# Patient Record
Sex: FEMALE | Race: WHITE | NOT HISPANIC OR LATINO | Employment: OTHER | URBAN - METROPOLITAN AREA
[De-identification: names, ages, dates, MRNs, and addresses within clinical notes are randomized per-mention and may not be internally consistent; named-entity substitution may affect disease eponyms.]

---

## 2017-01-06 ENCOUNTER — TELEPHONE (OUTPATIENT)
Dept: INTERNAL MEDICINE | Facility: CLINIC | Age: 82
End: 2017-01-06

## 2017-01-10 RX ORDER — HYDROCHLOROTHIAZIDE 12.5 MG/1
12.5 TABLET ORAL DAILY
Qty: 90 TABLET | Refills: 0 | Status: SHIPPED | OUTPATIENT
Start: 2017-01-10 | End: 2017-12-27 | Stop reason: HOSPADM

## 2017-01-10 RX ORDER — PRAVASTATIN SODIUM 40 MG
40 TABLET ORAL DAILY
Qty: 90 TABLET | Refills: 2 | Status: SHIPPED | OUTPATIENT
Start: 2017-01-10 | End: 2018-10-22 | Stop reason: SDUPTHER

## 2017-01-12 RX ORDER — PRAVASTATIN SODIUM 40 MG
TABLET ORAL
Qty: 90 TABLET | Refills: 0 | Status: SHIPPED | OUTPATIENT
Start: 2017-01-12 | End: 2017-03-17

## 2017-01-26 ENCOUNTER — OFFICE VISIT (OUTPATIENT)
Dept: INTERNAL MEDICINE | Facility: CLINIC | Age: 82
End: 2017-01-26

## 2017-01-26 VITALS
HEIGHT: 59 IN | BODY MASS INDEX: 27.82 KG/M2 | WEIGHT: 138 LBS | SYSTOLIC BLOOD PRESSURE: 183 MMHG | OXYGEN SATURATION: 94 % | HEART RATE: 77 BPM | DIASTOLIC BLOOD PRESSURE: 84 MMHG | TEMPERATURE: 97.7 F

## 2017-01-26 DIAGNOSIS — M81.0 OSTEOPOROSIS: ICD-10-CM

## 2017-01-26 DIAGNOSIS — M15.9 GENERALIZED OSTEOARTHRITIS: ICD-10-CM

## 2017-01-26 DIAGNOSIS — F32.89 OTHER DEPRESSION: ICD-10-CM

## 2017-01-26 DIAGNOSIS — E78.49 OTHER HYPERLIPIDEMIA: Primary | ICD-10-CM

## 2017-01-26 DIAGNOSIS — J06.9 UPPER RESPIRATORY TRACT INFECTION, UNSPECIFIED TYPE: ICD-10-CM

## 2017-01-26 DIAGNOSIS — I10 ESSENTIAL HYPERTENSION: ICD-10-CM

## 2017-01-26 PROCEDURE — 99214 OFFICE O/P EST MOD 30 MIN: CPT | Performed by: INTERNAL MEDICINE

## 2017-01-26 RX ORDER — AZITHROMYCIN 250 MG/1
TABLET, FILM COATED ORAL
Qty: 6 TABLET | Refills: 0 | Status: SHIPPED | OUTPATIENT
Start: 2017-01-26 | End: 2017-03-17

## 2017-01-26 RX ORDER — METHYLPREDNISOLONE 4 MG/1
TABLET ORAL
Qty: 21 TABLET | Refills: 0 | Status: SHIPPED | OUTPATIENT
Start: 2017-01-26 | End: 2017-03-17

## 2017-01-26 RX ORDER — TRIAMCINOLONE ACETONIDE 1 MG/G
CREAM TOPICAL
COMMUNITY
Start: 2017-01-06 | End: 2017-09-18

## 2017-01-26 NOTE — MR AVS SNAPSHOT
Neha Guillory   1/26/2017 3:30 PM   Office Visit    Dept Phone:  [phone]   Encounter #:  [id number]    Provider:  Wm Mchugh MD   Department:  [facility] INTERNAL MEDICINE                Your Full Care Plan              Today's Medication Changes          These changes are accurate as of: 1/26/17  4:55 PM.  If you have any questions, ask your nurse or doctor.               New Medication(s)Ordered:     azithromycin 250 MG tablet   Commonly known as:  ZITHROMAX   Take 2 tablets the first day, then 1 tablet daily for 4 days.   Started by:  Wm Mchugh MD       MethylPREDNISolone 4 MG tablet   Commonly known as:  MEDROL (ATILIO)   Take as directed on package instructions.   Started by:  Wm Mchugh MD            Where to Get Your Medications      These medications were sent to [address] AT [facility]  - [phone]  - [phone] [place] [address]     Phone:  [phone]     azithromycin 250 MG tablet    MethylPREDNISolone 4 MG tablet                  Your Updated Medication List          This list is accurate as of: 1/26/17  4:55 PM.  Always use your most recent med list.                azithromycin 250 MG tablet   Commonly known as:  ZITHROMAX   Take 2 tablets the first day, then 1 tablet daily for 4 days.       carvedilol 25 MG tablet   Commonly known as:  COREG       CloNIDine 0.2 MG tablet   Commonly known as:  CATAPRES   TAKE TWO TABLETS BY MOUTH EVERY NIGHT AT BEDTIME       hydrochlorothiazide 12.5 MG tablet   Commonly known as:  HYDRODIURIL   Take 1 tablet by mouth Daily.       losartan 100 MG tablet   Commonly known as:  COZAAR       MethylPREDNISolone 4 MG tablet   Commonly known as:  MEDROL (ATILIO)   Take as directed on package instructions.       oxyCODONE-acetaminophen 5-325 MG per tablet   Commonly known as:  ROXICET   1-2 tabs q 4 hour  prn pain       * pravastatin 40 MG tablet   Commonly known as:  PRAVACHOL   Take 1 tablet by mouth Daily.       * pravastatin 40 MG tablet   Commonly known as:  PRAVACHOL   TAKE ONE TABLET BY MOUTH DAILY       triamcinolone 0.1 % cream   Commonly known as:  KENALOG       * Notice:  This list has 2 medication(s) that are the same as other medications prescribed for you. Read the directions carefully, and ask your doctor or other care provider to review them with you.            We Performed the Following     CBC & Differential     Comprehensive Metabolic Panel     Lipid Panel With LDL / HDL Ratio     T4, Free     TSH     Urinalysis With Microscopic       You Were Diagnosed With        Codes Comments    Other hyperlipidemia    -  Primary ICD-10-CM: E78.4  ICD-9-CM: 272.4     Essential hypertension     ICD-10-CM: I10  ICD-9-CM: 401.9     Generalized osteoarthritis     ICD-10-CM: M15.9  ICD-9-CM: 715.00     Osteoporosis     ICD-10-CM: M81.0  ICD-9-CM: 733.00     Other depression     ICD-10-CM: F32.89     Upper respiratory tract infection, unspecified type     ICD-10-CM: J06.9  ICD-9-CM: 465.9       Medications to be Given to You by a Medical Professional     Due       Frequency    2/8/2016 denosumab (PROLIA) syringe 60 mg  Once      Instructions     None    Patient Instructions History      Upcoming Appointments     Visit Type Date Time Department    OFFICE VISIT 1/26/2017  3:30 PM [facility] 1 4003    INJECTION 2/8/2017  1:00 PM  [facility] OP INFU NON ONC    [facility] 3/3/2017  9:00 AM [facility]    OFFICE VISIT 3/17/2017  9:00 AM [facility]    OFFICE VISIT 6/29/2017  9:45 AM [facility] 1 4003    OFFICE VISIT 7/26/2017 10:45 AM [facility] 1 4003      MyChart Signup     Our records indicate that you have declined [employer] signup. If you would like to sign up for [employer], please email [email or web address] or call [phone] to obtain an activation code.             Other Info  "from Your Visit           Your Appointments     Feb 08, 2017  1:00 PM EST   Injection with ROOM 5 [facility]    [address]  [facility] [address] [phone]            Mar 03, 2017  9:00 AM EST   [facility] with [facility], [facility]   [facility] ENDOCRINOLOGY (--)    [address]  [facility] [address] [phone]            Mar 17, 2017  9:00 AM EDT   Office Visit with ENZO Ricci   [facility] ENDOCRINOLOGY (--)    [address]  [facility] [address] [phone]           Arrive 15 minutes prior to appointment.            Jun 29, 2017  9:45 AM EDT   Office Visit with Wm Mchugh MD   [facility] INTERNAL MEDICINE (--)    [address]  [facility] [address] [phone]           Arrive 15 minutes prior to appointment.            Jul 26, 2017 10:45 AM EDT   Office Visit with Wm Mchugh MD   [facility] INTERNAL MEDICINE (--)    [address]  [facility] [address] [phone]           Arrive 15 minutes prior to appointment.              Allergies     Atorvastatin      myalgias  myalgias  myalgias    Amoxicillin  Hives      Vital Signs     Blood Pressure Pulse Temperature Height Weight Oxygen Saturation    183/84 (BP Location: Left arm, Patient Position: Sitting, Cuff Size: Adult) 77 97.7 °F (36.5 °C) (Tympanic) 59\" (149.9 cm) 138 lb (62.6 kg) 94%    Body Mass Index Smoking Status                27.87 kg/m2 Never Smoker          Problems and Diagnoses Noted     Depression    Generalized osteoarthritis    High cholesterol or triglycerides    High blood pressure    Osteoporosis    Upper respiratory infection      No Longer an Issue     Rash and nonspecific skin eruption        "

## 2017-01-26 NOTE — PROGRESS NOTES
Subjective   Neha Guillory is a 83 y.o. female.   She is here today for hyperlipidemia hypertension generalized osteoarthritis osteoporosis depression and has never respiratory tract infection today  History of Present Illness   She is here today for an upper respiratory tract infection today along with hyper lipidemia hypertension generalized osteoarthritis osteoporosis depression and she forgot HER-2 p.m. blood pressure medicine today  The following portions of the patient's history were reviewed and updated as appropriate: allergies, current medications, past family history, past medical history, past social history, past surgical history and problem list.    Review of Systems   HENT: Positive for congestion and postnasal drip.    All other systems reviewed and are negative.      Objective   Physical Exam   Constitutional: She is oriented to person, place, and time. She appears well-developed and well-nourished. She is cooperative.   HENT:   Head: Normocephalic and atraumatic.   Right Ear: Hearing, tympanic membrane, external ear and ear canal normal.   Left Ear: Hearing, tympanic membrane, external ear and ear canal normal.   Nose: Nose normal.   Mouth/Throat: Oropharyngeal exudate (upper respiratory congestion) present.   Eyes: Conjunctivae, EOM and lids are normal. Pupils are equal, round, and reactive to light.   Neck: Phonation normal. Neck supple. Carotid bruit is not present.   Cardiovascular: Normal rate, regular rhythm and normal heart sounds.  Exam reveals no gallop and no friction rub.    No murmur heard.  Pulmonary/Chest: Effort normal and breath sounds normal. No respiratory distress.   Abdominal: Soft. Bowel sounds are normal. She exhibits no distension and no mass. There is no hepatosplenomegaly. There is no tenderness. There is no rebound and no guarding. No hernia.   Musculoskeletal: She exhibits no edema.   Neurological: She is alert and oriented to person, place, and time. Coordination and gait  normal.   Skin: Skin is warm and dry.   Psychiatric: She has a normal mood and affect. Her speech is normal and behavior is normal. Judgment and thought content normal.   Nursing note and vitals reviewed.      Assessment/Plan   Diagnoses and all orders for this visit:    Other hyperlipidemia  -     Lipid Panel With LDL / HDL Ratio  -     CBC & Differential  -     Comprehensive Metabolic Panel  -     T4, Free  -     TSH  -     Urinalysis With Microscopic    Essential hypertension  -     Lipid Panel With LDL / HDL Ratio  -     CBC & Differential  -     Comprehensive Metabolic Panel  -     T4, Free  -     TSH  -     Urinalysis With Microscopic    Generalized osteoarthritis  -     Lipid Panel With LDL / HDL Ratio  -     CBC & Differential  -     Comprehensive Metabolic Panel  -     T4, Free  -     TSH  -     Urinalysis With Microscopic    Osteoporosis  -     Lipid Panel With LDL / HDL Ratio  -     CBC & Differential  -     Comprehensive Metabolic Panel  -     T4, Free  -     TSH  -     Urinalysis With Microscopic    Other depression  -     Lipid Panel With LDL / HDL Ratio  -     CBC & Differential  -     Comprehensive Metabolic Panel  -     T4, Free  -     TSH  -     Urinalysis With Microscopic    Upper respiratory tract infection, unspecified type  -     Lipid Panel With LDL / HDL Ratio  -     CBC & Differential  -     Comprehensive Metabolic Panel  -     T4, Free  -     TSH  -     Urinalysis With Microscopic    Other orders  -     azithromycin (ZITHROMAX) 250 MG tablet; Take 2 tablets the first day, then 1 tablet daily for 4 days.  -     MethylPREDNISolone (MEDROL, ATILIO,) 4 MG tablet; Take as directed on package instructions.  -     Microscopic Examination      Hyper lipidemia keep LDL less than 70 with proper diet exercise medication  Osteoporosis regular DEXA scans bisphosphonates calcium vitamin D  Depression stable on current medication  Upper respiratory tract infection medication as noted  Generalized  osteoporosis supportive meds physical therapy  Hypertension normally well controlled but she forgot HER-2 p.m. blood pressure medication today    She forgot her 2 pm b/p med today

## 2017-01-27 LAB
ALBUMIN SERPL-MCNC: 4.5 G/DL (ref 3.5–5.2)
ALBUMIN/GLOB SERPL: 1.9 G/DL
ALP SERPL-CCNC: 56 U/L (ref 39–117)
ALT SERPL-CCNC: 12 U/L (ref 1–33)
APPEARANCE UR: CLEAR
AST SERPL-CCNC: 16 U/L (ref 1–32)
BACTERIA #/AREA URNS HPF: ABNORMAL /HPF
BASOPHILS # BLD AUTO: 0.03 10*3/MM3 (ref 0–0.2)
BASOPHILS NFR BLD AUTO: 0.3 % (ref 0–1.5)
BILIRUB SERPL-MCNC: 0.7 MG/DL (ref 0.1–1.2)
BILIRUB UR QL STRIP: NEGATIVE
BUN SERPL-MCNC: 20 MG/DL (ref 8–23)
BUN/CREAT SERPL: 20.2 (ref 7–25)
CALCIUM SERPL-MCNC: 9.7 MG/DL (ref 8.6–10.5)
CASTS URNS MICRO: ABNORMAL
CHLORIDE SERPL-SCNC: 99 MMOL/L (ref 98–107)
CHOLEST SERPL-MCNC: 137 MG/DL (ref 0–200)
CO2 SERPL-SCNC: 30.2 MMOL/L (ref 22–29)
COLOR UR: YELLOW
CREAT SERPL-MCNC: 0.99 MG/DL (ref 0.57–1)
EOSINOPHIL # BLD AUTO: 0.38 10*3/MM3 (ref 0–0.7)
EOSINOPHIL NFR BLD AUTO: 4 % (ref 0.3–6.2)
EPI CELLS #/AREA URNS HPF: ABNORMAL /HPF
ERYTHROCYTE [DISTWIDTH] IN BLOOD BY AUTOMATED COUNT: 12.9 % (ref 11.7–13)
GLOBULIN SER CALC-MCNC: 2.4 GM/DL
GLUCOSE SERPL-MCNC: 93 MG/DL (ref 65–99)
GLUCOSE UR QL: NEGATIVE
HCT VFR BLD AUTO: 42.4 % (ref 35.6–45.5)
HDLC SERPL-MCNC: 49 MG/DL (ref 40–60)
HGB BLD-MCNC: 13.9 G/DL (ref 11.9–15.5)
HGB UR QL STRIP: NEGATIVE
IMM GRANULOCYTES # BLD: 0.05 10*3/MM3 (ref 0–0.03)
IMM GRANULOCYTES NFR BLD: 0.5 % (ref 0–0.5)
KETONES UR QL STRIP: NEGATIVE
LDLC SERPL CALC-MCNC: 72 MG/DL (ref 0–100)
LDLC/HDLC SERPL: 1.47 {RATIO}
LEUKOCYTE ESTERASE UR QL STRIP: (no result)
LYMPHOCYTES # BLD AUTO: 1.28 10*3/MM3 (ref 0.9–4.8)
LYMPHOCYTES NFR BLD AUTO: 13.4 % (ref 19.6–45.3)
MCH RBC QN AUTO: 30 PG (ref 26.9–32)
MCHC RBC AUTO-ENTMCNC: 32.8 G/DL (ref 32.4–36.3)
MCV RBC AUTO: 91.6 FL (ref 80.5–98.2)
MONOCYTES # BLD AUTO: 1.14 10*3/MM3 (ref 0.2–1.2)
MONOCYTES NFR BLD AUTO: 11.9 % (ref 5–12)
NEUTROPHILS # BLD AUTO: 6.7 10*3/MM3 (ref 1.9–8.1)
NEUTROPHILS NFR BLD AUTO: 69.9 % (ref 42.7–76)
NITRITE UR QL STRIP: NEGATIVE
PH UR STRIP: 6 [PH] (ref 5–8)
PLATELET # BLD AUTO: 256 10*3/MM3 (ref 140–500)
POTASSIUM SERPL-SCNC: 4.3 MMOL/L (ref 3.5–5.2)
PROT SERPL-MCNC: 6.9 G/DL (ref 6–8.5)
PROT UR QL STRIP: NEGATIVE
RBC # BLD AUTO: 4.63 10*6/MM3 (ref 3.9–5.2)
RBC #/AREA URNS HPF: ABNORMAL /HPF
SODIUM SERPL-SCNC: 140 MMOL/L (ref 136–145)
SP GR UR: 1.01 (ref 1–1.03)
T4 FREE SERPL-MCNC: 1.72 NG/DL (ref 0.93–1.7)
TRIGL SERPL-MCNC: 79 MG/DL (ref 0–150)
TSH SERPL DL<=0.005 MIU/L-ACNC: 1.13 MIU/ML (ref 0.27–4.2)
UROBILINOGEN UR STRIP-MCNC: (no result) MG/DL
VLDLC SERPL CALC-MCNC: 15.8 MG/DL (ref 5–40)
WBC # BLD AUTO: 9.58 10*3/MM3 (ref 4.5–10.7)
WBC #/AREA URNS HPF: ABNORMAL /HPF

## 2017-02-06 DIAGNOSIS — M81.0 OSTEOPOROSIS: Primary | ICD-10-CM

## 2017-02-06 DIAGNOSIS — E55.9 VITAMIN D DEFICIENCY: Primary | ICD-10-CM

## 2017-02-06 DIAGNOSIS — M81.0 OSTEOPOROSIS: ICD-10-CM

## 2017-02-06 DIAGNOSIS — M85.80 OSTEOPENIA: ICD-10-CM

## 2017-02-07 PROBLEM — M81.0 SENILE OSTEOPOROSIS: Status: ACTIVE | Noted: 2017-02-07

## 2017-02-08 ENCOUNTER — HOSPITAL ENCOUNTER (OUTPATIENT)
Dept: INFUSION THERAPY | Facility: HOSPITAL | Age: 82
Discharge: HOME | End: 2017-02-08
Admitting: INTERNAL MEDICINE

## 2017-02-08 VITALS
BODY MASS INDEX: 27.82 KG/M2 | SYSTOLIC BLOOD PRESSURE: 124 MMHG | HEIGHT: 59 IN | WEIGHT: 138 LBS | OXYGEN SATURATION: 92 % | TEMPERATURE: 97.9 F | RESPIRATION RATE: 20 BRPM | DIASTOLIC BLOOD PRESSURE: 68 MMHG | HEART RATE: 70 BPM

## 2017-02-08 DIAGNOSIS — M81.0 SENILE OSTEOPOROSIS: ICD-10-CM

## 2017-02-08 PROCEDURE — 96401 CHEMO ANTI-NEOPL SQ/IM: CPT

## 2017-02-08 PROCEDURE — 25010000002 DENOSUMAB 60 MG/ML SOLUTION: Performed by: INTERNAL MEDICINE

## 2017-02-08 RX ADMIN — DENOSUMAB 60 MG: 60 INJECTION SUBCUTANEOUS at 13:12

## 2017-02-20 DIAGNOSIS — E21.3 HYPERPARATHYROIDISM (HCC): ICD-10-CM

## 2017-02-20 DIAGNOSIS — E83.52 HYPERCALCEMIA: ICD-10-CM

## 2017-02-20 DIAGNOSIS — E79.0 HYPERURICEMIA: ICD-10-CM

## 2017-02-20 DIAGNOSIS — E89.2 H/O PARATHYROIDECTOMY (HCC): ICD-10-CM

## 2017-02-20 DIAGNOSIS — E55.9 VITAMIN D DEFICIENCY: Primary | ICD-10-CM

## 2017-02-24 RX ORDER — OSELTAMIVIR PHOSPHATE 75 MG/1
75 CAPSULE ORAL 2 TIMES DAILY
Qty: 10 CAPSULE | Refills: 0 | Status: SHIPPED | OUTPATIENT
Start: 2017-02-24 | End: 2017-03-17

## 2017-03-07 ENCOUNTER — LAB (OUTPATIENT)
Dept: ENDOCRINOLOGY | Age: 82
End: 2017-03-07

## 2017-03-07 DIAGNOSIS — E79.0 HYPERURICEMIA: ICD-10-CM

## 2017-03-07 DIAGNOSIS — E83.52 HYPERCALCEMIA: ICD-10-CM

## 2017-03-07 DIAGNOSIS — E89.2 H/O PARATHYROIDECTOMY (HCC): ICD-10-CM

## 2017-03-07 DIAGNOSIS — E55.9 VITAMIN D DEFICIENCY: ICD-10-CM

## 2017-03-07 DIAGNOSIS — E21.3 HYPERPARATHYROIDISM (HCC): ICD-10-CM

## 2017-03-08 LAB
25(OH)D3+25(OH)D2 SERPL-MCNC: 31.7 NG/ML (ref 30–100)
ALBUMIN SERPL-MCNC: 4.3 G/DL (ref 3.5–5.2)
ALBUMIN/GLOB SERPL: 1.9 G/DL
ALP SERPL-CCNC: 42 U/L (ref 39–117)
ALT SERPL-CCNC: 16 U/L (ref 1–33)
AST SERPL-CCNC: 21 U/L (ref 1–32)
BILIRUB SERPL-MCNC: 0.4 MG/DL (ref 0.1–1.2)
BUN SERPL-MCNC: 31 MG/DL (ref 8–23)
BUN/CREAT SERPL: 26.5 (ref 7–25)
CA-I SERPL ISE-MCNC: 5.5 MG/DL (ref 4.5–5.6)
CALCIUM SERPL-MCNC: 9.5 MG/DL (ref 8.6–10.5)
CHLORIDE SERPL-SCNC: 101 MMOL/L (ref 98–107)
CO2 SERPL-SCNC: 27.9 MMOL/L (ref 22–29)
CREAT SERPL-MCNC: 1.17 MG/DL (ref 0.57–1)
GLOBULIN SER CALC-MCNC: 2.3 GM/DL
GLUCOSE SERPL-MCNC: 109 MG/DL (ref 65–99)
MAGNESIUM SERPL-MCNC: 2.1 MG/DL (ref 1.6–2.4)
PHOSPHATE SERPL-MCNC: 4.4 MG/DL (ref 2.5–4.5)
POTASSIUM SERPL-SCNC: 4.1 MMOL/L (ref 3.5–5.2)
PROT SERPL-MCNC: 6.6 G/DL (ref 6–8.5)
PTH-INTACT SERPL-MCNC: 47 PG/ML (ref 15–65)
SODIUM SERPL-SCNC: 141 MMOL/L (ref 136–145)
URATE SERPL-MCNC: 7.4 MG/DL (ref 2.4–5.7)

## 2017-03-17 ENCOUNTER — OFFICE VISIT (OUTPATIENT)
Dept: ENDOCRINOLOGY | Age: 82
End: 2017-03-17

## 2017-03-17 VITALS
HEIGHT: 59 IN | SYSTOLIC BLOOD PRESSURE: 138 MMHG | DIASTOLIC BLOOD PRESSURE: 80 MMHG | WEIGHT: 137 LBS | BODY MASS INDEX: 27.62 KG/M2

## 2017-03-17 DIAGNOSIS — E89.2 H/O PARATHYROIDECTOMY (HCC): ICD-10-CM

## 2017-03-17 DIAGNOSIS — E79.0 HYPERURICEMIA: ICD-10-CM

## 2017-03-17 DIAGNOSIS — I10 ESSENTIAL HYPERTENSION: ICD-10-CM

## 2017-03-17 DIAGNOSIS — E78.49 OTHER HYPERLIPIDEMIA: ICD-10-CM

## 2017-03-17 DIAGNOSIS — E55.9 VITAMIN D DEFICIENCY: ICD-10-CM

## 2017-03-17 DIAGNOSIS — E83.52 HYPERCALCEMIA: Primary | ICD-10-CM

## 2017-03-17 PROBLEM — I51.89 DIASTOLIC DYSFUNCTION: Status: ACTIVE | Noted: 2017-03-17

## 2017-03-17 PROCEDURE — 99214 OFFICE O/P EST MOD 30 MIN: CPT | Performed by: NURSE PRACTITIONER

## 2017-03-17 RX ORDER — CLONIDINE HYDROCHLORIDE 0.2 MG/1
0.2 TABLET ORAL 2 TIMES DAILY
COMMUNITY
Start: 2017-02-13 | End: 2018-10-04 | Stop reason: SDUPTHER

## 2017-03-17 RX ORDER — CARVEDILOL 25 MG/1
25 TABLET ORAL
COMMUNITY
Start: 2017-02-13 | End: 2017-03-17 | Stop reason: SDUPTHER

## 2017-03-17 RX ORDER — LOSARTAN POTASSIUM 100 MG/1
100 TABLET ORAL DAILY
Status: ON HOLD | COMMUNITY
Start: 2017-02-13 | End: 2017-12-27

## 2017-03-17 RX ORDER — HYDROCHLOROTHIAZIDE 12.5 MG/1
12.5 TABLET ORAL
COMMUNITY
Start: 2017-02-13 | End: 2017-03-17 | Stop reason: SDUPTHER

## 2017-03-17 NOTE — PROGRESS NOTES
"Carol Guillory is a 84 y.o. female is here today for follow-up.  Chief Complaint   Patient presents with   • Hyperlipidemia     has recent labs   • Hypertension     tingling in both hands   • Vitamin D Deficiency   • hyperparathyroidism   • Abnormal Calcium   • hyperuricemia     Visit Vitals   • /62   • Ht 59\" (149.9 cm)   • Wt 137 lb (62.1 kg)   • BMI 27.67 kg/m2     Allergies   Allergen Reactions   • Atorvastatin      myalgias  myalgias  myalgias   • Amoxicillin Hives       Current Outpatient Prescriptions:   •  B COMPLEX VITAMINS PO, Take 1 tablet by mouth., Disp: , Rfl:   •  carvedilol (COREG) 25 MG tablet, Take 1 tablet by mouth daily., Disp: , Rfl:   •  CloNIDine (CATAPRES) 0.2 MG tablet, Take 0.1 mg by mouth 2 (Two) Times a Day. One in the am one at night, Disp: , Rfl:   •  hydrochlorothiazide (HYDRODIURIL) 12.5 MG tablet, Take 1 tablet by mouth Daily., Disp: 90 tablet, Rfl: 0  •  losartan (COZAAR) 100 MG tablet, Take 100 mg by mouth., Disp: , Rfl:   •  MULTIPLE VITAMINS-MINERALS PO, Take 1 tablet by mouth., Disp: , Rfl:   •  pravastatin (PRAVACHOL) 40 MG tablet, Take 1 tablet by mouth Daily., Disp: 90 tablet, Rfl: 2  •  triamcinolone (KENALOG) 0.1 % cream, , Disp: , Rfl:     Current Facility-Administered Medications:   •  denosumab (PROLIA) syringe 60 mg, 60 mg, Subcutaneous, Once, Wm Mchugh MD  Family History   Problem Relation Age of Onset   • Colon cancer Sister    • Heart disease Brother      CABG   • Other Brother      BRAIN TUMOR   • Cancer Other      Social History     Social History   • Marital status: Single     Spouse name: N/A   • Number of children: N/A   • Years of education: N/A     Social History Main Topics   • Smoking status: Never Smoker   • Smokeless tobacco: Never Used   • Alcohol use No   • Drug use: No   • Sexual activity: Defer     Other Topics Concern   • None     Social History Narrative         History of Present Illness   Encounter Diagnoses   Name Primary? "   • Other hyperlipidemia    • Essential hypertension    • Vitamin D deficiency    • Hypercalcemia Yes   • H/O parathyroidectomy    • Hyperuricemia      This is an 84-year-old female patient being seen today for routine follow-up visit for the above-mentioned problems.  She had a parathyroidectomy done in April of 2016 by Dr Marina and she has had no issues since. Her recent labs were reviewed and she was provided a copy. Her calcium level is normal. Her Cr has increased and gfr has decreased since her last labs. She admits she does not drink enough water and her labs were drawn in the morning and she had not drank anything since the night before. She also complains of leg cramps at night but this is not a new problem. She believes it is due to not drinking enough water. Her vitamin D level was at the low end of normal. Her uric acid level was elevated and she denies any history of gout. Her BP was elevated on the first attempt however it was rechecked and was stable. She does have tingling in her hands at night but she has had this for awhile. Her PCP believes it is carpel tunnel. She denies any other complaints.       The following portions of the patient's history were reviewed and updated as appropriate: allergies, current medications, past family history, past medical history, past social history, past surgical history and problem list.    Review of Systems   Constitutional: Negative for fatigue.   HENT: Negative for trouble swallowing.    Eyes: Negative for visual disturbance.   Respiratory: Negative for shortness of breath.    Cardiovascular: Negative for leg swelling.   Gastrointestinal: Negative for nausea.   Endocrine: Negative for polyphagia.   Genitourinary: Negative for urgency.   Musculoskeletal: Negative for joint swelling.   Skin: Negative for wound.   Allergic/Immunologic: Negative for immunocompromised state.   Neurological: Negative for numbness.   Hematological: Negative for adenopathy.    Psychiatric/Behavioral: The patient is not nervous/anxious.        Objective   Physical Exam   Constitutional: She is oriented to person, place, and time. She appears well-developed and well-nourished. No distress.   HENT:   Head: Normocephalic.   Right Ear: External ear normal.   Left Ear: External ear normal.   Nose: Nose normal.   Mouth/Throat: Oropharynx is clear and moist.   Eyes: Conjunctivae and EOM are normal. Pupils are equal, round, and reactive to light.   Neck: Normal range of motion. Neck supple. Carotid bruit is not present. Edema present. No tracheal deviation and no erythema present. No thyromegaly present.   Cardiovascular: Normal rate, regular rhythm and intact distal pulses.  Exam reveals no gallop and no friction rub.    Murmur heard.  Pulmonary/Chest: Effort normal and breath sounds normal. No respiratory distress.   Abdominal: Soft.   Musculoskeletal: Normal range of motion. She exhibits no edema.   Trace edema. Varicose veins   Lymphadenopathy:     She has no cervical adenopathy.   Neurological: She is alert and oriented to person, place, and time.   Leg cramps at night sometimes   Skin: Skin is warm and dry. She is not diaphoretic. No erythema. No pallor.   Psychiatric: She has a normal mood and affect. Her behavior is normal. Judgment and thought content normal.   Nursing note and vitals reviewed.    Results for orders placed or performed in visit on 03/07/17   Comprehensive Metabolic Panel   Result Value Ref Range    Glucose 109 (H) 65 - 99 mg/dL    BUN 31 (H) 8 - 23 mg/dL    Creatinine 1.17 (H) 0.57 - 1.00 mg/dL    eGFR Non African Am 44 (L) >60 mL/min/1.73    eGFR African Am 53 (L) >60 mL/min/1.73    BUN/Creatinine Ratio 26.5 (H) 7.0 - 25.0    Sodium 141 136 - 145 mmol/L    Potassium 4.1 3.5 - 5.2 mmol/L    Chloride 101 98 - 107 mmol/L    Total CO2 27.9 22.0 - 29.0 mmol/L    Calcium 9.5 8.6 - 10.5 mg/dL    Total Protein 6.6 6.0 - 8.5 g/dL    Albumin 4.30 3.50 - 5.20 g/dL    Globulin 2.3  gm/dL    A/G Ratio 1.9 g/dL    Total Bilirubin 0.4 0.1 - 1.2 mg/dL    Alkaline Phosphatase 42 39 - 117 U/L    AST (SGOT) 21 1 - 32 U/L    ALT (SGPT) 16 1 - 33 U/L   Vitamin D 25 Hydroxy   Result Value Ref Range    25 Hydroxy, Vitamin D 31.7 30.0 - 100.0 ng/mL   Calcium, Ionized   Result Value Ref Range    Ionized Calcium 5.5 4.5 - 5.6 mg/dL   Phosphorus   Result Value Ref Range    Phosphorus 4.4 2.5 - 4.5 mg/dL   Magnesium   Result Value Ref Range    Magnesium 2.1 1.6 - 2.4 mg/dL   Uric Acid   Result Value Ref Range    Uric Acid 7.4 (H) 2.4 - 5.7 mg/dL   PTH, Intact   Result Value Ref Range    PTH, Intact 47 15 - 65 pg/mL         Assessment/Plan   Neha was seen today for hyperlipidemia, hypertension, vitamin d deficiency, hyperparathyroidism, abnormal calcium and hyperuricemia.    Diagnoses and all orders for this visit:    Hypercalcemia    Other hyperlipidemia    Essential hypertension    Vitamin D deficiency    H/O parathyroidectomy    Hyperuricemia    In summary, patient was seen and examined. She had recent labs done which were reviewed and she was provided a copy. Her calcium level was normal on her most recent labs. Her uric acid level is elevated and she denies any history of gout. We discussed possible medications used to treat hyperuricemia and she has chosen not to use medication at this time and instead watch her diet and limit the amount of purines. She was provided written materials on gout and hyperuricemia and foods to avoid that are high in purine. She verbalizes understanding. We also discussed her kidney function per her recent labs and she was advised to increase her water intake and we will continue to monitor this. Since her vitamin D is at the low end of normal, it was recommended for her to take OTC vitamin D 2,000u a day. Her cholesterol is stable. She is to follow up in 6 months with labs prior. I have asked that she contact the office with any questions or concerns prior to her next visit.

## 2017-04-10 RX ORDER — PRAVASTATIN SODIUM 40 MG
TABLET ORAL
Qty: 90 TABLET | Refills: 0 | Status: SHIPPED | OUTPATIENT
Start: 2017-04-10 | End: 2017-08-09 | Stop reason: SDUPTHER

## 2017-07-21 DIAGNOSIS — E55.9 VITAMIN D DEFICIENCY: ICD-10-CM

## 2017-07-21 DIAGNOSIS — E78.49 OTHER HYPERLIPIDEMIA: Primary | ICD-10-CM

## 2017-07-21 DIAGNOSIS — E83.52 HYPERCALCEMIA: ICD-10-CM

## 2017-07-21 DIAGNOSIS — E79.0 HYPERURICEMIA: ICD-10-CM

## 2017-07-26 ENCOUNTER — HOSPITAL ENCOUNTER (OUTPATIENT)
Dept: GENERAL RADIOLOGY | Facility: HOSPITAL | Age: 82
Discharge: HOME | End: 2017-07-26
Attending: INTERNAL MEDICINE | Admitting: INTERNAL MEDICINE

## 2017-07-26 ENCOUNTER — OFFICE VISIT (OUTPATIENT)
Dept: INTERNAL MEDICINE | Facility: CLINIC | Age: 82
End: 2017-07-26

## 2017-07-26 VITALS
DIASTOLIC BLOOD PRESSURE: 73 MMHG | OXYGEN SATURATION: 92 % | BODY MASS INDEX: 28.22 KG/M2 | TEMPERATURE: 97.8 F | WEIGHT: 140 LBS | HEART RATE: 59 BPM | HEIGHT: 59 IN | SYSTOLIC BLOOD PRESSURE: 147 MMHG

## 2017-07-26 DIAGNOSIS — Z00.00 MEDICARE ANNUAL WELLNESS VISIT, INITIAL: Primary | ICD-10-CM

## 2017-07-26 DIAGNOSIS — M25.552 HIP PAIN, LEFT: ICD-10-CM

## 2017-07-26 DIAGNOSIS — M81.0 OSTEOPOROSIS: ICD-10-CM

## 2017-07-26 DIAGNOSIS — I27.20 PULMONARY HYPERTENSION (HCC): ICD-10-CM

## 2017-07-26 DIAGNOSIS — G47.33 OSA ON CPAP: ICD-10-CM

## 2017-07-26 DIAGNOSIS — I51.89 DIASTOLIC DYSFUNCTION: ICD-10-CM

## 2017-07-26 DIAGNOSIS — E78.49 OTHER HYPERLIPIDEMIA: ICD-10-CM

## 2017-07-26 DIAGNOSIS — I10 ESSENTIAL HYPERTENSION: ICD-10-CM

## 2017-07-26 DIAGNOSIS — Z99.89 OSA ON CPAP: ICD-10-CM

## 2017-07-26 DIAGNOSIS — M20.41 HAMMER TOE OF SECOND TOE OF RIGHT FOOT: ICD-10-CM

## 2017-07-26 DIAGNOSIS — I42.2 PRIMARY HYPERTROPHIC CARDIOMYOPATHY (HCC): ICD-10-CM

## 2017-07-26 PROCEDURE — 99214 OFFICE O/P EST MOD 30 MIN: CPT | Performed by: INTERNAL MEDICINE

## 2017-07-26 PROCEDURE — G0438 PPPS, INITIAL VISIT: HCPCS | Performed by: INTERNAL MEDICINE

## 2017-07-26 PROCEDURE — 73502 X-RAY EXAM HIP UNI 2-3 VIEWS: CPT

## 2017-07-26 NOTE — PROGRESS NOTES
QUICK REFERENCE INFORMATION:  The ABCs of the Annual Wellness Visit    Initial Medicare Wellness Visit    HEALTH RISK ASSESSMENT    2/4/1933    Recent Hospitalizations:  No hospitalization(s) within the last year..        Current Medical Providers:  Patient Care Team:  Wm Mchugh MD as PCP - General (Internal Medicine)  Wm Mchugh MD as PCP - Claims Attributed        Smoking Status:  History   Smoking Status   • Never Smoker   Smokeless Tobacco   • Never Used       Alcohol Consumption:  History   Alcohol Use No       Depression Screen:   PHQ-9 Depression Screening 7/26/2017   Little interest or pleasure in doing things 0   Feeling down, depressed, or hopeless 0   PHQ-9 Total Score 0       Health Habits and Functional and Cognitive Screening:  Functional & Cognitive Status 7/26/2017   Do you have difficulty preparing food and eating? No   Do you have difficulty bathing yourself? No   Do you have difficulty getting dressed? No   Do you have difficulty using the toilet? No   Do you have difficulty moving around from place to place? No   In the past year have you fallen or experienced a near fall? Yes   Do you need help using the phone?  No   Are you deaf or do you have serious difficulty hearing?  No   Do you need help with transportation? No   Do you need help shopping? No   Do you need help preparing meals?  No   Do you need help with housework?  No   Do you need help with laundry? No   Do you need help taking your medications? No   Do you need help managing money? No   Do you have difficulty concentrating, remembering or making decisions? No       Health Habits  Current Diet: Well Balanced Diet  Dental Exam: Up to date  Eye Exam: Up to date  Exercise (times per week): 0 times per week  Current Exercise Activities Include: None          Does the patient have evidence of cognitive impairment? No    Asiprin use counseling: Start ASA 81 mg daily       Recent Lab Results:    Visual Acuity:  No exam data  present    Age-appropriate Screening Schedule:  Refer to the list below for future screening recommendations based on patient's age, sex and/or medical conditions. Orders for these recommended tests are listed in the plan section. The patient has been provided with a written plan.    Health Maintenance   Topic Date Due   • TDAP/TD VACCINES (1 - Tdap) 02/04/1952   • ZOSTER VACCINE  02/04/2016   • DXA SCAN  07/14/2017   • INFLUENZA VACCINE  08/01/2017   • PNEUMOCOCCAL VACCINES (65+ LOW/MEDIUM RISK) (2 of 2 - PPSV23) 01/26/2018   • LIPID PANEL  01/26/2018   • MAMMOGRAM  06/06/2018        Subjective   History of Present Illness    Neha Guillory is a 84 y.o. female who presents for an Annual Wellness Visit.    The following portions of the patient's history were reviewed and updated as appropriate: allergies, current medications, past family history, past medical history, past social history, past surgical history and problem list.    Outpatient Medications Prior to Visit   Medication Sig Dispense Refill   • B COMPLEX VITAMINS PO Take 1 tablet by mouth.     • carvedilol (COREG) 25 MG tablet Take 1 tablet by mouth daily.     • CloNIDine (CATAPRES) 0.2 MG tablet Take 0.1 mg by mouth 2 (Two) Times a Day. One in the am one at night     • hydrochlorothiazide (HYDRODIURIL) 12.5 MG tablet Take 1 tablet by mouth Daily. 90 tablet 0   • losartan (COZAAR) 100 MG tablet Take 100 mg by mouth.     • MULTIPLE VITAMINS-MINERALS PO Take 1 tablet by mouth.     • pravastatin (PRAVACHOL) 40 MG tablet Take 1 tablet by mouth Daily. 90 tablet 2   • pravastatin (PRAVACHOL) 40 MG tablet TAKE ONE TABLET BY MOUTH DAILY 90 tablet 0   • triamcinolone (KENALOG) 0.1 % cream        Facility-Administered Medications Prior to Visit   Medication Dose Route Frequency Provider Last Rate Last Dose   • denosumab (PROLIA) syringe 60 mg  60 mg Subcutaneous Once Wm Mchugh MD           Patient Active Problem List   Diagnosis   • Hypercalcemia   •  "Hyperlipidemia   • Essential hypertension   • Primary hypertrophic cardiomyopathy   • Depression   • Generalized osteoarthritis   • Obstructive sleep apnea syndrome   • Osteoporosis   • Thrombophlebitis of deep veins of lower extremity   • Vitamin D deficiency   • Colon polyps   • Family hx of colon cancer   • H/O parathyroidectomy   • Pulmonary hypertension   • Echocardiogram abnormal   • Stenosis of carotid artery   • Ischemic rest pain of lower extremity   • Bilateral carpal tunnel syndrome   • Visit for screening mammogram   • Osteopenia   • Hyperuricemia   • Allergic reaction caused by a drug   • Upper respiratory infection   • Senile osteoporosis   • Diastolic dysfunction       Advance Care Planning:  has an advance directive - a copy HAS NOT been provided    Identification of Risk Factors:  Risk factors include: weight .    Review of Systems    Compared to one year ago, the patient feels her physical health is worse.  Compared to one year ago, the patient feels her mental health is the same.    Objective     Physical Exam    Vitals:    07/26/17 1103   BP: 147/73   BP Location: Left arm   Patient Position: Sitting   Cuff Size: Adult   Pulse: 59   Temp: 97.8 °F (36.6 °C)   TempSrc: Tympanic   SpO2: 92%   Weight: 140 lb (63.5 kg)   Height: 59\" (149.9 cm)   PainSc:   4       Body mass index is 28.28 kg/(m^2).  Discussed the patient's BMI with her. The BMI is above average; BMI management plan is completed.    Assessment/Plan   Patient Self-Management and Personalized Health Advice  The patient has been provided with information about: weight management and preventive services including:   · Nutrition counseling provided.    Visit Diagnoses:  No diagnosis found.    No orders of the defined types were placed in this encounter.      Outpatient Encounter Prescriptions as of 7/26/2017   Medication Sig Dispense Refill   • B COMPLEX VITAMINS PO Take 1 tablet by mouth.     • carvedilol (COREG) 25 MG tablet Take 1 tablet " by mouth daily.     • CloNIDine (CATAPRES) 0.2 MG tablet Take 0.1 mg by mouth 2 (Two) Times a Day. One in the am one at night     • hydrochlorothiazide (HYDRODIURIL) 12.5 MG tablet Take 1 tablet by mouth Daily. 90 tablet 0   • losartan (COZAAR) 100 MG tablet Take 100 mg by mouth.     • MULTIPLE VITAMINS-MINERALS PO Take 1 tablet by mouth.     • pravastatin (PRAVACHOL) 40 MG tablet Take 1 tablet by mouth Daily. 90 tablet 2   • pravastatin (PRAVACHOL) 40 MG tablet TAKE ONE TABLET BY MOUTH DAILY 90 tablet 0   • triamcinolone (KENALOG) 0.1 % cream        Facility-Administered Encounter Medications as of 7/26/2017   Medication Dose Route Frequency Provider Last Rate Last Dose   • denosumab (PROLIA) syringe 60 mg  60 mg Subcutaneous Once Wm Mchugh MD           Reviewed use of high risk medication in the elderly: yes  Reviewed for potential of harmful drug interactions in the elderly: yes    Follow Up:  No Follow-up on file.     An After Visit Summary and PPPS with all of these plans were given to the patient.

## 2017-07-26 NOTE — PATIENT INSTRUCTIONS
Medicare Wellness  Personal Prevention Plan of Service     Date of Office Visit:  2017  Encounter Provider:  Wm Mchugh MD  Place of Service:  [facility] INTERNAL MEDICINE  Patient Name: Neha Guillory  :  1933    As part of the Medicare Wellness portion of your visit today, we are providing you with this personalized preventive plan of services (PPPS). This plan is based upon recommendations of the [place] Preventive Services Task Force (USPSTF) and the Advisory Committee on Immunization Practices (ACIP).    This lists the preventive care services that should be considered, and provides dates of when you are due. Items listed as completed are up-to-date and do not require any further intervention.    Health Maintenance   Topic Date Due   • TDAP/TD VACCINES (1 - Tdap) 1952   • ZOSTER VACCINE  2016   • DXA SCAN  2017   • INFLUENZA VACCINE  2017   • PNEUMOCOCCAL VACCINES (65+ LOW/MEDIUM RISK) (2 of 2 - PPSV23) 2018   • LIPID PANEL  2018   • MAMMOGRAM  2018   • MEDICARE ANNUAL WELLNESS  2018       No orders of the defined types were placed in this encounter.      Return in about 6 months (around 2018).

## 2017-07-27 LAB
ALBUMIN SERPL-MCNC: 4.6 G/DL (ref 3.5–5.2)
ALBUMIN/GLOB SERPL: 2.2 G/DL
ALP SERPL-CCNC: 40 U/L (ref 39–117)
ALT SERPL-CCNC: 17 U/L (ref 1–33)
APPEARANCE UR: CLEAR
AST SERPL-CCNC: 14 U/L (ref 1–32)
BACTERIA #/AREA URNS HPF: ABNORMAL /HPF
BASOPHILS # BLD AUTO: 0.04 10*3/MM3 (ref 0–0.2)
BASOPHILS NFR BLD AUTO: 0.4 % (ref 0–1.5)
BILIRUB SERPL-MCNC: 0.7 MG/DL (ref 0.1–1.2)
BILIRUB UR QL STRIP: NEGATIVE
BUN SERPL-MCNC: 23 MG/DL (ref 8–23)
BUN/CREAT SERPL: 22.3 (ref 7–25)
CALCIUM SERPL-MCNC: 9.7 MG/DL (ref 8.6–10.5)
CASTS URNS MICRO: ABNORMAL
CHLORIDE SERPL-SCNC: 101 MMOL/L (ref 98–107)
CHOLEST SERPL-MCNC: 131 MG/DL (ref 0–200)
CO2 SERPL-SCNC: 28.5 MMOL/L (ref 22–29)
COLOR UR: YELLOW
CREAT SERPL-MCNC: 1.03 MG/DL (ref 0.57–1)
EOSINOPHIL # BLD AUTO: 0.31 10*3/MM3 (ref 0–0.7)
EOSINOPHIL NFR BLD AUTO: 3.4 % (ref 0.3–6.2)
EPI CELLS #/AREA URNS HPF: ABNORMAL /HPF
ERYTHROCYTE [DISTWIDTH] IN BLOOD BY AUTOMATED COUNT: 13.5 % (ref 11.7–13)
GLOBULIN SER CALC-MCNC: 2.1 GM/DL
GLUCOSE SERPL-MCNC: 92 MG/DL (ref 65–99)
GLUCOSE UR QL: NEGATIVE
HCT VFR BLD AUTO: 43.4 % (ref 35.6–45.5)
HDLC SERPL-MCNC: 48 MG/DL (ref 40–60)
HGB BLD-MCNC: 13.9 G/DL (ref 11.9–15.5)
HGB UR QL STRIP: NEGATIVE
IMM GRANULOCYTES # BLD: 0.03 10*3/MM3 (ref 0–0.03)
IMM GRANULOCYTES NFR BLD: 0.3 % (ref 0–0.5)
KETONES UR QL STRIP: NEGATIVE
LDLC SERPL CALC-MCNC: 61 MG/DL (ref 0–100)
LDLC/HDLC SERPL: 1.28 {RATIO}
LEUKOCYTE ESTERASE UR QL STRIP: (no result)
LYMPHOCYTES # BLD AUTO: 1.32 10*3/MM3 (ref 0.9–4.8)
LYMPHOCYTES NFR BLD AUTO: 14.5 % (ref 19.6–45.3)
MCH RBC QN AUTO: 30.3 PG (ref 26.9–32)
MCHC RBC AUTO-ENTMCNC: 32 G/DL (ref 32.4–36.3)
MCV RBC AUTO: 94.8 FL (ref 80.5–98.2)
MONOCYTES # BLD AUTO: 0.79 10*3/MM3 (ref 0.2–1.2)
MONOCYTES NFR BLD AUTO: 8.7 % (ref 5–12)
NEUTROPHILS # BLD AUTO: 6.63 10*3/MM3 (ref 1.9–8.1)
NEUTROPHILS NFR BLD AUTO: 72.7 % (ref 42.7–76)
NITRITE UR QL STRIP: NEGATIVE
PH UR STRIP: 6 [PH] (ref 5–8)
PLATELET # BLD AUTO: 221 10*3/MM3 (ref 140–500)
POTASSIUM SERPL-SCNC: 4.6 MMOL/L (ref 3.5–5.2)
PROT SERPL-MCNC: 6.7 G/DL (ref 6–8.5)
PROT UR QL STRIP: NEGATIVE
RBC # BLD AUTO: 4.58 10*6/MM3 (ref 3.9–5.2)
RBC #/AREA URNS HPF: ABNORMAL /HPF
SODIUM SERPL-SCNC: 141 MMOL/L (ref 136–145)
SP GR UR: 1.02 (ref 1–1.03)
T4 FREE SERPL-MCNC: 1.54 NG/DL (ref 0.93–1.7)
TRIGL SERPL-MCNC: 108 MG/DL (ref 0–150)
TSH SERPL DL<=0.005 MIU/L-ACNC: 1.34 MIU/ML (ref 0.27–4.2)
UROBILINOGEN UR STRIP-MCNC: (no result) MG/DL
VLDLC SERPL CALC-MCNC: 21.6 MG/DL (ref 5–40)
WBC # BLD AUTO: 9.12 10*3/MM3 (ref 4.5–10.7)
WBC #/AREA URNS HPF: ABNORMAL /HPF

## 2017-07-31 NOTE — PROGRESS NOTES
Subjective   Neha Guillory is a 84 y.o. female.   She is here today for Medicare annual visit initial along with hypertension hyperlipidemia left hip pain Hammer toe of second toe of right foot hypertension diastolic dysfunction RICH on CPAP hypertrophic cardiomyopathy and osteoporosis  History of Present Illness   She is here today for Medicare annual wellness visit initial along with hypertension up lipidemia left hip pain Hammer toe of second toe of right foot hypertension diastolic dysfunction RICH on CPAP and hypertrophic cardiomyopathy  The following portions of the patient's history were reviewed and updated as appropriate: allergies, current medications, past family history, past medical history, past social history, past surgical history and problem list.    Review of Systems   Musculoskeletal: Positive for arthralgias.   All other systems reviewed and are negative.      Objective   Physical Exam   Constitutional: She is oriented to person, place, and time. Vital signs are normal. She appears well-developed and well-nourished. She is active.   HENT:   Head: Normocephalic and atraumatic.   Right Ear: Hearing, tympanic membrane, external ear and ear canal normal.   Left Ear: Hearing, tympanic membrane, external ear and ear canal normal.   Nose: Nose normal.   Mouth/Throat: Uvula is midline, oropharynx is clear and moist and mucous membranes are normal.   Eyes: Conjunctivae, EOM and lids are normal. Pupils are equal, round, and reactive to light. Right eye exhibits no discharge. Left eye exhibits no discharge.   Neck: Trachea normal, normal range of motion, full passive range of motion without pain and phonation normal. Neck supple. Carotid bruit is not present. No edema present. No thyroid mass and no thyromegaly present.   Cardiovascular: Normal rate, regular rhythm, normal heart sounds, intact distal pulses and normal pulses.  Exam reveals no gallop and no friction rub.    No murmur heard.  Pulmonary/Chest:  Effort normal and breath sounds normal. No respiratory distress. She has no wheezes. She has no rales.   Abdominal: Soft. Normal appearance, normal aorta and bowel sounds are normal. She exhibits no distension, no abdominal bruit and no mass. There is no hepatosplenomegaly. There is no tenderness. There is no rebound, no guarding and no CVA tenderness. No hernia. Hernia confirmed negative in the right inguinal area and confirmed negative in the left inguinal area.   Musculoskeletal: Normal range of motion. She exhibits no edema.        Left hip: She exhibits tenderness.       Vascular Status -  Her exam exhibits right foot vasculature normal. Her exam exhibits no right foot edema. Her exam exhibits left foot vasculature normal. Her exam exhibits no left foot edema.   Skin Integrity  -  Her right foot skin is intact.     Neha 's left foot skin is intact. .     Lymphadenopathy:     She has no cervical adenopathy.     She has no axillary adenopathy.        Right: No inguinal and no supraclavicular adenopathy present.        Left: No inguinal and no supraclavicular adenopathy present.   Neurological: She is alert and oriented to person, place, and time. She has normal strength. No cranial nerve deficit or sensory deficit. She exhibits normal muscle tone. She displays a negative Romberg sign. Coordination normal.   Skin: Skin is warm, dry and intact. No cyanosis. Nails show no clubbing.   Psychiatric: She has a normal mood and affect. Her speech is normal and behavior is normal. Judgment and thought content normal. Cognition and memory are normal.   Nursing note and vitals reviewed.      Assessment/Plan   Diagnoses and all orders for this visit:    Medicare annual wellness visit, initial  -     Lipid Panel With LDL / HDL Ratio  -     CBC & Differential  -     Comprehensive Metabolic Panel  -     T4, Free  -     TSH  -     Urinalysis With Microscopic    Essential hypertension  -     Lipid Panel With LDL / HDL Ratio  -      CBC & Differential  -     Comprehensive Metabolic Panel  -     T4, Free  -     TSH  -     Urinalysis With Microscopic    Other hyperlipidemia  -     Lipid Panel With LDL / HDL Ratio  -     CBC & Differential  -     Comprehensive Metabolic Panel  -     T4, Free  -     TSH  -     Urinalysis With Microscopic    Hip pain, left  -     Lipid Panel With LDL / HDL Ratio  -     CBC & Differential  -     Comprehensive Metabolic Panel  -     T4, Free  -     TSH  -     Urinalysis With Microscopic  -     XR Hip With or Without Pelvis 2 - 3 View Left    Hammer toe of second toe of right foot  -     Lipid Panel With LDL / HDL Ratio  -     CBC & Differential  -     Comprehensive Metabolic Panel  -     T4, Free  -     TSH  -     Urinalysis With Microscopic  -     Ambulatory Referral to Podiatry    Pulmonary hypertension  -     Lipid Panel With LDL / HDL Ratio  -     CBC & Differential  -     Comprehensive Metabolic Panel  -     T4, Free  -     TSH  -     Urinalysis With Microscopic    Diastolic dysfunction  -     Lipid Panel With LDL / HDL Ratio  -     CBC & Differential  -     Comprehensive Metabolic Panel  -     T4, Free  -     TSH  -     Urinalysis With Microscopic    RICH on CPAP  -     Lipid Panel With LDL / HDL Ratio  -     CBC & Differential  -     Comprehensive Metabolic Panel  -     T4, Free  -     TSH  -     Urinalysis With Microscopic    Primary hypertrophic cardiomyopathy  -     Lipid Panel With LDL / HDL Ratio  -     CBC & Differential  -     Comprehensive Metabolic Panel  -     T4, Free  -     TSH  -     Urinalysis With Microscopic    Osteoporosis  -     DEXA Bone Density Axial; Future    Other orders  -     Microscopic Examination      Medicare annual wellness visit initial following recommendations  RICH on CPAP continue CPAP  Hypertrophic cardiomyopathy follow with cardiology  Osteoporosis regular DEXA scans bisphosphonates calcium vitamin D  Diastolic dysfunction continue medicine as needed  Hammer toe of second toe  of right foot referral to podiatry  Hyper lipidemia keep LDL less than 70 with proper diet exercise medication  Hypertension overall well controlled but weight loss would be beneficial along with diet exercise

## 2017-08-02 ENCOUNTER — LAB (OUTPATIENT)
Dept: ENDOCRINOLOGY | Age: 82
End: 2017-08-02

## 2017-08-02 DIAGNOSIS — E55.9 VITAMIN D DEFICIENCY: ICD-10-CM

## 2017-08-02 DIAGNOSIS — E83.52 HYPERCALCEMIA: ICD-10-CM

## 2017-08-02 DIAGNOSIS — E79.0 HYPERURICEMIA: ICD-10-CM

## 2017-08-02 DIAGNOSIS — E78.49 OTHER HYPERLIPIDEMIA: ICD-10-CM

## 2017-08-03 LAB
25(OH)D3+25(OH)D2 SERPL-MCNC: 45.4 NG/ML (ref 30–100)
ALBUMIN SERPL-MCNC: 4.4 G/DL (ref 3.5–5.2)
ALBUMIN/GLOB SERPL: 1.8 G/DL
ALP SERPL-CCNC: 45 U/L (ref 39–117)
ALT SERPL-CCNC: 16 U/L (ref 1–33)
AST SERPL-CCNC: 17 U/L (ref 1–32)
BILIRUB SERPL-MCNC: 0.6 MG/DL (ref 0.1–1.2)
BUN SERPL-MCNC: 25 MG/DL (ref 8–23)
BUN/CREAT SERPL: 24.3 (ref 7–25)
CA-I SERPL ISE-MCNC: 5.5 MG/DL (ref 4.5–5.6)
CALCIUM SERPL-MCNC: 10 MG/DL (ref 8.6–10.5)
CHLORIDE SERPL-SCNC: 104 MMOL/L (ref 98–107)
CHOLEST SERPL-MCNC: 138 MG/DL (ref 0–200)
CO2 SERPL-SCNC: 26.3 MMOL/L (ref 22–29)
CREAT SERPL-MCNC: 1.03 MG/DL (ref 0.57–1)
GLOBULIN SER CALC-MCNC: 2.4 GM/DL
GLUCOSE SERPL-MCNC: 108 MG/DL (ref 65–99)
HDLC SERPL-MCNC: 49 MG/DL (ref 40–60)
LDLC SERPL CALC-MCNC: 73 MG/DL (ref 0–100)
PHOSPHATE SERPL-MCNC: 3.8 MG/DL (ref 2.5–4.5)
POTASSIUM SERPL-SCNC: 4.4 MMOL/L (ref 3.5–5.2)
PROT SERPL-MCNC: 6.8 G/DL (ref 6–8.5)
PTH-INTACT SERPL-MCNC: 28 PG/ML (ref 15–65)
SODIUM SERPL-SCNC: 143 MMOL/L (ref 136–145)
TRIGL SERPL-MCNC: 82 MG/DL (ref 0–150)
URATE SERPL-MCNC: 7.5 MG/DL (ref 2.4–5.7)
VLDLC SERPL CALC-MCNC: 16.4 MG/DL (ref 5–40)

## 2017-08-09 ENCOUNTER — HOSPITAL ENCOUNTER (OUTPATIENT)
Dept: BONE DENSITY | Facility: HOSPITAL | Age: 82
Discharge: HOME | End: 2017-08-09
Attending: INTERNAL MEDICINE

## 2017-08-09 ENCOUNTER — HOSPITAL ENCOUNTER (OUTPATIENT)
Dept: INFUSION THERAPY | Facility: HOSPITAL | Age: 82
Discharge: HOME | End: 2017-08-09
Attending: INTERNAL MEDICINE | Admitting: INTERNAL MEDICINE

## 2017-08-09 VITALS
TEMPERATURE: 98.1 F | SYSTOLIC BLOOD PRESSURE: 145 MMHG | HEART RATE: 66 BPM | OXYGEN SATURATION: 96 % | DIASTOLIC BLOOD PRESSURE: 71 MMHG | RESPIRATION RATE: 20 BRPM

## 2017-08-09 DIAGNOSIS — M81.0 OSTEOPOROSIS: ICD-10-CM

## 2017-08-09 DIAGNOSIS — M81.0 SENILE OSTEOPOROSIS: ICD-10-CM

## 2017-08-09 PROCEDURE — 25010000002 DENOSUMAB 60 MG/ML SOLUTION: Performed by: INTERNAL MEDICINE

## 2017-08-09 PROCEDURE — 96372 THER/PROPH/DIAG INJ SC/IM: CPT

## 2017-08-09 PROCEDURE — 77080 DXA BONE DENSITY AXIAL: CPT

## 2017-08-09 RX ORDER — ASPIRIN 81 MG/1
1 TABLET ORAL DAILY
COMMUNITY

## 2017-08-09 RX ADMIN — DENOSUMAB 60 MG: 60 INJECTION SUBCUTANEOUS at 12:06

## 2017-08-10 ENCOUNTER — APPOINTMENT (OUTPATIENT)
Dept: WOMENS IMAGING | Facility: HOSPITAL | Age: 82
End: 2017-08-10

## 2017-08-10 PROCEDURE — G0202 SCR MAMMO BI INCL CAD: HCPCS | Performed by: RADIOLOGY

## 2017-08-10 PROCEDURE — 77063 BREAST TOMOSYNTHESIS BI: CPT | Performed by: RADIOLOGY

## 2017-08-28 DIAGNOSIS — E83.52 HYPERCALCEMIA: ICD-10-CM

## 2017-08-28 DIAGNOSIS — E89.2 H/O PARATHYROIDECTOMY (HCC): ICD-10-CM

## 2017-08-28 DIAGNOSIS — E55.9 VITAMIN D DEFICIENCY: Primary | ICD-10-CM

## 2017-08-28 DIAGNOSIS — E79.0 HYPERURICEMIA: ICD-10-CM

## 2017-09-05 ENCOUNTER — LAB (OUTPATIENT)
Dept: ENDOCRINOLOGY | Age: 82
End: 2017-09-05

## 2017-09-05 DIAGNOSIS — E89.2 H/O PARATHYROIDECTOMY (HCC): ICD-10-CM

## 2017-09-05 DIAGNOSIS — E79.0 HYPERURICEMIA: ICD-10-CM

## 2017-09-05 DIAGNOSIS — E55.9 VITAMIN D DEFICIENCY: ICD-10-CM

## 2017-09-05 DIAGNOSIS — E83.52 HYPERCALCEMIA: ICD-10-CM

## 2017-09-06 LAB
25(OH)D3+25(OH)D2 SERPL-MCNC: 38.8 NG/ML (ref 30–100)
ALBUMIN SERPL-MCNC: 4.2 G/DL (ref 3.5–5.2)
ALBUMIN/GLOB SERPL: 2.1 G/DL
ALP SERPL-CCNC: 42 U/L (ref 39–117)
ALT SERPL-CCNC: 17 U/L (ref 1–33)
AST SERPL-CCNC: 19 U/L (ref 1–32)
BILIRUB SERPL-MCNC: 0.5 MG/DL (ref 0.1–1.2)
BUN SERPL-MCNC: 21 MG/DL (ref 8–23)
BUN/CREAT SERPL: 17.8 (ref 7–25)
CA-I SERPL ISE-MCNC: 5.3 MG/DL (ref 4.5–5.6)
CALCIUM SERPL-MCNC: 9.7 MG/DL (ref 8.6–10.5)
CHLORIDE SERPL-SCNC: 107 MMOL/L (ref 98–107)
CHOLEST SERPL-MCNC: 132 MG/DL (ref 0–200)
CO2 SERPL-SCNC: 25.5 MMOL/L (ref 22–29)
CREAT SERPL-MCNC: 1.18 MG/DL (ref 0.57–1)
GLOBULIN SER CALC-MCNC: 2 GM/DL
GLUCOSE SERPL-MCNC: 102 MG/DL (ref 65–99)
HDLC SERPL-MCNC: 55 MG/DL (ref 40–60)
LDLC SERPL CALC-MCNC: 59 MG/DL (ref 0–100)
PHOSPHATE SERPL-MCNC: 3.7 MG/DL (ref 2.5–4.5)
POTASSIUM SERPL-SCNC: 4.6 MMOL/L (ref 3.5–5.2)
PROT SERPL-MCNC: 6.2 G/DL (ref 6–8.5)
PTH-INTACT SERPL-MCNC: 61 PG/ML (ref 15–65)
SODIUM SERPL-SCNC: 146 MMOL/L (ref 136–145)
TRIGL SERPL-MCNC: 88 MG/DL (ref 0–150)
URATE SERPL-MCNC: 7.3 MG/DL (ref 2.4–5.7)
VLDLC SERPL CALC-MCNC: 17.6 MG/DL (ref 5–40)

## 2017-09-18 ENCOUNTER — OFFICE VISIT (OUTPATIENT)
Dept: ENDOCRINOLOGY | Age: 82
End: 2017-09-18

## 2017-09-18 VITALS
SYSTOLIC BLOOD PRESSURE: 126 MMHG | HEIGHT: 59 IN | BODY MASS INDEX: 28.39 KG/M2 | WEIGHT: 140.8 LBS | DIASTOLIC BLOOD PRESSURE: 76 MMHG

## 2017-09-18 DIAGNOSIS — E89.2 H/O PARATHYROIDECTOMY (HCC): Primary | ICD-10-CM

## 2017-09-18 DIAGNOSIS — E79.0 HYPERURICEMIA: ICD-10-CM

## 2017-09-18 DIAGNOSIS — E83.52 HYPERCALCEMIA: ICD-10-CM

## 2017-09-18 DIAGNOSIS — E55.9 VITAMIN D DEFICIENCY: ICD-10-CM

## 2017-09-18 DIAGNOSIS — I10 ESSENTIAL HYPERTENSION: ICD-10-CM

## 2017-09-18 PROCEDURE — 99214 OFFICE O/P EST MOD 30 MIN: CPT | Performed by: NURSE PRACTITIONER

## 2017-09-18 RX ORDER — MELATONIN
1000 DAILY
COMMUNITY

## 2017-09-18 NOTE — PROGRESS NOTES
"Carol Guillory is a 84 y.o. female is here today for follow-up.  Chief Complaint   Patient presents with   • Abnormal Calcium     recent labs   • Vitamin D Deficiency   • Hypertension   • hyperuricemia     /76  Ht 59\" (149.9 cm)  Wt 140 lb 12.8 oz (63.9 kg)  BMI 28.44 kg/m2  Current Outpatient Prescriptions on File Prior to Visit   Medication Sig   • aspirin 81 MG EC tablet Take 81 mg by mouth Daily.   • B COMPLEX VITAMINS PO Take 1 tablet by mouth.   • carvedilol (COREG) 25 MG tablet Take 1 tablet by mouth daily.   • CloNIDine (CATAPRES) 0.2 MG tablet Take 0.1 mg by mouth 2 (Two) Times a Day. One in the am one at night   • hydrochlorothiazide (HYDRODIURIL) 12.5 MG tablet Take 1 tablet by mouth Daily.   • losartan (COZAAR) 100 MG tablet Take 100 mg by mouth.   • MULTIPLE VITAMINS-MINERALS PO Take 1 tablet by mouth.   • pravastatin (PRAVACHOL) 40 MG tablet Take 1 tablet by mouth Daily.   • [DISCONTINUED] triamcinolone (KENALOG) 0.1 % cream      Current Facility-Administered Medications on File Prior to Visit   Medication   • denosumab (PROLIA) syringe 60 mg     Family History   Problem Relation Age of Onset   • Colon cancer Sister    • Heart disease Brother      CABG   • Other Brother      BRAIN TUMOR   • Cancer Other      Social History   Substance Use Topics   • Smoking status: Never Smoker   • Smokeless tobacco: Never Used   • Alcohol use No     Allergies   Allergen Reactions   • Atorvastatin      myalgias  myalgias  myalgias  myalgias   • Amoxicillin Hives         History of Present Illness   Encounter Diagnoses   Name Primary?   • H/O parathyroidectomy Yes   • Hyperuricemia    • Essential hypertension    • Vitamin D deficiency    • Hypercalcemia    This is an 84-year-old female patient here today for a routine follow-up visit.  She had recent labs which were reviewed and she was provided a copy.  She has no complaints at today's visit.  She has a history of having a parathyroidectomy.  She is " taking her medications as prescribed.  She is currently taken vitamin D 1000 units over-the-counter daily.  She has had no reason which to go the emergency room.      The following portions of the patient's history were reviewed and updated as appropriate: allergies, current medications, past family history, past medical history, past social history, past surgical history and problem list.    Review of Systems   Constitutional: Negative for fatigue.   HENT: Negative for trouble swallowing.    Eyes: Negative for visual disturbance.   Respiratory: Negative for shortness of breath.    Cardiovascular: Negative for leg swelling.   Endocrine: Negative for polyuria.   Skin: Negative for wound.   Neurological: Negative for numbness.       Objective   Physical Exam   Constitutional: She is oriented to person, place, and time. She appears well-developed and well-nourished. No distress.   HENT:   Head: Normocephalic.   Right Ear: External ear normal.   Left Ear: External ear normal.   Nose: Nose normal.   Mouth/Throat: Oropharynx is clear and moist.   Eyes: Conjunctivae and EOM are normal. Pupils are equal, round, and reactive to light.   Neck: Normal range of motion. Neck supple. Carotid bruit is not present. Edema present. No tracheal deviation and no erythema present. No thyromegaly present.   Cardiovascular: Normal rate, regular rhythm and intact distal pulses.  Exam reveals no gallop and no friction rub.    Murmur heard.  Pulmonary/Chest: Effort normal and breath sounds normal. No respiratory distress.   Abdominal: Soft.   Musculoskeletal: Normal range of motion. She exhibits no edema.   Trace edema. Varicose veins  kyphosis   Lymphadenopathy:     She has no cervical adenopathy.   Neurological: She is alert and oriented to person, place, and time.   Leg cramps at night sometimes   Skin: Skin is warm and dry. She is not diaphoretic. No erythema. No pallor.   Psychiatric: She has a normal mood and affect. Her behavior is  normal. Judgment and thought content normal.   Nursing note and vitals reviewed.      Results for orders placed or performed in visit on 09/05/17   Comprehensive Metabolic Panel   Result Value Ref Range    Glucose 102 (H) 65 - 99 mg/dL    BUN 21 8 - 23 mg/dL    Creatinine 1.18 (H) 0.57 - 1.00 mg/dL    eGFR Non African Am 44 (L) >60 mL/min/1.73    eGFR African Am 53 (L) >60 mL/min/1.73    BUN/Creatinine Ratio 17.8 7.0 - 25.0    Sodium 146 (H) 136 - 145 mmol/L    Potassium 4.6 3.5 - 5.2 mmol/L    Chloride 107 98 - 107 mmol/L    Total CO2 25.5 22.0 - 29.0 mmol/L    Calcium 9.7 8.6 - 10.5 mg/dL    Total Protein 6.2 6.0 - 8.5 g/dL    Albumin 4.20 3.50 - 5.20 g/dL    Globulin 2.0 gm/dL    A/G Ratio 2.1 g/dL    Total Bilirubin 0.5 0.1 - 1.2 mg/dL    Alkaline Phosphatase 42 39 - 117 U/L    AST (SGOT) 19 1 - 32 U/L    ALT (SGPT) 17 1 - 33 U/L   Lipid Panel   Result Value Ref Range    Total Cholesterol 132 0 - 200 mg/dL    Triglycerides 88 0 - 150 mg/dL    HDL Cholesterol 55 40 - 60 mg/dL    VLDL Cholesterol 17.6 5 - 40 mg/dL    LDL Cholesterol  59 0 - 100 mg/dL   Vitamin D 25 Hydroxy   Result Value Ref Range    25 Hydroxy, Vitamin D 38.8 30.0 - 100.0 ng/mL   Calcium, Ionized   Result Value Ref Range    Ionized Calcium 5.3 4.5 - 5.6 mg/dL   Phosphorus   Result Value Ref Range    Phosphorus 3.7 2.5 - 4.5 mg/dL   Uric Acid   Result Value Ref Range    Uric Acid 7.3 (H) 2.4 - 5.7 mg/dL   PTH, Intact   Result Value Ref Range    PTH, Intact 61 15 - 65 pg/mL       Assessment/Plan   Neha was seen today for abnormal calcium, vitamin d deficiency, hypertension and hyperuricemia.    Diagnoses and all orders for this visit:    H/O parathyroidectomy    Hyperuricemia    Essential hypertension    Vitamin D deficiency    Hypercalcemia        In summary, patient was seen and examined.  Her labs are all acceptable range.  She will continue all her current medications as prescribed.  Her recent labs were reviewed which are metabolically  stable.  Her parathyroid hormone and vitamin D level on acceptable range.  Her calcium level is stable.  Her blood pressure is in good range.  No medications were changed at today's visit.  She will follow-up in 6 months with labs prior.  I've encouraged her to contact the office should she have any questions or concerns prior to then

## 2017-12-26 ENCOUNTER — APPOINTMENT (OUTPATIENT)
Dept: CT IMAGING | Facility: HOSPITAL | Age: 82
End: 2017-12-26

## 2017-12-26 ENCOUNTER — HOSPITAL ENCOUNTER (OUTPATIENT)
Facility: HOSPITAL | Age: 82
Setting detail: OBSERVATION
Discharge: HOME | End: 2017-12-27
Attending: EMERGENCY MEDICINE | Admitting: INTERNAL MEDICINE

## 2017-12-26 DIAGNOSIS — D72.829 LEUKOCYTOSIS, UNSPECIFIED TYPE: ICD-10-CM

## 2017-12-26 DIAGNOSIS — K52.9 GASTROENTERITIS: Primary | ICD-10-CM

## 2017-12-26 PROBLEM — T73.0XXA STARVATION KETOACIDOSIS: Status: ACTIVE | Noted: 2017-12-26

## 2017-12-26 PROBLEM — N18.30 CKD (CHRONIC KIDNEY DISEASE) STAGE 3, GFR 30-59 ML/MIN: Chronic | Status: ACTIVE | Noted: 2017-12-26

## 2017-12-26 PROBLEM — E86.0 DEHYDRATION: Status: ACTIVE | Noted: 2017-12-26

## 2017-12-26 PROBLEM — E87.29 STARVATION KETOACIDOSIS: Status: ACTIVE | Noted: 2017-12-26

## 2017-12-26 LAB
ALBUMIN SERPL-MCNC: 4.3 G/DL (ref 3.5–5.2)
ALBUMIN/GLOB SERPL: 1.2 G/DL
ALP SERPL-CCNC: 51 U/L (ref 39–117)
ALT SERPL W P-5'-P-CCNC: 21 U/L (ref 1–33)
ANION GAP SERPL CALCULATED.3IONS-SCNC: 17.5 MMOL/L
AST SERPL-CCNC: 24 U/L (ref 1–32)
BACTERIA UR QL AUTO: ABNORMAL /HPF
BASOPHILS # BLD AUTO: 0.02 10*3/MM3 (ref 0–0.2)
BASOPHILS NFR BLD AUTO: 0.1 % (ref 0–1.5)
BILIRUB SERPL-MCNC: 0.7 MG/DL (ref 0.1–1.2)
BILIRUB UR QL STRIP: NEGATIVE
BUN BLD-MCNC: 25 MG/DL (ref 8–23)
BUN/CREAT SERPL: 25 (ref 7–25)
CALCIUM SPEC-SCNC: 9.9 MG/DL (ref 8.6–10.5)
CHLORIDE SERPL-SCNC: 103 MMOL/L (ref 98–107)
CLARITY UR: CLEAR
CO2 SERPL-SCNC: 24.5 MMOL/L (ref 22–29)
COLOR UR: YELLOW
CREAT BLD-MCNC: 1 MG/DL (ref 0.57–1)
D-LACTATE SERPL-SCNC: 1.4 MMOL/L (ref 0.5–2)
DEPRECATED RDW RBC AUTO: 42.6 FL (ref 37–54)
EOSINOPHIL # BLD AUTO: 0 10*3/MM3 (ref 0–0.7)
EOSINOPHIL NFR BLD AUTO: 0 % (ref 0.3–6.2)
ERYTHROCYTE [DISTWIDTH] IN BLOOD BY AUTOMATED COUNT: 12.9 % (ref 11.7–13)
GFR SERPL CREATININE-BSD FRML MDRD: 53 ML/MIN/1.73
GLOBULIN UR ELPH-MCNC: 3.6 GM/DL
GLUCOSE BLD-MCNC: 150 MG/DL (ref 65–99)
GLUCOSE UR STRIP-MCNC: NEGATIVE MG/DL
HCT VFR BLD AUTO: 45.2 % (ref 35.6–45.5)
HGB BLD-MCNC: 15.2 G/DL (ref 11.9–15.5)
HGB UR QL STRIP.AUTO: NEGATIVE
HOLD SPECIMEN: NORMAL
HOLD SPECIMEN: NORMAL
HYALINE CASTS UR QL AUTO: ABNORMAL /LPF
IMM GRANULOCYTES # BLD: 0.07 10*3/MM3 (ref 0–0.03)
IMM GRANULOCYTES NFR BLD: 0.3 % (ref 0–0.5)
KETONES UR QL STRIP: ABNORMAL
LEUKOCYTE ESTERASE UR QL STRIP.AUTO: ABNORMAL
LIPASE SERPL-CCNC: 26 U/L (ref 13–60)
LYMPHOCYTES # BLD AUTO: 0.49 10*3/MM3 (ref 0.9–4.8)
LYMPHOCYTES NFR BLD AUTO: 2.4 % (ref 19.6–45.3)
MCH RBC QN AUTO: 30.6 PG (ref 26.9–32)
MCHC RBC AUTO-ENTMCNC: 33.6 G/DL (ref 32.4–36.3)
MCV RBC AUTO: 90.9 FL (ref 80.5–98.2)
MONOCYTES # BLD AUTO: 0.75 10*3/MM3 (ref 0.2–1.2)
MONOCYTES NFR BLD AUTO: 3.6 % (ref 5–12)
NEUTROPHILS # BLD AUTO: 19.39 10*3/MM3 (ref 1.9–8.1)
NEUTROPHILS NFR BLD AUTO: 93.6 % (ref 42.7–76)
NITRITE UR QL STRIP: NEGATIVE
PH UR STRIP.AUTO: 5.5 [PH] (ref 5–8)
PLATELET # BLD AUTO: 258 10*3/MM3 (ref 140–500)
PMV BLD AUTO: 10.8 FL (ref 6–12)
POTASSIUM BLD-SCNC: 3.6 MMOL/L (ref 3.5–5.2)
PROT SERPL-MCNC: 7.9 G/DL (ref 6–8.5)
PROT UR QL STRIP: ABNORMAL
RBC # BLD AUTO: 4.97 10*6/MM3 (ref 3.9–5.2)
RBC # UR: ABNORMAL /HPF
REF LAB TEST METHOD: ABNORMAL
SODIUM BLD-SCNC: 145 MMOL/L (ref 136–145)
SP GR UR STRIP: >=1.03 (ref 1–1.03)
SQUAMOUS #/AREA URNS HPF: ABNORMAL /HPF
UROBILINOGEN UR QL STRIP: ABNORMAL
WBC NRBC COR # BLD: 20.72 10*3/MM3 (ref 4.5–10.7)
WBC UR QL AUTO: ABNORMAL /HPF
WHOLE BLOOD HOLD SPECIMEN: NORMAL
WHOLE BLOOD HOLD SPECIMEN: NORMAL

## 2017-12-26 PROCEDURE — 80053 COMPREHEN METABOLIC PANEL: CPT | Performed by: EMERGENCY MEDICINE

## 2017-12-26 PROCEDURE — 96361 HYDRATE IV INFUSION ADD-ON: CPT

## 2017-12-26 PROCEDURE — 25010000002 MORPHINE PER 10 MG: Performed by: NURSE PRACTITIONER

## 2017-12-26 PROCEDURE — 25010000002 ONDANSETRON PER 1 MG: Performed by: NURSE PRACTITIONER

## 2017-12-26 PROCEDURE — 25810000003 SODIUM CHLORIDE 0.9 % WITH KCL 20 MEQ 20-0.9 MEQ/L-% SOLUTION: Performed by: INTERNAL MEDICINE

## 2017-12-26 PROCEDURE — G0378 HOSPITAL OBSERVATION PER HR: HCPCS

## 2017-12-26 PROCEDURE — 0 IOPAMIDOL 61 % SOLUTION: Performed by: EMERGENCY MEDICINE

## 2017-12-26 PROCEDURE — 96374 THER/PROPH/DIAG INJ IV PUSH: CPT

## 2017-12-26 PROCEDURE — 83605 ASSAY OF LACTIC ACID: CPT | Performed by: EMERGENCY MEDICINE

## 2017-12-26 PROCEDURE — 81001 URINALYSIS AUTO W/SCOPE: CPT | Performed by: EMERGENCY MEDICINE

## 2017-12-26 PROCEDURE — 99284 EMERGENCY DEPT VISIT MOD MDM: CPT

## 2017-12-26 PROCEDURE — 85025 COMPLETE CBC W/AUTO DIFF WBC: CPT | Performed by: EMERGENCY MEDICINE

## 2017-12-26 PROCEDURE — 74177 CT ABD & PELVIS W/CONTRAST: CPT

## 2017-12-26 PROCEDURE — 83690 ASSAY OF LIPASE: CPT | Performed by: EMERGENCY MEDICINE

## 2017-12-26 PROCEDURE — 96375 TX/PRO/DX INJ NEW DRUG ADDON: CPT

## 2017-12-26 RX ORDER — SODIUM CHLORIDE 0.9 % (FLUSH) 0.9 %
10 SYRINGE (ML) INJECTION AS NEEDED
Status: DISCONTINUED | OUTPATIENT
Start: 2017-12-26 | End: 2017-12-26

## 2017-12-26 RX ORDER — ONDANSETRON 2 MG/ML
4 INJECTION INTRAMUSCULAR; INTRAVENOUS EVERY 6 HOURS PRN
Status: DISCONTINUED | OUTPATIENT
Start: 2017-12-26 | End: 2017-12-27 | Stop reason: HOSPADM

## 2017-12-26 RX ORDER — PRAVASTATIN SODIUM 40 MG
TABLET ORAL
Qty: 90 TABLET | Refills: 0 | Status: ON HOLD | OUTPATIENT
Start: 2017-12-26 | End: 2017-12-26 | Stop reason: SDUPTHER

## 2017-12-26 RX ORDER — HYDRALAZINE HYDROCHLORIDE 20 MG/ML
10 INJECTION INTRAMUSCULAR; INTRAVENOUS EVERY 4 HOURS PRN
Status: DISCONTINUED | OUTPATIENT
Start: 2017-12-26 | End: 2017-12-27 | Stop reason: HOSPADM

## 2017-12-26 RX ORDER — NALOXONE HCL 0.4 MG/ML
0.4 VIAL (ML) INJECTION
Status: DISCONTINUED | OUTPATIENT
Start: 2017-12-26 | End: 2017-12-27 | Stop reason: HOSPADM

## 2017-12-26 RX ORDER — PROMETHAZINE HYDROCHLORIDE 25 MG/1
12.5 TABLET ORAL EVERY 6 HOURS PRN
Status: DISCONTINUED | OUTPATIENT
Start: 2017-12-26 | End: 2017-12-27 | Stop reason: HOSPADM

## 2017-12-26 RX ORDER — HYDROCHLOROTHIAZIDE 25 MG/1
12.5 TABLET ORAL DAILY
Status: DISCONTINUED | OUTPATIENT
Start: 2017-12-26 | End: 2017-12-27

## 2017-12-26 RX ORDER — LOSARTAN POTASSIUM 100 MG/1
100 TABLET ORAL DAILY
Status: DISCONTINUED | OUTPATIENT
Start: 2017-12-26 | End: 2017-12-27

## 2017-12-26 RX ORDER — CLONIDINE HYDROCHLORIDE 0.1 MG/1
0.2 TABLET ORAL EVERY 12 HOURS SCHEDULED
Status: DISCONTINUED | OUTPATIENT
Start: 2017-12-26 | End: 2017-12-27 | Stop reason: HOSPADM

## 2017-12-26 RX ORDER — CARVEDILOL 25 MG/1
25 TABLET ORAL
Status: DISCONTINUED | OUTPATIENT
Start: 2017-12-26 | End: 2017-12-27 | Stop reason: HOSPADM

## 2017-12-26 RX ORDER — HYDROMORPHONE HYDROCHLORIDE 1 MG/ML
0.25 INJECTION, SOLUTION INTRAMUSCULAR; INTRAVENOUS; SUBCUTANEOUS EVERY 4 HOURS PRN
Status: DISCONTINUED | OUTPATIENT
Start: 2017-12-26 | End: 2017-12-27 | Stop reason: HOSPADM

## 2017-12-26 RX ORDER — B-COMPLEX WITH VITAMIN C
1 TABLET ORAL DAILY
COMMUNITY
End: 2019-06-12 | Stop reason: SDUPTHER

## 2017-12-26 RX ORDER — CALCIUM CARBONATE 200(500)MG
1 TABLET,CHEWABLE ORAL 2 TIMES DAILY PRN
Status: DISCONTINUED | OUTPATIENT
Start: 2017-12-26 | End: 2017-12-27 | Stop reason: HOSPADM

## 2017-12-26 RX ORDER — PROMETHAZINE HYDROCHLORIDE 6.25 MG/5ML
12.5 SYRUP ORAL EVERY 6 HOURS PRN
Status: DISCONTINUED | OUTPATIENT
Start: 2017-12-26 | End: 2017-12-27 | Stop reason: HOSPADM

## 2017-12-26 RX ORDER — ONDANSETRON 2 MG/ML
4 INJECTION INTRAMUSCULAR; INTRAVENOUS ONCE
Status: COMPLETED | OUTPATIENT
Start: 2017-12-26 | End: 2017-12-26

## 2017-12-26 RX ORDER — BISACODYL 10 MG
10 SUPPOSITORY, RECTAL RECTAL DAILY PRN
Status: DISCONTINUED | OUTPATIENT
Start: 2017-12-26 | End: 2017-12-27 | Stop reason: HOSPADM

## 2017-12-26 RX ORDER — PROMETHAZINE HYDROCHLORIDE 25 MG/ML
12.5 INJECTION, SOLUTION INTRAMUSCULAR; INTRAVENOUS EVERY 6 HOURS PRN
Status: DISCONTINUED | OUTPATIENT
Start: 2017-12-26 | End: 2017-12-27 | Stop reason: HOSPADM

## 2017-12-26 RX ORDER — SODIUM CHLORIDE AND POTASSIUM CHLORIDE 150; 900 MG/100ML; MG/100ML
125 INJECTION, SOLUTION INTRAVENOUS CONTINUOUS
Status: DISCONTINUED | OUTPATIENT
Start: 2017-12-26 | End: 2017-12-27

## 2017-12-26 RX ORDER — SODIUM CHLORIDE 0.9 % (FLUSH) 0.9 %
1-10 SYRINGE (ML) INJECTION AS NEEDED
Status: DISCONTINUED | OUTPATIENT
Start: 2017-12-26 | End: 2017-12-27 | Stop reason: HOSPADM

## 2017-12-26 RX ORDER — ACETAMINOPHEN 325 MG/1
650 TABLET ORAL EVERY 6 HOURS PRN
Status: DISCONTINUED | OUTPATIENT
Start: 2017-12-26 | End: 2017-12-27 | Stop reason: HOSPADM

## 2017-12-26 RX ORDER — MELATONIN
1000 DAILY
Status: DISCONTINUED | OUTPATIENT
Start: 2017-12-26 | End: 2017-12-27 | Stop reason: HOSPADM

## 2017-12-26 RX ORDER — ASPIRIN 81 MG/1
81 TABLET ORAL DAILY
Status: DISCONTINUED | OUTPATIENT
Start: 2017-12-26 | End: 2017-12-27 | Stop reason: HOSPADM

## 2017-12-26 RX ADMIN — SODIUM CHLORIDE 1000 ML: 9 INJECTION, SOLUTION INTRAVENOUS at 11:44

## 2017-12-26 RX ADMIN — MORPHINE SULFATE 4 MG: 4 INJECTION INTRAVENOUS at 11:50

## 2017-12-26 RX ADMIN — CLONIDINE HYDROCHLORIDE 0.2 MG: 0.1 TABLET ORAL at 20:54

## 2017-12-26 RX ADMIN — IOPAMIDOL 85 ML: 612 INJECTION, SOLUTION INTRAVENOUS at 12:26

## 2017-12-26 RX ADMIN — ASPIRIN 81 MG: 81 TABLET ORAL at 20:54

## 2017-12-26 RX ADMIN — SODIUM CHLORIDE 500 ML: 9 INJECTION, SOLUTION INTRAVENOUS at 17:21

## 2017-12-26 RX ADMIN — ONDANSETRON 4 MG: 2 INJECTION INTRAMUSCULAR; INTRAVENOUS at 11:50

## 2017-12-26 RX ADMIN — CARVEDILOL 25 MG: 25 TABLET, FILM COATED ORAL at 20:54

## 2017-12-26 RX ADMIN — VITAMIN D, TAB 1000IU (100/BT) 1000 UNITS: 25 TAB at 20:54

## 2017-12-26 RX ADMIN — POTASSIUM CHLORIDE AND SODIUM CHLORIDE 125 ML/HR: 900; 150 INJECTION, SOLUTION INTRAVENOUS at 20:55

## 2017-12-27 VITALS
BODY MASS INDEX: 28.3 KG/M2 | DIASTOLIC BLOOD PRESSURE: 73 MMHG | WEIGHT: 140.4 LBS | SYSTOLIC BLOOD PRESSURE: 144 MMHG | RESPIRATION RATE: 18 BRPM | OXYGEN SATURATION: 92 % | HEIGHT: 59 IN | HEART RATE: 78 BPM | TEMPERATURE: 99.1 F

## 2017-12-27 LAB
ANION GAP SERPL CALCULATED.3IONS-SCNC: 10.6 MMOL/L
BASOPHILS # BLD AUTO: 0.01 10*3/MM3 (ref 0–0.2)
BASOPHILS NFR BLD AUTO: 0.1 % (ref 0–1.5)
BUN BLD-MCNC: 18 MG/DL (ref 8–23)
BUN/CREAT SERPL: 18.9 (ref 7–25)
CALCIUM SPEC-SCNC: 7.4 MG/DL (ref 8.6–10.5)
CHLORIDE SERPL-SCNC: 110 MMOL/L (ref 98–107)
CO2 SERPL-SCNC: 23.4 MMOL/L (ref 22–29)
CREAT BLD-MCNC: 0.95 MG/DL (ref 0.57–1)
DEPRECATED RDW RBC AUTO: 45.5 FL (ref 37–54)
EOSINOPHIL # BLD AUTO: 0.19 10*3/MM3 (ref 0–0.7)
EOSINOPHIL NFR BLD AUTO: 2.6 % (ref 0.3–6.2)
ERYTHROCYTE [DISTWIDTH] IN BLOOD BY AUTOMATED COUNT: 13.3 % (ref 11.7–13)
GFR SERPL CREATININE-BSD FRML MDRD: 56 ML/MIN/1.73
GLUCOSE BLD-MCNC: 83 MG/DL (ref 65–99)
HCT VFR BLD AUTO: 36 % (ref 35.6–45.5)
HGB BLD-MCNC: 11.4 G/DL (ref 11.9–15.5)
IMM GRANULOCYTES # BLD: 0.03 10*3/MM3 (ref 0–0.03)
IMM GRANULOCYTES NFR BLD: 0.4 % (ref 0–0.5)
LYMPHOCYTES # BLD AUTO: 1.03 10*3/MM3 (ref 0.9–4.8)
LYMPHOCYTES NFR BLD AUTO: 14.3 % (ref 19.6–45.3)
MAGNESIUM SERPL-MCNC: 1.9 MG/DL (ref 1.6–2.4)
MCH RBC QN AUTO: 29.7 PG (ref 26.9–32)
MCHC RBC AUTO-ENTMCNC: 31.7 G/DL (ref 32.4–36.3)
MCV RBC AUTO: 93.8 FL (ref 80.5–98.2)
MONOCYTES # BLD AUTO: 0.97 10*3/MM3 (ref 0.2–1.2)
MONOCYTES NFR BLD AUTO: 13.5 % (ref 5–12)
NEUTROPHILS # BLD AUTO: 4.97 10*3/MM3 (ref 1.9–8.1)
NEUTROPHILS NFR BLD AUTO: 69.1 % (ref 42.7–76)
PLATELET # BLD AUTO: 183 10*3/MM3 (ref 140–500)
PMV BLD AUTO: 10.8 FL (ref 6–12)
POTASSIUM BLD-SCNC: 3.9 MMOL/L (ref 3.5–5.2)
RBC # BLD AUTO: 3.84 10*6/MM3 (ref 3.9–5.2)
SODIUM BLD-SCNC: 144 MMOL/L (ref 136–145)
WBC NRBC COR # BLD: 7.2 10*3/MM3 (ref 4.5–10.7)

## 2017-12-27 PROCEDURE — 80048 BASIC METABOLIC PNL TOTAL CA: CPT | Performed by: INTERNAL MEDICINE

## 2017-12-27 PROCEDURE — 96361 HYDRATE IV INFUSION ADD-ON: CPT

## 2017-12-27 PROCEDURE — 83735 ASSAY OF MAGNESIUM: CPT | Performed by: INTERNAL MEDICINE

## 2017-12-27 PROCEDURE — 25810000003 SODIUM CHLORIDE 0.9 % WITH KCL 20 MEQ 20-0.9 MEQ/L-% SOLUTION: Performed by: INTERNAL MEDICINE

## 2017-12-27 PROCEDURE — G0378 HOSPITAL OBSERVATION PER HR: HCPCS

## 2017-12-27 PROCEDURE — 85025 COMPLETE CBC W/AUTO DIFF WBC: CPT | Performed by: INTERNAL MEDICINE

## 2017-12-27 RX ORDER — LOSARTAN POTASSIUM 100 MG/1
50 TABLET ORAL DAILY
Start: 2017-12-27 | End: 2018-10-22 | Stop reason: SDUPTHER

## 2017-12-27 RX ORDER — LOSARTAN POTASSIUM 50 MG/1
50 TABLET ORAL DAILY
Status: DISCONTINUED | OUTPATIENT
Start: 2017-12-28 | End: 2017-12-27 | Stop reason: HOSPADM

## 2017-12-27 RX ADMIN — ASPIRIN 81 MG: 81 TABLET ORAL at 09:26

## 2017-12-27 RX ADMIN — VITAMIN D, TAB 1000IU (100/BT) 1000 UNITS: 25 TAB at 09:30

## 2017-12-27 RX ADMIN — LOSARTAN POTASSIUM 100 MG: 100 TABLET ORAL at 09:26

## 2017-12-27 RX ADMIN — HYDROCHLOROTHIAZIDE 12.5 MG: 25 TABLET ORAL at 09:25

## 2017-12-27 RX ADMIN — POTASSIUM CHLORIDE AND SODIUM CHLORIDE 125 ML/HR: 900; 150 INJECTION, SOLUTION INTRAVENOUS at 05:52

## 2017-12-27 RX ADMIN — CARVEDILOL 25 MG: 25 TABLET, FILM COATED ORAL at 09:32

## 2017-12-28 ENCOUNTER — EPISODE CHANGES (OUTPATIENT)
Dept: CASE MANAGEMENT | Facility: OTHER | Age: 82
End: 2017-12-28

## 2018-01-12 ENCOUNTER — EPISODE CHANGES (OUTPATIENT)
Dept: CASE MANAGEMENT | Facility: OTHER | Age: 83
End: 2018-01-12

## 2018-01-26 ENCOUNTER — OFFICE VISIT (OUTPATIENT)
Dept: INTERNAL MEDICINE | Facility: CLINIC | Age: 83
End: 2018-01-26

## 2018-01-26 VITALS
OXYGEN SATURATION: 94 % | SYSTOLIC BLOOD PRESSURE: 164 MMHG | HEIGHT: 59 IN | WEIGHT: 134 LBS | DIASTOLIC BLOOD PRESSURE: 76 MMHG | BODY MASS INDEX: 27.01 KG/M2 | TEMPERATURE: 98 F | HEART RATE: 63 BPM

## 2018-01-26 DIAGNOSIS — E78.5 HYPERLIPIDEMIA LDL GOAL <70: ICD-10-CM

## 2018-01-26 DIAGNOSIS — Z09 HOSPITAL DISCHARGE FOLLOW-UP: ICD-10-CM

## 2018-01-26 DIAGNOSIS — M15.9 GENERALIZED OSTEOARTHRITIS: ICD-10-CM

## 2018-01-26 DIAGNOSIS — I10 ESSENTIAL HYPERTENSION: Primary | ICD-10-CM

## 2018-01-26 DIAGNOSIS — N18.30 CKD (CHRONIC KIDNEY DISEASE) STAGE 3, GFR 30-59 ML/MIN (HCC): Chronic | ICD-10-CM

## 2018-01-26 DIAGNOSIS — I51.89 DIASTOLIC DYSFUNCTION: ICD-10-CM

## 2018-01-26 DIAGNOSIS — M1A.9XX1 TOPHUS: ICD-10-CM

## 2018-01-26 DIAGNOSIS — M81.0 OSTEOPOROSIS WITHOUT CURRENT PATHOLOGICAL FRACTURE, UNSPECIFIED OSTEOPOROSIS TYPE: ICD-10-CM

## 2018-01-26 PROCEDURE — 99214 OFFICE O/P EST MOD 30 MIN: CPT | Performed by: INTERNAL MEDICINE

## 2018-01-27 LAB
ALBUMIN SERPL-MCNC: 4 G/DL (ref 3.5–5.2)
ALBUMIN/GLOB SERPL: 1.4 G/DL
ALP SERPL-CCNC: 45 U/L (ref 39–117)
ALT SERPL-CCNC: 16 U/L (ref 1–33)
APPEARANCE UR: CLEAR
AST SERPL-CCNC: 18 U/L (ref 1–32)
BACTERIA #/AREA URNS HPF: ABNORMAL /HPF
BASOPHILS # BLD AUTO: 0.04 10*3/MM3 (ref 0–0.2)
BASOPHILS NFR BLD AUTO: 0.5 % (ref 0–1.5)
BILIRUB SERPL-MCNC: 0.7 MG/DL (ref 0.1–1.2)
BILIRUB UR QL STRIP: NEGATIVE
BUN SERPL-MCNC: 24 MG/DL (ref 8–23)
BUN/CREAT SERPL: 26.7 (ref 7–25)
CALCIUM SERPL-MCNC: 9.9 MG/DL (ref 8.6–10.5)
CASTS URNS MICRO: ABNORMAL
CHLORIDE SERPL-SCNC: 105 MMOL/L (ref 98–107)
CHOLEST SERPL-MCNC: 130 MG/DL (ref 0–200)
CO2 SERPL-SCNC: 27.1 MMOL/L (ref 22–29)
COLOR UR: YELLOW
CREAT SERPL-MCNC: 0.9 MG/DL (ref 0.57–1)
EOSINOPHIL # BLD AUTO: 0.25 10*3/MM3 (ref 0–0.7)
EOSINOPHIL NFR BLD AUTO: 3.1 % (ref 0.3–6.2)
EPI CELLS #/AREA URNS HPF: ABNORMAL /HPF
ERYTHROCYTE [DISTWIDTH] IN BLOOD BY AUTOMATED COUNT: 13.8 % (ref 11.7–13)
GFR SERPLBLD CREATININE-BSD FMLA CKD-EPI: 60 ML/MIN/1.73
GFR SERPLBLD CREATININE-BSD FMLA CKD-EPI: 72 ML/MIN/1.73
GLOBULIN SER CALC-MCNC: 2.8 GM/DL
GLUCOSE SERPL-MCNC: 95 MG/DL (ref 65–99)
GLUCOSE UR QL: NEGATIVE
HCT VFR BLD AUTO: 43.5 % (ref 35.6–45.5)
HDLC SERPL-MCNC: 53 MG/DL (ref 40–60)
HGB BLD-MCNC: 13.4 G/DL (ref 11.9–15.5)
HGB UR QL STRIP: NEGATIVE
IMM GRANULOCYTES # BLD: 0.03 10*3/MM3 (ref 0–0.03)
IMM GRANULOCYTES NFR BLD: 0.4 % (ref 0–0.5)
KETONES UR QL STRIP: NEGATIVE
LDLC SERPL CALC-MCNC: 64 MG/DL (ref 0–100)
LDLC/HDLC SERPL: 1.22 {RATIO}
LEUKOCYTE ESTERASE UR QL STRIP: (no result)
LYMPHOCYTES # BLD AUTO: 1.29 10*3/MM3 (ref 0.9–4.8)
LYMPHOCYTES NFR BLD AUTO: 15.8 % (ref 19.6–45.3)
MCH RBC QN AUTO: 29.3 PG (ref 26.9–32)
MCHC RBC AUTO-ENTMCNC: 30.8 G/DL (ref 32.4–36.3)
MCV RBC AUTO: 95.2 FL (ref 80.5–98.2)
MONOCYTES # BLD AUTO: 0.81 10*3/MM3 (ref 0.2–1.2)
MONOCYTES NFR BLD AUTO: 9.9 % (ref 5–12)
NEUTROPHILS # BLD AUTO: 5.76 10*3/MM3 (ref 1.9–8.1)
NEUTROPHILS NFR BLD AUTO: 70.3 % (ref 42.7–76)
NITRITE UR QL STRIP: NEGATIVE
PH UR STRIP: 6 [PH] (ref 5–8)
PLATELET # BLD AUTO: 234 10*3/MM3 (ref 140–500)
POTASSIUM SERPL-SCNC: 4.7 MMOL/L (ref 3.5–5.2)
PROT SERPL-MCNC: 6.8 G/DL (ref 6–8.5)
PROT UR QL STRIP: NEGATIVE
RBC # BLD AUTO: 4.57 10*6/MM3 (ref 3.9–5.2)
RBC #/AREA URNS HPF: ABNORMAL /HPF
SODIUM SERPL-SCNC: 147 MMOL/L (ref 136–145)
SP GR UR: 1.02 (ref 1–1.03)
T4 FREE SERPL-MCNC: 1.64 NG/DL (ref 0.93–1.7)
TRIGL SERPL-MCNC: 63 MG/DL (ref 0–150)
TSH SERPL DL<=0.005 MIU/L-ACNC: 0.75 MIU/ML (ref 0.27–4.2)
UROBILINOGEN UR STRIP-MCNC: (no result) MG/DL
VLDLC SERPL CALC-MCNC: 12.6 MG/DL (ref 5–40)
WBC # BLD AUTO: 8.18 10*3/MM3 (ref 4.5–10.7)
WBC #/AREA URNS HPF: ABNORMAL /HPF

## 2018-02-08 DIAGNOSIS — M85.80 OSTEOPENIA, UNSPECIFIED LOCATION: ICD-10-CM

## 2018-02-08 DIAGNOSIS — M81.0 OSTEOPOROSIS WITHOUT CURRENT PATHOLOGICAL FRACTURE, UNSPECIFIED OSTEOPOROSIS TYPE: Primary | ICD-10-CM

## 2018-02-08 NOTE — PROGRESS NOTES
Subjective   Neha Guillory is a 85 y.o. female.   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 and hospital discharge follow-up as well  History of Present Illness   She is here today for hospital discharge follow-up doing better from a standpoint for diastolic dysfunction and hypertension which is well-controlled on current medication and chronic kidney disease stage III which has been stabilized and we will follow closely and diastolic dysfunction which is mentioned and stable now and hyperlipidemia which is well-controlled on Pravachol 40 mg daily with no side effects from that drug and a 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    Review of Systems   Musculoskeletal: Positive for arthralgias.        Tophus on hand/wrist   All other systems reviewed and are negative.      Objective   Physical Exam   Constitutional: She is oriented to person, place, and time. She appears well-developed and well-nourished. She is cooperative.   HENT:   Head: Normocephalic and atraumatic.   Right Ear: Hearing, tympanic membrane, external ear and ear canal normal.   Left Ear: Hearing, tympanic membrane, external ear and ear canal normal.   Nose: Nose normal.   Mouth/Throat: Uvula is midline, oropharynx is clear and moist and mucous membranes are normal.   Eyes: Conjunctivae, EOM and lids are normal. Pupils are equal, round, and reactive to light.   Neck: Phonation normal. Neck supple. Carotid bruit is not present.   Cardiovascular: Normal rate, regular rhythm and normal heart sounds.  Exam reveals no gallop and no friction rub.    No murmur  heard.  Pulmonary/Chest: Effort normal and breath sounds normal. No respiratory distress.   Abdominal: Soft. Bowel sounds are normal. She exhibits no distension and no mass. There is no hepatosplenomegaly. There is no tenderness. There is no rebound and no guarding. No hernia.   Musculoskeletal: She exhibits tenderness (multiple joint groupsincludingknees shoulders hands). She exhibits no edema.        Hands:  Neurological: She is alert and oriented to person, place, and time. Coordination and gait normal.   Skin: Skin is warm and dry.   Psychiatric: She has a normal mood and affect. Her speech is normal and behavior is normal. Judgment and thought content normal.   Nursing note and vitals reviewed.      Assessment/Plan   Diagnoses and all orders for this visit:    Essential hypertension  -     Lipid Panel With LDL / HDL Ratio  -     CBC & Differential  -     Comprehensive Metabolic Panel  -     T4, Free  -     TSH  -     Urinalysis With Microscopic - Urine, Clean Catch    Generalized osteoarthritis  -     Lipid Panel With LDL / HDL Ratio  -     CBC & Differential  -     Comprehensive Metabolic Panel  -     T4, Free  -     TSH  -     Urinalysis With Microscopic - Urine, Clean Catch    Osteoporosis without current pathological fracture, unspecified osteoporosis type  -     Lipid Panel With LDL / HDL Ratio  -     CBC & Differential  -     Comprehensive Metabolic Panel  -     T4, Free  -     TSH  -     Urinalysis With Microscopic - Urine, Clean Catch    CKD (chronic kidney disease) stage 3, GFR 30-59 ml/min  -     Lipid Panel With LDL / HDL Ratio  -     CBC & Differential  -     Comprehensive Metabolic Panel  -     T4, Free  -     TSH  -     Urinalysis With Microscopic - Urine, Clean Catch    Diastolic dysfunction  -     Lipid Panel With LDL / HDL Ratio  -     CBC & Differential  -     Comprehensive Metabolic Panel  -     T4, Free  -     TSH  -     Urinalysis With Microscopic - Urine, Clean Catch    Hyperlipidemia LDL  goal <70  -     Lipid Panel With LDL / HDL Ratio  -     CBC & Differential  -     Comprehensive Metabolic Panel  -     T4, Free  -     TSH  -     Urinalysis With Microscopic - Urine, Clean Catch    Tops  -     Ambulatory Referral to Hand Surgery    Hospital discharge follow-up    Other orders  -     Microscopic Examination        Hypertension normally well controlled on current medication no changes today as I do not want to over treat her  Diastolic dysfunction stable on current medications at this time  Hyperlipidemia stable on pravastatin with no evidence of muscle aches and we will check liver enzymes and lipid panel and go from there  Hospital discharge follow-up doing much better now and stable  Osteoporosis regular DEXA scans bisphosphonates calcium vitamin D  Osteoporosis multiple sites noted and supportive meds for this as well and she tolerates them at this point  [facility]  Chronic kidney disease stage III follow closely and avoid NSAIDs at this time but she can take Tylenol and aspirin

## 2018-02-12 PROBLEM — M81.0 OSTEOPOROSIS, POST-MENOPAUSAL: Status: ACTIVE | Noted: 2018-02-12

## 2018-02-13 ENCOUNTER — HOSPITAL ENCOUNTER (OUTPATIENT)
Dept: INFUSION THERAPY | Facility: HOSPITAL | Age: 83
Discharge: HOME | End: 2018-02-13
Attending: INTERNAL MEDICINE | Admitting: INTERNAL MEDICINE

## 2018-02-13 VITALS
SYSTOLIC BLOOD PRESSURE: 117 MMHG | TEMPERATURE: 97.7 F | OXYGEN SATURATION: 91 % | RESPIRATION RATE: 16 BRPM | HEART RATE: 103 BPM | DIASTOLIC BLOOD PRESSURE: 65 MMHG

## 2018-02-13 DIAGNOSIS — M85.80 OSTEOPENIA, UNSPECIFIED LOCATION: ICD-10-CM

## 2018-02-13 DIAGNOSIS — M81.0 OSTEOPOROSIS, POST-MENOPAUSAL: ICD-10-CM

## 2018-02-13 PROCEDURE — 25010000002 DENOSUMAB 60 MG/ML SOLUTION: Performed by: INTERNAL MEDICINE

## 2018-02-13 PROCEDURE — 96372 THER/PROPH/DIAG INJ SC/IM: CPT

## 2018-02-13 RX ADMIN — DENOSUMAB 60 MG: 60 INJECTION SUBCUTANEOUS at 12:04

## 2018-03-05 ENCOUNTER — LAB (OUTPATIENT)
Dept: ENDOCRINOLOGY | Age: 83
End: 2018-03-05

## 2018-03-05 DIAGNOSIS — E55.9 VITAMIN D DEFICIENCY: ICD-10-CM

## 2018-03-05 DIAGNOSIS — E83.52 HYPERCALCEMIA: ICD-10-CM

## 2018-03-05 DIAGNOSIS — M81.0 OSTEOPOROSIS, POST-MENOPAUSAL: Primary | ICD-10-CM

## 2018-03-05 DIAGNOSIS — M81.0 OSTEOPOROSIS, POST-MENOPAUSAL: ICD-10-CM

## 2018-03-05 DIAGNOSIS — E78.5 HYPERLIPIDEMIA LDL GOAL <70: ICD-10-CM

## 2018-03-06 LAB
25(OH)D3+25(OH)D2 SERPL-MCNC: 42.3 NG/ML (ref 30–100)
ALBUMIN SERPL-MCNC: 4 G/DL (ref 3.5–5.2)
ALBUMIN/GLOB SERPL: 1.8 G/DL
ALP SERPL-CCNC: 47 U/L (ref 39–117)
ALT SERPL-CCNC: 17 U/L (ref 1–33)
AST SERPL-CCNC: 16 U/L (ref 1–32)
BILIRUB SERPL-MCNC: 0.5 MG/DL (ref 0.1–1.2)
BUN SERPL-MCNC: 20 MG/DL (ref 8–23)
BUN/CREAT SERPL: 19 (ref 7–25)
CA-I SERPL ISE-MCNC: 5.6 MG/DL (ref 4.5–5.6)
CALCIUM SERPL-MCNC: 9.9 MG/DL (ref 8.6–10.5)
CHLORIDE SERPL-SCNC: 102 MMOL/L (ref 98–107)
CHOLEST SERPL-MCNC: 122 MG/DL (ref 0–200)
CO2 SERPL-SCNC: 27.6 MMOL/L (ref 22–29)
CREAT SERPL-MCNC: 1.05 MG/DL (ref 0.57–1)
GFR SERPLBLD CREATININE-BSD FMLA CKD-EPI: 50 ML/MIN/1.73
GFR SERPLBLD CREATININE-BSD FMLA CKD-EPI: 60 ML/MIN/1.73
GLOBULIN SER CALC-MCNC: 2.2 GM/DL
GLUCOSE SERPL-MCNC: 110 MG/DL (ref 65–99)
HDLC SERPL-MCNC: 51 MG/DL (ref 40–60)
INTERPRETATION: NORMAL
LDLC SERPL CALC-MCNC: 58 MG/DL (ref 0–100)
PHOSPHATE SERPL-MCNC: 4.2 MG/DL (ref 2.5–4.5)
POTASSIUM SERPL-SCNC: 4.7 MMOL/L (ref 3.5–5.2)
PROT SERPL-MCNC: 6.2 G/DL (ref 6–8.5)
PTH-INTACT SERPL-MCNC: 33 PG/ML (ref 15–65)
SODIUM SERPL-SCNC: 141 MMOL/L (ref 136–145)
TRIGL SERPL-MCNC: 64 MG/DL (ref 0–150)
URATE SERPL-MCNC: 5.6 MG/DL (ref 2.4–5.7)
VLDLC SERPL CALC-MCNC: 12.8 MG/DL (ref 5–40)

## 2018-03-14 PROCEDURE — 88304 TISSUE EXAM BY PATHOLOGIST: CPT | Performed by: PLASTIC SURGERY

## 2018-03-15 ENCOUNTER — LAB REQUISITION (OUTPATIENT)
Dept: LAB | Facility: HOSPITAL | Age: 83
End: 2018-03-15

## 2018-03-15 DIAGNOSIS — R22.32 LOCALIZED SWELLING, MASS AND LUMP, LEFT UPPER LIMB: ICD-10-CM

## 2018-03-16 LAB
CYTO UR: NORMAL
LAB AP CASE REPORT: NORMAL
LAB AP CLINICAL INFORMATION: NORMAL
Lab: NORMAL
PATH REPORT.FINAL DX SPEC: NORMAL
PATH REPORT.GROSS SPEC: NORMAL

## 2018-03-19 ENCOUNTER — OFFICE VISIT (OUTPATIENT)
Dept: ENDOCRINOLOGY | Age: 83
End: 2018-03-19

## 2018-03-19 VITALS
HEIGHT: 59 IN | SYSTOLIC BLOOD PRESSURE: 132 MMHG | DIASTOLIC BLOOD PRESSURE: 80 MMHG | WEIGHT: 135.2 LBS | BODY MASS INDEX: 27.26 KG/M2

## 2018-03-19 DIAGNOSIS — E55.9 VITAMIN D DEFICIENCY: ICD-10-CM

## 2018-03-19 DIAGNOSIS — E79.0 HYPERURICEMIA: ICD-10-CM

## 2018-03-19 DIAGNOSIS — E83.52 HYPERCALCEMIA: ICD-10-CM

## 2018-03-19 DIAGNOSIS — I10 ESSENTIAL HYPERTENSION: Primary | ICD-10-CM

## 2018-03-19 DIAGNOSIS — E78.5 HYPERLIPIDEMIA LDL GOAL <70: ICD-10-CM

## 2018-03-19 DIAGNOSIS — E89.2 H/O PARATHYROIDECTOMY (HCC): ICD-10-CM

## 2018-03-19 PROCEDURE — 99214 OFFICE O/P EST MOD 30 MIN: CPT | Performed by: NURSE PRACTITIONER

## 2018-03-19 RX ORDER — HYDROCODONE BITARTRATE AND ACETAMINOPHEN 5; 325 MG/1; MG/1
TABLET ORAL
COMMUNITY
Start: 2018-03-07 | End: 2018-03-19

## 2018-03-19 NOTE — PROGRESS NOTES
"Carol Guillory is a 85 y.o. female is here today for follow-up.  Chief Complaint   Patient presents with   • Abnormal Calcium     recent labs   • Hypertension   • Vitamin D Deficiency   • hyperuricemia     /80   Ht 149.9 cm (59\")   Wt 61.3 kg (135 lb 3.2 oz)   BMI 27.31 kg/m²   Current Outpatient Prescriptions on File Prior to Visit   Medication Sig   • aspirin 81 MG EC tablet Take 81 mg by mouth Daily.   • B Complex Vitamins (VITAMIN B COMPLEX) tablet Take 1 tablet by mouth Daily.   • carvedilol (COREG) 25 MG tablet Take 1 tablet by mouth 2 (Two) Times a Day.   • cholecalciferol (VITAMIN D3) 1000 units tablet Take 1,000 Units by mouth Daily.   • CloNIDine (CATAPRES) 0.2 MG tablet Take 0.2 mg by mouth 2 (Two) Times a Day. One in the am one at night   • losartan (COZAAR) 100 MG tablet Take 0.5 tablets by mouth Daily. (Patient taking differently: Take 100 mg by mouth Daily.)   • pravastatin (PRAVACHOL) 40 MG tablet Take 1 tablet by mouth Daily.     No current facility-administered medications on file prior to visit.      Family History   Problem Relation Age of Onset   • Colon cancer Sister    • Heart disease Brother      CABG   • Other Brother      BRAIN TUMOR   • Cancer Other      Social History   Substance Use Topics   • Smoking status: Never Smoker   • Smokeless tobacco: Never Used   • Alcohol use No     Allergies   Allergen Reactions   • Atorvastatin      myalgias  myalgias  myalgias  myalgias   • Amoxicillin Hives         History of Present Illness  Encounter Diagnoses   Name Primary?   • Essential hypertension Yes   • Hyperlipidemia LDL goal <70    • Vitamin D deficiency    • Hypercalcemia    • Hyperuricemia      This is a pleasant 85-year-old female patient here today for a routine follow-up visit.  She has been seen for the above-mentioned problems.  She had recent labs which were reviewed and she was provided a copy.  She states she is taking her medications as prescribed.  She has cut " back on sweets after [place] and states has lost a few pounds.  She has no complaints.  She recently had surgery on her left hand fourth finger to remove a cyst.  The following portions of the patient's history were reviewed and updated as appropriate: allergies, current medications, past family history, past medical history, past social history, past surgical history and problem list.    Review of Systems   Constitutional: Negative for fatigue.   HENT: Negative for trouble swallowing.    Eyes: Negative for visual disturbance.   Respiratory: Negative for shortness of breath.    Cardiovascular: Negative for leg swelling.   Endocrine: Negative for polyuria.   Skin: Negative for wound.   Neurological: Negative for numbness.       Objective   Physical Exam   Constitutional: She is oriented to person, place, and time. She appears well-developed and well-nourished. No distress.   HENT:   Head: Normocephalic.   Right Ear: External ear normal.   Left Ear: External ear normal.   Nose: Nose normal.   Mouth/Throat: Oropharynx is clear and moist.   Eyes: Conjunctivae and EOM are normal. Pupils are equal, round, and reactive to light.   Neck: Normal range of motion. Neck supple. Carotid bruit is not present. Edema present. No tracheal deviation and no erythema present. No thyromegaly present.   Cardiovascular: Normal rate, regular rhythm and intact distal pulses.  Exam reveals no gallop and no friction rub.    Murmur heard.  Pulmonary/Chest: Effort normal and breath sounds normal. No respiratory distress.   Abdominal: Soft.   Musculoskeletal: Normal range of motion. She exhibits no edema.   Trace edema. Varicose veins  kyphosis   Lymphadenopathy:     She has no cervical adenopathy.   Neurological: She is alert and oriented to person, place, and time.   Leg cramps at night sometimes   Skin: Skin is warm and dry. She is not diaphoretic. No erythema. No pallor.   Psychiatric: She has a normal mood and affect. Her behavior is  normal. Judgment and thought content normal.   Nursing note and vitals reviewed.    Lab Results   Component Value Date    GLUCOSE 83 12/27/2017    BUN 20 03/05/2018    CREATININE 1.05 (H) 03/05/2018    EGFRIFNONA 50 (L) 03/05/2018    EGFRIFAFRI 60 (L) 03/05/2018    BCR 19.0 03/05/2018    K 4.7 03/05/2018    CO2 27.6 03/05/2018    CALCIUM 9.9 03/05/2018    PROTENTOTREF 6.2 03/05/2018    ALBUMIN 4.00 03/05/2018    LABIL2 1.8 03/05/2018    AST 16 03/05/2018    ALT 17 03/05/2018     Lab Results   Component Value Date    CHLPL 122 03/05/2018    TRIG 64 03/05/2018    HDL 51 03/05/2018    LDL 58 03/05/2018     Lab Results   Component Value Date    TSH 0.747 01/26/2018    R4FJEFQ 8.51 08/30/2016     Lab Results   Component Value Date    CALCIUM 9.9 03/05/2018     No results found for: HGBA1C    No results found for: HGBA1C   Lab Results   Component Value Date    PTH 33 03/05/2018    CALCIUM 9.9 03/05/2018    CAION 5.6 03/05/2018       Assessment/Plan   Problems Addressed this Visit        Cardiovascular and Mediastinum    Hyperlipidemia LDL goal <70    Essential hypertension - Primary       Digestive    Vitamin D deficiency       Other    Hypercalcemia    Hyperuricemia      Other Visit Diagnoses    None.         In summary, patient was seen and examined.  Her recent labs were reviewed and metabolically she is stable.  Her recent labs were reviewed and she was provided a copy of her laboratory evaluation.  No medications were changed at today's visit.  She will follow-up with Dr. Jenkins in 6 months with labs prior.

## 2018-04-09 RX ORDER — PRAVASTATIN SODIUM 40 MG
TABLET ORAL
Qty: 90 TABLET | Refills: 0 | Status: SHIPPED | OUTPATIENT
Start: 2018-04-09 | End: 2018-07-23 | Stop reason: SDUPTHER

## 2018-07-23 RX ORDER — PRAVASTATIN SODIUM 40 MG
TABLET ORAL
Qty: 90 TABLET | Refills: 0 | Status: SHIPPED | OUTPATIENT
Start: 2018-07-23 | End: 2018-07-30 | Stop reason: SDUPTHER

## 2018-07-30 ENCOUNTER — OFFICE VISIT (OUTPATIENT)
Dept: INTERNAL MEDICINE | Facility: CLINIC | Age: 83
End: 2018-07-30

## 2018-07-30 VITALS
RESPIRATION RATE: 15 BRPM | HEIGHT: 59 IN | DIASTOLIC BLOOD PRESSURE: 73 MMHG | BODY MASS INDEX: 27.21 KG/M2 | WEIGHT: 135 LBS | OXYGEN SATURATION: 94 % | SYSTOLIC BLOOD PRESSURE: 132 MMHG | HEART RATE: 63 BPM | TEMPERATURE: 98.4 F

## 2018-07-30 DIAGNOSIS — E55.9 VITAMIN D DEFICIENCY: ICD-10-CM

## 2018-07-30 DIAGNOSIS — M81.8 OTHER OSTEOPOROSIS WITHOUT CURRENT PATHOLOGICAL FRACTURE: ICD-10-CM

## 2018-07-30 DIAGNOSIS — I10 ESSENTIAL HYPERTENSION: Primary | ICD-10-CM

## 2018-07-30 DIAGNOSIS — I51.89 DIASTOLIC DYSFUNCTION: ICD-10-CM

## 2018-07-30 DIAGNOSIS — M15.9 GENERALIZED OSTEOARTHRITIS: ICD-10-CM

## 2018-07-30 DIAGNOSIS — N18.30 CKD (CHRONIC KIDNEY DISEASE) STAGE 3, GFR 30-59 ML/MIN (HCC): Chronic | ICD-10-CM

## 2018-07-30 LAB
ALBUMIN SERPL-MCNC: 4.3 G/DL (ref 3.5–5.2)
ALBUMIN/GLOB SERPL: 2.2 G/DL
ALP SERPL-CCNC: 45 U/L (ref 39–117)
ALT SERPL-CCNC: 17 U/L (ref 1–33)
APPEARANCE UR: CLEAR
AST SERPL-CCNC: 11 U/L (ref 1–32)
BACTERIA #/AREA URNS HPF: ABNORMAL /HPF
BASOPHILS # BLD AUTO: 0.04 10*3/MM3 (ref 0–0.2)
BASOPHILS NFR BLD AUTO: 0.6 % (ref 0–1.5)
BILIRUB SERPL-MCNC: 0.7 MG/DL (ref 0.1–1.2)
BILIRUB UR QL STRIP: NEGATIVE
BUN SERPL-MCNC: 24 MG/DL (ref 8–23)
BUN/CREAT SERPL: 21.4 (ref 7–25)
CALCIUM SERPL-MCNC: 9.2 MG/DL (ref 8.6–10.5)
CASTS URNS MICRO: ABNORMAL
CHLORIDE SERPL-SCNC: 104 MMOL/L (ref 98–107)
CHOLEST SERPL-MCNC: 129 MG/DL (ref 0–200)
CO2 SERPL-SCNC: 26.1 MMOL/L (ref 22–29)
COLOR UR: (no result)
CREAT SERPL-MCNC: 1.12 MG/DL (ref 0.57–1)
EOSINOPHIL # BLD AUTO: 0.2 10*3/MM3 (ref 0–0.7)
EOSINOPHIL NFR BLD AUTO: 2.9 % (ref 0.3–6.2)
EPI CELLS #/AREA URNS HPF: ABNORMAL /HPF
ERYTHROCYTE [DISTWIDTH] IN BLOOD BY AUTOMATED COUNT: 13.5 % (ref 11.7–13)
GLOBULIN SER CALC-MCNC: 2 GM/DL
GLUCOSE SERPL-MCNC: 98 MG/DL (ref 65–99)
GLUCOSE UR QL: NEGATIVE
HCT VFR BLD AUTO: 42.1 % (ref 35.6–45.5)
HDLC SERPL-MCNC: 51 MG/DL (ref 40–60)
HGB BLD-MCNC: 13.1 G/DL (ref 11.9–15.5)
HGB UR QL STRIP: NEGATIVE
IMM GRANULOCYTES # BLD: 0.03 10*3/MM3 (ref 0–0.03)
IMM GRANULOCYTES NFR BLD: 0.4 % (ref 0–0.5)
KETONES UR QL STRIP: NEGATIVE
LDLC SERPL CALC-MCNC: 64 MG/DL (ref 0–100)
LDLC/HDLC SERPL: 1.25 {RATIO}
LEUKOCYTE ESTERASE UR QL STRIP: (no result)
LYMPHOCYTES # BLD AUTO: 1.22 10*3/MM3 (ref 0.9–4.8)
LYMPHOCYTES NFR BLD AUTO: 17.9 % (ref 19.6–45.3)
MCH RBC QN AUTO: 29.2 PG (ref 26.9–32)
MCHC RBC AUTO-ENTMCNC: 31.1 G/DL (ref 32.4–36.3)
MCV RBC AUTO: 94 FL (ref 80.5–98.2)
MONOCYTES # BLD AUTO: 0.82 10*3/MM3 (ref 0.2–1.2)
MONOCYTES NFR BLD AUTO: 12.1 % (ref 5–12)
NEUTROPHILS # BLD AUTO: 4.49 10*3/MM3 (ref 1.9–8.1)
NEUTROPHILS NFR BLD AUTO: 66.1 % (ref 42.7–76)
NITRITE UR QL STRIP: NEGATIVE
PH UR STRIP: <=5 [PH] (ref 5–8)
PLATELET # BLD AUTO: 213 10*3/MM3 (ref 140–500)
POTASSIUM SERPL-SCNC: 4.5 MMOL/L (ref 3.5–5.2)
PROT SERPL-MCNC: 6.3 G/DL (ref 6–8.5)
PROT UR QL STRIP: NEGATIVE
RBC # BLD AUTO: 4.48 10*6/MM3 (ref 3.9–5.2)
RBC #/AREA URNS HPF: ABNORMAL /HPF
SODIUM SERPL-SCNC: 143 MMOL/L (ref 136–145)
SP GR UR: 1.02 (ref 1–1.03)
T4 FREE SERPL-MCNC: 1.58 NG/DL (ref 0.93–1.7)
TRIGL SERPL-MCNC: 72 MG/DL (ref 0–150)
TSH SERPL DL<=0.005 MIU/L-ACNC: 0.9 MIU/ML (ref 0.27–4.2)
UROBILINOGEN UR STRIP-MCNC: (no result) MG/DL
VLDLC SERPL CALC-MCNC: 14.4 MG/DL (ref 5–40)
WBC # BLD AUTO: 6.8 10*3/MM3 (ref 4.5–10.7)
WBC #/AREA URNS HPF: ABNORMAL /HPF

## 2018-07-30 PROCEDURE — 99214 OFFICE O/P EST MOD 30 MIN: CPT | Performed by: INTERNAL MEDICINE

## 2018-07-30 RX ORDER — ASPIRIN 81 MG/1
81 TABLET ORAL
COMMUNITY
End: 2018-07-30 | Stop reason: SDUPTHER

## 2018-07-30 RX ORDER — MELATONIN
COMMUNITY
End: 2018-07-30 | Stop reason: SDUPTHER

## 2018-07-31 ENCOUNTER — PATIENT OUTREACH (OUTPATIENT)
Dept: CASE MANAGEMENT | Facility: OTHER | Age: 83
End: 2018-07-31

## 2018-08-12 NOTE — PROGRESS NOTES
Subjective   Neha Guillory is a 85 y.o. female.   She is here to follow-up for hypertension vitamin D deficiency diastolic dysfunction osteoarthritis CK D stage III and osteoporosis and everything is stable and she has no complaints  History of Present Illness   She is here to follow-up for hypertension which is well-controlled on current medication vitamin D deficiency which has been stable on supplementation diastolic dysfunction which is stable on current diuretics osteoarthritis in general which is stable on current medication chronic kidney disease stage III which is stable and osteoporosis which is stable with no evidence of any fractures  The following portions of the patient's history were reviewed and updated as appropriate: allergies, current medications, past family history, past medical history, past social history, past surgical history and problem list.    Review of Systems   All other systems reviewed and are negative.      Objective   Physical Exam   Constitutional: She is oriented to person, place, and time. She appears well-developed and well-nourished. She is cooperative.   HENT:   Head: Normocephalic and atraumatic.   Right Ear: Hearing, tympanic membrane, external ear and ear canal normal.   Left Ear: Hearing, tympanic membrane, external ear and ear canal normal.   Nose: Nose normal.   Mouth/Throat: Uvula is midline, oropharynx is clear and moist and mucous membranes are normal.   Eyes: Pupils are equal, round, and reactive to light. Conjunctivae, EOM and lids are normal.   Neck: Phonation normal. Neck supple. Carotid bruit is not present.   Cardiovascular: Normal rate, regular rhythm and normal heart sounds.  Exam reveals no gallop and no friction rub.    No murmur heard.  Pulmonary/Chest: Effort normal and breath sounds normal. No respiratory distress.   Abdominal: Soft. Bowel sounds are normal. She exhibits no distension and no mass. There is no hepatosplenomegaly. There is no tenderness. There  is no rebound and no guarding. No hernia.   Musculoskeletal: She exhibits no edema.   Neurological: She is alert and oriented to person, place, and time. Coordination and gait normal.   Skin: Skin is warm and dry.   Psychiatric: She has a normal mood and affect. Her speech is normal and behavior is normal. Judgment and thought content normal.   Nursing note and vitals reviewed.      Assessment/Plan   Diagnoses and all orders for this visit:    Essential hypertension  -     Lipid Panel With LDL / HDL Ratio  -     CBC & Differential  -     Comprehensive Metabolic Panel  -     Urinalysis With Microscopic - Urine, Clean Catch    Vitamin D deficiency  -     TSH  -     T4, Free    Diastolic dysfunction    Generalized osteoarthritis    CKD (chronic kidney disease) stage 3, GFR 30-59 ml/min    Other osteoporosis without current pathological fracture    Other orders  -     Microscopic Examination      Hypertension well-controlled on current medication no changes  Vitamin D deficiency has been stable supplementation including over-the-counter and no changes needed  Diastolic dysfunction stable on current medications with no changes needed  Osteoarthritis in general stable on current medication with no changes needed  Chronic kidney disease stage III stable and no changes needed  Osteoporosis stable with no evidence of any fractures

## 2018-08-15 ENCOUNTER — INFUSION (OUTPATIENT)
Dept: ONCOLOGY | Facility: HOSPITAL | Age: 83
End: 2018-08-15
Attending: INTERNAL MEDICINE

## 2018-08-15 VITALS — TEMPERATURE: 97.9 F | WEIGHT: 135 LBS | BODY MASS INDEX: 27.23 KG/M2

## 2018-08-15 DIAGNOSIS — M81.0 OSTEOPOROSIS, POST-MENOPAUSAL: Primary | ICD-10-CM

## 2018-08-15 DIAGNOSIS — M85.80 OSTEOPENIA, UNSPECIFIED LOCATION: ICD-10-CM

## 2018-08-15 PROCEDURE — 96372 THER/PROPH/DIAG INJ SC/IM: CPT | Performed by: NURSE PRACTITIONER

## 2018-08-15 PROCEDURE — 25010000002 DENOSUMAB 60 MG/ML SOLUTION: Performed by: INTERNAL MEDICINE

## 2018-08-15 RX ADMIN — DENOSUMAB 60 MG: 60 INJECTION SUBCUTANEOUS at 13:39

## 2018-08-15 NOTE — PROGRESS NOTES
Prolia injection given and tolerated well.  Pt instructed to contact Dr Mchugh for any questions or concerns. Pt discharged home ambulatory.

## 2018-08-17 ENCOUNTER — APPOINTMENT (OUTPATIENT)
Dept: WOMENS IMAGING | Facility: HOSPITAL | Age: 83
End: 2018-08-17

## 2018-08-17 PROCEDURE — 77067 SCR MAMMO BI INCL CAD: CPT | Performed by: RADIOLOGY

## 2018-08-17 PROCEDURE — 77063 BREAST TOMOSYNTHESIS BI: CPT | Performed by: RADIOLOGY

## 2018-09-26 DIAGNOSIS — E79.0 HYPERURICEMIA: Primary | ICD-10-CM

## 2018-09-26 DIAGNOSIS — E89.2 H/O PARATHYROIDECTOMY (HCC): ICD-10-CM

## 2018-09-26 DIAGNOSIS — E78.5 HYPERLIPIDEMIA LDL GOAL <70: ICD-10-CM

## 2018-09-26 DIAGNOSIS — E55.9 VITAMIN D DEFICIENCY: ICD-10-CM

## 2018-09-26 DIAGNOSIS — M81.0 OSTEOPOROSIS, POST-MENOPAUSAL: ICD-10-CM

## 2018-10-04 RX ORDER — CLONIDINE HYDROCHLORIDE 0.2 MG/1
0.2 TABLET ORAL 2 TIMES DAILY
Qty: 90 TABLET | Refills: 3 | Status: SHIPPED | OUTPATIENT
Start: 2018-10-04 | End: 2019-03-04 | Stop reason: SDUPTHER

## 2018-10-08 ENCOUNTER — LAB (OUTPATIENT)
Dept: ENDOCRINOLOGY | Age: 83
End: 2018-10-08

## 2018-10-08 DIAGNOSIS — E78.5 HYPERLIPIDEMIA LDL GOAL <70: ICD-10-CM

## 2018-10-08 DIAGNOSIS — M81.0 OSTEOPOROSIS, POST-MENOPAUSAL: ICD-10-CM

## 2018-10-08 DIAGNOSIS — E55.9 VITAMIN D DEFICIENCY: ICD-10-CM

## 2018-10-08 DIAGNOSIS — E89.2 H/O PARATHYROIDECTOMY (HCC): ICD-10-CM

## 2018-10-08 DIAGNOSIS — E79.0 HYPERURICEMIA: ICD-10-CM

## 2018-10-09 LAB
25(OH)D3+25(OH)D2 SERPL-MCNC: 47.6 NG/ML (ref 30–100)
ALBUMIN SERPL-MCNC: 4.3 G/DL (ref 3.5–5.2)
ALBUMIN/GLOB SERPL: 1.9 G/DL
ALP SERPL-CCNC: 45 U/L (ref 39–117)
ALT SERPL-CCNC: 15 U/L (ref 1–33)
AST SERPL-CCNC: 13 U/L (ref 1–32)
BILIRUB SERPL-MCNC: 0.6 MG/DL (ref 0.1–1.2)
BUN SERPL-MCNC: 17 MG/DL (ref 8–23)
BUN/CREAT SERPL: 17 (ref 7–25)
CA-I SERPL ISE-MCNC: 5.2 MG/DL (ref 4.5–5.6)
CALCIUM SERPL-MCNC: 9.1 MG/DL (ref 8.6–10.5)
CHLORIDE SERPL-SCNC: 107 MMOL/L (ref 98–107)
CHOLEST SERPL-MCNC: 120 MG/DL (ref 0–200)
CO2 SERPL-SCNC: 25.8 MMOL/L (ref 22–29)
CREAT SERPL-MCNC: 1 MG/DL (ref 0.57–1)
GLOBULIN SER CALC-MCNC: 2.3 GM/DL
GLUCOSE SERPL-MCNC: 109 MG/DL (ref 65–99)
HDLC SERPL-MCNC: 51 MG/DL (ref 40–60)
INTERPRETATION: NORMAL
LDLC SERPL CALC-MCNC: 58 MG/DL (ref 0–100)
PHOSPHATE SERPL-MCNC: 3.7 MG/DL (ref 2.5–4.5)
POTASSIUM SERPL-SCNC: 4.6 MMOL/L (ref 3.5–5.2)
PROT SERPL-MCNC: 6.6 G/DL (ref 6–8.5)
PTH-INTACT SERPL-MCNC: 66 PG/ML (ref 15–65)
SODIUM SERPL-SCNC: 144 MMOL/L (ref 136–145)
T3FREE SERPL-MCNC: 2.9 PG/ML (ref 2–4.4)
T4 FREE SERPL-MCNC: 1.59 NG/DL (ref 0.93–1.7)
T4 SERPL-MCNC: 8.74 MCG/DL (ref 4.5–11.7)
TRIGL SERPL-MCNC: 55 MG/DL (ref 0–150)
TSH SERPL DL<=0.005 MIU/L-ACNC: 0.97 MIU/ML (ref 0.27–4.2)
URATE SERPL-MCNC: 5.8 MG/DL (ref 2.4–5.7)
VLDLC SERPL CALC-MCNC: 11 MG/DL (ref 5–40)

## 2018-10-17 ENCOUNTER — EPISODE CHANGES (OUTPATIENT)
Dept: CASE MANAGEMENT | Facility: OTHER | Age: 83
End: 2018-10-17

## 2018-10-22 ENCOUNTER — OFFICE VISIT (OUTPATIENT)
Dept: ENDOCRINOLOGY | Age: 83
End: 2018-10-22

## 2018-10-22 VITALS
HEIGHT: 59 IN | SYSTOLIC BLOOD PRESSURE: 112 MMHG | BODY MASS INDEX: 26.97 KG/M2 | RESPIRATION RATE: 16 BRPM | DIASTOLIC BLOOD PRESSURE: 66 MMHG | WEIGHT: 133.8 LBS

## 2018-10-22 DIAGNOSIS — E79.0 HYPERURICEMIA: ICD-10-CM

## 2018-10-22 DIAGNOSIS — M81.8 OTHER OSTEOPOROSIS WITHOUT CURRENT PATHOLOGICAL FRACTURE: ICD-10-CM

## 2018-10-22 DIAGNOSIS — E55.9 VITAMIN D DEFICIENCY: ICD-10-CM

## 2018-10-22 DIAGNOSIS — E78.5 DYSLIPIDEMIA: ICD-10-CM

## 2018-10-22 DIAGNOSIS — R73.9 HYPERGLYCEMIA: Primary | ICD-10-CM

## 2018-10-22 DIAGNOSIS — E21.1 SECONDARY HYPERPARATHYROIDISM, NON-RENAL (HCC): ICD-10-CM

## 2018-10-22 DIAGNOSIS — M81.0 SENILE OSTEOPOROSIS: ICD-10-CM

## 2018-10-22 DIAGNOSIS — M81.0 OSTEOPOROSIS, POST-MENOPAUSAL: ICD-10-CM

## 2018-10-22 PROCEDURE — 99214 OFFICE O/P EST MOD 30 MIN: CPT | Performed by: INTERNAL MEDICINE

## 2018-10-22 RX ORDER — CARVEDILOL 25 MG/1
25 TABLET ORAL
Qty: 90 TABLET | Refills: 3 | Status: SHIPPED | OUTPATIENT
Start: 2018-10-22 | End: 2019-02-14 | Stop reason: SDUPTHER

## 2018-10-22 RX ORDER — PRAVASTATIN SODIUM 40 MG
40 TABLET ORAL DAILY
Qty: 90 TABLET | Refills: 2 | Status: SHIPPED | OUTPATIENT
Start: 2018-10-22 | End: 2019-02-14

## 2018-10-22 RX ORDER — PRAVASTATIN SODIUM 40 MG
TABLET ORAL
Qty: 90 TABLET | Refills: 0 | Status: SHIPPED | OUTPATIENT
Start: 2018-10-22 | End: 2019-02-14 | Stop reason: SDUPTHER

## 2018-10-22 RX ORDER — LOSARTAN POTASSIUM 100 MG/1
50 TABLET ORAL DAILY
Qty: 45 TABLET | Refills: 3
Start: 2018-10-22 | End: 2019-02-14

## 2018-10-22 NOTE — PROGRESS NOTES
"Subjective   Neha Guillory is a 85 y.o. female seen for follow up for osteoporosis, vit d deficiency, hyperlipidemia, HTN, hyperparathyroidism, lab review. She denies any problems or concerns.     History of Present Illness this is an 85-year-old female known patient with osteoporosis and vitamin D deficiency as well as hypertension and dyslipidemia.  Over the course of last 6 months she has had no significant health problems for which to go to the emergency room or hospital.    /66   Resp 16   Ht 149.9 cm (59\")   Wt 60.7 kg (133 lb 12.8 oz)   BMI 27.02 kg/m²      Allergies   Allergen Reactions   • Atorvastatin      myalgias  myalgias  myalgias  myalgias   • Amoxicillin Hives       Current Outpatient Prescriptions:   •  aspirin 81 MG EC tablet, Take 81 mg by mouth Daily., Disp: , Rfl:   •  B Complex Vitamins (VITAMIN B COMPLEX) tablet, Take 1 tablet by mouth Daily., Disp: , Rfl:   •  carvedilol (COREG) 25 MG tablet, Take 1 tablet by mouth 2 (Two) Times a Day., Disp: , Rfl:   •  cholecalciferol (VITAMIN D3) 1000 units tablet, Take 1,000 Units by mouth Daily., Disp: , Rfl:   •  CloNIDine (CATAPRES) 0.2 MG tablet, Take 1 tablet by mouth 2 (Two) Times a Day. One in the am one at night, Disp: 90 tablet, Rfl: 3  •  losartan (COZAAR) 100 MG tablet, Take 0.5 tablets by mouth Daily. (Patient taking differently: Take 100 mg by mouth Daily.), Disp: , Rfl:   •  pravastatin (PRAVACHOL) 40 MG tablet, Take 1 tablet by mouth Daily., Disp: 90 tablet, Rfl: 2      The following portions of the patient's history were reviewed and updated as appropriate: allergies, current medications, past family history, past medical history, past social history, past surgical history and problem list.    Review of Systems   Constitutional: Negative.    HENT: Negative.    Eyes: Negative.    Respiratory: Negative.    Cardiovascular: Negative.    Gastrointestinal: Negative.    Endocrine: Negative.    Genitourinary: Negative.    Musculoskeletal: " Negative.    Skin: Negative.    Allergic/Immunologic: Negative.    Neurological: Negative.    Hematological: Negative.    Psychiatric/Behavioral: Negative.        Objective   Physical Exam   Constitutional: She is oriented to person, place, and time. She appears well-developed and well-nourished. No distress.   HENT:   Head: Normocephalic and atraumatic.   Right Ear: External ear normal.   Left Ear: External ear normal.   Nose: Nose normal.   Mouth/Throat: Oropharynx is clear and moist. No oropharyngeal exudate.   Eyes: Pupils are equal, round, and reactive to light. Conjunctivae and EOM are normal. Right eye exhibits no discharge. Left eye exhibits no discharge. No scleral icterus.   Neck: Normal range of motion. Neck supple. No JVD present. Carotid bruit is not present. Edema present. No tracheal deviation and no erythema present. No thyromegaly present.   Healed the scar of parathyroidectomy in lower neck to the right.   Cardiovascular: Normal rate, regular rhythm and intact distal pulses.  Exam reveals no gallop and no friction rub.    No murmur heard.  Pulmonary/Chest: Effort normal and breath sounds normal. No stridor. No respiratory distress. She has no wheezes. She has no rales. She exhibits no tenderness.   Abdominal: Soft. Bowel sounds are normal. She exhibits no distension and no mass. There is no tenderness. There is no rebound and no guarding. No hernia.   Musculoskeletal: Normal range of motion. She exhibits no edema, tenderness or deformity.   Trace edema. Varicose veins  kyphosis   Lymphadenopathy:     She has no cervical adenopathy.   Neurological: She is alert and oriented to person, place, and time. She has normal reflexes. She displays normal reflexes. No cranial nerve deficit or sensory deficit. She exhibits normal muscle tone. Coordination normal.   Leg cramps at night sometimes   Skin: Skin is warm and dry. No rash noted. She is not diaphoretic. No erythema. No pallor.   Psychiatric: She has a  normal mood and affect. Her behavior is normal. Judgment and thought content normal.   Nursing note and vitals reviewed.       Results for orders placed or performed in visit on 10/08/18   Comprehensive Metabolic Panel   Result Value Ref Range    Glucose 109 (H) 65 - 99 mg/dL    BUN 17 8 - 23 mg/dL    Creatinine 1.00 0.57 - 1.00 mg/dL    eGFR Non African Am 53 (L) >60 mL/min/1.73    eGFR African Am 64 >60 mL/min/1.73    BUN/Creatinine Ratio 17.0 7.0 - 25.0    Sodium 144 136 - 145 mmol/L    Potassium 4.6 3.5 - 5.2 mmol/L    Chloride 107 98 - 107 mmol/L    Total CO2 25.8 22.0 - 29.0 mmol/L    Calcium 9.1 8.6 - 10.5 mg/dL    Total Protein 6.6 6.0 - 8.5 g/dL    Albumin 4.30 3.50 - 5.20 g/dL    Globulin 2.3 gm/dL    A/G Ratio 1.9 g/dL    Total Bilirubin 0.6 0.1 - 1.2 mg/dL    Alkaline Phosphatase 45 39 - 117 U/L    AST (SGOT) 13 1 - 32 U/L    ALT (SGPT) 15 1 - 33 U/L   Lipid Panel   Result Value Ref Range    Total Cholesterol 120 0 - 200 mg/dL    Triglycerides 55 0 - 150 mg/dL    HDL Cholesterol 51 40 - 60 mg/dL    VLDL Cholesterol 11 5 - 40 mg/dL    LDL Cholesterol  58 0 - 100 mg/dL   Uric Acid   Result Value Ref Range    Uric Acid 5.8 (H) 2.4 - 5.7 mg/dL   Vitamin D 25 Hydroxy   Result Value Ref Range    25 Hydroxy, Vitamin D 47.6 30.0 - 100.0 ng/ml   Phosphorus   Result Value Ref Range    Phosphorus 3.7 2.5 - 4.5 mg/dL   Calcium, Ionized   Result Value Ref Range    Ionized Calcium 5.2 4.5 - 5.6 mg/dL   PTH, Intact   Result Value Ref Range    PTH, Intact 66 (H) 15 - 65 pg/mL   T3, Free   Result Value Ref Range    T3, Free 2.9 2.0 - 4.4 pg/mL   T4 & TSH ([facility])   Result Value Ref Range    TSH 0.971 0.270 - 4.200 mIU/mL    T4, Total 8.74 4.50 - 11.70 mcg/dL   T4, Free   Result Value Ref Range    Free T4 1.59 0.93 - 1.70 ng/dL   Cardiovascular Risk Assessment   Result Value Ref Range    Interpretation Note          Assessment/Plan   Diagnoses and all orders for this visit:    Hyperglycemia  -     T4 & TSH ([facility]);  Future  -     T3, Free; Future  -     T4, Free; Future  -     Thyroglobulin With Anti-TG; Future  -     Uric Acid; Future  -     Vitamin D 25 Hydroxy; Future  -     Comprehensive Metabolic Panel; Future  -     Comprehensive Thyroglobulin; Future  -     Hemoglobin A1c; Future  -     Lipid Panel; Future  -     Calcium, Ionized; Future  -     Phosphorus; Future  -     PTH, Intact; Future    Secondary hyperparathyroidism, non-renal (CMS/HCC)  -     T4 & TSH ([facility]); Future  -     T3, Free; Future  -     T4, Free; Future  -     Thyroglobulin With Anti-TG; Future  -     Uric Acid; Future  -     Vitamin D 25 Hydroxy; Future  -     Comprehensive Metabolic Panel; Future  -     Comprehensive Thyroglobulin; Future  -     Hemoglobin A1c; Future  -     Lipid Panel; Future  -     Calcium, Ionized; Future  -     Phosphorus; Future  -     PTH, Intact; Future    Vitamin D deficiency  -     T4 & TSH ([facility]); Future  -     T3, Free; Future  -     T4, Free; Future  -     Thyroglobulin With Anti-TG; Future  -     Uric Acid; Future  -     Vitamin D 25 Hydroxy; Future  -     Comprehensive Metabolic Panel; Future  -     Comprehensive Thyroglobulin; Future  -     Hemoglobin A1c; Future  -     Lipid Panel; Future  -     Calcium, Ionized; Future  -     Phosphorus; Future  -     PTH, Intact; Future    Other osteoporosis without current pathological fracture  -     T4 & TSH ([facility]); Future  -     T3, Free; Future  -     T4, Free; Future  -     Thyroglobulin With Anti-TG; Future  -     Uric Acid; Future  -     Vitamin D 25 Hydroxy; Future  -     Comprehensive Metabolic Panel; Future  -     Comprehensive Thyroglobulin; Future  -     Hemoglobin A1c; Future  -     Lipid Panel; Future  -     Calcium, Ionized; Future  -     Phosphorus; Future  -     PTH, Intact; Future  -     dexa bone density axial; Future    Senile osteoporosis  -     T4 & TSH ([facility]); Future  -     T3, Free; Future  -     T4, Free; Future  -     Thyroglobulin With Anti-TG;  Future  -     Uric Acid; Future  -     Vitamin D 25 Hydroxy; Future  -     Comprehensive Metabolic Panel; Future  -     Comprehensive Thyroglobulin; Future  -     Hemoglobin A1c; Future  -     Lipid Panel; Future  -     Calcium, Ionized; Future  -     Phosphorus; Future  -     PTH, Intact; Future  -     dexa bone density axial; Future    Osteoporosis, post-menopausal  -     T4 & TSH ([facility]); Future  -     T3, Free; Future  -     T4, Free; Future  -     Thyroglobulin With Anti-TG; Future  -     Uric Acid; Future  -     Vitamin D 25 Hydroxy; Future  -     Comprehensive Metabolic Panel; Future  -     Comprehensive Thyroglobulin; Future  -     Hemoglobin A1c; Future  -     Lipid Panel; Future  -     Calcium, Ionized; Future  -     Phosphorus; Future  -     PTH, Intact; Future  -     dexa bone density axial; Future    Hyperuricemia  -     T4 & TSH ([facility]); Future  -     T3, Free; Future  -     T4, Free; Future  -     Thyroglobulin With Anti-TG; Future  -     Uric Acid; Future  -     Vitamin D 25 Hydroxy; Future  -     Comprehensive Metabolic Panel; Future  -     Comprehensive Thyroglobulin; Future  -     Hemoglobin A1c; Future  -     Lipid Panel; Future  -     Calcium, Ionized; Future  -     Phosphorus; Future  -     PTH, Intact; Future    Dyslipidemia  -     T4 & TSH ([facility]); Future  -     T3, Free; Future  -     T4, Free; Future  -     Thyroglobulin With Anti-TG; Future  -     Uric Acid; Future  -     Vitamin D 25 Hydroxy; Future  -     Comprehensive Metabolic Panel; Future  -     Comprehensive Thyroglobulin; Future  -     Hemoglobin A1c; Future  -     Lipid Panel; Future  -     Calcium, Ionized; Future  -     Phosphorus; Future  -     PTH, Intact; Future    Other orders  -     pravastatin (PRAVACHOL) 40 MG tablet; Take 1 tablet by mouth Daily.  -     losartan (COZAAR) 100 MG tablet; Take 0.5 tablets by mouth Daily.  -     carvedilol (COREG) 25 MG tablet; Take 1 tablet by mouth 2 (Two) Times a Day.             in  summary I saw and examined this 85-year-old female for above-mentioned problems.  I reviewed her laboratory evaluation of 10/08/2018 and provided her with a hard copy of it.  I also discussed with her her DEXA scan of 07/17/2017.  We will continue her current prescription at this time and will see MsDarren Vanessa Reddy  in 6 months or sooner if needed with laboratory evaluation prior to each office visit.

## 2019-02-14 ENCOUNTER — OFFICE VISIT (OUTPATIENT)
Dept: INTERNAL MEDICINE | Facility: CLINIC | Age: 84
End: 2019-02-14

## 2019-02-14 VITALS
TEMPERATURE: 97.8 F | OXYGEN SATURATION: 95 % | DIASTOLIC BLOOD PRESSURE: 86 MMHG | BODY MASS INDEX: 27.01 KG/M2 | HEART RATE: 65 BPM | WEIGHT: 134 LBS | RESPIRATION RATE: 17 BRPM | HEIGHT: 59 IN | SYSTOLIC BLOOD PRESSURE: 146 MMHG

## 2019-02-14 DIAGNOSIS — I51.89 DIASTOLIC DYSFUNCTION: ICD-10-CM

## 2019-02-14 DIAGNOSIS — I10 ESSENTIAL HYPERTENSION: ICD-10-CM

## 2019-02-14 DIAGNOSIS — E21.1 SECONDARY HYPERPARATHYROIDISM, NON-RENAL (HCC): Primary | ICD-10-CM

## 2019-02-14 DIAGNOSIS — M81.8 OTHER OSTEOPOROSIS WITHOUT CURRENT PATHOLOGICAL FRACTURE: ICD-10-CM

## 2019-02-14 DIAGNOSIS — R73.9 HYPERGLYCEMIA: ICD-10-CM

## 2019-02-14 DIAGNOSIS — E78.5 HYPERLIPIDEMIA LDL GOAL <70: ICD-10-CM

## 2019-02-14 PROCEDURE — 99214 OFFICE O/P EST MOD 30 MIN: CPT | Performed by: INTERNAL MEDICINE

## 2019-02-14 RX ORDER — CARVEDILOL 12.5 MG/1
12.5 TABLET ORAL 2 TIMES DAILY WITH MEALS
Qty: 180 TABLET | Refills: 3 | Status: SHIPPED | OUTPATIENT
Start: 2019-02-14 | End: 2019-09-19 | Stop reason: ALTCHOICE

## 2019-02-14 RX ORDER — OLMESARTAN MEDOXOMIL 40 MG/1
40 TABLET ORAL DAILY
Qty: 90 TABLET | Refills: 3 | Status: SHIPPED | OUTPATIENT
Start: 2019-02-14 | End: 2020-03-10 | Stop reason: SDUPTHER

## 2019-02-14 RX ORDER — PRAVASTATIN SODIUM 40 MG
40 TABLET ORAL DAILY
Qty: 90 TABLET | Refills: 3 | Status: SHIPPED | OUTPATIENT
Start: 2019-02-14 | End: 2019-06-12 | Stop reason: SDUPTHER

## 2019-02-15 LAB
ALBUMIN SERPL-MCNC: 4.5 G/DL (ref 3.5–4.7)
ALBUMIN/GLOB SERPL: 2 {RATIO} (ref 1.2–2.2)
ALP SERPL-CCNC: 48 IU/L (ref 39–117)
ALT SERPL-CCNC: 17 IU/L (ref 0–32)
APPEARANCE UR: CLEAR
AST SERPL-CCNC: 19 IU/L (ref 0–40)
BACTERIA #/AREA URNS HPF: ABNORMAL /[HPF]
BASOPHILS # BLD AUTO: 0 X10E3/UL (ref 0–0.2)
BASOPHILS NFR BLD AUTO: 0 %
BILIRUB SERPL-MCNC: 0.6 MG/DL (ref 0–1.2)
BILIRUB UR QL STRIP: NEGATIVE
BUN SERPL-MCNC: 23 MG/DL (ref 8–27)
BUN/CREAT SERPL: 23 (ref 12–28)
CA-I SERPL ISE-MCNC: 5.4 MG/DL (ref 4.5–5.6)
CALCIUM SERPL-MCNC: 10 MG/DL (ref 8.7–10.3)
CHLORIDE SERPL-SCNC: 104 MMOL/L (ref 96–106)
CHOLEST SERPL-MCNC: 142 MG/DL (ref 100–199)
CO2 SERPL-SCNC: 25 MMOL/L (ref 20–29)
COLOR UR: YELLOW
CREAT SERPL-MCNC: 1 MG/DL (ref 0.57–1)
EOSINOPHIL # BLD AUTO: 0.3 X10E3/UL (ref 0–0.4)
EOSINOPHIL NFR BLD AUTO: 3 %
EPI CELLS #/AREA URNS HPF: ABNORMAL /HPF
ERYTHROCYTE [DISTWIDTH] IN BLOOD BY AUTOMATED COUNT: 14 % (ref 12.3–15.4)
GLOBULIN SER CALC-MCNC: 2.3 G/DL (ref 1.5–4.5)
GLUCOSE SERPL-MCNC: 103 MG/DL (ref 65–99)
GLUCOSE UR QL: NEGATIVE
HBA1C MFR BLD: 5.9 % (ref 4.8–5.6)
HCT VFR BLD AUTO: 42.1 % (ref 34–46.6)
HDLC SERPL-MCNC: 55 MG/DL
HGB BLD-MCNC: 13.7 G/DL (ref 11.1–15.9)
HGB UR QL STRIP: NEGATIVE
IMM GRANULOCYTES # BLD AUTO: 0.1 X10E3/UL (ref 0–0.1)
IMM GRANULOCYTES NFR BLD AUTO: 1 %
KETONES UR QL STRIP: NEGATIVE
LDLC SERPL CALC-MCNC: 70 MG/DL (ref 0–99)
LDLC/HDLC SERPL: 1.3 RATIO (ref 0–3.2)
LEUKOCYTE ESTERASE UR QL STRIP: (no result)
LYMPHOCYTES # BLD AUTO: 1.1 X10E3/UL (ref 0.7–3.1)
LYMPHOCYTES NFR BLD AUTO: 12 %
MCH RBC QN AUTO: 29.1 PG (ref 26.6–33)
MCHC RBC AUTO-ENTMCNC: 32.5 G/DL (ref 31.5–35.7)
MCV RBC AUTO: 89 FL (ref 79–97)
MICRO URNS: (no result)
MONOCYTES # BLD AUTO: 0.9 X10E3/UL (ref 0.1–0.9)
MONOCYTES NFR BLD AUTO: 10 %
MUCOUS THREADS URNS QL MICRO: PRESENT
NEUTROPHILS # BLD AUTO: 6.6 X10E3/UL (ref 1.4–7)
NEUTROPHILS NFR BLD AUTO: 74 %
NITRITE UR QL STRIP: NEGATIVE
PH UR STRIP: 5.5 [PH] (ref 5–7.5)
PLATELET # BLD AUTO: 242 X10E3/UL (ref 150–379)
POTASSIUM SERPL-SCNC: 4.5 MMOL/L (ref 3.5–5.2)
PROT SERPL-MCNC: 6.8 G/DL (ref 6–8.5)
PROT UR QL STRIP: NEGATIVE
PTH-INTACT SERPL-MCNC: 35 PG/ML (ref 15–65)
RBC # BLD AUTO: 4.71 X10E6/UL (ref 3.77–5.28)
RBC #/AREA URNS HPF: ABNORMAL /HPF
SODIUM SERPL-SCNC: 143 MMOL/L (ref 134–144)
SP GR UR: 1.02 (ref 1–1.03)
T4 FREE SERPL-MCNC: 1.68 NG/DL (ref 0.82–1.77)
TRIGL SERPL-MCNC: 83 MG/DL (ref 0–149)
TSH SERPL DL<=0.005 MIU/L-ACNC: 1.26 UIU/ML (ref 0.45–4.5)
UROBILINOGEN UR STRIP-MCNC: 0.2 MG/DL (ref 0.2–1)
VLDLC SERPL CALC-MCNC: 17 MG/DL (ref 5–40)
WBC # BLD AUTO: 8.9 X10E3/UL (ref 3.4–10.8)
WBC #/AREA URNS HPF: ABNORMAL /HPF

## 2019-02-21 ENCOUNTER — INFUSION (OUTPATIENT)
Dept: ONCOLOGY | Facility: HOSPITAL | Age: 84
End: 2019-02-21

## 2019-02-21 VITALS
RESPIRATION RATE: 16 BRPM | HEART RATE: 80 BPM | BODY MASS INDEX: 27.01 KG/M2 | SYSTOLIC BLOOD PRESSURE: 167 MMHG | WEIGHT: 134 LBS | OXYGEN SATURATION: 96 % | HEIGHT: 59 IN | DIASTOLIC BLOOD PRESSURE: 74 MMHG | TEMPERATURE: 98.1 F

## 2019-02-21 DIAGNOSIS — M85.80 OSTEOPENIA, UNSPECIFIED LOCATION: ICD-10-CM

## 2019-02-21 DIAGNOSIS — M81.0 OSTEOPOROSIS, POST-MENOPAUSAL: Primary | ICD-10-CM

## 2019-02-21 PROCEDURE — 96372 THER/PROPH/DIAG INJ SC/IM: CPT | Performed by: NURSE PRACTITIONER

## 2019-02-21 PROCEDURE — 25010000002 DENOSUMAB 60 MG/ML SOLUTION: Performed by: INTERNAL MEDICINE

## 2019-02-21 RX ADMIN — DENOSUMAB 60 MG: 60 INJECTION SUBCUTANEOUS at 15:08

## 2019-02-21 NOTE — PROGRESS NOTES
Arrived ambulatory for prolia injection. Indication and side effects reviewed. Denies recent dental work. Labs and medications verified. Pt is not taking calcium supp per MD instructions. Prolia administered in left arm without incidence. Instructed to call prescribing MD for any concerns or questions and instructed on how to schedule future appts.  Pt vu and discharged ambulatory.

## 2019-03-03 NOTE — PROGRESS NOTES
Subjective   Neha Guillory is a 86 y.o. female.   She is here for follow-up for hyperparathyroidism hypertension hyperlipidemia diastolic dysfunction osteoporosis hyperglycemia  History of Present Illness   She is here for follow-up for hyperparathyroidism which is stable now we will check labs today hypertension which is stable on current medication hyperlipidemia which is been stable on current medication diastolic dysfunction stable on current diuretics and osteoporosis which has been stable and hyperglycemia which is stable with no evidence of diabetes up to this point  The following portions of the patient's history were reviewed and updated as appropriate: allergies, current medications, past family history, past medical history, past social history, past surgical history and problem list.    Review of Systems   Constitutional: Negative for fatigue.   Endocrine: Negative for polydipsia and polyuria.   Genitourinary: Negative for difficulty urinating.   Neurological: Negative for weakness.   Psychiatric/Behavioral: Negative for dysphoric mood.   All other systems reviewed and are negative.      Objective   Physical Exam   Constitutional: She is oriented to person, place, and time. She appears well-developed and well-nourished. She is cooperative.   HENT:   Head: Normocephalic and atraumatic.   Right Ear: Hearing, tympanic membrane, external ear and ear canal normal.   Left Ear: Hearing, tympanic membrane, external ear and ear canal normal.   Nose: Nose normal.   Mouth/Throat: Uvula is midline, oropharynx is clear and moist and mucous membranes are normal.   Eyes: Conjunctivae, EOM and lids are normal. Pupils are equal, round, and reactive to light.   Neck: Phonation normal. Neck supple. Carotid bruit is not present.   Cardiovascular: Normal rate, regular rhythm and normal heart sounds. Exam reveals no gallop and no friction rub.   No murmur heard.  Pulmonary/Chest: Effort normal and breath sounds normal. No  respiratory distress.   Abdominal: Soft. Bowel sounds are normal. She exhibits no distension and no mass. There is no hepatosplenomegaly. There is no tenderness. There is no rebound and no guarding. No hernia.   Musculoskeletal: She exhibits no edema.   Neurological: She is alert and oriented to person, place, and time. Coordination and gait normal.   Skin: Skin is warm and dry.   Psychiatric: She has a normal mood and affect. Her speech is normal and behavior is normal. Judgment and thought content normal.   Nursing note and vitals reviewed.      Assessment/Plan   Diagnoses and all orders for this visit:    Secondary hyperparathyroidism, non-renal (CMS/HCC)  -     PTH, Intact  -     Calcium, Ionized    Essential hypertension    Hyperlipidemia LDL goal <70  -     Hemoglobin A1c  -     Lipid Panel With LDL / HDL Ratio  -     CBC & Differential  -     Comprehensive Metabolic Panel  -     T4, Free  -     TSH  -     Urinalysis With Microscopic - Urine, Clean Catch    Diastolic dysfunction    Other osteoporosis without current pathological fracture    Hyperglycemia  -     Hemoglobin A1c    Other orders  -     carvedilol (COREG) 12.5 MG tablet; Take 1 tablet by mouth 2 (Two) Times a Day With Meals.  -     pravastatin (PRAVACHOL) 40 MG tablet; Take 1 tablet by mouth Daily.  -     olmesartan (BENICAR) 40 MG tablet; Take 1 tablet by mouth Daily.  -     Microscopic Examination -      Secondary hyperparathyroidism we will check intact PTH and ionized calcium to make sure still stable  Hypertension well-controlled on current medication but we will provide more carvedilol  Hyperlipidemia stable on current medication we will check lipid panel  Diastolic dysfunction stable on diuretics which we will continue  Osteoporosis asthma stable at this point  Hyperglycemia stable with no evidence of diabetes at this point

## 2019-03-04 RX ORDER — CLONIDINE HYDROCHLORIDE 0.2 MG/1
TABLET ORAL
Qty: 90 TABLET | Refills: 0 | Status: SHIPPED | OUTPATIENT
Start: 2019-03-04 | End: 2019-04-22 | Stop reason: SDUPTHER

## 2019-03-29 DIAGNOSIS — E21.1 SECONDARY HYPERPARATHYROIDISM, NON-RENAL (HCC): ICD-10-CM

## 2019-03-29 DIAGNOSIS — E83.52 HYPERCALCEMIA: ICD-10-CM

## 2019-03-29 DIAGNOSIS — E55.9 VITAMIN D DEFICIENCY: ICD-10-CM

## 2019-03-29 DIAGNOSIS — E79.0 HYPERURICEMIA: ICD-10-CM

## 2019-03-29 DIAGNOSIS — R73.9 HYPERGLYCEMIA: Primary | ICD-10-CM

## 2019-03-29 DIAGNOSIS — E78.5 DYSLIPIDEMIA: ICD-10-CM

## 2019-04-15 ENCOUNTER — RESULTS ENCOUNTER (OUTPATIENT)
Dept: ENDOCRINOLOGY | Age: 84
End: 2019-04-15

## 2019-04-15 DIAGNOSIS — E55.9 VITAMIN D DEFICIENCY: ICD-10-CM

## 2019-04-15 DIAGNOSIS — R73.9 HYPERGLYCEMIA: ICD-10-CM

## 2019-04-15 DIAGNOSIS — M81.8 OTHER OSTEOPOROSIS WITHOUT CURRENT PATHOLOGICAL FRACTURE: ICD-10-CM

## 2019-04-15 DIAGNOSIS — E21.1 SECONDARY HYPERPARATHYROIDISM, NON-RENAL (HCC): ICD-10-CM

## 2019-04-15 DIAGNOSIS — M81.0 OSTEOPOROSIS, POST-MENOPAUSAL: ICD-10-CM

## 2019-04-15 DIAGNOSIS — M81.0 SENILE OSTEOPOROSIS: ICD-10-CM

## 2019-04-15 DIAGNOSIS — E79.0 HYPERURICEMIA: ICD-10-CM

## 2019-04-15 DIAGNOSIS — E78.5 DYSLIPIDEMIA: ICD-10-CM

## 2019-04-22 RX ORDER — CLONIDINE HYDROCHLORIDE 0.2 MG/1
TABLET ORAL
Qty: 90 TABLET | Refills: 2 | OUTPATIENT
Start: 2019-04-22

## 2019-04-22 RX ORDER — CLONIDINE HYDROCHLORIDE 0.2 MG/1
TABLET ORAL
Qty: 90 TABLET | Refills: 2 | Status: SHIPPED | OUTPATIENT
Start: 2019-04-22 | End: 2019-04-22 | Stop reason: SDUPTHER

## 2019-04-22 RX ORDER — CLONIDINE HYDROCHLORIDE 0.2 MG/1
TABLET ORAL
Qty: 90 TABLET | Refills: 2 | Status: SHIPPED | OUTPATIENT
Start: 2019-04-22 | End: 2020-02-03

## 2019-04-29 ENCOUNTER — RESULTS ENCOUNTER (OUTPATIENT)
Dept: ENDOCRINOLOGY | Age: 84
End: 2019-04-29

## 2019-04-29 DIAGNOSIS — E79.0 HYPERURICEMIA: ICD-10-CM

## 2019-04-29 DIAGNOSIS — E21.1 SECONDARY HYPERPARATHYROIDISM, NON-RENAL (HCC): ICD-10-CM

## 2019-04-29 DIAGNOSIS — E55.9 VITAMIN D DEFICIENCY: ICD-10-CM

## 2019-04-29 DIAGNOSIS — E78.5 DYSLIPIDEMIA: ICD-10-CM

## 2019-04-29 DIAGNOSIS — E83.52 HYPERCALCEMIA: ICD-10-CM

## 2019-04-29 DIAGNOSIS — R73.9 HYPERGLYCEMIA: ICD-10-CM

## 2019-06-12 ENCOUNTER — OFFICE VISIT (OUTPATIENT)
Dept: INTERNAL MEDICINE | Facility: CLINIC | Age: 84
End: 2019-06-12

## 2019-06-12 VITALS
DIASTOLIC BLOOD PRESSURE: 86 MMHG | BODY MASS INDEX: 27.01 KG/M2 | HEIGHT: 59 IN | TEMPERATURE: 98.1 F | OXYGEN SATURATION: 92 % | RESPIRATION RATE: 16 BRPM | HEART RATE: 66 BPM | WEIGHT: 134 LBS | SYSTOLIC BLOOD PRESSURE: 152 MMHG

## 2019-06-12 DIAGNOSIS — N18.30 CKD (CHRONIC KIDNEY DISEASE) STAGE 3, GFR 30-59 ML/MIN (HCC): Chronic | ICD-10-CM

## 2019-06-12 DIAGNOSIS — M81.0 OSTEOPOROSIS, POST-MENOPAUSAL: ICD-10-CM

## 2019-06-12 DIAGNOSIS — Z00.00 HEALTHCARE MAINTENANCE: ICD-10-CM

## 2019-06-12 DIAGNOSIS — E78.5 HYPERLIPIDEMIA LDL GOAL <70: ICD-10-CM

## 2019-06-12 DIAGNOSIS — Z76.89 ENCOUNTER TO ESTABLISH CARE: Primary | ICD-10-CM

## 2019-06-12 DIAGNOSIS — I10 ESSENTIAL HYPERTENSION: ICD-10-CM

## 2019-06-12 PROCEDURE — 99214 OFFICE O/P EST MOD 30 MIN: CPT | Performed by: NURSE PRACTITIONER

## 2019-06-12 RX ORDER — PRAVASTATIN SODIUM 40 MG
40 TABLET ORAL DAILY
Qty: 90 TABLET | Refills: 3 | Status: SHIPPED | OUTPATIENT
Start: 2019-06-12 | End: 2020-04-01 | Stop reason: SDUPTHER

## 2019-06-12 NOTE — PROGRESS NOTES
Subjective   Neha Guillory is a 86 y.o. female.   Chief Complaint   Patient presents with   • Establish Care     Questions about DEXA scan, osteoporosis    Patient presents for establish care visit.  This is a 89-year-old female former patient of Dr. Mchugh.  This patient and her chronic conditions are new to me.    She has a history of hypertension and has a cardiologist, Dr. Bel Freeman who helps to manage this.  She takes olmesartan 40 mg daily, carvedilol 12.5 mg twice daily, and clonidine 0.2 mg twice daily.  Blood pressures a bit elevated today at 152/86.  She does not routinely check it at home, she states that she needs to buy a new blood pressure cuff.  She denies headache or visual changes.  She denies peripheral edema.    She also has a history of hyperlipidemia for which she takes pravastatin 40 mg daily and reports excellent compliance and toleration with this medication.  She reports that she is a generally healthy diet.    She has has a history of vitamin D deficiency and currently takes vitamin D3 1000 units daily.  This is being managed by endocrinology, ENZO Ricci.    She also has a history of osteoporosis.  Last DEXA scan was in 8/9/2017.  It showed osteoporosis without significant changes.  She does take Prolia infusions every 6 months.  She has a history of hyperparathyroidism, for which she is followed by endocrinology as well.    She has a history of CKD stage III, although her last creatinine was normal at 1.0.  She does not have a nephrologist.    She denies development of any other new issues today.         The following portions of the patient's history were reviewed and updated as appropriate: allergies, current medications, past family history, past medical history, past social history, past surgical history and problem list.    Review of Systems   Constitutional: Negative for activity change, chills, fatigue, fever, unexpected weight gain and unexpected weight loss.   HENT:  "Negative for congestion, hearing loss, postnasal drip, sinus pressure, sneezing, sore throat and tinnitus.    Eyes: Negative for photophobia, pain and visual disturbance.   Respiratory: Negative for cough, chest tightness, shortness of breath and wheezing.    Cardiovascular: Negative for chest pain, palpitations and leg swelling.   Gastrointestinal: Negative for abdominal distention, abdominal pain, constipation, diarrhea, nausea and vomiting.   Endocrine: Negative for polydipsia, polyphagia and polyuria.   Genitourinary: Negative for dysuria, frequency, hematuria and urgency.   Neurological: Negative for dizziness, weakness, numbness and headache.   All other systems reviewed and are negative.      Objective    /86   Pulse 66   Temp 98.1 °F (36.7 °C) (Oral)   Resp 16   Ht 149.9 cm (59.02\")   Wt 60.8 kg (134 lb)   SpO2 92%   BMI 27.05 kg/m²     Physical Exam   Constitutional: She is oriented to person, place, and time. She appears well-developed and well-nourished. No distress.   HENT:   Head: Normocephalic and atraumatic.   Eyes: EOM are normal. Pupils are equal, round, and reactive to light.   Neck: Normal range of motion. Neck supple. No JVD present. No tracheal deviation present. No thyromegaly present.   Cardiovascular: Normal rate, regular rhythm, normal heart sounds and intact distal pulses. Exam reveals no gallop and no friction rub.   No murmur heard.  No peripheral edema   Pulmonary/Chest: Effort normal and breath sounds normal. No stridor. No respiratory distress. She has no wheezes. She has no rales. She exhibits no tenderness.   Lungs are CTA bilaterally   Abdominal: Soft. Bowel sounds are normal. She exhibits no distension. There is no tenderness.   Musculoskeletal: Normal range of motion.   Lymphadenopathy:     She has no cervical adenopathy.   Neurological: She is alert and oriented to person, place, and time.   Skin: Skin is warm and dry. Capillary refill takes less than 2 seconds. She " is not diaphoretic.   Psychiatric: She has a normal mood and affect. Her behavior is normal. Judgment and thought content normal.   Nursing note and vitals reviewed.    Current outpatient and discharge medications have been reconciled for the patient.  Reviewed by: ENZO Yu      Assessment/Felice   Neha was seen today for establish care.    Diagnoses and all orders for this visit:    Encounter to establish care    Hyperlipidemia LDL goal <70  -     pravastatin (PRAVACHOL) 40 MG tablet; Take 1 tablet by mouth Daily.  -     Lipid panel; Future    Osteoporosis, post-menopausal  -     DEXA Bone Density Axial; Future    Essential hypertension  -     Comprehensive metabolic panel; Future  -     CBC & Differential; Future  -     Urinalysis With Microscopic - Urine, Clean Catch; Future    Healthcare maintenance    CKD (chronic kidney disease) stage 3, GFR 30-59 ml/min (CMS/[facility])    -Encounter to establish care, Healthcare maintenance: Reviewed patient's medical history and current chronic conditions.  She is due for Tdap, which she refuses.  She has been instructed to get the new shingles vaccine at her pharmacy.  She is up-to-date on other vaccinations.     -Osteoporosis: Per patient she has been taking Prolia injections every 6 months.  She is due for repeat DEXA scan in August of this year.  I have ordered the DEXA scan to be completed after 8/9/2018.  As she does have a history of hyperparathyroidism, we will refer to endocrinology for clearance on Prolia once the DEXA scan is back.  She currently takes a vitamin D3 supplement but no calcium supplement.    -Hypertension: Blood pressure is a bit elevated today at 152/86.  She is not monitoring at home, and I have asked her to do so, which she will and keep a log of her readings.  She does follow with Dr. Bel Freeman, cardiology on this issue as well.  Continue olmesartan 40 mg daily, carvedilol 12.5 mg daily, and clonidine 0.2 milligrams twice daily per previous  treatment regimen.  She will let me know if she is seeing sustained high readings at home.    -Hyperlipidemia: Continue pravastatin 40 mg.  We will check fasting labs in August, and get a lipid panel.  We will titrate depending upon results.    -CKD stage III: We will check a creatinine level.  Last creatinine was 1.0.  She does not have a nephrologist currently.  Stable.    -We will contact patient with results of her labs and any further recommendations.  Follow-up PRN and in 6 months for routine health maintenance and chronic condition follow-up.

## 2019-07-12 DIAGNOSIS — E55.9 VITAMIN D DEFICIENCY: ICD-10-CM

## 2019-07-12 DIAGNOSIS — R73.9 HYPERGLYCEMIA: Primary | ICD-10-CM

## 2019-07-12 DIAGNOSIS — E79.0 HYPERURICEMIA: ICD-10-CM

## 2019-07-12 DIAGNOSIS — E21.1 SECONDARY HYPERPARATHYROIDISM, NON-RENAL (HCC): ICD-10-CM

## 2019-07-12 PROBLEM — I65.23 CAROTID ATHEROSCLEROSIS, BILATERAL: Status: ACTIVE | Noted: 2019-07-12

## 2019-07-18 ENCOUNTER — LAB (OUTPATIENT)
Dept: ENDOCRINOLOGY | Age: 84
End: 2019-07-18

## 2019-07-18 DIAGNOSIS — E21.1 SECONDARY HYPERPARATHYROIDISM, NON-RENAL (HCC): ICD-10-CM

## 2019-07-18 DIAGNOSIS — E79.0 HYPERURICEMIA: ICD-10-CM

## 2019-07-18 DIAGNOSIS — E55.9 VITAMIN D DEFICIENCY: ICD-10-CM

## 2019-07-18 DIAGNOSIS — R73.9 HYPERGLYCEMIA: ICD-10-CM

## 2019-07-21 LAB
25(OH)D3+25(OH)D2 SERPL-MCNC: 45.1 NG/ML (ref 30–100)
ALBUMIN SERPL-MCNC: 4.1 G/DL (ref 3.5–5.2)
ALBUMIN/GLOB SERPL: 1.9 G/DL
ALP SERPL-CCNC: 51 U/L (ref 39–117)
ALT SERPL-CCNC: 14 U/L (ref 1–33)
AST SERPL-CCNC: 14 U/L (ref 1–32)
BILIRUB SERPL-MCNC: 0.6 MG/DL (ref 0.2–1.2)
BUN SERPL-MCNC: 25 MG/DL (ref 8–23)
BUN/CREAT SERPL: 24 (ref 7–25)
C PEPTIDE SERPL-MCNC: 10.1 NG/ML (ref 1.1–4.4)
CA-I SERPL ISE-MCNC: 5.5 MG/DL (ref 4.5–5.6)
CALCIUM SERPL-MCNC: 9.7 MG/DL (ref 8.6–10.5)
CHLORIDE SERPL-SCNC: 104 MMOL/L (ref 98–107)
CHOLEST SERPL-MCNC: 135 MG/DL (ref 0–200)
CO2 SERPL-SCNC: 25.6 MMOL/L (ref 22–29)
CREAT SERPL-MCNC: 1.04 MG/DL (ref 0.57–1)
GLOBULIN SER CALC-MCNC: 2.2 GM/DL
GLUCOSE SERPL-MCNC: 102 MG/DL (ref 65–99)
HBA1C MFR BLD: 5.7 % (ref 4.8–5.6)
HDLC SERPL-MCNC: 55 MG/DL (ref 40–60)
INTERPRETATION: NORMAL
LDLC SERPL CALC-MCNC: 68 MG/DL (ref 0–100)
Lab: NORMAL
POTASSIUM SERPL-SCNC: 4.5 MMOL/L (ref 3.5–5.2)
PROT SERPL-MCNC: 6.3 G/DL (ref 6–8.5)
PTH-INTACT SERPL-MCNC: 28 PG/ML (ref 15–65)
SODIUM SERPL-SCNC: 142 MMOL/L (ref 136–145)
THYROGLOB AB SERPL-ACNC: <1 IU/ML
THYROGLOB SERPL-MCNC: 12.4 NG/ML
THYROGLOB SERPL-MCNC: NORMAL NG/ML
TRIGL SERPL-MCNC: 58 MG/DL (ref 0–150)
URATE SERPL-MCNC: 7.4 MG/DL (ref 2.4–5.7)
VLDLC SERPL CALC-MCNC: 11.6 MG/DL

## 2019-08-01 ENCOUNTER — OFFICE VISIT (OUTPATIENT)
Dept: ENDOCRINOLOGY | Age: 84
End: 2019-08-01

## 2019-08-01 VITALS
DIASTOLIC BLOOD PRESSURE: 64 MMHG | HEIGHT: 59 IN | SYSTOLIC BLOOD PRESSURE: 116 MMHG | WEIGHT: 138 LBS | BODY MASS INDEX: 27.82 KG/M2

## 2019-08-01 DIAGNOSIS — R73.03 PREDIABETES: ICD-10-CM

## 2019-08-01 DIAGNOSIS — I10 ESSENTIAL HYPERTENSION: ICD-10-CM

## 2019-08-01 DIAGNOSIS — E89.2 H/O PARATHYROIDECTOMY (HCC): ICD-10-CM

## 2019-08-01 DIAGNOSIS — E55.9 VITAMIN D DEFICIENCY: ICD-10-CM

## 2019-08-01 DIAGNOSIS — E78.5 HYPERLIPIDEMIA LDL GOAL <70: Primary | ICD-10-CM

## 2019-08-01 PROCEDURE — 99214 OFFICE O/P EST MOD 30 MIN: CPT | Performed by: NURSE PRACTITIONER

## 2019-08-01 NOTE — PATIENT INSTRUCTIONS
Continue current meds  Monitor blood pressure   Make sure you are drinking enough water or decaffeinated fluids for hydration

## 2019-08-01 NOTE — PROGRESS NOTES
"Carol Guillory is a 86 y.o. female is here today for follow-up.  Chief Complaint   Patient presents with   • Hyperglycemia     recent labs   • Hyperlipidemia   • Vitamin D Deficiency   • hyperuricemia   • hyperparathyroidism     /64   Ht 149.9 cm (59\")   Wt 62.6 kg (138 lb)   BMI 27.87 kg/m²   Current Outpatient Medications on File Prior to Visit   Medication Sig   • aspirin 81 MG EC tablet Take 81 mg by mouth Daily.   • carvedilol (COREG) 12.5 MG tablet Take 1 tablet by mouth 2 (Two) Times a Day With Meals.   • cholecalciferol (VITAMIN D3) 1000 units tablet Take 1,000 Units by mouth Daily.   • CloNIDine (CATAPRES) 0.2 MG tablet TAKE ONE TABLET BY MOUTH TWICE A DAY. (MORNING AND AT NIGHT)   • denosumab (PROLIA) 60 MG/ML solution prefilled syringe syringe Inject 1 syringe under the skin into the appropriate area as directed Every 6 (Six) Months.   • Lactobacillus (PROBIOTIC ACIDOPHILUS PO) Take 1 tablet by mouth Daily.   • olmesartan (BENICAR) 40 MG tablet Take 1 tablet by mouth Daily.   • pravastatin (PRAVACHOL) 40 MG tablet Take 1 tablet by mouth Daily.     No current facility-administered medications on file prior to visit.      Family History   Problem Relation Age of Onset   • Colon cancer Sister    • Heart disease Brother         CABG   • Other Brother         BRAIN TUMOR   • Cancer Other      Social History     Tobacco Use   • Smoking status: Never Smoker   • Smokeless tobacco: Never Used   Substance Use Topics   • Alcohol use: No   • Drug use: No     Allergies   Allergen Reactions   • Atorvastatin      myalgias  myalgias  myalgias  myalgias   • Atorvastatin Calcium Other (See Comments)     myalgias   • Amoxicillin Hives         History of Present Illness  Encounter Diagnoses   Name Primary?   • Essential hypertension    • H/O parathyroidectomy    • Hyperlipidemia LDL goal <70 Yes   • Vitamin D deficiency      86-year-old female patient here today for routine follow-up visit.  She has been " seen for the above-mentioned problems.  She has monitor her blood pressures daily.  Her blood pressure today is in satisfactory range.  She has had some systolic blood pressures in the 170 range.  She is scheduled for a bone density by her primary care provider.  She does have a history of osteoporosis.  She has a history of a parathyroidectomy in her recent labs reflect normal calcium and parathyroid hormone.  Her medication list was reviewed and updated.  She has no complaints at today's visit.  We discussed goals for blood pressure to reduce risk of cardiovascular event  The following portions of the patient's history were reviewed and updated as appropriate: allergies, current medications, past family history, past medical history, past social history, past surgical history and problem list.    Review of Systems   Constitutional: Negative.    HENT: Positive for hearing loss.    Eyes: Negative.    Cardiovascular: Negative.    Endocrine: Negative.    Neurological: Negative.      I have reviewed the patient's ROS dated 08/01/2019   and attest to its accuracy.    Objective   Physical Exam   Constitutional: She is oriented to person, place, and time. She appears well-developed and well-nourished. No distress.   HENT:   Head: Normocephalic.   Right Ear: External ear normal.   Left Ear: External ear normal.   Nose: Nose normal.   Mouth/Throat: Oropharynx is clear and moist.   Eyes: Conjunctivae and EOM are normal. Pupils are equal, round, and reactive to light.   Neck: Normal range of motion. Neck supple. Carotid bruit is not present. Edema present. No tracheal deviation and no erythema present. No thyromegaly present.   Cardiovascular: Normal rate, regular rhythm and intact distal pulses. Exam reveals no gallop and no friction rub.   Murmur heard.  Pulmonary/Chest: Effort normal and breath sounds normal. No respiratory distress.   Abdominal: Soft.   Musculoskeletal: Normal range of motion. She exhibits no edema.    Trace edema. Varicose veins  kyphosis   Lymphadenopathy:     She has no cervical adenopathy.   Neurological: She is alert and oriented to person, place, and time.   Skin: Skin is warm and dry. She is not diaphoretic. No erythema. No pallor.   Psychiatric: She has a normal mood and affect. Her behavior is normal. Judgment and thought content normal.   Nursing note and vitals reviewed.    Results for orders placed or performed in visit on 07/18/19   PTH, Intact   Result Value Ref Range    PTH, Intact 28 15 - 65 pg/mL   Uric Acid   Result Value Ref Range    Uric Acid 7.4 (H) 2.4 - 5.7 mg/dL   Calcium, Ionized   Result Value Ref Range    Ionized Calcium 5.5 4.5 - 5.6 mg/dL   Comprehensive Thyroglobulin   Result Value Ref Range    Thyroglobulin Ab <1.0 IU/mL    Thyroglobulin 12.4 ng/mL    Thyroglobulin (TG-SERENITY) Comment ng/mL   C-Peptide   Result Value Ref Range    C-Peptide 10.1 (H) 1.1 - 4.4 ng/mL   Hemoglobin A1c   Result Value Ref Range    Hemoglobin A1C 5.70 (H) 4.80 - 5.60 %   Vitamin D 25 Hydroxy   Result Value Ref Range    25 Hydroxy, Vitamin D 45.1 30.0 - 100.0 ng/ml   Lipid Panel   Result Value Ref Range    Total Cholesterol 135 0 - 200 mg/dL    Triglycerides 58 0 - 150 mg/dL    HDL Cholesterol 55 40 - 60 mg/dL    VLDL Cholesterol 11.6 mg/dL    LDL Cholesterol  68 0 - 100 mg/dL   Comprehensive Metabolic Panel   Result Value Ref Range    Glucose 102 (H) 65 - 99 mg/dL    BUN 25 (H) 8 - 23 mg/dL    Creatinine 1.04 (H) 0.57 - 1.00 mg/dL    eGFR Non African Am 50 (L) >60 mL/min/1.73    eGFR African Am 61 >60 mL/min/1.73    BUN/Creatinine Ratio 24.0 7.0 - 25.0    Sodium 142 136 - 145 mmol/L    Potassium 4.5 3.5 - 5.2 mmol/L    Chloride 104 98 - 107 mmol/L    Total CO2 25.6 22.0 - 29.0 mmol/L    Calcium 9.7 8.6 - 10.5 mg/dL    Total Protein 6.3 6.0 - 8.5 g/dL    Albumin 4.10 3.50 - 5.20 g/dL    Globulin 2.2 gm/dL    A/G Ratio 1.9 g/dL    Total Bilirubin 0.6 0.2 - 1.2 mg/dL    Alkaline Phosphatase 51 39 - 117 U/L     AST (SGOT) 14 1 - 32 U/L    ALT (SGPT) 14 1 - 33 U/L   Cardiovascular Risk Assessment   Result Value Ref Range    Interpretation Note    Diabetes Patient Education   Result Value Ref Range    PDF Image Not applicable          Assessment/Plan   Problems Addressed this Visit        Cardiovascular and Mediastinum    Hyperlipidemia LDL goal <70 - Primary    Essential hypertension       Digestive    Vitamin D deficiency       Other    H/O parathyroidectomy        In summary, Patient was seen and examined.  Clinically and metabolically she is stable.  Her labs were reviewed and she was provided a copy.  No medications were changed at today's visit.  Her blood pressure is in good range at today's visit.  She does have some readings that are greater than 170 systolic.  Her medications were reviewed and she is on several different agents for blood pressure.  She will follow-up in 6 months with labs prior.  She is been encouraged to contact the office should she have any questions or concerns.  She will continue to monitor her blood pressure.  Scheduled for bone density to evaluate her osteoporosis.  Cholesterol is well controlled.  Vitamin D is stable calcium and parathyroid hormone are normal

## 2019-08-12 ENCOUNTER — HOSPITAL ENCOUNTER (OUTPATIENT)
Dept: BONE DENSITY | Facility: HOSPITAL | Age: 84
Discharge: HOME | End: 2019-08-12
Admitting: NURSE PRACTITIONER

## 2019-08-12 DIAGNOSIS — M81.0 OSTEOPOROSIS, POST-MENOPAUSAL: ICD-10-CM

## 2019-08-12 PROCEDURE — 77080 DXA BONE DENSITY AXIAL: CPT

## 2019-08-14 LAB
ALBUMIN SERPL-MCNC: 4.1 G/DL (ref 3.5–5.2)
ALBUMIN/GLOB SERPL: 2 G/DL
ALP SERPL-CCNC: 52 U/L (ref 39–117)
ALT SERPL-CCNC: 12 U/L (ref 1–33)
APPEARANCE UR: CLEAR
AST SERPL-CCNC: 15 U/L (ref 1–32)
BACTERIA #/AREA URNS HPF: NORMAL /HPF
BASOPHILS # BLD AUTO: 0.05 10*3/MM3 (ref 0–0.2)
BASOPHILS NFR BLD AUTO: 0.7 % (ref 0–1.5)
BILIRUB SERPL-MCNC: 0.4 MG/DL (ref 0.2–1.2)
BILIRUB UR QL STRIP: NEGATIVE
BUN SERPL-MCNC: 19 MG/DL (ref 8–23)
BUN/CREAT SERPL: 20.7 (ref 7–25)
CALCIUM SERPL-MCNC: 9 MG/DL (ref 8.6–10.5)
CASTS URNS MICRO: NORMAL
CHLORIDE SERPL-SCNC: 103 MMOL/L (ref 98–107)
CHOLEST SERPL-MCNC: 124 MG/DL (ref 0–200)
CO2 SERPL-SCNC: 25.2 MMOL/L (ref 22–29)
COLOR UR: YELLOW
CREAT SERPL-MCNC: 0.92 MG/DL (ref 0.57–1)
EOSINOPHIL # BLD AUTO: 0.19 10*3/MM3 (ref 0–0.4)
EOSINOPHIL NFR BLD AUTO: 2.7 % (ref 0.3–6.2)
EPI CELLS #/AREA URNS HPF: NORMAL /HPF
ERYTHROCYTE [DISTWIDTH] IN BLOOD BY AUTOMATED COUNT: 12.9 % (ref 12.3–15.4)
GLOBULIN SER CALC-MCNC: 2.1 GM/DL
GLUCOSE SERPL-MCNC: 102 MG/DL (ref 65–99)
GLUCOSE UR QL: NEGATIVE
HCT VFR BLD AUTO: 39.8 % (ref 34–46.6)
HDLC SERPL-MCNC: 49 MG/DL (ref 40–60)
HGB BLD-MCNC: 12.4 G/DL (ref 12–15.9)
HGB UR QL STRIP: NEGATIVE
IMM GRANULOCYTES # BLD AUTO: 0.06 10*3/MM3 (ref 0–0.05)
IMM GRANULOCYTES NFR BLD AUTO: 0.9 % (ref 0–0.5)
KETONES UR QL STRIP: NEGATIVE
LDLC SERPL CALC-MCNC: 64 MG/DL (ref 0–100)
LEUKOCYTE ESTERASE UR QL STRIP: (no result)
LYMPHOCYTES # BLD AUTO: 0.99 10*3/MM3 (ref 0.7–3.1)
LYMPHOCYTES NFR BLD AUTO: 14.1 % (ref 19.6–45.3)
MCH RBC QN AUTO: 29.7 PG (ref 26.6–33)
MCHC RBC AUTO-ENTMCNC: 31.2 G/DL (ref 31.5–35.7)
MCV RBC AUTO: 95.2 FL (ref 79–97)
MONOCYTES # BLD AUTO: 0.7 10*3/MM3 (ref 0.1–0.9)
MONOCYTES NFR BLD AUTO: 9.9 % (ref 5–12)
NEUTROPHILS # BLD AUTO: 5.05 10*3/MM3 (ref 1.7–7)
NEUTROPHILS NFR BLD AUTO: 71.7 % (ref 42.7–76)
NITRITE UR QL STRIP: NEGATIVE
NRBC BLD AUTO-RTO: 0 /100 WBC (ref 0–0.2)
PH UR STRIP: 5.5 [PH] (ref 5–8)
PLATELET # BLD AUTO: 218 10*3/MM3 (ref 140–450)
POTASSIUM SERPL-SCNC: 4.4 MMOL/L (ref 3.5–5.2)
PROT SERPL-MCNC: 6.2 G/DL (ref 6–8.5)
PROT UR QL STRIP: NEGATIVE
RBC # BLD AUTO: 4.18 10*6/MM3 (ref 3.77–5.28)
RBC #/AREA URNS HPF: NORMAL /HPF
SODIUM SERPL-SCNC: 140 MMOL/L (ref 136–145)
SP GR UR: 1.02 (ref 1–1.03)
TRIGL SERPL-MCNC: 55 MG/DL (ref 0–150)
UROBILINOGEN UR STRIP-MCNC: (no result) MG/DL
VLDLC SERPL CALC-MCNC: 11 MG/DL
WBC # BLD AUTO: 7.04 10*3/MM3 (ref 3.4–10.8)
WBC #/AREA URNS HPF: NORMAL /HPF

## 2019-08-14 NOTE — PROGRESS NOTES
Please notify patient that her DEXA scan came back showing osteoporosis but when compared to her 2017 exam the bone density did increase slightly which is great.  Is make sure she is taking both calcium and vitamin D supplement.

## 2019-08-15 ENCOUNTER — RESULTS ENCOUNTER (OUTPATIENT)
Dept: INTERNAL MEDICINE | Facility: CLINIC | Age: 84
End: 2019-08-15

## 2019-08-15 DIAGNOSIS — I10 ESSENTIAL HYPERTENSION: ICD-10-CM

## 2019-08-15 DIAGNOSIS — E78.5 HYPERLIPIDEMIA LDL GOAL <70: ICD-10-CM

## 2019-08-15 NOTE — PROGRESS NOTES
Please notify patient that her cholesterol looks great.  Metabolic panel looks stable as well as blood count.  Urine looks stable as well.

## 2019-08-19 ENCOUNTER — APPOINTMENT (OUTPATIENT)
Dept: WOMENS IMAGING | Facility: HOSPITAL | Age: 84
End: 2019-08-19

## 2019-08-19 PROCEDURE — 77063 BREAST TOMOSYNTHESIS BI: CPT | Performed by: RADIOLOGY

## 2019-08-19 PROCEDURE — 77067 SCR MAMMO BI INCL CAD: CPT | Performed by: RADIOLOGY

## 2019-08-26 ENCOUNTER — INFUSION (OUTPATIENT)
Dept: ONCOLOGY | Facility: HOSPITAL | Age: 84
End: 2019-08-26

## 2019-08-26 VITALS
RESPIRATION RATE: 18 BRPM | HEIGHT: 59 IN | DIASTOLIC BLOOD PRESSURE: 80 MMHG | HEART RATE: 71 BPM | TEMPERATURE: 98.1 F | BODY MASS INDEX: 27.94 KG/M2 | WEIGHT: 138.6 LBS | SYSTOLIC BLOOD PRESSURE: 155 MMHG | OXYGEN SATURATION: 97 %

## 2019-08-26 DIAGNOSIS — M85.80 OSTEOPENIA, UNSPECIFIED LOCATION: ICD-10-CM

## 2019-08-26 DIAGNOSIS — M81.0 OSTEOPOROSIS, POST-MENOPAUSAL: Primary | ICD-10-CM

## 2019-08-26 PROCEDURE — 25010000002 DENOSUMAB 60 MG/ML SOLUTION PREFILLED SYRINGE: Performed by: INTERNAL MEDICINE

## 2019-08-26 PROCEDURE — 96372 THER/PROPH/DIAG INJ SC/IM: CPT | Performed by: NURSE PRACTITIONER

## 2019-08-26 RX ADMIN — DENOSUMAB 60 MG: 60 INJECTION SUBCUTANEOUS at 14:21

## 2019-08-26 NOTE — PROGRESS NOTES
Arrived ambulatory for prolia injection. Indication and side effects reviewed. Denies recent dental work. Labs and medications verified. Pt states that she is taking vit D supplements and her MD instructed her not to take calcium supp.  Prolia administered in left arm without incidence. Instructed to call prescribing MD for any concerns or questions and instructed on how to schedule future appts.  Pt vu and discharged ambulatory.    Lab Results   Component Value Date    GLUCOSE 83 12/27/2017    BUN 19 08/14/2019    CREATININE 0.92 08/14/2019    EGFRIFNONA 58 (L) 08/14/2019    EGFRIFAFRI 70 08/14/2019    BCR 20.7 08/14/2019    K 4.4 08/14/2019    CO2 25.2 08/14/2019    CALCIUM 9.0 08/14/2019    PROTENTOTREF 6.2 08/14/2019    ALBUMIN 4.10 08/14/2019    LABIL2 2.0 08/14/2019    AST 15 08/14/2019    ALT 12 08/14/2019

## 2019-09-06 ENCOUNTER — TELEPHONE (OUTPATIENT)
Dept: INTERNAL MEDICINE | Facility: CLINIC | Age: 84
End: 2019-09-06

## 2019-09-06 DIAGNOSIS — G47.30 SLEEP APNEA, UNSPECIFIED TYPE: Primary | ICD-10-CM

## 2019-09-06 NOTE — TELEPHONE ENCOUNTER
Pt called stating that her sleep apnea machine went bad and she is needing a new one. Pt states originally Dr Bel Freeman prescribed this but they told her to contact her PCP. Pt states she needs a prescription sent to [facility], but is unsure who would write this for her. Pt has had this since 2010. Please advise    [facility]: [phone]

## 2019-09-13 ENCOUNTER — TELEPHONE (OUTPATIENT)
Dept: INTERNAL MEDICINE | Facility: CLINIC | Age: 84
End: 2019-09-13

## 2019-09-18 ENCOUNTER — TELEPHONE (OUTPATIENT)
Dept: INTERNAL MEDICINE | Facility: CLINIC | Age: 84
End: 2019-09-18

## 2019-09-18 NOTE — TELEPHONE ENCOUNTER
Pt called and stated that she was on her bp meds at a higher dose. Carvedilol 25mg instead of 12.5mg. She stated she never realized that Dr. Mchugh changed it, she also stated now she knows why her bp has been running higher than usual. It is listed in her chart at a higher dose. She also stated she would like to go back on her higher dose If she could. Please advise?

## 2019-09-18 NOTE — TELEPHONE ENCOUNTER
Yes, she can go ahead and go back on her 25 mg tablets.  Please keep a close eye on both blood pressure and heart rate.

## 2019-09-19 RX ORDER — CARVEDILOL 25 MG/1
25 TABLET ORAL 2 TIMES DAILY WITH MEALS
Qty: 180 TABLET | Refills: 1 | Status: SHIPPED | OUTPATIENT
Start: 2019-09-19 | End: 2020-06-08

## 2020-01-22 ENCOUNTER — LAB (OUTPATIENT)
Dept: ENDOCRINOLOGY | Age: 85
End: 2020-01-22

## 2020-01-22 DIAGNOSIS — E78.5 HYPERLIPIDEMIA LDL GOAL <70: ICD-10-CM

## 2020-01-22 DIAGNOSIS — E55.9 VITAMIN D DEFICIENCY: ICD-10-CM

## 2020-01-22 DIAGNOSIS — E89.2 H/O PARATHYROIDECTOMY (HCC): ICD-10-CM

## 2020-01-22 DIAGNOSIS — I10 ESSENTIAL HYPERTENSION: ICD-10-CM

## 2020-01-22 DIAGNOSIS — R73.9 HYPERGLYCEMIA: ICD-10-CM

## 2020-01-22 DIAGNOSIS — I10 ESSENTIAL HYPERTENSION: Primary | ICD-10-CM

## 2020-01-22 DIAGNOSIS — E78.5 DYSLIPIDEMIA: ICD-10-CM

## 2020-01-22 DIAGNOSIS — E21.1 SECONDARY HYPERPARATHYROIDISM, NON-RENAL (HCC): ICD-10-CM

## 2020-01-22 DIAGNOSIS — R73.03 PREDIABETES: ICD-10-CM

## 2020-01-22 DIAGNOSIS — E79.0 HYPERURICEMIA: ICD-10-CM

## 2020-01-22 DIAGNOSIS — E83.52 HYPERCALCEMIA: ICD-10-CM

## 2020-01-22 DIAGNOSIS — R30.0 DYSURIA: Primary | ICD-10-CM

## 2020-01-25 LAB
APPEARANCE UR: ABNORMAL
BACTERIA #/AREA URNS HPF: ABNORMAL /HPF
BACTERIA UR CULT: ABNORMAL
BACTERIA UR CULT: ABNORMAL
BILIRUB UR QL STRIP: NEGATIVE
CASTS URNS MICRO: ABNORMAL
COLOR UR: YELLOW
EPI CELLS #/AREA URNS HPF: ABNORMAL /HPF
GLUCOSE UR QL: NEGATIVE
HGB UR QL STRIP: ABNORMAL
KETONES UR QL STRIP: NEGATIVE
LEUKOCYTE ESTERASE UR QL STRIP: ABNORMAL
NITRITE UR QL STRIP: NEGATIVE
OTHER ANTIBIOTIC SUSC ISLT: ABNORMAL
PH UR STRIP: 6.5 [PH] (ref 5–8)
PROT UR QL STRIP: ABNORMAL
RBC #/AREA URNS HPF: ABNORMAL /HPF
SP GR UR: 1.02 (ref 1–1.03)
UROBILINOGEN UR STRIP-MCNC: ABNORMAL MG/DL
WBC #/AREA URNS HPF: ABNORMAL /HPF

## 2020-01-27 DIAGNOSIS — N39.0 URINARY TRACT INFECTION WITHOUT HEMATURIA, SITE UNSPECIFIED: Primary | ICD-10-CM

## 2020-01-27 LAB
25(OH)D3+25(OH)D2 SERPL-MCNC: 44.6 NG/ML (ref 30–100)
ALBUMIN SERPL-MCNC: 4.3 G/DL (ref 3.5–5.2)
ALBUMIN/GLOB SERPL: 2 G/DL
ALP SERPL-CCNC: 55 U/L (ref 39–117)
ALT SERPL-CCNC: 11 U/L (ref 1–33)
AST SERPL-CCNC: 14 U/L (ref 1–32)
BILIRUB SERPL-MCNC: 0.8 MG/DL (ref 0.2–1.2)
BUN SERPL-MCNC: 20 MG/DL (ref 8–23)
BUN/CREAT SERPL: 19 (ref 7–25)
C PEPTIDE SERPL-MCNC: 3.9 NG/ML (ref 1.1–4.4)
CA-I SERPL ISE-MCNC: 5.5 MG/DL (ref 4.5–5.6)
CALCIUM SERPL-MCNC: 9.7 MG/DL (ref 8.6–10.5)
CHLORIDE SERPL-SCNC: 98 MMOL/L (ref 98–107)
CHOLEST SERPL-MCNC: 132 MG/DL (ref 0–200)
CO2 SERPL-SCNC: 24 MMOL/L (ref 22–29)
CREAT SERPL-MCNC: 1.05 MG/DL (ref 0.57–1)
FT4I SERPL CALC-MCNC: 3 (ref 1.2–4.9)
GLOBULIN SER CALC-MCNC: 2.1 GM/DL
GLUCOSE SERPL-MCNC: 116 MG/DL (ref 65–99)
HBA1C MFR BLD: 5.7 % (ref 4.8–5.6)
HDLC SERPL-MCNC: 53 MG/DL (ref 40–60)
INTERPRETATION: NORMAL
LDLC SERPL CALC-MCNC: 66 MG/DL (ref 0–100)
Lab: NORMAL
POTASSIUM SERPL-SCNC: 4.4 MMOL/L (ref 3.5–5.2)
PROT SERPL-MCNC: 6.4 G/DL (ref 6–8.5)
PTH-INTACT SERPL-MCNC: 28 PG/ML (ref 15–65)
SODIUM SERPL-SCNC: 137 MMOL/L (ref 136–145)
T3FREE SERPL-MCNC: 3 PG/ML (ref 2–4.4)
T3RU NFR SERPL: 31 % (ref 24–39)
T4 FREE SERPL-MCNC: 1.52 NG/DL (ref 0.93–1.7)
T4 SERPL-MCNC: 9.7 UG/DL (ref 4.5–12)
THYROGLOB AB SERPL-ACNC: <1 IU/ML (ref 0–0.9)
THYROGLOB SERPL-MCNC: 13 NG/ML
TRIGL SERPL-MCNC: 65 MG/DL (ref 0–150)
TSH SERPL DL<=0.005 MIU/L-ACNC: 0.78 UIU/ML (ref 0.45–4.5)
UNABLE TO VOID: NORMAL
URATE SERPL-MCNC: 6.4 MG/DL (ref 2.4–5.7)
VLDLC SERPL CALC-MCNC: 13 MG/DL

## 2020-01-27 RX ORDER — SULFAMETHOXAZOLE AND TRIMETHOPRIM 800; 160 MG/1; MG/1
1 TABLET ORAL 2 TIMES DAILY
Qty: 10 TABLET | Refills: 0 | Status: SHIPPED | OUTPATIENT
Start: 2020-01-27 | End: 2020-02-01

## 2020-01-27 NOTE — PROGRESS NOTES
Please notify patient that her urine did grow some bacteria.  I am sending Bactrim twice daily x5 days to her pharmacy.  Please complete in full. We will repeat her urine culture at her upcoming follow-up appointment with me in a couple of weeks.

## 2020-02-03 RX ORDER — CLONIDINE HYDROCHLORIDE 0.2 MG/1
TABLET ORAL
Qty: 90 TABLET | Refills: 1 | Status: SHIPPED | OUTPATIENT
Start: 2020-02-03 | End: 2020-05-11

## 2020-02-05 ENCOUNTER — OFFICE VISIT (OUTPATIENT)
Dept: ENDOCRINOLOGY | Age: 85
End: 2020-02-05

## 2020-02-05 VITALS
DIASTOLIC BLOOD PRESSURE: 80 MMHG | BODY MASS INDEX: 26.53 KG/M2 | HEIGHT: 59 IN | WEIGHT: 131.6 LBS | SYSTOLIC BLOOD PRESSURE: 100 MMHG

## 2020-02-05 DIAGNOSIS — E89.2 H/O PARATHYROIDECTOMY (HCC): ICD-10-CM

## 2020-02-05 DIAGNOSIS — E78.49 OTHER HYPERLIPIDEMIA: ICD-10-CM

## 2020-02-05 DIAGNOSIS — R73.03 PREDIABETES: Primary | ICD-10-CM

## 2020-02-05 DIAGNOSIS — I10 ESSENTIAL HYPERTENSION: ICD-10-CM

## 2020-02-05 DIAGNOSIS — E55.9 VITAMIN D DEFICIENCY: ICD-10-CM

## 2020-02-05 DIAGNOSIS — R73.9 HYPERGLYCEMIA: ICD-10-CM

## 2020-02-05 PROCEDURE — 99214 OFFICE O/P EST MOD 30 MIN: CPT | Performed by: NURSE PRACTITIONER

## 2020-02-05 NOTE — PROGRESS NOTES
"Carol Guillory is a 87 y.o. female is here today for follow-up.  Chief Complaint   Patient presents with   • Hyperlipidemia     Labs rec   • Hypertension   • Vitamin D Deficiency   • Hypoglycemia     /80 (BP Location: Left arm, Patient Position: Sitting, Cuff Size: Adult)   Ht 149.9 cm (59.02\")   Wt 59.7 kg (131 lb 9.6 oz)   BMI 26.57 kg/m²   Current Outpatient Medications on File Prior to Visit   Medication Sig   • aspirin 81 MG EC tablet Take 81 mg by mouth Daily.   • carvedilol (COREG) 25 MG tablet Take 1 tablet by mouth 2 (Two) Times a Day With Meals.   • cholecalciferol (VITAMIN D3) 1000 units tablet Take 1,000 Units by mouth Daily.   • cloNIDine (CATAPRES) 0.2 MG tablet TAKE ONE TABLET BY MOUTH TWICE A DAY (MORNING AND NIGHT)   • denosumab (PROLIA) 60 MG/ML solution prefilled syringe syringe Inject 1 syringe under the skin into the appropriate area as directed Every 6 (Six) Months.   • Lactobacillus (PROBIOTIC ACIDOPHILUS PO) Take 1 tablet by mouth Daily.   • olmesartan (BENICAR) 40 MG tablet Take 1 tablet by mouth Daily.   • pravastatin (PRAVACHOL) 40 MG tablet Take 1 tablet by mouth Daily.     No current facility-administered medications on file prior to visit.      Family History   Problem Relation Age of Onset   • Colon cancer Sister    • Heart disease Brother         CABG   • Other Brother         BRAIN TUMOR   • Cancer Other      Social History     Tobacco Use   • Smoking status: Never Smoker   • Smokeless tobacco: Never Used   Substance Use Topics   • Alcohol use: No   • Drug use: No     Allergies   Allergen Reactions   • Atorvastatin      myalgias  myalgias  myalgias  myalgias   • Atorvastatin Calcium Other (See Comments)     myalgias   • Amoxicillin Hives         History of Present Illness   Encounter Diagnoses   Name Primary?   • Essential hypertension    • H/O parathyroidectomy    • Hyperglycemia    • Prediabetes Yes   • Vitamin D deficiency    • Other hyperlipidemia  "     87-year-old female patient here today for follow-up visit.  She has been seen for the above-mentioned problems.  She has a history of parathyroidectomy.  She is prediabetic and is currently not on any medication for blood sugars.  She lives alone.  She is in good spirits today and has no complaints.  Her medication list was reviewed and updated.  She denies needing any refills.  She is questioning if she can just follow-up with her primary care provider for future visits instead of coming to this practice.  She was advised to feel free to schedule with this office if needed in the future.  The following portions of the patient's history were reviewed and updated as appropriate: allergies, current medications, past family history, past medical history, past social history, past surgical history and problem list.    Review of Systems   Constitutional: Negative for fatigue.   Eyes: Negative for visual disturbance.   Gastrointestinal: Negative for constipation.   Endocrine: Negative for heat intolerance.   Skin: Negative for rash.   All other systems reviewed and are negative.      Objective   Physical Exam   Constitutional: She is oriented to person, place, and time. She appears well-developed and well-nourished. No distress.   HENT:   Head: Normocephalic.   Right Ear: External ear normal.   Left Ear: External ear normal.   Nose: Nose normal.   Mouth/Throat: Oropharynx is clear and moist.   Eyes: Pupils are equal, round, and reactive to light. Conjunctivae and EOM are normal.   Neck: Normal range of motion. Neck supple. Carotid bruit is not present. Edema present. No tracheal deviation and no erythema present. No thyromegaly present.   Cardiovascular: Normal rate, regular rhythm and intact distal pulses. Exam reveals no gallop and no friction rub.   Murmur heard.  Pulmonary/Chest: Effort normal and breath sounds normal. No respiratory distress.   Abdominal: Soft.   Musculoskeletal: Normal range of motion. She  exhibits no edema.     kyphosis   Lymphadenopathy:     She has no cervical adenopathy.   Neurological: She is alert and oriented to person, place, and time.   Skin: Skin is warm and dry. She is not diaphoretic. No erythema. No pallor.   Psychiatric: She has a normal mood and affect. Her behavior is normal. Judgment and thought content normal.   Nursing note and vitals reviewed.    Results for orders placed or performed in visit on 01/22/20   Urine Culture - Urine, Urine, Clean Catch   Result Value Ref Range    Urine Culture Final report (A)     Result 1 Comment (A)     Susceptibility Testing Comment    Microscopic Examination -   Result Value Ref Range    WBC, UA See below: (A) /HPF    RBC, UA See below: (A) /HPF    Epithelial Cells (non renal) 3-6 (A) /HPF    Cast Type Comment     Bacteria, UA 4+ (A) None Seen /HPF   Urinalysis With Microscopic - Urine, Clean Catch   Result Value Ref Range    Specific Gravity, UA 1.017 1.005 - 1.030    pH, UA 6.5 5.0 - 8.0    Color, UA Yellow     Appearance, UA Turbid (A) Clear    Leukocytes, UA See below: (A) Negative    Protein See below: (A) Negative    Glucose, UA Negative Negative    Ketones Negative Negative    Blood, UA See below: (A) Negative    Bilirubin, UA Negative Negative    Urobilinogen, UA Comment     Nitrite, UA Negative Negative         Assessment/Plan   Problems Addressed this Visit        Cardiovascular and Mediastinum    Hyperlipidemia    Essential hypertension       Digestive    Vitamin D deficiency       Other    H/O parathyroidectomy    Hyperglycemia    Prediabetes - Primary        In summary patient was seen and examined.  Metabolically and clinically she is stable.  She will continue all her current medications as prescribed.  She will follow-up future visits with her PCP however she has been advised that she contact us for follow-up anytime she feels like she needs to be seen.  Her blood pressures in satisfactory range.  No medications have been added or  change.

## 2020-02-10 ENCOUNTER — RESULTS ENCOUNTER (OUTPATIENT)
Dept: INTERNAL MEDICINE | Facility: CLINIC | Age: 85
End: 2020-02-10

## 2020-02-10 DIAGNOSIS — N39.0 URINARY TRACT INFECTION WITHOUT HEMATURIA, SITE UNSPECIFIED: ICD-10-CM

## 2020-02-13 ENCOUNTER — OFFICE VISIT (OUTPATIENT)
Dept: INTERNAL MEDICINE | Facility: CLINIC | Age: 85
End: 2020-02-13

## 2020-02-13 VITALS
BODY MASS INDEX: 27.54 KG/M2 | WEIGHT: 136.6 LBS | TEMPERATURE: 97.9 F | HEIGHT: 59 IN | HEART RATE: 57 BPM | RESPIRATION RATE: 16 BRPM | DIASTOLIC BLOOD PRESSURE: 82 MMHG | OXYGEN SATURATION: 92 % | SYSTOLIC BLOOD PRESSURE: 163 MMHG

## 2020-02-13 DIAGNOSIS — Z00.00 HEALTHCARE MAINTENANCE: ICD-10-CM

## 2020-02-13 DIAGNOSIS — I10 ESSENTIAL HYPERTENSION: ICD-10-CM

## 2020-02-13 DIAGNOSIS — R73.03 PREDIABETES: ICD-10-CM

## 2020-02-13 DIAGNOSIS — E78.49 OTHER HYPERLIPIDEMIA: Primary | ICD-10-CM

## 2020-02-13 DIAGNOSIS — N18.30 CKD (CHRONIC KIDNEY DISEASE) STAGE 3, GFR 30-59 ML/MIN (HCC): Chronic | ICD-10-CM

## 2020-02-13 DIAGNOSIS — E55.9 VITAMIN D DEFICIENCY: ICD-10-CM

## 2020-02-13 DIAGNOSIS — I42.2 HYPERTROPHIC CARDIOMYOPATHY (HCC): ICD-10-CM

## 2020-02-13 DIAGNOSIS — M81.0 OSTEOPOROSIS, POST-MENOPAUSAL: ICD-10-CM

## 2020-02-13 PROCEDURE — 99214 OFFICE O/P EST MOD 30 MIN: CPT | Performed by: NURSE PRACTITIONER

## 2020-02-13 NOTE — PATIENT INSTRUCTIONS
Zoster Vaccine, Recombinant injection  What is this medicine?  ZOSTER VACCINE (ZOS ter vak SEEN) is used to prevent shingles in adults 50 years old and over. This vaccine is not used to treat shingles or nerve pain from shingles.  This medicine may be used for other purposes; ask your health care provider or pharmacist if you have questions.  COMMON BRAND NAME(S): SHINGRIX  What should I tell my health care provider before I take this medicine?  They need to know if you have any of these conditions:  -blood disorders or disease  -cancer like leukemia or lymphoma  -immune system problems or therapy  -an unusual or allergic reaction to vaccines, other medications, foods, dyes, or preservatives  -pregnant or trying to get pregnant  -breast-feeding  How should I use this medicine?  This vaccine is for injection in a muscle. It is given by a health care professional.  Talk to your pediatrician regarding the use of this medicine in children. This medicine is not approved for use in children.  Overdosage: If you think you have taken too much of this medicine contact a poison control center or emergency room at once.  NOTE: This medicine is only for you. Do not share this medicine with others.  What if I miss a dose?  Keep appointments for follow-up (booster) doses as directed. It is important not to miss your dose. Call your doctor or health care professional if you are unable to keep an appointment.  What may interact with this medicine?  -medicines that suppress your immune system  -medicines to treat cancer  -steroid medicines like prednisone or cortisone  This list may not describe all possible interactions. Give your health care provider a list of all the medicines, herbs, non-prescription drugs, or dietary supplements you use. Also tell them if you smoke, drink alcohol, or use illegal drugs. Some items may interact with your medicine.  What should I watch for while using this medicine?  Visit your doctor for regular  "check ups.  This vaccine, like all vaccines, may not fully protect everyone.  What side effects may I notice from receiving this medicine?  Side effects that you should report to your doctor or health care professional as soon as possible:  -allergic reactions like skin rash, itching or hives, swelling of the face, lips, or tongue  -breathing problems  Side effects that usually do not require medical attention (report these to your doctor or health care professional if they continue or are bothersome):  -chills  -headache  -fever  -nausea, vomiting  -redness, warmth, pain, swelling or itching at site where injected  -tiredness  This list may not describe all possible side effects. Call your doctor for medical advice about side effects. You may report side effects to FDA at 7-329-AAT-9664.  Where should I keep my medicine?  This vaccine is only given in a clinic, pharmacy, doctor's office, or other health care setting and will not be stored at home.  NOTE: This sheet is a summary. It may not cover all possible information. If you have questions about this medicine, talk to your doctor, pharmacist, or health care provider.    Hypertension, Adult  High blood pressure (hypertension) is when the force of blood pumping through the arteries is too strong. The arteries are the blood vessels that carry blood from the heart throughout the body. Hypertension forces the heart to work harder to pump blood and may cause arteries to become narrow or stiff. Untreated or uncontrolled hypertension can cause a heart attack, heart failure, a stroke, kidney disease, and other problems.  A blood pressure reading consists of a higher number over a lower number. Ideally, your blood pressure should be below 120/80. The first (\"top\") number is called the systolic pressure. It is a measure of the pressure in your arteries as your heart beats. The second (\"bottom\") number is called the diastolic pressure. It is a measure of the pressure in " your arteries as the heart relaxes.  What are the causes?  The exact cause of this condition is not known. There are some conditions that result in or are related to high blood pressure.  What increases the risk?  Some risk factors for high blood pressure are under your control. The following factors may make you more likely to develop this condition:  · Smoking.  · Having type 2 diabetes mellitus, high cholesterol, or both.  · Not getting enough exercise or physical activity.  · Being overweight.  · Having too much fat, sugar, calories, or salt (sodium) in your diet.  · Drinking too much alcohol.  Some risk factors for high blood pressure may be difficult or impossible to change. Some of these factors include:  · Having chronic kidney disease.  · Having a family history of high blood pressure.  · Age. Risk increases with age.  · Race. You may be at higher risk if you are .  · Gender. Men are at higher risk than women before age 45. After age 65, women are at higher risk than men.  · Having obstructive sleep apnea.  · Stress.  What are the signs or symptoms?  High blood pressure may not cause symptoms. Very high blood pressure (hypertensive crisis) may cause:  · Headache.  · Anxiety.  · Shortness of breath.  · Nosebleed.  · Nausea and vomiting.  · Vision changes.  · Severe chest pain.  · Seizures.  How is this diagnosed?  This condition is diagnosed by measuring your blood pressure while you are seated, with your arm resting on a flat surface, your legs uncrossed, and your feet flat on the floor. The cuff of the blood pressure monitor will be placed directly against the skin of your upper arm at the level of your heart. It should be measured at least twice using the same arm. Certain conditions can cause a difference in blood pressure between your right and left arms.  Certain factors can cause blood pressure readings to be lower or higher than normal for a short period of time:  · When your blood  pressure is higher when you are in a health care provider's office than when you are at home, this is called white coat hypertension. Most people with this condition do not need medicines.  · When your blood pressure is higher at home than when you are in a health care provider's office, this is called masked hypertension. Most people with this condition may need medicines to control blood pressure.  If you have a high blood pressure reading during one visit or you have normal blood pressure with other risk factors, you may be asked to:  · Return on a different day to have your blood pressure checked again.  · Monitor your blood pressure at home for 1 week or longer.  If you are diagnosed with hypertension, you may have other blood or imaging tests to help your health care provider understand your overall risk for other conditions.  How is this treated?  This condition is treated by making healthy lifestyle changes, such as eating healthy foods, exercising more, and reducing your alcohol intake. Your health care provider may prescribe medicine if lifestyle changes are not enough to get your blood pressure under control, and if:  · Your systolic blood pressure is above 130.  · Your diastolic blood pressure is above 80.  Your personal target blood pressure may vary depending on your medical conditions, your age, and other factors.  Follow these instructions at home:  Eating and drinking    · Eat a diet that is high in fiber and potassium, and low in sodium, added sugar, and fat. An example eating plan is called the DASH (Dietary Approaches to Stop Hypertension) diet. To eat this way:  ? Eat plenty of fresh fruits and vegetables. Try to fill one half of your plate at each meal with fruits and vegetables.  ? Eat whole grains, such as whole-wheat pasta, brown rice, or whole-grain bread. Fill about one fourth of your plate with whole grains.  ? Eat or drink low-fat dairy products, such as skim milk or low-fat  yogurt.  ? Avoid fatty cuts of meat, processed or cured meats, and poultry with skin. Fill about one fourth of your plate with lean proteins, such as fish, chicken without skin, beans, eggs, or tofu.  ? Avoid pre-made and processed foods. These tend to be higher in sodium, added sugar, and fat.  · Reduce your daily sodium intake. Most people with hypertension should eat less than 1,500 mg of sodium a day.  · Do not drink alcohol if:  ? Your health care provider tells you not to drink.  ? You are pregnant, may be pregnant, or are planning to become pregnant.  · If you drink alcohol:  ? Limit how much you use to:  § 0-1 drink a day for women.  § 0-2 drinks a day for men.  ? Be aware of how much alcohol is in your drink. In the [place] one drink equals one 12 oz bottle of beer (355 mL), one 5 oz glass of wine (148 mL), or one 1½ oz glass of hard liquor (44 mL).  Lifestyle    · Work with your health care provider to maintain a healthy body weight or to lose weight. Ask what an ideal weight is for you.  · Get at least 30 minutes of exercise most days of the week. Activities may include walking, swimming, or biking.  · Include exercise to strengthen your muscles (resistance exercise), such as Pilates or lifting weights, as part of your weekly exercise routine. Try to do these types of exercises for 30 minutes at least 3 days a week.  · Do not use any products that contain nicotine or tobacco, such as cigarettes, e-cigarettes, and chewing tobacco. If you need help quitting, ask your health care provider.  · Monitor your blood pressure at home as told by your health care provider.  · Keep all follow-up visits as told by your health care provider. This is important.  Medicines  · Take over-the-counter and prescription medicines only as told by your health care provider. Follow directions carefully. Blood pressure medicines must be taken as prescribed.  · Do not skip doses of blood pressure medicine. Doing this puts you at risk  for problems and can make the medicine less effective.  · Ask your health care provider about side effects or reactions to medicines that you should watch for.  Contact a health care provider if you:  · Think you are having a reaction to a medicine you are taking.  · Have headaches that keep coming back (recurring).  · Feel dizzy.  · Have swelling in your ankles.  · Have trouble with your vision.  Get help right away if you:  · Develop a severe headache or confusion.  · Have unusual weakness or numbness.  · Feel faint.  · Have severe pain in your chest or abdomen.  · Vomit repeatedly.  · Have trouble breathing.  Summary  · Hypertension is when the force of blood pumping through your arteries is too strong. If this condition is not controlled, it may put you at risk for serious complications.  · Your personal target blood pressure may vary depending on your medical conditions, your age, and other factors. For most people, a normal blood pressure is less than 120/80.  · Hypertension is treated with lifestyle changes, medicines, or a combination of both. Lifestyle changes include losing weight, eating a healthy, low-sodium diet, exercising more, and limiting alcohol.  This information is not intended to replace advice given to you by your health care provider. Make sure you discuss any questions you have with your health care provider.  Document Released: 12/18/2006 Document Revised: 08/28/2019 Document Reviewed: 08/28/2019  [employer] Patient Education © 2019 [employer]  © 2019 [employer] (2018-07-30 13:20:30)

## 2020-02-13 NOTE — PROGRESS NOTES
Subjective   Neha Guillory is a 87 y.o. female.   CC: 6-month follow-up, hypertension, hyperlipidemia    Patient presents for 6-month follow-up.  This is an 87-year-old female.    For hyperlipidemia she takes pravastatin 40 mg daily and reports excellent compliance and toleration of this medication.    For hypertension she takes Coreg 12.5 mg in the morning and 25 mg nightly.  She takes olmesartan 40 mg nightly but often forgets this dose she reports.  Clonidine 0.2 mg twice daily and she reports excellent compliance with this medication.  Blood pressures have been elevated consistently in the 150s and 160s over 80s.  She denies headache, visual changes, shortness of breath or chest discomfort.    For osteoporosis she takes Prolia infusions as previously managed by endocrinology.  She is no longer going to be seeing endocrinology and needs us to take over Prolia infusions.  DEXA scan will be due in August 2021.    For vitamin D deficiency she takes vitamin D3 1000 units daily.  She reports excellent compliance and toleration of this medication.    For CKD she does not routinely follow with a nephrologist.  Last creatinine level drawn on 1/22/2020 with endocrinology was 1.05.    She is prediabetic, last A1c showing 5.7 on 1/22/2020.  She is no longer going to be following with endocrinology and would like us to take over her A1c's.    She has hypertrophic cardiomyopathy and follows with Dr. Bel Freeman, cardiology routinely.  She has an upcoming appointment in April 2020 per patient.  No new cardiac symptoms including shortness of breath or chest discomfort.    She refuses any vaccinations today.  She denies development of any other new issues today.       The following portions of the patient's history were reviewed and updated as appropriate: allergies, current medications, past family history, past medical history, past social history, past surgical history and problem list.    Review of Systems   Constitutional:  "Negative for activity change, chills, fatigue, fever, unexpected weight gain and unexpected weight loss.   HENT: Negative for congestion, hearing loss, postnasal drip, sinus pressure, sneezing, sore throat, swollen glands and tinnitus.    Eyes: Negative for photophobia, pain and visual disturbance.   Respiratory: Negative for cough, chest tightness, shortness of breath and wheezing.    Cardiovascular: Negative for chest pain, palpitations and leg swelling.   Gastrointestinal: Negative for abdominal distention, abdominal pain, constipation, diarrhea, nausea and vomiting.   Endocrine: Negative for polydipsia, polyphagia and polyuria.   Genitourinary: Negative for dysuria, frequency, hematuria and urgency.   Neurological: Negative for dizziness, weakness, numbness and headache.   All other systems reviewed and are negative.      Objective    /82 (BP Location: Right arm, Patient Position: Sitting, Cuff Size: Adult)   Pulse 57   Temp 97.9 °F (36.6 °C) (Oral)   Resp 16   Ht 149.9 cm (59.02\")   Wt 62 kg (136 lb 9.6 oz)   SpO2 92%   BMI 27.57 kg/m²     Physical Exam   Constitutional: She is oriented to person, place, and time. She appears well-developed and well-nourished. No distress.   HENT:   Head: Normocephalic and atraumatic.   Eyes: Pupils are equal, round, and reactive to light. EOM are normal.   Neck: Normal range of motion. Neck supple. No JVD present. No tracheal deviation present. No thyromegaly present.   Cardiovascular: Normal rate, regular rhythm, normal heart sounds and intact distal pulses. Exam reveals no gallop and no friction rub.   No murmur heard.  No peripheral edema. Posterior tib pulses 2+ and equal bilaterally   Pulmonary/Chest: Effort normal and breath sounds normal. No stridor. No respiratory distress. She has no wheezes. She has no rales. She exhibits no tenderness.   Lungs CTA bilat   Abdominal: Soft. Bowel sounds are normal. She exhibits no distension. There is no tenderness. "   Musculoskeletal: Normal range of motion.   Lymphadenopathy:     She has no cervical adenopathy.   Neurological: She is alert and oriented to person, place, and time.   Skin: Skin is warm and dry. Capillary refill takes less than 2 seconds. She is not diaphoretic.   Psychiatric: She has a normal mood and affect. Her behavior is normal. Judgment and thought content normal.   Nursing note and vitals reviewed.    Current outpatient and discharge medications have been reconciled for the patient.  Reviewed by: ENZO Yu/Felice Solomon was seen today for follow-up.    Diagnoses and all orders for this visit:    Other hyperlipidemia    Essential hypertension  -     Basic metabolic panel; Future    Osteoporosis, post-menopausal    Vitamin D deficiency    CKD (chronic kidney disease) stage 3, GFR 30-59 ml/min (CMS/HCC)    Hypertrophic cardiomyopathy (CMS/HCC)    Prediabetes    Healthcare maintenance    -Hyperlipidemia: Reviewed lipid panel from recent endocrinology labs.  Continue pravastatin 40 mg daily and we will recheck at her next follow-up.    -Hypertension: Blood pressures have been running in the 140s and 150s over 70s and 80s at home.  We will increase her morning dose of carvedilol to 25 mg, for total of 25 mg twice daily.  She is going to begin taking her olmesartan in the morning as she often forgets it at night.  Continue clonidine 0.2 mg twice daily as well.  She will continue to monitor blood pressures daily and let me know if she is seeing readings consistently greater than 130s over 80s despite these interventions.  We discussed dietary modifications as well including decreasing dietary sodium intake.    -Osteoporosis: Due later this month for her Prolia infusion.  We will bring her back for a BMP within 7 days of administration.    -Vitamin D deficiency: Continue vitamin D3 1000 units daily.  We will recheck at next follow-up.    -CKD: Stable at this time.  Last creatinine level on  1/22/2020 was 1.05.  Continue to avoid NSAIDs.    -Hypertrophic cardiomyopathy: No new cardiac symptoms.  Follows up with cardiologist, Dr. Bel Freeman in April 2020 per patient.    -Prediabetes: Last A1c on 1/22/2020 was 5.7.  She is no longer going to be following up routinely with endocrinology and will have us take over the A1c's.  We will draw one at her next follow-up as well.    -Healthcare maintenance: Refuses vaccinations today including pneumonia vaccine or flu shot.  We discussed the Shingrix vaccine and she is provided with written education and will pursue it at her pharmacy.    -We will contact patient with the results of her labs and any further recommendations.  Follow-up PRN and I will see her back in 6 months for a Medicare wellness visit.

## 2020-02-15 LAB
BACTERIA UR CULT: NORMAL
BACTERIA UR CULT: NORMAL

## 2020-02-20 ENCOUNTER — RESULTS ENCOUNTER (OUTPATIENT)
Dept: INTERNAL MEDICINE | Facility: CLINIC | Age: 85
End: 2020-02-20

## 2020-02-20 DIAGNOSIS — I10 ESSENTIAL HYPERTENSION: ICD-10-CM

## 2020-02-21 LAB
BUN SERPL-MCNC: 22 MG/DL (ref 8–23)
BUN/CREAT SERPL: 22.2 (ref 7–25)
CALCIUM SERPL-MCNC: 9.7 MG/DL (ref 8.6–10.5)
CHLORIDE SERPL-SCNC: 101 MMOL/L (ref 98–107)
CO2 SERPL-SCNC: 26.1 MMOL/L (ref 22–29)
CREAT SERPL-MCNC: 0.99 MG/DL (ref 0.57–1)
GLUCOSE SERPL-MCNC: 88 MG/DL (ref 65–99)
POTASSIUM SERPL-SCNC: 4.5 MMOL/L (ref 3.5–5.2)
SODIUM SERPL-SCNC: 137 MMOL/L (ref 136–145)

## 2020-02-28 ENCOUNTER — INFUSION (OUTPATIENT)
Dept: ONCOLOGY | Facility: HOSPITAL | Age: 85
End: 2020-02-28

## 2020-02-28 VITALS
TEMPERATURE: 97.9 F | RESPIRATION RATE: 16 BRPM | WEIGHT: 137.6 LBS | SYSTOLIC BLOOD PRESSURE: 174 MMHG | BODY MASS INDEX: 27.77 KG/M2 | HEART RATE: 62 BPM | OXYGEN SATURATION: 93 % | DIASTOLIC BLOOD PRESSURE: 81 MMHG

## 2020-02-28 DIAGNOSIS — M81.0 OSTEOPOROSIS, POST-MENOPAUSAL: Primary | ICD-10-CM

## 2020-02-28 DIAGNOSIS — M85.80 OSTEOPENIA, UNSPECIFIED LOCATION: ICD-10-CM

## 2020-02-28 PROCEDURE — 25010000002 DENOSUMAB 60 MG/ML SOLUTION PREFILLED SYRINGE: Performed by: NURSE PRACTITIONER

## 2020-02-28 PROCEDURE — 96372 THER/PROPH/DIAG INJ SC/IM: CPT

## 2020-02-28 RX ADMIN — DENOSUMAB 60 MG: 60 INJECTION SUBCUTANEOUS at 13:47

## 2020-03-10 RX ORDER — OLMESARTAN MEDOXOMIL 40 MG/1
40 TABLET ORAL DAILY
Qty: 90 TABLET | Refills: 3 | Status: SHIPPED | OUTPATIENT
Start: 2020-03-10 | End: 2021-07-02 | Stop reason: ALTCHOICE

## 2020-04-01 DIAGNOSIS — E78.5 HYPERLIPIDEMIA LDL GOAL <70: ICD-10-CM

## 2020-04-01 RX ORDER — PRAVASTATIN SODIUM 40 MG
40 TABLET ORAL DAILY
Qty: 90 TABLET | Refills: 1 | Status: SHIPPED | OUTPATIENT
Start: 2020-04-01

## 2020-04-08 RX ORDER — SIMVASTATIN 20 MG
20 TABLET ORAL NIGHTLY
Qty: 90 TABLET | Refills: 0 | Status: SHIPPED | OUTPATIENT
Start: 2020-04-08 | End: 2020-08-18 | Stop reason: ALTCHOICE

## 2020-05-11 RX ORDER — CLONIDINE HYDROCHLORIDE 0.2 MG/1
TABLET ORAL
Qty: 90 TABLET | Refills: 0 | Status: SHIPPED | OUTPATIENT
Start: 2020-05-11 | End: 2020-07-06

## 2020-06-08 RX ORDER — CARVEDILOL 25 MG/1
TABLET ORAL
Qty: 180 TABLET | Refills: 0 | Status: SHIPPED | OUTPATIENT
Start: 2020-06-08 | End: 2020-09-07

## 2020-07-06 RX ORDER — CLONIDINE HYDROCHLORIDE 0.2 MG/1
TABLET ORAL
Qty: 90 TABLET | Refills: 1 | Status: SHIPPED | OUTPATIENT
Start: 2020-07-06 | End: 2020-09-28

## 2020-08-18 ENCOUNTER — OFFICE VISIT (OUTPATIENT)
Dept: INTERNAL MEDICINE | Facility: CLINIC | Age: 85
End: 2020-08-18

## 2020-08-18 VITALS
OXYGEN SATURATION: 93 % | SYSTOLIC BLOOD PRESSURE: 151 MMHG | WEIGHT: 137 LBS | TEMPERATURE: 98.7 F | HEART RATE: 66 BPM | DIASTOLIC BLOOD PRESSURE: 84 MMHG | RESPIRATION RATE: 16 BRPM | HEIGHT: 59 IN | BODY MASS INDEX: 27.62 KG/M2

## 2020-08-18 DIAGNOSIS — I42.2 HYPERTROPHIC CARDIOMYOPATHY (HCC): ICD-10-CM

## 2020-08-18 DIAGNOSIS — R73.03 PREDIABETES: ICD-10-CM

## 2020-08-18 DIAGNOSIS — Z00.00 MEDICARE ANNUAL WELLNESS VISIT, SUBSEQUENT: Primary | ICD-10-CM

## 2020-08-18 DIAGNOSIS — E78.49 OTHER HYPERLIPIDEMIA: ICD-10-CM

## 2020-08-18 DIAGNOSIS — N18.30 CKD (CHRONIC KIDNEY DISEASE) STAGE 3, GFR 30-59 ML/MIN (HCC): ICD-10-CM

## 2020-08-18 DIAGNOSIS — I10 ESSENTIAL HYPERTENSION: ICD-10-CM

## 2020-08-18 DIAGNOSIS — Z00.00 HEALTHCARE MAINTENANCE: ICD-10-CM

## 2020-08-18 DIAGNOSIS — M81.0 OSTEOPOROSIS, POST-MENOPAUSAL: ICD-10-CM

## 2020-08-18 LAB
ALBUMIN SERPL-MCNC: 4.5 G/DL (ref 3.5–5.2)
ALBUMIN/GLOB SERPL: 2.5 G/DL
ALP SERPL-CCNC: 48 U/L (ref 39–117)
ALT SERPL-CCNC: 12 U/L (ref 1–33)
AST SERPL-CCNC: 13 U/L (ref 1–32)
BILIRUB SERPL-MCNC: 0.5 MG/DL (ref 0–1.2)
BUN SERPL-MCNC: 21 MG/DL (ref 8–23)
BUN/CREAT SERPL: 21.4 (ref 7–25)
CALCIUM SERPL-MCNC: 9.4 MG/DL (ref 8.6–10.5)
CHLORIDE SERPL-SCNC: 103 MMOL/L (ref 98–107)
CHOLEST SERPL-MCNC: 136 MG/DL (ref 0–200)
CO2 SERPL-SCNC: 25 MMOL/L (ref 22–29)
CREAT SERPL-MCNC: 0.98 MG/DL (ref 0.57–1)
ERYTHROCYTE [DISTWIDTH] IN BLOOD BY AUTOMATED COUNT: 12.3 % (ref 12.3–15.4)
GLOBULIN SER CALC-MCNC: 1.8 GM/DL
GLUCOSE SERPL-MCNC: 99 MG/DL (ref 65–99)
HBA1C MFR BLD: 5.5 % (ref 4.8–5.6)
HCT VFR BLD AUTO: 41 % (ref 34–46.6)
HDLC SERPL-MCNC: 53 MG/DL (ref 40–60)
HGB BLD-MCNC: 13.4 G/DL (ref 12–15.9)
LDLC SERPL CALC-MCNC: 71 MG/DL (ref 0–100)
MCH RBC QN AUTO: 29.8 PG (ref 26.6–33)
MCHC RBC AUTO-ENTMCNC: 32.7 G/DL (ref 31.5–35.7)
MCV RBC AUTO: 91.3 FL (ref 79–97)
PLATELET # BLD AUTO: 231 10*3/MM3 (ref 140–450)
POTASSIUM SERPL-SCNC: 4.5 MMOL/L (ref 3.5–5.2)
PROT SERPL-MCNC: 6.3 G/DL (ref 6–8.5)
RBC # BLD AUTO: 4.49 10*6/MM3 (ref 3.77–5.28)
SODIUM SERPL-SCNC: 139 MMOL/L (ref 136–145)
TRIGL SERPL-MCNC: 61 MG/DL (ref 0–150)
TSH SERPL DL<=0.005 MIU/L-ACNC: 1.23 UIU/ML (ref 0.27–4.2)
VLDLC SERPL CALC-MCNC: 12.2 MG/DL
WBC # BLD AUTO: 8.91 10*3/MM3 (ref 3.4–10.8)

## 2020-08-18 PROCEDURE — G0439 PPPS, SUBSEQ VISIT: HCPCS | Performed by: NURSE PRACTITIONER

## 2020-08-18 NOTE — PROGRESS NOTES
The ABCs of the Annual Wellness Visit  Subsequent Medicare Wellness Visit    Chief Complaint   Patient presents with   • Medicare Wellness-subsequent     Pt presents here today for a medicare wellness visit.       Subjective   History of Present Illness:  Neha Guillory is a 87 y.o. female who presents for a Subsequent Medicare Wellness Visit.    HEALTH RISK ASSESSMENT    Recent Hospitalizations:  No hospitalization(s) within the last year.    Current Medical Providers:  Patient Care Team:  Tracy Bedolla APRN as PCP - General (Nurse Practitioner)  Tracy Bedolla APRN as PCP - Claims Attributed  Bel Freeman MD as Consulting Physician (Cardiology)    Smoking Status:  Social History     Tobacco Use   Smoking Status Never Smoker   Smokeless Tobacco Never Used       Alcohol Consumption:  Social History     Substance and Sexual Activity   Alcohol Use No       Depression Screen:   PHQ-2/PHQ-9 Depression Screening 8/18/2020   Little interest or pleasure in doing things 0   Feeling down, depressed, or hopeless 0   Total Score 0       Fall Risk Screen:  SILVIA Fall Risk Assessment was completed, and patient is at LOW risk for falls.Assessment completed on:8/18/2020    Health Habits and Functional and Cognitive Screening:  Functional & Cognitive Status 8/18/2020   Do you have difficulty preparing food and eating? No   Do you have difficulty bathing yourself, getting dressed or grooming yourself? No   Do you have difficulty using the toilet? No   Do you have difficulty moving around from place to place? No   Do you have trouble with steps or getting out of a bed or a chair? No   Current Diet Well Balanced Diet   Dental Exam Up to date   Eye Exam Up to date   Exercise (times per week) 0 times per week   Current Exercise Activities Include None   Do you need help using the phone?  No   Are you deaf or do you have serious difficulty hearing?  Yes   Do you need help with transportation? No   Do you need help shopping? No   Do  you need help preparing meals?  No   Do you need help with housework?  No   Do you need help with laundry? No   Do you need help taking your medications? No   Do you need help managing money? No   Do you ever drive or ride in a car without wearing a seat belt? No   Have you felt unusual stress, anger or loneliness in the last month? No   Who do you live with? Child   If you need help, do you have trouble finding someone available to you? No   Have you been bothered in the last four weeks by sexual problems? No   Do you have difficulty concentrating, remembering or making decisions? No         Does the patient have evidence of cognitive impairment? No    Asprin use counseling:Taking ASA appropriately as indicated    Age-appropriate Screening Schedule:  Refer to the list below for future screening recommendations based on patient's age, sex and/or medical conditions. Orders for these recommended tests are listed in the plan section. The patient has been provided with a written plan.    Health Maintenance   Topic Date Due   • TDAP/TD VACCINES (1 - Tdap) 02/04/1944   • ZOSTER VACCINE (1 of 2) 02/04/1983   • INFLUENZA VACCINE  08/01/2020   • LIPID PANEL  01/22/2021   • DXA SCAN  08/12/2021   • MAMMOGRAM  08/19/2021          The following portions of the patient's history were reviewed and updated as appropriate: allergies, current medications, past family history, past medical history, past social history, past surgical history and problem list.    Outpatient Medications Prior to Visit   Medication Sig Dispense Refill   • aspirin 81 MG EC tablet Take 81 mg by mouth Daily.     • B Complex Vitamins (VITAMIN B COMPLEX IJ) Inject  as directed.     • carvedilol (COREG) 25 MG tablet TAKE ONE TABLET BY MOUTH TWICE A DAY WITH MEALS 180 tablet 0   • cholecalciferol (VITAMIN D3) 1000 units tablet Take 1,000 Units by mouth Daily.     • cloNIDine (CATAPRES) 0.2 MG tablet TAKE ONE TABLET BY MOUTH TWICE A DAY (MORNING AND NIGHT) 90  tablet 1   • denosumab (PROLIA) 60 MG/ML solution prefilled syringe syringe Inject 1 syringe under the skin into the appropriate area as directed Every 6 (Six) Months.     • Lactobacillus (PROBIOTIC ACIDOPHILUS PO) Take 1 tablet by mouth Daily.     • olmesartan (BENICAR) 40 MG tablet Take 1 tablet by mouth Daily. 90 tablet 3   • pravastatin (PRAVACHOL) 40 MG tablet Take 1 tablet by mouth Daily. 90 tablet 1   • simvastatin (Zocor) 20 MG tablet Take 1 tablet by mouth Every Night. 90 tablet 0     No facility-administered medications prior to visit.        Patient Active Problem List   Diagnosis   • Hyperlipidemia   • Essential hypertension   • Hypertrophic cardiomyopathy (CMS/HCC)   • Generalized osteoarthritis   • RICH on CPAP   • Osteoporosis   • Thrombophlebitis of deep veins of lower extremity (CMS/HCC)   • Vitamin D deficiency   • Colon polyps   • Family hx of colon cancer   • H/O parathyroidectomy   • Pulmonary hypertension (CMS/HCC)   • Echocardiogram abnormal   • Carotid artery stenosis   • Ischemic rest pain of lower extremity (CMS/HCC)   • Bilateral carpal tunnel syndrome   • Visit for screening mammogram   • Osteopenia   • Hyperuricemia   • Allergic reaction caused by a drug   • Upper respiratory infection   • Senile osteoporosis   • Diastolic dysfunction   • Medicare annual wellness visit, initial   • Hip pain, left   • Hammer toe of second toe of right foot   • Gastroenteritis   • Starvation ketoacidosis   • CKD (chronic kidney disease) stage 3, GFR 30-59 ml/min (CMS/HCC)   • Dehydration   • Tophus   • Hospital discharge follow-up   • Osteoporosis, post-menopausal   • Hyperglycemia   • Carotid atherosclerosis, bilateral   • Prediabetes       Advanced Care Planning:  ACP discussion was held with the patient during this visit. Patient does not have an advance directive, information provided.    Review of Systems    Compared to one year ago, the patient feels her physical health is the same.  Compared to one  "year ago, the patient feels her mental health is worse.    Reviewed chart for potential of high risk medication in the elderly: yes  Reviewed chart for potential of harmful drug interactions in the elderly:yes    Objective         Vitals:    08/18/20 0902   BP: 151/84   BP Location: Right arm   Patient Position: Sitting   Cuff Size: Adult   Pulse: 66   Resp: 16   Temp: 98.7 °F (37.1 °C)   TempSrc: Oral   SpO2: 93%   Weight: 62.1 kg (137 lb)   Height: 149.9 cm (59.02\")       Body mass index is 27.65 kg/m².  Discussed the patient's BMI with her. The BMI is above average; no BMI management plan is appropriate..    Physical Exam          Assessment/Plan   Medicare Risks and Personalized Health Plan  CMS Preventative Services Quick Reference  Advance Directive Discussion  Cardiovascular risk  Chronic Pain   Dementia/Memory   Depression/Dysphoria  Fall Risk  Inactivity/Sedentary  Osteoprorosis Risk    The above risks/problems have been discussed with the patient.  Pertinent information has been shared with the patient in the After Visit Summary.  Follow up plans and orders are seen below in the Assessment/Plan Section.    Diagnoses and all orders for this visit:    1. Medicare annual wellness visit, subsequent (Primary)    2. Healthcare maintenance    3. Other hyperlipidemia  -     Lipid panel    4. Prediabetes  -     Hemoglobin A1c    5. Osteoporosis, post-menopausal    6. Hypertrophic cardiomyopathy (CMS/[facility])    7. Essential hypertension  -     CBC No Differential  -     Comprehensive metabolic panel  -     TSH Rfx On Abnormal To Free T4    8. CKD (chronic kidney disease) stage 3, GFR 30-59 ml/min (CMS/[facility])      Follow Up:  No follow-ups on file.     An After Visit Summary and PPPS were given to the patient.     -Annual medicare wellness visit performed.     -Her blood pressure at home has been a bit labile, at times as high as 160s/80s and as low as 90s/50s. I reviewed her antihypertensive medication regimen.  She has " been taking olmesartan 40 mg and 0.2 mg and Coreg 25 mg around 10:30 PM, and clonidine 0.2 mg and Coreg 25 mg early in the morning when she wakes up.  We discussed the timing of her medications.  She is going to begin taking the olmesartan 40 mg, clonidine 0.2 mg and Coreg 25 mg when she wakes at 8 AM, then 12 hours later and take the second dose of clonidine and Coreg.  We will follow-up in 1 month via telephone on the change in her medication timing.  At that time if she is still having high or low readings I will adjust medication dosages/type.    -Healthcare maintenance: Fasting labs today.      -Prediabetes: A1c today.  Last A1c 5.7 January 2020.    -Osteoporosis: Scheduled for Prolia infusion later this month.  DEXA scan in September 2021 due next.    -Hypertrophic cardiomyopathy: Following with Dr. Bel Freeman, cardiology.  She takes an 81 mg aspirin every other day.  Continue routine cardiology follow-ups.    -CKD stage III: Currently no nephrologist.  Creatinine check today.  Discussed avoiding any NSAIDs.    -We will contact patient with her lab results and any further recommendations.  Follow-up PRN and we will do a phone visit in 1 month for follow-up on changing of the timing of her antihypertensives/blood pressure log and in 6 months in the office for routine health maintenance/follow-up on chronic conditions.

## 2020-08-18 NOTE — PATIENT INSTRUCTIONS
Medicare Wellness  Personal Prevention Plan of Service     Date of Office Visit:  2020  Encounter Provider:  ENZO Yu  Place of Service:  [facility]  Patient Name: Neha Guillory  :  1933    As part of the Medicare Wellness portion of your visit today, we are providing you with this personalized preventive plan of services (PPPS). This plan is based upon recommendations of the [place] Preventive Services Task Force (USPSTF) and the Advisory Committee on Immunization Practices (ACIP).    This lists the preventive care services that should be considered, and provides dates of when you are due. Items listed as completed are up-to-date and do not require any further intervention.    Health Maintenance   Topic Date Due   • TDAP/TD VACCINES (1 - Tdap) 1944   • ZOSTER VACCINE (1 of 2) 1983   • Pneumococcal Vaccine Once at 65 Years Old  1998   • INFLUENZA VACCINE  2020   • LIPID PANEL  2021   • DXA SCAN  2021   • MEDICARE ANNUAL WELLNESS  2021   • MAMMOGRAM  2021       No orders of the defined types were placed in this encounter.      No follow-ups on file.

## 2020-08-19 NOTE — PROGRESS NOTES
Please notify patient blood count looks stable, no anemia.  Metabolic panel stable including kidney function, liver enzymes and electrolytes.  Cholesterol is great.  Thyroid is normal.  A1c stable at 5.5.

## 2020-08-31 ENCOUNTER — INFUSION (OUTPATIENT)
Dept: ONCOLOGY | Facility: HOSPITAL | Age: 85
End: 2020-08-31

## 2020-08-31 VITALS
RESPIRATION RATE: 18 BRPM | SYSTOLIC BLOOD PRESSURE: 186 MMHG | BODY MASS INDEX: 27.82 KG/M2 | HEART RATE: 61 BPM | TEMPERATURE: 98.3 F | WEIGHT: 138 LBS | DIASTOLIC BLOOD PRESSURE: 82 MMHG | HEIGHT: 59 IN | OXYGEN SATURATION: 97 %

## 2020-08-31 DIAGNOSIS — M85.80 OSTEOPENIA, UNSPECIFIED LOCATION: ICD-10-CM

## 2020-08-31 DIAGNOSIS — M81.0 OSTEOPOROSIS, POST-MENOPAUSAL: Primary | ICD-10-CM

## 2020-08-31 PROCEDURE — 25010000002 DENOSUMAB 60 MG/ML SOLUTION PREFILLED SYRINGE: Performed by: NURSE PRACTITIONER

## 2020-08-31 PROCEDURE — 96372 THER/PROPH/DIAG INJ SC/IM: CPT

## 2020-08-31 RX ADMIN — DENOSUMAB 60 MG: 60 INJECTION SUBCUTANEOUS at 13:54

## 2020-09-07 RX ORDER — CARVEDILOL 25 MG/1
TABLET ORAL
Qty: 180 TABLET | Refills: 0 | Status: SHIPPED | OUTPATIENT
Start: 2020-09-07 | End: 2020-12-04

## 2020-09-17 ENCOUNTER — OFFICE VISIT (OUTPATIENT)
Dept: INTERNAL MEDICINE | Facility: CLINIC | Age: 85
End: 2020-09-17

## 2020-09-17 ENCOUNTER — APPOINTMENT (OUTPATIENT)
Dept: WOMENS IMAGING | Facility: HOSPITAL | Age: 85
End: 2020-09-17

## 2020-09-17 DIAGNOSIS — I10 RESISTANT HYPERTENSION: Primary | ICD-10-CM

## 2020-09-17 PROCEDURE — 77063 BREAST TOMOSYNTHESIS BI: CPT | Performed by: RADIOLOGY

## 2020-09-17 PROCEDURE — 77067 SCR MAMMO BI INCL CAD: CPT | Performed by: RADIOLOGY

## 2020-09-17 PROCEDURE — 99442 PR PHYS/QHP TELEPHONE EVALUATION 11-20 MIN: CPT | Performed by: NURSE PRACTITIONER

## 2020-09-17 RX ORDER — HYDRALAZINE HYDROCHLORIDE 25 MG/1
25 TABLET, FILM COATED ORAL 2 TIMES DAILY
Qty: 90 TABLET | Refills: 1 | Status: SHIPPED | OUTPATIENT
Start: 2020-09-17 | End: 2020-12-07

## 2020-09-17 NOTE — PROGRESS NOTES
Subjective   Neha Guillory is a 87 y.o. female.   Chief Complaint   Patient presents with   • Hypertension     1 month BP follow up     You have chosen to receive care through a telephone visit. Do you consent to use a telephone visit for your medical care today? Yes    Patient presents via telephone for one-month follow-up on hypertension.  This is an 87-year-old female.  I saw her in the office on 8/18/2020 at which time her blood pressure was high at 151/84.  Her blood pressure medications were not timed optimally and I changed around her daily timing of it.  She has now been taking olmesartan 40 mg, clonidine 0.2 mg and Coreg 25 mg around 8 AM, then 12 hours later taking the clonidine 0.2 mg and Coreg 25 mg around 8 PM.  She has been checking her blood pressure faithfully at home and is still seeing readings in the 150s to 160s over 90s consistently.  She denies headache, visual changes, shortness of breath or chest discomfort.  She does follow with Dr. Bel rFeeman, cardiology routinely for hypertrophic cardiomyopathy.  She denies development of any other new issues today.       The following portions of the patient's history were reviewed and updated as appropriate: allergies, current medications, past family history, past medical history, past social history, past surgical history and problem list.    Review of Systems   Constitutional: Negative for activity change, chills, fatigue, fever, unexpected weight gain and unexpected weight loss.   HENT: Negative for congestion, hearing loss, postnasal drip, sinus pressure, sneezing, sore throat, swollen glands and tinnitus.    Eyes: Negative for photophobia, pain and visual disturbance.   Respiratory: Negative for cough, chest tightness, shortness of breath and wheezing.    Cardiovascular: Negative for chest pain, palpitations and leg swelling.   Gastrointestinal: Negative for abdominal distention, abdominal pain, constipation, diarrhea, nausea and vomiting.    Endocrine: Negative for polydipsia, polyphagia and polyuria.   Genitourinary: Negative for dysuria, frequency, hematuria and urgency.   Neurological: Negative for dizziness, weakness, numbness and headache.   All other systems reviewed and are negative.      Objective    There were no vitals filed for this visit.    Physical Exam  Neurological:      Mental Status: She is oriented to person, place, and time.       Current outpatient and discharge medications have been reconciled for the patient.  Reviewed by: ENZO Yu      Assessment/Felice   Neha was seen today for hypertension.    Diagnoses and all orders for this visit:    Resistant hypertension  -     hydrALAZINE (APRESOLINE) 25 MG tablet; Take 1 tablet by mouth 2 (two) times a day.      -Resistant hypertension: Continue olmesartan 40 mg daily, clonidine 0.2 mg every 12 hours and Coreg 25 mg every 12 hours.  We will add hydralazine 25 mg every 12 hours.  Discussed lowering dietary sodium intake.    -She will continue to log blood pressures daily.    -Follow-up in 3 weeks via telephone visit on hypertension/initiation of hydralazine.  She will also see Dr. Freeman, her cardiologist routinely.  She is aware that blood pressure goal is less than 130/80.  Follow-up PRN in the meantime.    This visit was conducted via telephone therefore no physical exam was performed.    This visit has been rescheduled as a phone visit to comply with patient safety concerns in accordance with CDC recommendations. Total time of discussion was 13 minutes.

## 2020-09-28 RX ORDER — CLONIDINE HYDROCHLORIDE 0.2 MG/1
TABLET ORAL
Qty: 90 TABLET | Refills: 0 | Status: SHIPPED | OUTPATIENT
Start: 2020-09-28 | End: 2020-11-16

## 2020-10-08 ENCOUNTER — OFFICE VISIT (OUTPATIENT)
Dept: INTERNAL MEDICINE | Facility: CLINIC | Age: 85
End: 2020-10-08

## 2020-10-08 DIAGNOSIS — I10 ESSENTIAL HYPERTENSION: Primary | ICD-10-CM

## 2020-10-08 PROCEDURE — 99442 PR PHYS/QHP TELEPHONE EVALUATION 11-20 MIN: CPT | Performed by: NURSE PRACTITIONER

## 2020-10-08 NOTE — PROGRESS NOTES
Subjective   Neha Guillory is a 87 y.o. female.   Chief Complaint   Patient presents with   • Hypertension     3 week follow up     You have chosen to receive care through a telephone visit. Do you consent to use a telephone visit for your medical care today? Yes    Patient presents via telephone for 3-week follow-up on hypertension.  This is an 87-year-old female with RICH, hyperlipidemia, hypertension, hypertrophic cardiomyopathy following with Dr. Freeman, cardiology.  Her hypertension has been a bit labile/resistant.  At our last visit on 9/17/2020 we went over her blood pressure readings.  She was still having persistent high readings and I added hydralazine 25 mg every 12 hours.  She is also taking clonidine 0.2 mg every 12 hours, Coreg 25 mg every 12 hours and olmesartan 40 mg daily.  She is currently taking olmesartan in the morning.  Reports that she is waking up still with high blood pressures into the 160s systolically, 90s diastolically.  In the afternoons that she is seeing readings as low as 98/59.  On average blood pressures are still above goal but she is having some lows.  Denies symptoms of dizziness, shortness of breath or chest discomfort.  She denies development of any other new issues today.       The following portions of the patient's history were reviewed and updated as appropriate: allergies, current medications, past family history, past medical history, past social history, past surgical history and problem list.    Review of Systems   Constitutional: Negative for activity change, chills, fatigue, fever, unexpected weight gain and unexpected weight loss.   HENT: Negative for congestion, hearing loss, postnasal drip, sinus pressure, sneezing, sore throat, swollen glands and tinnitus.    Eyes: Negative for photophobia, pain and visual disturbance.   Respiratory: Negative for cough, chest tightness, shortness of breath and wheezing.    Cardiovascular: Negative for chest pain, palpitations and leg  swelling.   Gastrointestinal: Negative for abdominal distention, abdominal pain, constipation, diarrhea, nausea and vomiting.   Endocrine: Negative for polydipsia, polyphagia and polyuria.   Genitourinary: Negative for dysuria, frequency, hematuria and urgency.   Neurological: Negative for dizziness, weakness, numbness and headache.   All other systems reviewed and are negative.      Objective    There were no vitals filed for this visit.    Physical Exam  Neurological:      Mental Status: She is oriented to person, place, and time.       Current outpatient and discharge medications have been reconciled for the patient.  Reviewed by: ENZO Yu/Felice   Neha was seen today for hypertension.    Diagnoses and all orders for this visit:    Essential hypertension      -Hypertension has been a bit labile.  Still having quite high readings in the morning although midafternoon readings are becoming quite low then increasing again by evening.  We will have her begin taking the olmesartan 40 mg in the evening instead of the morning.  Hopefully this will offset the high readings in the morning she is having and minimize the lows that she is seeing midday.  Continue with the hydralazine 25 mg every 12 hours, clonidine 0.2 mg every 12 hours and Coreg 25 mg every 12 hours.  She will continue to monitor her blood pressures and record readings in a log for follow-up.    -We will follow-up in 1 month in the office with her blood pressure logs.  At that time we will get some labs as well.  If still having labile hypertension we may discuss going off the clonidine as this may be related to rebound hypertension from the clonidine.    -Follow-up PRN in the meantime.    This visit was conducted via phone therefore no physical exam was performed.    This visit has been rescheduled as a phone visit to comply with patient safety concerns in accordance with CDC recommendations. Total time of discussion was 15  minutes.

## 2020-11-13 ENCOUNTER — OFFICE VISIT (OUTPATIENT)
Dept: INTERNAL MEDICINE | Facility: CLINIC | Age: 85
End: 2020-11-13

## 2020-11-13 VITALS
RESPIRATION RATE: 17 BRPM | DIASTOLIC BLOOD PRESSURE: 76 MMHG | TEMPERATURE: 97.8 F | BODY MASS INDEX: 27.09 KG/M2 | HEIGHT: 59 IN | WEIGHT: 134.4 LBS | OXYGEN SATURATION: 95 % | SYSTOLIC BLOOD PRESSURE: 130 MMHG | HEART RATE: 63 BPM

## 2020-11-13 DIAGNOSIS — I10 RESISTANT HYPERTENSION: Primary | ICD-10-CM

## 2020-11-13 PROCEDURE — 99213 OFFICE O/P EST LOW 20 MIN: CPT | Performed by: NURSE PRACTITIONER

## 2020-11-13 NOTE — PROGRESS NOTES
Subjective   Neha Guillory is a 87 y.o. female.   Chief Complaint   Patient presents with   • Follow-up     Pt presents here today for a follow up for blood pressure.   • Hypertension       Patient presents for 1 month follow-up on hypertension.  This is an 87-year-old female.  She has cardiomyopathy and follows with Dr. Freeman, cardiology with [facility].  She has hyperlipidemia and hypertension.  I last had a telehealth visit with her on 10/8/2020 at which time we discussed labile hypertension.  I changed the timing of her olmesartan 40 mg dosage to evenings and continued hydralazine 25 mg every 12 hours, clonidine 0.2 mg every 12 hours and Coreg 25 mg every 12 hours.  She presents today for follow-up with a log of blood pressure readings.  Midday readings are stable although her morning and evening readings are still quite high.  She reports taking her medication as directed with good compliance.  She is not eating a lot of sodium.  Denies headaches or visual changes and denies development of any other new issues today.       The following portions of the patient's history were reviewed and updated as appropriate: allergies, current medications, past family history, past medical history, past social history, past surgical history and problem list.    Review of Systems   Constitutional: Negative for activity change, chills, fatigue, fever, unexpected weight gain and unexpected weight loss.   HENT: Negative for congestion, hearing loss, postnasal drip, sinus pressure, sneezing, sore throat, swollen glands and tinnitus.    Eyes: Negative for photophobia, pain and visual disturbance.   Respiratory: Negative for cough, chest tightness, shortness of breath and wheezing.    Cardiovascular: Negative for chest pain, palpitations and leg swelling.   Gastrointestinal: Negative for abdominal distention, abdominal pain, constipation, diarrhea, nausea and vomiting.   Endocrine: Negative for polydipsia, polyphagia and polyuria.  "  Genitourinary: Negative for dysuria, frequency, hematuria and urgency.   Neurological: Negative for dizziness, weakness, numbness and headache.   All other systems reviewed and are negative.      Objective    /76   Pulse 63   Temp 97.8 °F (36.6 °C) (Oral)   Resp 17   Ht 149.9 cm (59\")   Wt 61 kg (134 lb 6.4 oz)   SpO2 95%   BMI 27.15 kg/m²     Physical Exam  Vitals signs and nursing note reviewed.   Constitutional:       General: She is not in acute distress.     Appearance: Normal appearance. She is well-developed. She is not ill-appearing, toxic-appearing or diaphoretic.   HENT:      Head: Atraumatic.      Right Ear: Tympanic membrane, ear canal and external ear normal.      Left Ear: Tympanic membrane, ear canal and external ear normal.   Eyes:      Pupils: Pupils are equal, round, and reactive to light.   Neck:      Musculoskeletal: Normal range of motion and neck supple. No neck rigidity or muscular tenderness.      Vascular: No carotid bruit.   Cardiovascular:      Rate and Rhythm: Normal rate and regular rhythm.      Pulses: Normal pulses.      Heart sounds: Normal heart sounds.      Comments: No peripheral edema  Pulmonary:      Effort: Pulmonary effort is normal. No respiratory distress.      Breath sounds: Normal breath sounds. No stridor. No wheezing, rhonchi or rales.   Chest:      Chest wall: No tenderness.   Abdominal:      General: Bowel sounds are normal. There is no distension.      Palpations: Abdomen is soft.      Tenderness: There is no abdominal tenderness.   Musculoskeletal: Normal range of motion.   Lymphadenopathy:      Cervical: No cervical adenopathy.   Skin:     General: Skin is warm and dry.      Capillary Refill: Capillary refill takes less than 2 seconds.   Neurological:      Mental Status: She is alert and oriented to person, place, and time.   Psychiatric:         Mood and Affect: Mood normal.         Behavior: Behavior normal.         Thought Content: Thought content " normal.         Judgment: Judgment normal.       Current outpatient and discharge medications have been reconciled for the patient.  Reviewed by: ENZO Yu      Assessment/Plan   Diagnoses and all orders for this visit:    1. Resistant hypertension (Primary)  -     CBC No Differential  -     Comprehensive metabolic panel  -     TSH Rfx On Abnormal To Free T4  -     Duplex Renal Artery - Bilateral Complete CAR; Future    -Checked patient's home monitor against my manual reading today, consistent and accurate.  Blood pressure on manual recheck in the office today is 130/76.  This is consistent with her home blood pressure log is midday readings are often well controlled with high readings in the mornings in the evenings.  I want to rule out an underlying cause for her resistant hypertension and have ordered renal duplex Dopplers to rule out renal arterial stenosis.  Labs including CBC, CMP, TSH.    -If labs and renal duplex come back unremarkable will consider adding midday dose of hydralazine due to high evening readings.    -We will contact patient with her lab and imaging results and any further recommendations.  Follow-up as needed and at next scheduled office visit.

## 2020-11-14 LAB
ALBUMIN SERPL-MCNC: 4.3 G/DL (ref 3.5–5.2)
ALBUMIN/GLOB SERPL: 2.3 G/DL
ALP SERPL-CCNC: 47 U/L (ref 39–117)
ALT SERPL-CCNC: 17 U/L (ref 1–33)
AST SERPL-CCNC: 16 U/L (ref 1–32)
BILIRUB SERPL-MCNC: 0.6 MG/DL (ref 0–1.2)
BUN SERPL-MCNC: 21 MG/DL (ref 8–23)
BUN/CREAT SERPL: 20 (ref 7–25)
CALCIUM SERPL-MCNC: 9.8 MG/DL (ref 8.6–10.5)
CHLORIDE SERPL-SCNC: 101 MMOL/L (ref 98–107)
CO2 SERPL-SCNC: 27.5 MMOL/L (ref 22–29)
CREAT SERPL-MCNC: 1.05 MG/DL (ref 0.57–1)
ERYTHROCYTE [DISTWIDTH] IN BLOOD BY AUTOMATED COUNT: 12.6 % (ref 12.3–15.4)
GLOBULIN SER CALC-MCNC: 1.9 GM/DL
GLUCOSE SERPL-MCNC: 98 MG/DL (ref 65–99)
HCT VFR BLD AUTO: 37.2 % (ref 34–46.6)
HGB BLD-MCNC: 12.4 G/DL (ref 12–15.9)
MCH RBC QN AUTO: 29.6 PG (ref 26.6–33)
MCHC RBC AUTO-ENTMCNC: 33.3 G/DL (ref 31.5–35.7)
MCV RBC AUTO: 88.8 FL (ref 79–97)
PLATELET # BLD AUTO: 263 10*3/MM3 (ref 140–450)
POTASSIUM SERPL-SCNC: 4.5 MMOL/L (ref 3.5–5.2)
PROT SERPL-MCNC: 6.2 G/DL (ref 6–8.5)
RBC # BLD AUTO: 4.19 10*6/MM3 (ref 3.77–5.28)
SODIUM SERPL-SCNC: 138 MMOL/L (ref 136–145)
TSH SERPL DL<=0.005 MIU/L-ACNC: 0.9 UIU/ML (ref 0.27–4.2)
WBC # BLD AUTO: 7.96 10*3/MM3 (ref 3.4–10.8)

## 2020-11-16 RX ORDER — CLONIDINE HYDROCHLORIDE 0.2 MG/1
TABLET ORAL
Qty: 90 TABLET | Refills: 0 | Status: SHIPPED | OUTPATIENT
Start: 2020-11-16 | End: 2020-11-19

## 2020-11-16 NOTE — PROGRESS NOTES
Please call patient-blood count is stable, no anemia.  Very mild declining kidney function, may have been related to fasting for labs.  Please be sure you are drinking plenty of water.  Thyroid normal.  It looks like her ultrasound is scheduled for November 20, 2020, I will get her the results of the renal artery Dopplers as soon as I have them and further instructions.  Please continue monitoring blood pressure.

## 2020-11-19 RX ORDER — CLONIDINE HYDROCHLORIDE 0.2 MG/1
TABLET ORAL
Qty: 90 TABLET | Refills: 0 | Status: SHIPPED | OUTPATIENT
Start: 2020-11-19 | End: 2020-12-18 | Stop reason: ALTCHOICE

## 2020-11-20 ENCOUNTER — HOSPITAL ENCOUNTER (OUTPATIENT)
Dept: CARDIOLOGY | Facility: HOSPITAL | Age: 85
Discharge: HOME | End: 2020-11-20
Admitting: NURSE PRACTITIONER

## 2020-11-20 DIAGNOSIS — I10 RESISTANT HYPERTENSION: ICD-10-CM

## 2020-11-20 LAB
BH CV ECHO MEAS - DIST REN A EDV LEFT: 21.1 CM/SEC
BH CV ECHO MEAS - DIST REN A PSV LEFT: 81.5 CM/SEC
BH CV ECHO MEAS - DIST REN A RI LEFT: 0.74
BH CV ECHO MEAS - MID REN A EDV LEFT: -19.5 CM/SEC
BH CV ECHO MEAS - MID REN A PSV LEFT: -79.5 CM/SEC
BH CV ECHO MEAS - MID REN A RI LEFT: 0.75
BH CV ECHO MEAS - PROX REN A EDV LEFT: 13.3 CM/SEC
BH CV ECHO MEAS - PROX REN A PSV LEFT: 73 CM/SEC
BH CV ECHO MEAS - PROX REN A RI LEFT: 0.82
BH CV VAS BP LEFT ARM: NORMAL MMHG
BH CV VAS BP RIGHT ARM: NORMAL MMHG
BH CV VAS RENAL AORTIC MID EDV: 0 CM/S
BH CV VAS RENAL AORTIC MID PSV: 55 CM/S
BH CV VAS RENAL HILUM LEFT EDV: 8 CM/S
BH CV VAS RENAL HILUM LEFT PSV: 40 CM/S
BH CV VAS RENAL HILUM RIGHT EDV: 8 CM/S
BH CV VAS RENAL HILUM RIGHT PSV: 34 CM/S
BH CV XLRA MEAS - KID L LEFT: 10.7 CM
BH CV XLRA MEAS - RENAL A ORG RI LEFT: 0.72
BH CV XLRA MEAS DIST REN A EDV RIGHT: 19 CM/S
BH CV XLRA MEAS DIST REN A PSV RIGHT: 80 CM/S
BH CV XLRA MEAS KID L RIGHT: 9.06 CM
BH CV XLRA MEAS KID W RIGHT: 3.9 CM
BH CV XLRA MEAS MID REN A EDV RIGHT: -21.3 CM/SEC
BH CV XLRA MEAS MID REN A PSV RIGHT: -85.2 CM/SEC
BH CV XLRA MEAS MID REN A RI RIGHT: 0.75
BH CV XLRA MEAS PROX REN A EDV RIGHT: 14.8 CM/SEC
BH CV XLRA MEAS PROX REN A PSV RIGHT: 84.2 CM/SEC
BH CV XLRA MEAS PROX REN A RI RIGHT: 0.82
BH CV XLRA MEAS RAR LEFT: 1.47
BH CV XLRA MEAS RAR RIGHT: 1.54
BH CV XLRA MEAS RENAL A ORG EDV LEFT: 18.3 CM/SEC
BH CV XLRA MEAS RENAL A ORG EDV RIGHT: 18.1 CM/SEC
BH CV XLRA MEAS RENAL A ORG PSV LEFT: 66.4 CM/SEC
BH CV XLRA MEAS RENAL A ORG PSV RIGHT: 72.9 CM/SEC
BH CV XLRA MEAS RENAL A ORG RI RIGHT: 0.75
LEFT KIDNEY WIDTH: 4.9 CM
LEFT RENAL UPPER PARENCHYMA MAX: 23 CM/S
LEFT RENAL UPPER PARENCHYMA MIN: 5 CM/S
LEFT RENAL UPPER PARENCHYMA RI: 0.78
RIGHT RENAL UPPER PARENCHYMA MAX: 25 CM/S
RIGHT RENAL UPPER PARENCHYMA MIN: 7 CM/S
RIGHT RENAL UPPER PARENCHYMA RI: 0.72

## 2020-11-20 PROCEDURE — 93975 VASCULAR STUDY: CPT

## 2020-11-23 NOTE — PROGRESS NOTES
Please notify patient that her renal artery scan was normal.  As we discussed in the office, I would like to go ahead and add a midday dose of hydralazine 25 mg.  She will now be on hydralazine 25 mg 3 times daily, olmesartan 40 mg once daily, clonidine 0.2 mg every 12 hours and Coreg 25 mg every 12 hours.  This continue to log blood pressures as she has been doing and please make her a 1 month telephone visit with me for follow-up on her home blood pressures.

## 2020-12-04 RX ORDER — CARVEDILOL 25 MG/1
TABLET ORAL
Qty: 180 TABLET | Refills: 0 | Status: SHIPPED | OUTPATIENT
Start: 2020-12-04 | End: 2020-12-07

## 2020-12-07 DIAGNOSIS — I10 RESISTANT HYPERTENSION: ICD-10-CM

## 2020-12-07 RX ORDER — HYDRALAZINE HYDROCHLORIDE 25 MG/1
TABLET, FILM COATED ORAL
Qty: 90 TABLET | Refills: 1 | Status: SHIPPED | OUTPATIENT
Start: 2020-12-07 | End: 2020-12-18 | Stop reason: DRUGHIGH

## 2020-12-07 RX ORDER — CARVEDILOL 25 MG/1
TABLET ORAL
Qty: 180 TABLET | Refills: 1 | Status: SHIPPED | OUTPATIENT
Start: 2020-12-07 | End: 2022-10-14 | Stop reason: HOSPADM

## 2020-12-18 ENCOUNTER — TELEPHONE (OUTPATIENT)
Dept: INTERNAL MEDICINE | Facility: CLINIC | Age: 85
End: 2020-12-18

## 2020-12-18 ENCOUNTER — OFFICE VISIT (OUTPATIENT)
Dept: INTERNAL MEDICINE | Facility: CLINIC | Age: 85
End: 2020-12-18

## 2020-12-18 DIAGNOSIS — R09.89 LABILE HYPERTENSION: Primary | ICD-10-CM

## 2020-12-18 PROCEDURE — 99442 PR PHYS/QHP TELEPHONE EVALUATION 11-20 MIN: CPT | Performed by: NURSE PRACTITIONER

## 2020-12-18 RX ORDER — HYDRALAZINE HYDROCHLORIDE 50 MG/1
50 TABLET, FILM COATED ORAL 3 TIMES DAILY
Qty: 90 TABLET | Refills: 3 | Status: SHIPPED | OUTPATIENT
Start: 2020-12-18 | End: 2022-02-24

## 2020-12-18 NOTE — PROGRESS NOTES
"Subjective   Neha Guillory is a 87 y.o. female.   Chief Complaint   Patient presents with   • Hypertension     1 mo f/u     You have chosen to receive care through a telephone visit. Do you consent to use a telephone visit for your medical care today? Yes    Patient presents for 1 month follow up on labile HTN. This is an 87 YOF. We had our last visit on 11/13/2020 at which time her BP was still running quite high in the mornings and evenings, and midday she was having low readings. I ordered renal artery duplex US which she had done, normal, no renal arterial stenosis. I added a midday dose of hydralazine 25 mg. Current HTN regimen is as follows- hydralazine 25 mg TID, Coreg 25 mg q12h, olmesartan 40 mg daily, clonidine 0.2 mg q12h. She is unfortunately still having very high morning and evening blood pressures (160-180s/80-90s) with low midday readings. When she gets the low midday readings she \"feels very tired.\" No syncope, SOA or chest discomfort. Symptoms of low midday readings manifest after she takes the clonidine. She denies development of any other new issues today.        The following portions of the patient's history were reviewed and updated as appropriate: allergies, current medications, past family history, past medical history, past social history, past surgical history and problem list.    Review of Systems   Constitutional: Negative for activity change, chills, fatigue, fever, unexpected weight gain and unexpected weight loss.   HENT: Negative for congestion, hearing loss, postnasal drip, sinus pressure, sneezing, sore throat, swollen glands and tinnitus.    Eyes: Negative for photophobia, pain and visual disturbance.   Respiratory: Negative for cough, chest tightness, shortness of breath and wheezing.    Cardiovascular: Negative for chest pain, palpitations and leg swelling.   Gastrointestinal: Negative for abdominal distention, abdominal pain, constipation, diarrhea, nausea and vomiting. "   Endocrine: Negative for polydipsia, polyphagia and polyuria.   Genitourinary: Negative for dysuria, frequency, hematuria and urgency.   Neurological: Negative for dizziness, weakness, numbness and headache.   All other systems reviewed and are negative.      Objective    There were no vitals filed for this visit.    Physical Exam  Neurological:      Mental Status: She is oriented to person, place, and time.       Current outpatient and discharge medications have been reconciled for the patient.  Reviewed by: ENZO Yu      Assessment/Plan   Diagnoses and all orders for this visit:    1. Labile hypertension (Primary)  -     hydrALAZINE (APRESOLINE) 50 MG tablet; Take 1 tablet by mouth 3 (Three) Times a Day.  Dispense: 90 tablet; Refill: 3      -I believe she is having rebound HTN from the clonidine- low readings midday resulting in rebound high readings in the evenings and morning. I have instructed her to wean off of the clonidine over the next several days (down to 1/2 tab first, then gradually wean off). I will increase the hydralazine from 25 mg TID to 50 mg TID. Continue coreg 25 mg q12h and olmesartan 40 mg daily.     -Continue TID home BP monitoring.     -I have asked her to make a follow up with Dr. Bel Freeman, her cardiologist, for evaluation of resistant, labile HTN as well.     -Follow up PRN and in 3 weeks via telehealth on HTN/discontinuation of the clonidine and increase in the hydralazine.     This visit was conducted via phone therefore no physical exam was performed.     This visit has been rescheduled as a phone visit to comply with patient safety concerns in accordance with CDC recommendations. Total time of discussion was 16 minutes.

## 2020-12-18 NOTE — TELEPHONE ENCOUNTER
----- Message from ENZO Yu sent at 12/18/2020  9:38 AM EST -----  Regarding: Pharmacy  I am making some med adjustments to this patient's regimen- increasing her hydralazine to 50 mg TID and discontinuing the clonidine 0.2 mg tabs. Can you make sure her pharmacy knows to discontinue the clonidine and 25 mg hydralazine tablets, so that they don't send automatic refill requests of those doses?     Thank you!

## 2020-12-18 NOTE — TELEPHONE ENCOUNTER
Left message letting pharmacy know of changes and asked them to remove the Clonidine and the hydralazine 25 mg from chart.

## 2021-01-11 ENCOUNTER — OFFICE VISIT (OUTPATIENT)
Dept: INTERNAL MEDICINE | Facility: CLINIC | Age: 86
End: 2021-01-11

## 2021-01-11 DIAGNOSIS — I10 RESISTANT HYPERTENSION: Primary | ICD-10-CM

## 2021-01-11 PROCEDURE — 99442 PR PHYS/QHP TELEPHONE EVALUATION 11-20 MIN: CPT | Performed by: NURSE PRACTITIONER

## 2021-01-11 NOTE — PROGRESS NOTES
Chief Complaint  Hypertension (3 wk f/u)     You have chosen to receive care through a telephone visit. Do you consent to use a telephone visit for your medical care today? Yes      Subjective          Neha Guillory presents to [facility] for   Patient presents for 3-week follow-up on hypertension.  This is a an 87-year-old female with hypertrophic cardiomyopathy, hyperlipidemia, hypertension.  I have been following her for resistant and labile hypertension for the past several months.  Her blood pressures despite multiple medication titrations have remained resistant in the mornings and evenings and midday are dropping low.  I have done a renal duplex ultrasound which showed no bilateral renal artery stenosis.  At our last visit I increased her hydralazine to 50 mg 3 times daily and discontinued clonidine as I was concerned that she may be having some rebound hypertension from the clonidine.  She has continued olmesartan 40 mg daily and Coreg 25 mg twice daily as well.  She presents today for telephone follow-up.  Unfortunately she reports that her morning and evening readings are still high, averaging 150-170 over 70s and 80s.  Her midday readings after the initial hydralazine 50 mg dose in the morning are dropping as low as 109/58.  She denies headaches, visual changes, shortness of breath, chest discomfort or dizziness.  She denies development of any other new issues today.      Objective   Vital Signs:   There were no vitals taken for this visit.    Physical Exam  Neurological:      Mental Status: She is oriented to person, place, and time.        Result Review :   The following data was reviewed by: ENZO Yu on 01/11/2021:  Common labs    Common Labsle 2/21/20 8/18/20 8/18/20 8/18/20 8/18/20 11/13/20 11/13/20     0940 0940 0940 0940 1206 1206   Glucose 88  99    98   BUN 22  21    21   Creatinine 0.99  0.98    1.05 (A)   eGFR Non African Am 53 (A)  54 (A)    50 (A)    eGFR  Am 64  65    60 (A)   Sodium 137  139    138   Potassium 4.5  4.5    4.5   Chloride 101  103    101   Calcium 9.7  9.4    9.8   Total Protein   6.3    6.2   Albumin   4.50    4.30   Total Bilirubin   0.5    0.6   Alkaline Phosphatase   48    47   AST (SGOT)   13    16   ALT (SGPT)   12    17   WBC  8.91    7.96    Hemoglobin  13.4    12.4    Hematocrit  41.0    37.2    Platelets  231    263    Total Cholesterol    136      Triglycerides    61      HDL Cholesterol    53      LDL Cholesterol     71      Hemoglobin A1C     5.50     (A) Abnormal value       Comments are available for some flowsheets but are not being displayed.           Current outpatient and discharge medications have been reconciled for the patient.  Reviewed by: ENZO Yu           Assessment and Plan    Problem List Items Addressed This Visit     None      Visit Diagnoses     Resistant hypertension    -  Primary        At this point unfortunately despite multiple medication adjustments, ruling out renal artery stenosis, lifestyle modifications her blood pressures continue to fluctuate and be both labile and resistant.  I have asked her to call for a follow-up appointment with her cardiologist, Dr. Freeman for further evaluation of her resistant hypertension.    Again discussed lowering sodium in the diet.  For now continue hydralazine 50 mg 3 times daily, Coreg 25 mg every 12 hours and telmisartan 40 mg daily.  We will keep her off the clonidine as she has not had any increase in the blood pressures since going off of it and I do believe it may have been contributing to some rebound readings.    Follow-up as needed and I will see her back next month for her previously scheduled office visit.    This visit was conducted via phone therefore no physical exam was performed.    This visit has been rescheduled as a phone visit to comply with patient safety concerns in accordance with CDC recommendations. Total time of discussion was  15 minutes.      Follow Up   No follow-ups on file.  Patient was given instructions and counseling regarding her condition or for health maintenance advice. Please see specific information pulled into the AVS if appropriate.

## 2021-02-24 ENCOUNTER — OFFICE VISIT (OUTPATIENT)
Dept: INTERNAL MEDICINE | Facility: CLINIC | Age: 86
End: 2021-02-24

## 2021-02-24 VITALS
DIASTOLIC BLOOD PRESSURE: 78 MMHG | RESPIRATION RATE: 17 BRPM | SYSTOLIC BLOOD PRESSURE: 164 MMHG | HEIGHT: 59 IN | OXYGEN SATURATION: 96 % | BODY MASS INDEX: 27.62 KG/M2 | HEART RATE: 61 BPM | WEIGHT: 137 LBS | TEMPERATURE: 97.8 F

## 2021-02-24 DIAGNOSIS — E78.49 OTHER HYPERLIPIDEMIA: ICD-10-CM

## 2021-02-24 DIAGNOSIS — Z00.00 HEALTHCARE MAINTENANCE: ICD-10-CM

## 2021-02-24 DIAGNOSIS — I10 RESISTANT HYPERTENSION: Primary | ICD-10-CM

## 2021-02-24 DIAGNOSIS — M81.0 OSTEOPOROSIS, POST-MENOPAUSAL: ICD-10-CM

## 2021-02-24 PROBLEM — I1A.0 RESISTANT HYPERTENSION: Chronic | Status: ACTIVE | Noted: 2021-02-24

## 2021-02-24 PROBLEM — I1A.0 RESISTANT HYPERTENSION: Status: ACTIVE | Noted: 2021-02-24

## 2021-02-24 PROCEDURE — 99214 OFFICE O/P EST MOD 30 MIN: CPT | Performed by: NURSE PRACTITIONER

## 2021-02-24 RX ORDER — AMLODIPINE BESYLATE 2.5 MG/1
TABLET ORAL
COMMUNITY
Start: 2021-02-08 | End: 2021-07-02

## 2021-02-24 NOTE — ASSESSMENT & PLAN NOTE
Continue pravastatin 40 mg daily and healthy diet.  Lipid panel today and we will adjust therapy if needed.

## 2021-02-24 NOTE — ASSESSMENT & PLAN NOTE
She is scheduled for Prolia next week and we will get calcium level today.  Continue with supplements.  She will be due for repeat DEXA scan in August 2021.  Based on that DEXA scan we will decide whether to proceed with Prolia in the future.

## 2021-02-24 NOTE — ASSESSMENT & PLAN NOTE
Improving with the addition of amlodipine by cardiology, continue amlodipine, olmesartan, hydralazine and carvedilol per the direction of Dr. Freeman.  She has a follow-up appointment tomorrow.  I did review her blood pressure logs from the past month and will scan them into epic.

## 2021-02-24 NOTE — PROGRESS NOTES
"Chief Complaint  Follow-up (Pt presents here today for a 6 month follow up.), Hypertension, and Hyperlipidemia    Subjective          Neha Guillory presents to [facility]  Patient presents for 6-month follow-up.  This is an 88-year-old female.    She has hyperlipidemia treated with pravastatin 40 mg daily reporting good compliance with this medication.    She has hypertension treated with Coreg 25 mg twice daily and hydralazine 50 mg 3 times daily, olmesartan 40 mg daily and now amlodipine 2.5 mg daily was recently added by Dr. Bel Freeman, cardiologist.  She has an appointment tomorrow for follow-up on resistant hypertension with cardiology.  This has been an ongoing issue despite multiple medication titrations.  She denies chest pain, shortness of breath, headache or visual changes.    She has osteoporosis and is scheduled for Prolia infusion next week.  DEXA scan will be due in August 2021.    She refuses vaccinations including flu shot, PCV 23.    Reports that overall she has been feeling very well and denies development of any other new issues today.      Review of Systems   All other systems reviewed and are negative.    Objective   Vital Signs:   /78 (BP Location: Left arm, Patient Position: Sitting, Cuff Size: Adult)   Pulse 61   Temp 97.8 °F (36.6 °C)   Resp 17   Ht 149.9 cm (59\")   Wt 62.1 kg (137 lb)   SpO2 96%   BMI 27.67 kg/m²     Physical Exam  Vitals signs and nursing note reviewed.   Constitutional:       General: She is not in acute distress.     Appearance: Normal appearance. She is well-developed. She is not ill-appearing, toxic-appearing or diaphoretic.   HENT:      Head: Normocephalic and atraumatic.      Right Ear: Tympanic membrane, ear canal and external ear normal.      Left Ear: Tympanic membrane, ear canal and external ear normal.   Eyes:      Pupils: Pupils are equal, round, and reactive to light.   Neck:      Musculoskeletal: Normal range of " motion and neck supple. No neck rigidity or muscular tenderness.      Vascular: No carotid bruit.   Cardiovascular:      Rate and Rhythm: Normal rate and regular rhythm.      Pulses: Normal pulses.      Heart sounds: Normal heart sounds.      Comments: No peripheral edema  Pulmonary:      Effort: Pulmonary effort is normal. No respiratory distress.      Breath sounds: Normal breath sounds. No stridor. No wheezing, rhonchi or rales.   Chest:      Chest wall: No tenderness.   Abdominal:      General: Bowel sounds are normal. There is no distension.      Palpations: Abdomen is soft. There is no mass.      Tenderness: There is no abdominal tenderness. There is no right CVA tenderness, left CVA tenderness, guarding or rebound.      Hernia: No hernia is present.   Musculoskeletal: Normal range of motion.   Lymphadenopathy:      Cervical: No cervical adenopathy.   Skin:     General: Skin is warm and dry.      Capillary Refill: Capillary refill takes less than 2 seconds.   Neurological:      General: No focal deficit present.      Mental Status: She is alert and oriented to person, place, and time. Mental status is at baseline.   Psychiatric:         Mood and Affect: Mood normal.         Behavior: Behavior normal.         Thought Content: Thought content normal.         Judgment: Judgment normal.        Result Review :   The following data was reviewed by: ENZO Yu on 02/24/2021:  Common labs    Common Labsle 8/18/20 8/18/20 8/18/20 8/18/20 11/13/20 11/13/20    0940 0940 0940 0940 1206 1206   Glucose  99    98   BUN  21    21   Creatinine  0.98    1.05 (A)   eGFR Non African Am  54 (A)    50 (A)   eGFR  Am  65    60 (A)   Sodium  139    138   Potassium  4.5    4.5   Chloride  103    101   Calcium  9.4    9.8   Total Protein  6.3    6.2   Albumin  4.50    4.30   Total Bilirubin  0.5    0.6   Alkaline Phosphatase  48    47   AST (SGOT)  13    16   ALT (SGPT)  12    17   WBC 8.91    7.96    Hemoglobin 13.4     12.4    Hematocrit 41.0    37.2    Platelets 231    263    Total Cholesterol   136      Triglycerides   61      HDL Cholesterol   53      LDL Cholesterol    71      Hemoglobin A1C    5.50     (A) Abnormal value       Comments are available for some flowsheets but are not being displayed.           Data reviewed: Consultant notes Dr. Bel Freeman, cardiology notes.          Assessment and Plan    Diagnoses and all orders for this visit:    1. Resistant hypertension (Primary)  Assessment & Plan:  Improving with the addition of amlodipine by cardiology, continue amlodipine, olmesartan, hydralazine and carvedilol per the direction of Dr. Freeman.  She has a follow-up appointment tomorrow.  I did review her blood pressure logs from the past month and will scan them into epic.    Orders:  -     CBC No Differential  -     Comprehensive metabolic panel  -     TSH Rfx On Abnormal To Free T4    2. Other hyperlipidemia  Assessment & Plan:  Continue pravastatin 40 mg daily and healthy diet.  Lipid panel today and we will adjust therapy if needed.    Orders:  -     Lipid panel    3. Healthcare maintenance  Assessment & Plan:  Refuses PCV 23 and flu shot despite education.      4. Osteoporosis, post-menopausal  Assessment & Plan:  She is scheduled for Prolia next week and we will get calcium level today.  Continue with supplements.  She will be due for repeat DEXA scan in August 2021.  Based on that DEXA scan we will decide whether to proceed with Prolia in the future.      We will contact patient with lab results and any further recommendations.  Follow-up as needed and I will see her back in 6 months for a Medicare wellness visit.    Follow Up   Return in about 6 months (around 8/24/2021) for Medicare Wellness.  Patient was given instructions and counseling regarding her condition or for health maintenance advice. Please see specific information pulled into the AVS if appropriate.

## 2021-02-25 LAB
ALBUMIN SERPL-MCNC: 4.2 G/DL (ref 3.5–5.2)
ALBUMIN/GLOB SERPL: 2.1 G/DL
ALP SERPL-CCNC: 54 U/L (ref 39–117)
ALT SERPL-CCNC: 18 U/L (ref 1–33)
AST SERPL-CCNC: 32 U/L (ref 1–32)
BILIRUB SERPL-MCNC: 0.6 MG/DL (ref 0–1.2)
BUN SERPL-MCNC: 19 MG/DL (ref 8–23)
BUN/CREAT SERPL: 20.4 (ref 7–25)
CALCIUM SERPL-MCNC: 9.9 MG/DL (ref 8.6–10.5)
CHLORIDE SERPL-SCNC: 102 MMOL/L (ref 98–107)
CHOLEST SERPL-MCNC: 134 MG/DL (ref 0–200)
CO2 SERPL-SCNC: 28 MMOL/L (ref 22–29)
CREAT SERPL-MCNC: 0.93 MG/DL (ref 0.57–1)
ERYTHROCYTE [DISTWIDTH] IN BLOOD BY AUTOMATED COUNT: 12.1 % (ref 12.3–15.4)
GLOBULIN SER CALC-MCNC: 2 GM/DL
GLUCOSE SERPL-MCNC: 88 MG/DL (ref 65–99)
HCT VFR BLD AUTO: 36.8 % (ref 34–46.6)
HDLC SERPL-MCNC: 53 MG/DL (ref 40–60)
HGB BLD-MCNC: 12 G/DL (ref 12–15.9)
LDLC SERPL CALC-MCNC: 67 MG/DL (ref 0–100)
MCH RBC QN AUTO: 29.3 PG (ref 26.6–33)
MCHC RBC AUTO-ENTMCNC: 32.6 G/DL (ref 31.5–35.7)
MCV RBC AUTO: 89.8 FL (ref 79–97)
PLATELET # BLD AUTO: 265 10*3/MM3 (ref 140–450)
POTASSIUM SERPL-SCNC: 4.2 MMOL/L (ref 3.5–5.2)
PROT SERPL-MCNC: 6.2 G/DL (ref 6–8.5)
RBC # BLD AUTO: 4.1 10*6/MM3 (ref 3.77–5.28)
SODIUM SERPL-SCNC: 141 MMOL/L (ref 136–145)
TRIGL SERPL-MCNC: 72 MG/DL (ref 0–150)
TSH SERPL DL<=0.005 MIU/L-ACNC: 0.79 UIU/ML (ref 0.27–4.2)
VLDLC SERPL CALC-MCNC: 14 MG/DL (ref 5–40)
WBC # BLD AUTO: 7.96 10*3/MM3 (ref 3.4–10.8)

## 2021-02-25 NOTE — PROGRESS NOTES
Please call patient-blood count looks stable, no anemia, normal white blood cells.  Metabolic panel looks good including kidney function, liver enzymes and electrolytes.  Cholesterol panel looks great.  Thyroid normal.

## 2021-02-26 DIAGNOSIS — M81.0 OSTEOPOROSIS, POST-MENOPAUSAL: Primary | ICD-10-CM

## 2021-02-26 DIAGNOSIS — M85.80 OSTEOPENIA, UNSPECIFIED LOCATION: ICD-10-CM

## 2021-03-02 ENCOUNTER — INFUSION (OUTPATIENT)
Dept: ONCOLOGY | Facility: HOSPITAL | Age: 86
End: 2021-03-02

## 2021-03-02 VITALS — TEMPERATURE: 97.6 F | HEIGHT: 59 IN | BODY MASS INDEX: 27.67 KG/M2 | RESPIRATION RATE: 18 BRPM

## 2021-03-02 DIAGNOSIS — M85.80 OSTEOPENIA, UNSPECIFIED LOCATION: ICD-10-CM

## 2021-03-02 DIAGNOSIS — Z23 IMMUNIZATION DUE: ICD-10-CM

## 2021-03-02 DIAGNOSIS — M81.0 OSTEOPOROSIS, POST-MENOPAUSAL: Primary | ICD-10-CM

## 2021-03-02 PROCEDURE — 25010000002 DENOSUMAB 60 MG/ML SOLUTION PREFILLED SYRINGE: Performed by: NURSE PRACTITIONER

## 2021-03-02 PROCEDURE — 96372 THER/PROPH/DIAG INJ SC/IM: CPT

## 2021-03-02 RX ADMIN — DENOSUMAB 60 MG: 60 INJECTION SUBCUTANEOUS at 14:47

## 2021-03-02 NOTE — NURSING NOTE
This RN spoke with Kentrell from Tracy Steiner NP office regarding today's labs. It is okay to just use the CMP and forego the magnesium and phosphorus today for the Procrit injection.

## 2021-06-29 ENCOUNTER — TELEPHONE (OUTPATIENT)
Dept: INTERNAL MEDICINE | Facility: CLINIC | Age: 86
End: 2021-06-29

## 2021-06-29 NOTE — TELEPHONE ENCOUNTER
Caller: Monica Hernández    Relationship: Emergency Contact    Best call back number: [phone]    What orders are you requesting (i.e. lab or imaging): MAMMOGRAM     In what timeframe would the patient need to come in: ASAP     Where will you receive your lab/imaging services: [facility]   [facility] .     Additional notes: PATIENT IS HAVING PAIN IN RIGHT BREAST .

## 2021-07-02 ENCOUNTER — OFFICE VISIT (OUTPATIENT)
Dept: INTERNAL MEDICINE | Facility: CLINIC | Age: 86
End: 2021-07-02

## 2021-07-02 VITALS
OXYGEN SATURATION: 93 % | HEART RATE: 70 BPM | SYSTOLIC BLOOD PRESSURE: 159 MMHG | TEMPERATURE: 97.3 F | WEIGHT: 137.7 LBS | DIASTOLIC BLOOD PRESSURE: 84 MMHG | RESPIRATION RATE: 18 BRPM | HEIGHT: 59 IN | BODY MASS INDEX: 27.76 KG/M2

## 2021-07-02 DIAGNOSIS — N64.4 PAIN OF RIGHT BREAST: ICD-10-CM

## 2021-07-02 DIAGNOSIS — Z80.3 FH: BREAST CANCER: Primary | Chronic | ICD-10-CM

## 2021-07-02 PROCEDURE — 99213 OFFICE O/P EST LOW 20 MIN: CPT | Performed by: NURSE PRACTITIONER

## 2021-07-02 RX ORDER — AMLODIPINE BESYLATE 5 MG/1
5 TABLET ORAL DAILY
COMMUNITY
Start: 2021-05-31

## 2021-07-02 RX ORDER — OLMESARTAN MEDOXOMIL 20 MG/1
20 TABLET ORAL DAILY
COMMUNITY
Start: 2021-05-26 | End: 2022-08-24 | Stop reason: SDUPTHER

## 2021-07-02 NOTE — PROGRESS NOTES
"Chief Complaint  Breast Pain (Pt presents here today with R breast pain.)    Subjective          Neha Guillory presents to [facility]  Patient presents for evaluation of right breast pain. This is an 88-year-old female. Pain began 2 weeks ago, tender to palpation around the 11:00 portion of the right breast. No discoloration or drainage. Last mammogram was 9/17/2020, benign findings. She does have dense breast tissue. She has an extensive family history of breast cancer, 3 sisters with breast cancer. She denies fever, chills. Denies development of any other new issues today.      Objective   Vital Signs:   /84 (BP Location: Left arm, Patient Position: Sitting, Cuff Size: Adult)   Pulse 70   Temp 97.3 °F (36.3 °C)   Resp 18   Ht 149.9 cm (59\")   Wt 62.5 kg (137 lb 11.2 oz)   SpO2 93%   BMI 27.81 kg/m²     Physical Exam  Vitals and nursing note reviewed.   Constitutional:       Appearance: Normal appearance. She is well-developed.   HENT:      Head: Normocephalic and atraumatic.   Eyes:      Pupils: Pupils are equal, round, and reactive to light.   Neck:      Vascular: No carotid bruit.   Cardiovascular:      Rate and Rhythm: Normal rate and regular rhythm.      Pulses: Normal pulses.      Heart sounds: Normal heart sounds.   Pulmonary:      Effort: Pulmonary effort is normal. No respiratory distress.      Breath sounds: Normal breath sounds. No stridor. No wheezing, rhonchi or rales.   Chest:      Chest wall: No mass, lacerations, deformity, swelling or tenderness.      Breasts: Breasts are symmetrical.         Right: Tenderness (  Tenderness to palpation around 11:00 area of right breast) present. No swelling, bleeding, inverted nipple, mass, nipple discharge or skin change.         Left: Normal. No swelling, bleeding, inverted nipple, mass, nipple discharge, skin change or tenderness.       Abdominal:      General: Bowel sounds are normal. There is no distension.      " Palpations: Abdomen is soft.      Tenderness: There is no abdominal tenderness.   Musculoskeletal:         General: Normal range of motion.      Cervical back: Normal range of motion and neck supple. No rigidity or tenderness.   Lymphadenopathy:      Cervical: No cervical adenopathy.      Upper Body:      Right upper body: No supraclavicular, axillary or pectoral adenopathy.      Left upper body: No supraclavicular, axillary or pectoral adenopathy.   Skin:     General: Skin is warm and dry.      Capillary Refill: Capillary refill takes less than 2 seconds.   Neurological:      General: No focal deficit present.      Mental Status: She is alert and oriented to person, place, and time. Mental status is at baseline.   Psychiatric:         Mood and Affect: Mood normal.         Behavior: Behavior normal.         Thought Content: Thought content normal.         Judgment: Judgment normal.        Result Review :   The following data was reviewed by: ENZO Larry on 07/02/2021:  Common labs    Common Labsle 8/18/20 8/18/20 8/18/20 8/18/20 11/13/20 11/13/20 2/24/21 2/24/21 2/24/21    0940 0940 0940 0940 1206 1206 1413 1413 1413   Glucose  99    98  88    BUN  21    21  19    Creatinine  0.98    1.05 (A)  0.93    eGFR Non  Am  54 (A)    50 (A)  57 (A)    eGFR  Am  65    60 (A)  69    Sodium  139    138  141    Potassium  4.5    4.5  4.2    Chloride  103    101  102    Calcium  9.4    9.8  9.9    Total Protein  6.3    6.2  6.2    Albumin  4.50    4.30  4.20    Total Bilirubin  0.5    0.6  0.6    Alkaline Phosphatase  48    47  54    AST (SGOT)  13    16  32    ALT (SGPT)  12    17  18    WBC 8.91    7.96  7.96     Hemoglobin 13.4    12.4  12.0     Hematocrit 41.0    37.2  36.8     Platelets 231    263  265     Total Cholesterol   136      134   Triglycerides   61      72   HDL Cholesterol   53      53   LDL Cholesterol    71      67   Hemoglobin A1C    5.50        (A) Abnormal value       Comments are  available for some flowsheets but are not being displayed.               SCANNED - MAMMO (09/17/2020)    Current outpatient and discharge medications have been reconciled for the patient.  Reviewed by: ENZO Larry       Assessment and Plan    Diagnoses and all orders for this visit:    1. FH: breast cancer (Primary)  -     Mammo Diagnostic Bilateral With CAD; Future  -     US breast right complete; Future    2. Pain of right breast  -     Mammo Diagnostic Bilateral With CAD; Future  -     US breast right complete; Future      Reviewed previous mammogram. Due to new breast pain, extensive family history and current presentation obtaining diagnostic mammogram and ultrasound.    We will contact patient with imaging results and further recommendations. Follow-up as needed and at next scheduled office visit.    Follow Up   Return if symptoms worsen or fail to improve, for Next scheduled follow up.  Patient was given instructions and counseling regarding her condition or for health maintenance advice. Please see specific information pulled into the AVS if appropriate.

## 2021-08-09 ENCOUNTER — APPOINTMENT (OUTPATIENT)
Dept: WOMENS IMAGING | Facility: HOSPITAL | Age: 86
End: 2021-08-09

## 2021-08-09 PROCEDURE — 76641 ULTRASOUND BREAST COMPLETE: CPT | Performed by: RADIOLOGY

## 2021-08-09 PROCEDURE — G0279 TOMOSYNTHESIS, MAMMO: HCPCS | Performed by: RADIOLOGY

## 2021-08-09 PROCEDURE — 77065 DX MAMMO INCL CAD UNI: CPT | Performed by: RADIOLOGY

## 2021-08-12 ENCOUNTER — TELEPHONE (OUTPATIENT)
Dept: INTERNAL MEDICINE | Facility: CLINIC | Age: 86
End: 2021-08-12

## 2021-08-12 NOTE — TELEPHONE ENCOUNTER
----- Message from ENZO Larry sent at 8/12/2021 10:49 AM EDT -----  Please call pt- breast imaging came back normal, no evidence of malignancy or abnormality.  Return to regular screening mammograms is recommended.  Let me know if she has any trouble persistently with the breast.

## 2021-08-30 ENCOUNTER — OFFICE VISIT (OUTPATIENT)
Dept: INTERNAL MEDICINE | Facility: CLINIC | Age: 86
End: 2021-08-30

## 2021-08-30 VITALS
TEMPERATURE: 98.9 F | BODY MASS INDEX: 26.81 KG/M2 | DIASTOLIC BLOOD PRESSURE: 81 MMHG | HEART RATE: 69 BPM | OXYGEN SATURATION: 93 % | WEIGHT: 133 LBS | RESPIRATION RATE: 19 BRPM | SYSTOLIC BLOOD PRESSURE: 155 MMHG | HEIGHT: 59 IN

## 2021-08-30 DIAGNOSIS — Z00.00 MEDICARE ANNUAL WELLNESS VISIT, SUBSEQUENT: Primary | ICD-10-CM

## 2021-08-30 DIAGNOSIS — E78.49 OTHER HYPERLIPIDEMIA: Chronic | ICD-10-CM

## 2021-08-30 DIAGNOSIS — Z00.00 HEALTHCARE MAINTENANCE: ICD-10-CM

## 2021-08-30 DIAGNOSIS — I10 RESISTANT HYPERTENSION: Chronic | ICD-10-CM

## 2021-08-30 DIAGNOSIS — M81.0 OSTEOPOROSIS, POST-MENOPAUSAL: Chronic | ICD-10-CM

## 2021-08-30 PROBLEM — J06.9 UPPER RESPIRATORY INFECTION: Status: RESOLVED | Noted: 2017-01-26 | Resolved: 2021-08-30

## 2021-08-30 LAB
ALBUMIN SERPL-MCNC: 4.5 G/DL (ref 3.5–5.2)
ALBUMIN/GLOB SERPL: 1.9 G/DL
ALP SERPL-CCNC: 54 U/L (ref 39–117)
ALT SERPL-CCNC: 19 U/L (ref 1–33)
AST SERPL-CCNC: 23 U/L (ref 1–32)
BILIRUB SERPL-MCNC: 0.5 MG/DL (ref 0–1.2)
BUN SERPL-MCNC: 19 MG/DL (ref 8–23)
BUN/CREAT SERPL: 21.3 (ref 7–25)
CALCIUM SERPL-MCNC: 9.8 MG/DL (ref 8.6–10.5)
CHLORIDE SERPL-SCNC: 103 MMOL/L (ref 98–107)
CHOLEST SERPL-MCNC: 126 MG/DL (ref 0–200)
CO2 SERPL-SCNC: 25.7 MMOL/L (ref 22–29)
CREAT SERPL-MCNC: 0.89 MG/DL (ref 0.57–1)
ERYTHROCYTE [DISTWIDTH] IN BLOOD BY AUTOMATED COUNT: 13 % (ref 12.3–15.4)
GLOBULIN SER CALC-MCNC: 2.4 GM/DL
GLUCOSE SERPL-MCNC: 82 MG/DL (ref 65–99)
HCT VFR BLD AUTO: 40.2 % (ref 34–46.6)
HDLC SERPL-MCNC: 51 MG/DL (ref 40–60)
HGB BLD-MCNC: 13.3 G/DL (ref 12–15.9)
LDLC SERPL CALC-MCNC: 58 MG/DL (ref 0–100)
MAGNESIUM SERPL-MCNC: 2.1 MG/DL (ref 1.6–2.4)
MCH RBC QN AUTO: 29.7 PG (ref 26.6–33)
MCHC RBC AUTO-ENTMCNC: 33.1 G/DL (ref 31.5–35.7)
MCV RBC AUTO: 89.7 FL (ref 79–97)
PHOSPHATE SERPL-MCNC: 4 MG/DL (ref 2.5–4.5)
PLATELET # BLD AUTO: 234 10*3/MM3 (ref 140–450)
POTASSIUM SERPL-SCNC: 4.6 MMOL/L (ref 3.5–5.2)
PROT SERPL-MCNC: 6.9 G/DL (ref 6–8.5)
RBC # BLD AUTO: 4.48 10*6/MM3 (ref 3.77–5.28)
SODIUM SERPL-SCNC: 141 MMOL/L (ref 136–145)
TRIGL SERPL-MCNC: 85 MG/DL (ref 0–150)
TSH SERPL DL<=0.005 MIU/L-ACNC: 1.02 UIU/ML (ref 0.27–4.2)
VLDLC SERPL CALC-MCNC: 17 MG/DL (ref 5–40)
WBC # BLD AUTO: 8.95 10*3/MM3 (ref 3.4–10.8)

## 2021-08-30 PROCEDURE — G0439 PPPS, SUBSEQ VISIT: HCPCS | Performed by: NURSE PRACTITIONER

## 2021-08-30 PROCEDURE — 99214 OFFICE O/P EST MOD 30 MIN: CPT | Performed by: NURSE PRACTITIONER

## 2021-08-30 RX ORDER — MULTIVIT WITH MINERALS/LUTEIN
1000 TABLET ORAL NIGHTLY
COMMUNITY
End: 2023-01-04

## 2021-08-30 NOTE — ASSESSMENT & PLAN NOTE
Hypertension is improving, following with Dr. Freeman, cardiology. Continue Coreg 25 mg twice daily, amlodipine 5 mg daily and hydralazine 50 mg 3 times daily per the direction of her cardiologist.

## 2021-08-30 NOTE — PROGRESS NOTES
The ABCs of the Annual Wellness Visit  Subsequent Medicare Wellness Visit    Chief Complaint   Patient presents with   • Medicare Wellness-subsequent     Pt presents here today for a medicare wellness visit.       Subjective   History of Present Illness:  Neha Guillory is a 88 y.o. female who presents for a Subsequent Medicare Wellness Visit and follow-up on chronic conditions.    She has hyperlipidemia treated with pravastatin 40 mg daily reporting good compliance with this medication.    She has hypertension and follows with Dr. Bel Freeman, cardiology for resistant hypertension. She takes hydralazine 50 mg 3 times daily, Coreg 25 mg twice daily and amlodipine 5 mg daily with good compliance and reports that her home readings have declined and have been stable in the 140s over 70s recently.    She has osteoporosis and has taken Prolia since August 2016. She is wondering if she should continue. She is due for DEXA.    Overall reports that she is doing well and denies development of any other new issues today.    HEALTH RISK ASSESSMENT    Recent Hospitalizations:  No hospitalization(s) within the last year.    Current Medical Providers:  Patient Care Team:  Tracy Steiner APRN as PCP - General (Nurse Practitioner)  Bel Freeman MD as Consulting Physician (Cardiology)    Smoking Status:  Social History     Tobacco Use   Smoking Status Never Smoker   Smokeless Tobacco Never Used       Alcohol Consumption:  Social History     Substance and Sexual Activity   Alcohol Use No       Depression Screen:   PHQ-2/PHQ-9 Depression Screening 8/30/2021   Little interest or pleasure in doing things 0   Feeling down, depressed, or hopeless 0   Total Score 0       Fall Risk Screen:  LUIS ALBERTOADI Fall Risk Assessment was completed, and patient is at LOW risk for falls.Assessment completed on:8/30/2021    Health Habits and Functional and Cognitive Screening:  Functional & Cognitive Status 8/30/2021   Do you have difficulty preparing  food and eating? No   Do you have difficulty bathing yourself, getting dressed or grooming yourself? No   Do you have difficulty using the toilet? No   Do you have difficulty moving around from place to place? No   Do you have trouble with steps or getting out of a bed or a chair? No   Current Diet Well Balanced Diet   Dental Exam Up to date   Eye Exam Up to date   Exercise (times per week) 2 times per week   Current Exercises Include Walking   Current Exercise Activities Include -   Do you need help using the phone?  No   Are you deaf or do you have serious difficulty hearing?  Yes   Do you need help with transportation? No   Do you need help shopping? No   Do you need help preparing meals?  No   Do you need help with housework?  No   Do you need help with laundry? No   Do you need help taking your medications? No   Do you need help managing money? No   Do you ever drive or ride in a car without wearing a seat belt? No   Have you felt unusual stress, anger or loneliness in the last month? No   Who do you live with? Child   If you need help, do you have trouble finding someone available to you? No   Have you been bothered in the last four weeks by sexual problems? No   Do you have difficulty concentrating, remembering or making decisions? No         Does the patient have evidence of cognitive impairment? No    Asprin use counseling:Taking ASA appropriately as indicated    Age-appropriate Screening Schedule:  Refer to the list below for future screening recommendations based on patient's age, sex and/or medical conditions. Orders for these recommended tests are listed in the plan section. The patient has been provided with a written plan.    Health Maintenance   Topic Date Due   • TDAP/TD VACCINES (1 - Tdap) Never done   • ZOSTER VACCINE (1 of 2) Never done   • DXA SCAN  08/12/2021   • INFLUENZA VACCINE  10/01/2021   • LIPID PANEL  02/24/2022   • MAMMOGRAM  09/17/2022          The following portions of the patient's  history were reviewed and updated as appropriate: allergies, current medications, past family history, past medical history, past social history, past surgical history and problem list.    Outpatient Medications Prior to Visit   Medication Sig Dispense Refill   • amLODIPine (NORVASC) 5 MG tablet      • aspirin 81 MG EC tablet Take 81 mg by mouth Daily.     • B Complex Vitamins (VITAMIN B COMPLEX IJ) Inject  as directed.     • carvedilol (COREG) 25 MG tablet TAKE ONE TABLET BY MOUTH TWICE A DAY WITH MEALS 180 tablet 1   • cholecalciferol (VITAMIN D3) 1000 units tablet Take 1,000 Units by mouth Daily.     • denosumab (PROLIA) 60 MG/ML solution prefilled syringe syringe Inject 1 syringe under the skin into the appropriate area as directed Every 6 (Six) Months.     • hydrALAZINE (APRESOLINE) 50 MG tablet Take 1 tablet by mouth 3 (Three) Times a Day. 90 tablet 3   • Lactobacillus (PROBIOTIC ACIDOPHILUS PO) Take 1 tablet by mouth Daily.     • olmesartan (BENICAR) 20 MG tablet      • pravastatin (PRAVACHOL) 40 MG tablet Take 1 tablet by mouth Daily. 90 tablet 1   • vitamin C (ASCORBIC ACID) 250 MG tablet Take 500 mg by mouth Daily.     • Zinc 50 MG capsule Take  by mouth.       No facility-administered medications prior to visit.       Patient Active Problem List   Diagnosis   • Hyperlipidemia   • Essential hypertension   • Hypertrophic cardiomyopathy (CMS/HCC)   • Generalized osteoarthritis   • RICH on CPAP   • Osteoporosis   • Thrombophlebitis of deep veins of lower extremity (CMS/HCC)   • Vitamin D deficiency   • Colon polyps   • Family hx of colon cancer   • H/O parathyroidectomy (CMS/HCC)   • Pulmonary hypertension (CMS/HCC)   • Echocardiogram abnormal   • Carotid artery stenosis   • Ischemic rest pain of lower extremity   • Bilateral carpal tunnel syndrome   • Visit for screening mammogram   • Osteopenia   • Hyperuricemia   • Upper respiratory infection   • Senile osteoporosis   • Diastolic dysfunction   • Healthcare  maintenance   • Hip pain, left   • Hammer toe of second toe of right foot   • Gastroenteritis   • Starvation ketoacidosis   • CKD (chronic kidney disease) stage 3, GFR 30-59 ml/min (CMS/[facility])   • Dehydration   • Tophus   • Hospital discharge follow-up   • Osteoporosis, post-menopausal   • Hyperglycemia   • Carotid atherosclerosis, bilateral   • Prediabetes   • Resistant hypertension   • FH: breast cancer       Advanced Care Planning:  ACP discussion was held with the patient during this visit. Patient does not have an advance directive, information provided.    Review of Systems   Constitutional: Negative for activity change, chills, fatigue, fever and unexpected weight change.   HENT: Negative for congestion, hearing loss, postnasal drip, sinus pressure, sinus pain, sneezing, sore throat and tinnitus.    Eyes: Negative for photophobia, pain and visual disturbance.   Respiratory: Negative for cough, chest tightness, shortness of breath and wheezing.    Cardiovascular: Negative for chest pain, palpitations and leg swelling.   Gastrointestinal: Negative for abdominal distention, abdominal pain, constipation, diarrhea, nausea and vomiting.   Endocrine: Negative for polydipsia, polyphagia and polyuria.   Genitourinary: Negative for dysuria, frequency, hematuria and urgency.   Neurological: Negative for dizziness, weakness, numbness and headaches.   All other systems reviewed and are negative.      Compared to one year ago, the patient feels her physical health is the same.  Compared to one year ago, the patient feels her mental health is the same.    Reviewed chart for potential of high risk medication in the elderly: yes  Reviewed chart for potential of harmful drug interactions in the elderly:yes    Objective         Vitals:    08/30/21 1251   BP: 155/81   BP Location: Right arm   Patient Position: Sitting   Cuff Size: Adult   Pulse: 69   Resp: 19   Temp: 98.9 °F (37.2 °C)   SpO2: 93%   Weight: 60.3 kg (133 lb)  "  Height: 149.9 cm (59\")   PainSc: 1  Comment: Shoulder       Body mass index is 26.86 kg/m².  Discussed the patient's BMI with her. The BMI is above average; no BMI management plan is appropriate..    Physical Exam  Vitals and nursing note reviewed.   Constitutional:       General: She is not in acute distress.     Appearance: Normal appearance. She is well-developed. She is not ill-appearing, toxic-appearing or diaphoretic.   HENT:      Head: Normocephalic and atraumatic.      Right Ear: External ear normal.      Left Ear: External ear normal.   Eyes:      Pupils: Pupils are equal, round, and reactive to light.   Neck:      Vascular: No carotid bruit.   Cardiovascular:      Rate and Rhythm: Normal rate and regular rhythm.      Pulses: Normal pulses.      Heart sounds: Normal heart sounds.      Comments: No peripheral edema  Pulmonary:      Effort: Pulmonary effort is normal. No respiratory distress.      Breath sounds: Normal breath sounds. No stridor. No wheezing, rhonchi or rales.   Chest:      Chest wall: No tenderness.   Abdominal:      General: Bowel sounds are normal. There is no distension.      Palpations: Abdomen is soft. There is no mass.      Tenderness: There is no abdominal tenderness. There is no right CVA tenderness, left CVA tenderness, guarding or rebound.      Hernia: No hernia is present.   Musculoskeletal:         General: Normal range of motion.      Cervical back: Normal range of motion and neck supple. No rigidity or tenderness.   Lymphadenopathy:      Cervical: No cervical adenopathy.   Skin:     General: Skin is warm and dry.      Capillary Refill: Capillary refill takes less than 2 seconds.   Neurological:      General: No focal deficit present.      Mental Status: She is alert and oriented to person, place, and time. Mental status is at baseline.   Psychiatric:         Mood and Affect: Mood normal.         Behavior: Behavior normal.         Thought Content: Thought content normal.        "  Judgment: Judgment normal.       Current outpatient and discharge medications have been reconciled for the patient.  Reviewed by: ENZO Larry          Assessment/Plan   Medicare Risks and Personalized Health Plan  CMS Preventative Services Quick Reference  Advance Directive Discussion  Breast Cancer/Mammogram Screening  Cardiovascular risk  Colon Cancer Screening  Dementia/Memory   Depression/Dysphoria  Diabetic Lab Screening   Hearing Problem  Inadequate Social Support, Isolation, Loneliness, Lack of Transportation, Financial Difficulties, or Caregiver Stress   Inactivity/Sedentary  Osteoporosis Risk  Polypharmacy    The above risks/problems have been discussed with the patient.  Pertinent information has been shared with the patient in the After Visit Summary.  Follow up plans and orders are seen below in the Assessment/Plan Section.    Diagnoses and all orders for this visit:    1. Medicare annual wellness visit, subsequent (Primary)    2. Other hyperlipidemia  Assessment & Plan:  I advised continuation of pravastatin. Lipid panel today and we will adjust therapy if needed.    Orders:  -     Lipid panel    3. Resistant hypertension  Assessment & Plan:  Hypertension is improving, following with Dr. Freeman, cardiology. Continue Coreg 25 mg twice daily, amlodipine 5 mg daily and hydralazine 50 mg 3 times daily per the direction of her cardiologist.    Orders:  -     CBC No Differential  -     Comprehensive metabolic panel  -     TSH Rfx On Abnormal To Free T4    4. Osteoporosis, post-menopausal  Assessment & Plan:  She has been on Prolia since August 2016. We will likely take a drug holiday from this. Checking DEXA scan which is due-will make that decision based on scan results.    Orders:  -     Magnesium  -     Phosphorus  -     DEXA Bone Density Axial; Future    5. Healthcare maintenance  Assessment & Plan:  I definitely recommend the COVID-19 vaccine, especially given the patient's advanced age and  comorbidities.      Follow Up:  Return in about 6 months (around 2/28/2022).     We will contact patient with lab results and further recommendations. Follow-up as needed and I will see her back in 6 months for recheck of chronic conditions.    An After Visit Summary and PPPS were given to the patient.

## 2021-08-30 NOTE — ASSESSMENT & PLAN NOTE
I definitely recommend the COVID-19 vaccine, especially given the patient's advanced age and comorbidities.

## 2021-08-30 NOTE — PATIENT INSTRUCTIONS
Medicare Wellness  Personal Prevention Plan of Service     Date of Office Visit:  2021  Encounter Provider:  ENZO Larry  Place of Service:  [facility]  Patient Name: Neha Guillory  :  1933    As part of the Medicare Wellness portion of your visit today, we are providing you with this personalized preventive plan of services (PPPS). This plan is based upon recommendations of the [place] Preventive Services Task Force (USPSTF) and the Advisory Committee on Immunization Practices (ACIP).    This lists the preventive care services that should be considered, and provides dates of when you are due. Items listed as completed are up-to-date and do not require any further intervention.    Health Maintenance   Topic Date Due   • COVID-19 Vaccine (1) Never done   • TDAP/TD VACCINES (1 - Tdap) Never done   • ZOSTER VACCINE (1 of 2) Never done   • DXA SCAN  2021   • Pneumococcal Vaccine 65+ (1 of 1 - PPSV23) 2022 (Originally 1998)   • INFLUENZA VACCINE  10/01/2021   • LIPID PANEL  2022   • ANNUAL WELLNESS VISIT  2022   • MAMMOGRAM  2022       No orders of the defined types were placed in this encounter.      Return in about 6 months (around 2022).

## 2021-08-30 NOTE — ASSESSMENT & PLAN NOTE
She has been on Prolia since August 2016. We will likely take a drug holiday from this. Checking DEXA scan which is due-will make that decision based on scan results.

## 2021-08-31 NOTE — PROGRESS NOTES
Please call pt (doesn't get on mychart)-blood count looks good, no anemia.  Normal white blood cell count and platelets.  Metabolic panel looks stable including kidney function, electrolytes and liver enzymes.  Cholesterol panel looks great.  Thyroid numbers are stable.  Magnesium, phosphorus are normal.

## 2021-12-17 ENCOUNTER — OFFICE VISIT (OUTPATIENT)
Dept: INTERNAL MEDICINE | Facility: CLINIC | Age: 86
End: 2021-12-17

## 2021-12-17 ENCOUNTER — HOSPITAL ENCOUNTER (OUTPATIENT)
Dept: GENERAL RADIOLOGY | Facility: HOSPITAL | Age: 86
Discharge: HOME | End: 2021-12-17
Admitting: NURSE PRACTITIONER

## 2021-12-17 VITALS
HEIGHT: 59 IN | SYSTOLIC BLOOD PRESSURE: 160 MMHG | DIASTOLIC BLOOD PRESSURE: 89 MMHG | WEIGHT: 138 LBS | HEART RATE: 72 BPM | BODY MASS INDEX: 27.82 KG/M2 | OXYGEN SATURATION: 95 % | RESPIRATION RATE: 17 BRPM | TEMPERATURE: 97.9 F

## 2021-12-17 DIAGNOSIS — M25.411 EFFUSION OF JOINT OF RIGHT SHOULDER: ICD-10-CM

## 2021-12-17 DIAGNOSIS — R23.8: ICD-10-CM

## 2021-12-17 DIAGNOSIS — M25.419: ICD-10-CM

## 2021-12-17 DIAGNOSIS — M25.411 EFFUSION OF JOINT OF RIGHT SHOULDER: Primary | ICD-10-CM

## 2021-12-17 PROCEDURE — 73030 X-RAY EXAM OF SHOULDER: CPT

## 2021-12-17 PROCEDURE — 99213 OFFICE O/P EST LOW 20 MIN: CPT | Performed by: NURSE PRACTITIONER

## 2021-12-17 RX ORDER — CEPHALEXIN 500 MG/1
500 CAPSULE ORAL 2 TIMES DAILY
Qty: 20 CAPSULE | Refills: 0 | Status: SHIPPED | OUTPATIENT
Start: 2021-12-17 | End: 2021-12-27

## 2021-12-17 NOTE — PROGRESS NOTES
"Chief Complaint  Shoulder Pain (Pt presents here today with R shoulder pain and a rash on the chest.) and Rash    Subjective          Neha Guillory presents to [facility]  Patient presents for eval of decreased ROM of right shoulder along with development of a well circumscribed rash running from the right shoulder down the chest wall on the right side. No fever, chills or mechanism of injury and no itching. The rash is not raised and not painful. She has had an acute decrease in the ROM of the right shoulder since the swelling and redness began. No numbness. Denies falls/injury. No new body care products. Denies development of any other new issues today.      Objective   Vital Signs:   /89 (BP Location: Left arm, Patient Position: Sitting, Cuff Size: Adult)   Pulse 72   Temp 97.9 °F (36.6 °C)   Resp 17   Ht 149.9 cm (59\")   Wt 62.6 kg (138 lb)   SpO2 95%   BMI 27.87 kg/m²     Physical Exam  Vitals and nursing note reviewed.   Constitutional:       General: She is not in acute distress.     Appearance: Normal appearance. She is well-developed. She is not ill-appearing, toxic-appearing or diaphoretic.   HENT:      Head: Normocephalic and atraumatic.      Right Ear: External ear normal.      Left Ear: External ear normal.   Eyes:      Pupils: Pupils are equal, round, and reactive to light.   Neck:      Vascular: No carotid bruit.   Cardiovascular:      Rate and Rhythm: Normal rate and regular rhythm.      Pulses: Normal pulses.      Heart sounds: Normal heart sounds.   Pulmonary:      Effort: Pulmonary effort is normal. No respiratory distress.      Breath sounds: Normal breath sounds. No stridor. No wheezing, rhonchi or rales.   Chest:      Chest wall: No tenderness.   Abdominal:      General: Bowel sounds are normal. There is no distension.      Palpations: Abdomen is soft.      Tenderness: There is no abdominal tenderness.   Musculoskeletal:      Right shoulder: Swelling " and effusion present. No tenderness. Decreased range of motion.      Cervical back: Normal range of motion and neck supple. No rigidity or tenderness.   Lymphadenopathy:      Cervical: No cervical adenopathy.   Skin:     General: Skin is warm and dry.      Capillary Refill: Capillary refill takes less than 2 seconds.   Neurological:      General: No focal deficit present.      Mental Status: She is alert and oriented to person, place, and time. Mental status is at baseline.   Psychiatric:         Mood and Affect: Mood normal.         Behavior: Behavior normal.         Thought Content: Thought content normal.         Judgment: Judgment normal.        Result Review :   The following data was reviewed by: ENZO Larry on 12/17/2021:  Common labs    Common Labsle 2/24/21 2/24/21 2/24/21 8/30/21 8/30/21 8/30/21    1413 1413 1413 1341 1341 1341   Glucose  88   82    BUN  19   19    Creatinine  0.93   0.89    eGFR Non African Am  57 (A)   60 (A)    eGFR African Am  69   73    Sodium  141   141    Potassium  4.2   4.6    Chloride  102   103    Calcium  9.9   9.8    Total Protein  6.2   6.9    Albumin  4.20   4.50    Total Bilirubin  0.6   0.5    Alkaline Phosphatase  54   54    AST (SGOT)  32   23    ALT (SGPT)  18   19    WBC 7.96   8.95     Hemoglobin 12.0   13.3     Hematocrit 36.8   40.2     Platelets 265   234     Total Cholesterol   134   126   Triglycerides   72   85   HDL Cholesterol   53   51   LDL Cholesterol    67   58   (A) Abnormal value       Comments are available for some flowsheets but are not being displayed.           Current outpatient and discharge medications have been reconciled for the patient.  Reviewed by: ENZO Larry           Assessment and Plan    Diagnoses and all orders for this visit:    1. Effusion of joint of right shoulder (Primary)  -     XR Shoulder 2+ View Right; Future  -     Ambulatory Referral to Orthopedic Surgery  -     cephalexin (Keflex) 500 MG capsule; Take 1  capsule by mouth 2 (Two) Times a Day for 10 days.  Dispense: 20 capsule; Refill: 0    2. Redness and swelling of shoulder  -     XR Shoulder 2+ View Right; Future  -     Ambulatory Referral to Orthopedic Surgery  -     cephalexin (Keflex) 500 MG capsule; Take 1 capsule by mouth 2 (Two) Times a Day for 10 days.  Dispense: 20 capsule; Refill: 0      Urgent consult with ortho placed and she now has an apt this coming Monday (3 days from now). Xrays ordered, she will get these done today.     Covering with Keflex for potential cellulitis.     Will contact pt with results and other recommendations.     Follow Up   No follow-ups on file.  Patient was given instructions and counseling regarding her condition or for health maintenance advice. Please see specific information pulled into the AVS if appropriate.

## 2021-12-17 NOTE — PROGRESS NOTES
Please call pt- says she doesn't get on her mychart- I sent Keflex to her pharmacy, please make sure to start it and head to ER over the weekend for any worsening of the redness or any development of fever. The xray is back showing evidence of large rotator cuff tear along with a large effusion (fluid on the shoulder). Please make sure to keep the orthopedic apt Monday for possible aspiration of fluid from her shoulder.

## 2021-12-20 ENCOUNTER — OFFICE VISIT (OUTPATIENT)
Dept: ORTHOPEDIC SURGERY | Facility: CLINIC | Age: 86
End: 2021-12-20

## 2021-12-20 VITALS — HEIGHT: 59 IN | WEIGHT: 138 LBS | BODY MASS INDEX: 27.82 KG/M2 | TEMPERATURE: 97.7 F

## 2021-12-20 DIAGNOSIS — M12.812 ROTATOR CUFF ARTHROPATHY OF BOTH SHOULDERS: ICD-10-CM

## 2021-12-20 DIAGNOSIS — M25.411 EFFUSION OF JOINT OF RIGHT SHOULDER: ICD-10-CM

## 2021-12-20 DIAGNOSIS — M12.811 ROTATOR CUFF ARTHROPATHY OF BOTH SHOULDERS: ICD-10-CM

## 2021-12-20 DIAGNOSIS — M25.512 CHRONIC PAIN OF BOTH SHOULDERS: Primary | ICD-10-CM

## 2021-12-20 DIAGNOSIS — G89.29 CHRONIC PAIN OF BOTH SHOULDERS: Primary | ICD-10-CM

## 2021-12-20 DIAGNOSIS — M25.511 CHRONIC PAIN OF BOTH SHOULDERS: Primary | ICD-10-CM

## 2021-12-20 DIAGNOSIS — R23.4 BREAST SKIN CHANGES: Primary | ICD-10-CM

## 2021-12-20 DIAGNOSIS — N64.9 DISORDER OF BREAST, UNSPECIFIED: ICD-10-CM

## 2021-12-20 PROCEDURE — 73030 X-RAY EXAM OF SHOULDER: CPT | Performed by: NURSE PRACTITIONER

## 2021-12-20 PROCEDURE — 99214 OFFICE O/P EST MOD 30 MIN: CPT | Performed by: NURSE PRACTITIONER

## 2021-12-20 NOTE — PROGRESS NOTES
[facility]  New Patient      Patient Name: Neha Guillory  : 1933  Primary Care Physician: Tracy Steiner APRN        Chief Complaint: Right shoulder swelling with redness    HPI:   Neha Guillory is a 88 y.o. year old who presents today for evaluation of right shoulder swelling with redness.  Patient was referred from primary care for evaluation and treatment of swelling in the right shoulder with redness in the area.  Patient reports that several weeks ago she noticed a bit of redness along the anterior chest wall, she says the redness has now spread.  She has a history of chronic rotator cuff tear in the right shoulder which were treated nonoperatively.  She has a history of longstanding rotator cuff arthropathy limited range of motion which she is well aware of.  She noticed that the redness had started and went to primary care.  X-ray was obtained which suggested the arthropathy and effusion which was potentially current concerning for a septic joint given her appearance of cellulitis on the chest.  She was started on Keflex 500 twice daily 3 days ago.  She has not noticed any improvements in the redness.  She is got no fevers or chills.  She says her shoulder has always been painful its not particularly worse, and again her motion has always been quite limited.    She also has some complaints about the left shoulder she has been doing more with that left shoulder since all of this started and is been giving her some pain.      Past Medical/Surgical, Social and Family History:  I have reviewed and/or updated pertinent history as noted in the medical record including:  Past Medical History:   Diagnosis Date   • Carotid atherosclerosis, bilateral 2019   • Cataract     BILATERAL   • Deep vein thrombosis (HCC)    • Depression    • Gastroenteritis    • Generalized osteoarthritis 2016   • Hyperlipidemia    • Hyperparathyroidism (HCC)    • Hypertension    • Hypertrophic cardiomyopathy (HCC)    •  Neuromuscular disorder (HCC)    • Obstructive sleep apnea syndrome 2/1/2016   • Osteopenia    • Osteoporosis    • Primary familial hypertrophic cardiomyopathy (HCC) 2/1/2016   • Synovial cyst of popliteal space 2/1/2016   • Vitamin D deficiency      Past Surgical History:   Procedure Laterality Date   • BUNIONECTOMY Right     FOOT SURGERY   • COLONOSCOPY N/A 2011    Dr. Luis   • HAND SURGERY Right     TUMOR REMOVED ON FOREFINGER   • THYROIDECTOMY Right 4/20/2016    Procedure: RIGHT SUPERIOR PARATHYROIDECTOMY;  Surgeon: Bel Marina MD;  Location: [facility] OR;  Service:      Social History     Occupational History   • Not on file   Tobacco Use   • Smoking status: Never Smoker   • Smokeless tobacco: Never Used   Substance and Sexual Activity   • Alcohol use: No   • Drug use: No   • Sexual activity: Defer      Social History     Social History Narrative   • Not on file     Family History   Problem Relation Age of Onset   • Colon cancer Sister    • Heart disease Brother         CABG   • Other Brother         BRAIN TUMOR   • Cancer Other        Allergies:   Allergies   Allergen Reactions   • Atorvastatin      myalgias  myalgias  myalgias  myalgias   • Atorvastatin Calcium Other (See Comments)     myalgias   • Amoxicillin Hives       Medications:   Home Medications:  Current Outpatient Medications on File Prior to Visit   Medication Sig   • amLODIPine (NORVASC) 5 MG tablet    • aspirin 81 MG EC tablet Take 81 mg by mouth Daily.   • B Complex Vitamins (VITAMIN B COMPLEX IJ) Inject  as directed.   • carvedilol (COREG) 25 MG tablet TAKE ONE TABLET BY MOUTH TWICE A DAY WITH MEALS   • cephalexin (Keflex) 500 MG capsule Take 1 capsule by mouth 2 (Two) Times a Day for 10 days.   • cholecalciferol (VITAMIN D3) 1000 units tablet Take 1,000 Units by mouth Daily.   • denosumab (PROLIA) 60 MG/ML solution prefilled syringe syringe Inject 1 syringe under the skin into the appropriate area as directed Every 6 (Six) Months.   •  hydrALAZINE (APRESOLINE) 50 MG tablet Take 1 tablet by mouth 3 (Three) Times a Day.   • Lactobacillus (PROBIOTIC ACIDOPHILUS PO) Take 1 tablet by mouth Daily.   • olmesartan (BENICAR) 20 MG tablet    • pravastatin (PRAVACHOL) 40 MG tablet Take 1 tablet by mouth Daily.   • vitamin C (ASCORBIC ACID) 250 MG tablet Take 500 mg by mouth Daily.   • Zinc 50 MG capsule Take  by mouth.     No current facility-administered medications on file prior to visit.         ROS:  14 point review of systems was negative except as listed in the HPI.    Physical Exam:   88 y.o. female  Body mass index is 27.87 kg/m²., 62.6 kg (138 lb)  Vitals:    12/20/21 0901   Temp: 97.7 °F (36.5 °C)     General: Alert, cooperative, appears well and in no observable distress. Appears stated age and BMI as listed above.  HEENT: Normocephalic, atraumatic on external visual inspection.  CV: No significant peripheral edema.  Respiratory: Normal respiratory effort.  Skin: Warm & well perfused; appropriate skin turgor.  Psych: Appropriate mood & affect.  Neuro: Gross sensation and motor intact in affected extremity/extremities.  Vascular: Peripheral pulses palpable in affected extremity/extremities.     MSK Exam:  There is redness across the right side of her chest which is well demarcated it is across the clavicle down into her right breast just above the areola.  She does have a bit of redness over the anterior part of her shoulder as well.  The area is generally puffy and mildly edematous.  No mic redness over the shoulder itself, no redness posteriorly.  Range of motion is quite limited, strength is weak globally.    The left shoulder has no redness wounds or deformity.  Range of motion is limited to 160 degrees of forward flexion, external rotation 45 to 50 degrees, internal rotation rotation to the lumbar spine.  Cuff strength is weak globally bilaterally.    Radiology:    The following X-rays were ordered/reviewed today to evaluate the patient's  symptoms: Shoulder: AP, Scapular Y and Axillary Lateral of left shoulder(s) show Chronic degenerative changes about the left shoulder and the glenohumeral and acromial space.  No significant narrowing of the subacromial interval.  Have no prior films for comparison on the left shoulder..     I reviewed prior films from 12/17 on the right shoulder.  She has advanced rotator cuff arthropathy with erosion of the acromion and high riding humeral head.    Procedure:   N/A      Misc. Data/Labs: I reviewed prior diagnostic mammogram from August 2021.  Nothing identified at that time, patient does have a history of dense breast tissue.  She also has family history of cancer.    Assessment & Plan:    This is an 88-year-old female with chronic right shoulder rotator cuff arthropathy.  I suspect she also has left shoulder rotator cuff tears and arthropathy but does quite well.  Septic joint is lower on my list of differentials for this patient.  I would suspect she have worsening shoulder pain, more redness in the area of the shoulder with fevers and/or chills.  I have some concern about this rash which has a cellulitis/ Peau D'Orange type appearance could be related to a breast malignancy.  She has been on the Keflex for 3 days now there is been no significant or even really mild improvement in the redness.    Have her continue on the Keflex for now.  I did reach out and speak with her primary care provider.  Plan will be to order a diagnostic mammogram to better evaluate and treat.  If that comes back benign could certainly consider sending her for aspiration of the shoulder.     We will follow up with her after the mammogram.  Asked her to call with any questions or concerns or she develops any fevers or chills in the interim.    Follow up PRN. and Patient encouraged to call with questions or concerns prior to follow up.       ICD-10-CM ICD-9-CM   1. Chronic pain of both shoulders  M25.511 719.41    G89.29 338.29    M25.512     2. Rotator cuff arthropathy of both shoulders  M12.811 716.81    M12.812    3. Effusion of joint of right shoulder  M25.411 719.01     No orders of the defined types were placed in this encounter.    Orders Placed This Encounter   Procedures   • XR Shoulder 2+ View Left     Return if symptoms worsen or fail to improve.    ENZO Santos      Dictated Utilizing Dragon Dictation

## 2021-12-22 ENCOUNTER — TELEPHONE (OUTPATIENT)
Dept: INTERNAL MEDICINE | Facility: CLINIC | Age: 86
End: 2021-12-22

## 2021-12-22 NOTE — TELEPHONE ENCOUNTER
Caller: Monica Hernández    Relationship: Emergency Contact    Best call back number: [phone]     What is the best time to reach you: ANYTIME    Who are you requesting to speak with (clinical staff, provider,  specific staff member): MA OR DOCTOR    What was the call regarding: DAUGHTER STATES PATIENT WENT TO SPECIALIST ON 12/20 AND WAS SUPPOSE TO CONSULTS WITH KAREN. DAUGHTER STATES PATIENTS SHOULDER IS MUCH WORSE AND PATIENT IS IN EXTREME PAIN     Do you require a callback: YES

## 2021-12-22 NOTE — TELEPHONE ENCOUNTER
I need for the patient to go to the ER to be seen, I did speak with the orthopedic nurse practitioner via messaging Monday and it looks like they held off on a joint aspiration until the mammogram/ultrasound is done. If the pain is escalating to this extent as well as the redness, I think she needs to be seen emergently. I am concerned for intra-joint infection.

## 2021-12-23 ENCOUNTER — PATIENT ROUNDING (BHMG ONLY) (OUTPATIENT)
Dept: ORTHOPEDIC SURGERY | Facility: CLINIC | Age: 86
End: 2021-12-23

## 2021-12-23 NOTE — PROGRESS NOTES
December 23, 2021    Hello, may I speak with Neha Guillory?    My name is Stacie     I am  with [facility]  [facility]  [address] [facility] [address] [phone].    Before we get started may I verify your date of birth? 2/4/1933    I am calling to officially welcome you to our practice and ask about your recent visit. Is this a good time to talk? Yes    Tell me about your visit with us. What things went well?  visit went will I am doing good.  Jose Martin is very very nice.  Everything went fine.      We're always looking for ways to make our patients' experiences even better. Do you have recommendations on ways we may improve?  no    Overall were you satisfied with your first visit to our practice? yes       I appreciate you taking the time to speak with me today. Is there anything else I can do for you? no      Thank you, and have a great day.

## 2021-12-27 ENCOUNTER — TELEPHONE (OUTPATIENT)
Dept: INTERNAL MEDICINE | Facility: CLINIC | Age: 86
End: 2021-12-27

## 2022-01-03 ENCOUNTER — HOSPITAL ENCOUNTER (EMERGENCY)
Facility: HOSPITAL | Age: 87
Discharge: HOME | End: 2022-01-03
Attending: EMERGENCY MEDICINE | Admitting: EMERGENCY MEDICINE

## 2022-01-03 ENCOUNTER — APPOINTMENT (OUTPATIENT)
Dept: GENERAL RADIOLOGY | Facility: HOSPITAL | Age: 87
End: 2022-01-03

## 2022-01-03 VITALS
HEART RATE: 70 BPM | BODY MASS INDEX: 28.02 KG/M2 | RESPIRATION RATE: 16 BRPM | OXYGEN SATURATION: 97 % | TEMPERATURE: 98.7 F | HEIGHT: 59 IN | WEIGHT: 139 LBS | DIASTOLIC BLOOD PRESSURE: 87 MMHG | SYSTOLIC BLOOD PRESSURE: 146 MMHG

## 2022-01-03 DIAGNOSIS — S20.211A SUPERFICIAL BRUISING OF CHEST WALL, RIGHT, INITIAL ENCOUNTER: ICD-10-CM

## 2022-01-03 DIAGNOSIS — M19.011 OSTEOARTHRITIS OF RIGHT SHOULDER, UNSPECIFIED OSTEOARTHRITIS TYPE: ICD-10-CM

## 2022-01-03 DIAGNOSIS — R53.1 GENERAL WEAKNESS: ICD-10-CM

## 2022-01-03 DIAGNOSIS — R11.2 NON-INTRACTABLE VOMITING WITH NAUSEA, UNSPECIFIED VOMITING TYPE: Primary | ICD-10-CM

## 2022-01-03 DIAGNOSIS — M25.411 EFFUSION OF JOINT OF RIGHT SHOULDER: ICD-10-CM

## 2022-01-03 LAB
ALBUMIN SERPL-MCNC: 4.7 G/DL (ref 3.5–5.2)
ALBUMIN/GLOB SERPL: 2 G/DL
ALP SERPL-CCNC: 74 U/L (ref 39–117)
ALT SERPL W P-5'-P-CCNC: 17 U/L (ref 1–33)
ANION GAP SERPL CALCULATED.3IONS-SCNC: 7 MMOL/L (ref 5–15)
APTT PPP: 27.7 SECONDS (ref 22.7–35.4)
AST SERPL-CCNC: 18 U/L (ref 1–32)
BASOPHILS # BLD AUTO: 0.05 10*3/MM3 (ref 0–0.2)
BASOPHILS NFR BLD AUTO: 0.4 % (ref 0–1.5)
BILIRUB SERPL-MCNC: 0.6 MG/DL (ref 0–1.2)
BILIRUB UR QL STRIP: NEGATIVE
BUN SERPL-MCNC: 17 MG/DL (ref 8–23)
BUN/CREAT SERPL: 21.8 (ref 7–25)
CALCIUM SPEC-SCNC: 10.1 MG/DL (ref 8.6–10.5)
CHLORIDE SERPL-SCNC: 103 MMOL/L (ref 98–107)
CLARITY UR: CLEAR
CO2 SERPL-SCNC: 29 MMOL/L (ref 22–29)
COLOR UR: YELLOW
CREAT SERPL-MCNC: 0.78 MG/DL (ref 0.57–1)
DEPRECATED RDW RBC AUTO: 40.2 FL (ref 37–54)
EOSINOPHIL # BLD AUTO: 0.1 10*3/MM3 (ref 0–0.4)
EOSINOPHIL NFR BLD AUTO: 0.8 % (ref 0.3–6.2)
ERYTHROCYTE [DISTWIDTH] IN BLOOD BY AUTOMATED COUNT: 12.2 % (ref 12.3–15.4)
FLUAV AG NPH QL: NEGATIVE
FLUBV AG NPH QL IA: NEGATIVE
GFR SERPL CREATININE-BSD FRML MDRD: 70 ML/MIN/1.73
GLOBULIN UR ELPH-MCNC: 2.3 GM/DL
GLUCOSE SERPL-MCNC: 123 MG/DL (ref 65–99)
GLUCOSE UR STRIP-MCNC: NEGATIVE MG/DL
HCT VFR BLD AUTO: 40.7 % (ref 34–46.6)
HGB BLD-MCNC: 13.5 G/DL (ref 12–15.9)
HGB UR QL STRIP.AUTO: NEGATIVE
HOLD SPECIMEN: NORMAL
HOLD SPECIMEN: NORMAL
IMM GRANULOCYTES # BLD AUTO: 0.07 10*3/MM3 (ref 0–0.05)
IMM GRANULOCYTES NFR BLD AUTO: 0.6 % (ref 0–0.5)
INR PPP: 1.05 (ref 0.9–1.1)
KETONES UR QL STRIP: NEGATIVE
LEUKOCYTE ESTERASE UR QL STRIP.AUTO: NEGATIVE
LYMPHOCYTES # BLD AUTO: 0.74 10*3/MM3 (ref 0.7–3.1)
LYMPHOCYTES NFR BLD AUTO: 6.2 % (ref 19.6–45.3)
MAGNESIUM SERPL-MCNC: 1.9 MG/DL (ref 1.6–2.4)
MCH RBC QN AUTO: 29.9 PG (ref 26.6–33)
MCHC RBC AUTO-ENTMCNC: 33.2 G/DL (ref 31.5–35.7)
MCV RBC AUTO: 90.2 FL (ref 79–97)
MONOCYTES # BLD AUTO: 0.52 10*3/MM3 (ref 0.1–0.9)
MONOCYTES NFR BLD AUTO: 4.4 % (ref 5–12)
NEUTROPHILS NFR BLD AUTO: 10.4 10*3/MM3 (ref 1.7–7)
NEUTROPHILS NFR BLD AUTO: 87.6 % (ref 42.7–76)
NITRITE UR QL STRIP: NEGATIVE
NRBC BLD AUTO-RTO: 0 /100 WBC (ref 0–0.2)
PH UR STRIP.AUTO: 7 [PH] (ref 5–8)
PLATELET # BLD AUTO: 238 10*3/MM3 (ref 140–450)
PMV BLD AUTO: 10.5 FL (ref 6–12)
POTASSIUM SERPL-SCNC: 4.3 MMOL/L (ref 3.5–5.2)
PROT SERPL-MCNC: 7 G/DL (ref 6–8.5)
PROT UR QL STRIP: NEGATIVE
PROTHROMBIN TIME: 13.5 SECONDS (ref 11.7–14.2)
QT INTERVAL: 351 MS
RBC # BLD AUTO: 4.51 10*6/MM3 (ref 3.77–5.28)
SARS-COV-2 RNA PNL SPEC NAA+PROBE: NOT DETECTED
SODIUM SERPL-SCNC: 139 MMOL/L (ref 136–145)
SP GR UR STRIP: 1.01 (ref 1–1.03)
TROPONIN T SERPL-MCNC: <0.01 NG/ML (ref 0–0.03)
UROBILINOGEN UR QL STRIP: NORMAL
WBC NRBC COR # BLD: 11.88 10*3/MM3 (ref 3.4–10.8)
WHOLE BLOOD HOLD SPECIMEN: NORMAL
WHOLE BLOOD HOLD SPECIMEN: NORMAL

## 2022-01-03 PROCEDURE — 71045 X-RAY EXAM CHEST 1 VIEW: CPT

## 2022-01-03 PROCEDURE — 93010 ELECTROCARDIOGRAM REPORT: CPT | Performed by: INTERNAL MEDICINE

## 2022-01-03 PROCEDURE — 85025 COMPLETE CBC W/AUTO DIFF WBC: CPT

## 2022-01-03 PROCEDURE — 87635 SARS-COV-2 COVID-19 AMP PRB: CPT | Performed by: PHYSICIAN ASSISTANT

## 2022-01-03 PROCEDURE — 80053 COMPREHEN METABOLIC PANEL: CPT

## 2022-01-03 PROCEDURE — 84484 ASSAY OF TROPONIN QUANT: CPT

## 2022-01-03 PROCEDURE — 85730 THROMBOPLASTIN TIME PARTIAL: CPT | Performed by: PHYSICIAN ASSISTANT

## 2022-01-03 PROCEDURE — 93005 ELECTROCARDIOGRAM TRACING: CPT

## 2022-01-03 PROCEDURE — 81003 URINALYSIS AUTO W/O SCOPE: CPT

## 2022-01-03 PROCEDURE — 85610 PROTHROMBIN TIME: CPT | Performed by: PHYSICIAN ASSISTANT

## 2022-01-03 PROCEDURE — 99284 EMERGENCY DEPT VISIT MOD MDM: CPT

## 2022-01-03 PROCEDURE — 87804 INFLUENZA ASSAY W/OPTIC: CPT | Performed by: PHYSICIAN ASSISTANT

## 2022-01-03 PROCEDURE — 83735 ASSAY OF MAGNESIUM: CPT

## 2022-01-03 PROCEDURE — 36415 COLL VENOUS BLD VENIPUNCTURE: CPT

## 2022-01-03 RX ORDER — SODIUM CHLORIDE 0.9 % (FLUSH) 0.9 %
10 SYRINGE (ML) INJECTION AS NEEDED
Status: DISCONTINUED | OUTPATIENT
Start: 2022-01-03 | End: 2022-01-03 | Stop reason: HOSPADM

## 2022-01-03 RX ADMIN — SODIUM CHLORIDE 500 ML: 9 INJECTION, SOLUTION INTRAVENOUS at 19:53

## 2022-01-03 NOTE — ED PROVIDER NOTES
Brief history of present illness: 88-year-old female brought in by her daughter for progressive generalized weakness for 1-1/2 weeks.  They also noted an expanding ecchymotic lesion that began to right shoulder and is moved across the chest into the axilla and over the clavicle.  She denies pain associated with this.  She felt nauseated this morning and dry heaves a couple of times but really denies any ongoing abdominal pain, chest pain or shortness of breath, cough or fevers.    Physical exam:   ED Triage Vitals [01/03/22 1117]   Temp Heart Rate Resp BP SpO2   98.9 °F (37.2 °C) 66 18 (!) 188/79 96 %      Temp src Heart Rate Source Patient Position BP Location FiO2 (%)   -- -- -- -- --      Physical Exam   Constitutional: No distress.  Not overtly toxic  HENT:  Head: Normocephalic and atraumatic.   Oropharynx: Mucous membranes are moist.   Eyes: PERRL. No scleral icterus. No conjunctival pallor.  Neck: Normal range of motion. Neck supple.  No meningismus  Cardiovascular: Normal rate, regular rhythm and intact distal pulses.  Strong right radial pulse.  Pulmonary/Chest: Effort normal and breath sounds normal. No respiratory distress. There are no decreased breath sounds, no wheezes, no rhonchi, and no rales.  There is yellowish tinge to the right shoulder extending over the right superior anterior chest with leading edge appearing ecchymotic.  There is no induration, focal tenderness or fluctuance of this area.  Abdominal: Soft. Bowel sounds are normal. There is no tenderness. There is no rebound and no guarding.   Musculoskeletal: Normal range of motion. There is no pedal edema or calf tenderness.   Neurological: Alert. Normal sensation, normal strength and intact cranial nerves. No sensory deficit.   Skin: Skin is pink, warm, and dry. No rash noted. No pallor.   Psychiatric: Mood and affect normal.   Nursing note and vitals reviewed.      MDM:     XR Chest 1 View    Result Date: 1/3/2022  Narrative: PORTABLE CHEST  01/03/2022 AT 11:49 AM  CLINICAL HISTORY: Dyspnea. Dizziness.  The lungs are fairly well-expanded and appear free of focal infiltrates. There are no pleural effusions. The heart is at the upper limits of normal in size. The thoracic aorta is calcified and ectatic.  IMPRESSIONS: No evidence of acute disease within the chest.  This report was finalized on 1/3/2022 12:14 PM by Dr. Jose Morel M.D.      Results for orders placed or performed during the hospital encounter of 01/03/22   Comprehensive Metabolic Panel    Specimen: Blood   Result Value Ref Range    Glucose 123 (H) 65 - 99 mg/dL    BUN 17 8 - 23 mg/dL    Creatinine 0.78 0.57 - 1.00 mg/dL    Sodium 139 136 - 145 mmol/L    Potassium 4.3 3.5 - 5.2 mmol/L    Chloride 103 98 - 107 mmol/L    CO2 29.0 22.0 - 29.0 mmol/L    Calcium 10.1 8.6 - 10.5 mg/dL    Total Protein 7.0 6.0 - 8.5 g/dL    Albumin 4.70 3.50 - 5.20 g/dL    ALT (SGPT) 17 1 - 33 U/L    AST (SGOT) 18 1 - 32 U/L    Alkaline Phosphatase 74 39 - 117 U/L    Total Bilirubin 0.6 0.0 - 1.2 mg/dL    eGFR Non African Amer 70 >60 mL/min/1.73    Globulin 2.3 gm/dL    A/G Ratio 2.0 g/dL    BUN/Creatinine Ratio 21.8 7.0 - 25.0    Anion Gap 7.0 5.0 - 15.0 mmol/L   Troponin    Specimen: Blood   Result Value Ref Range    Troponin T <0.010 0.000 - 0.030 ng/mL   Magnesium    Specimen: Blood   Result Value Ref Range    Magnesium 1.9 1.6 - 2.4 mg/dL   CBC Auto Differential    Specimen: Blood   Result Value Ref Range    WBC 11.88 (H) 3.40 - 10.80 10*3/mm3    RBC 4.51 3.77 - 5.28 10*6/mm3    Hemoglobin 13.5 12.0 - 15.9 g/dL    Hematocrit 40.7 34.0 - 46.6 %    MCV 90.2 79.0 - 97.0 fL    MCH 29.9 26.6 - 33.0 pg    MCHC 33.2 31.5 - 35.7 g/dL    RDW 12.2 (L) 12.3 - 15.4 %    RDW-SD 40.2 37.0 - 54.0 fl    MPV 10.5 6.0 - 12.0 fL    Platelets 238 140 - 450 10*3/mm3    Neutrophil % 87.6 (H) 42.7 - 76.0 %    Lymphocyte % 6.2 (L) 19.6 - 45.3 %    Monocyte % 4.4 (L) 5.0 - 12.0 %    Eosinophil % 0.8 0.3 - 6.2 %    Basophil % 0.4 0.0  - 1.5 %    Immature Grans % 0.6 (H) 0.0 - 0.5 %    Neutrophils, Absolute 10.40 (H) 1.70 - 7.00 10*3/mm3    Lymphocytes, Absolute 0.74 0.70 - 3.10 10*3/mm3    Monocytes, Absolute 0.52 0.10 - 0.90 10*3/mm3    Eosinophils, Absolute 0.10 0.00 - 0.40 10*3/mm3    Basophils, Absolute 0.05 0.00 - 0.20 10*3/mm3    Immature Grans, Absolute 0.07 (H) 0.00 - 0.05 10*3/mm3    nRBC 0.0 0.0 - 0.2 /100 WBC   Green Top (Gel)   Result Value Ref Range    Extra Tube Hold for add-ons.    Lavender Top   Result Value Ref Range    Extra Tube hold for add-on    Gold Top - SST   Result Value Ref Range    Extra Tube Hold for add-ons.    Light Blue Top   Result Value Ref Range    Extra Tube hold for add-on      I have personally reviewed and interpreted the EKG obtained today in the emergency department at 1931 concomitant with treatment.  EKG reveals rhythm -sinus, 90. LATA prolonged consistent with first-degree AV block.  QRS-IVCD with slow R wave progression ST/T-wave -no STEMI; there is some ST/T wave repolarization abnormality; QTC within normal limits    Agree with plan for evaluating for acute life threats.    I have seen and personally evaluated this patient, discussed the case with the treating advanced practice provider, and reviewed their note. I was involved in the medical decision making during the evaluation, testing and disposition planning for this patient.          Rj Freeman MD  01/03/22 1946

## 2022-01-03 NOTE — ED PROVIDER NOTES
EMERGENCY DEPARTMENT ENCOUNTER    Room Number:  HALA/A  Date seen:  1/3/2022  Time seen: 18:19 EST  PCP: Tracy tSeiner APRN  Historian: patient and daughter at the bedside      HPI:  Chief Complaint: nausea, vomiting, weakness    A complete HPI/ROS/PMH/PSH/SH/FH are unobtainable due to: none    Context: Neha Guillory is a 88 y.o. female who presents to the ED for evaluation of nausea, generalized weakness and lightheadedness and one episode of vomiting that all occurred this morning.  Patient was feeling very unwell.  Unfortunately due to the high volume and acuity during this COVID-19 surge the patient waited for 7 hours to be evaluated.  During that time she actually started feel much better and is asymptomatic now.  She lives independently.  She has had some gradual decline in her functioning over the last couple months, daughter first really noticed it on Thanksgiving.  She is also had chronic bilateral shoulder pain for some time, developed some discoloration to the right shoulder onto the chest wall and was recently seen by PCP for it.  Prescribed Keflex for possible cellulitis and referred to orthopedics.  Orthopedics noted that she had a right shoulder effusion but did not recommend aspiration and until the patient had had a mammogram to further evaluate for breast malignancy.  She has chronic pain in the right shoulder, no significant increase in it.  Has not had any fevers or chills.  Range of motion in that shoulder is limited though no more limited than usual per patient.        PAST MEDICAL HISTORY  Active Ambulatory Problems     Diagnosis Date Noted   • Hyperlipidemia 02/01/2016   • Essential hypertension 02/01/2016   • Hypertrophic cardiomyopathy (HCC) 02/01/2016   • Generalized osteoarthritis 02/01/2016   • RICH on CPAP 02/01/2016   • Osteoporosis 02/01/2016   • Thrombophlebitis of deep veins of lower extremity (HCC) 02/01/2016   • Vitamin D deficiency 02/01/2016   • Colon polyps 02/24/2016   •  Family hx of colon cancer 02/24/2016   • H/O parathyroidectomy (HCC) 04/20/2016   • Pulmonary hypertension ([facility]) 04/25/2016   • Echocardiogram abnormal 03/16/2015   • Carotid artery stenosis 04/25/2016   • Ischemic rest pain of lower extremity 07/07/2016   • Bilateral carpal tunnel syndrome 07/07/2016   • Visit for screening mammogram 07/07/2016   • Osteopenia 08/05/2016   • Hyperuricemia 09/09/2016   • Senile osteoporosis 02/07/2017   • Diastolic dysfunction 03/17/2017   • Healthcare maintenance 07/26/2017   • Hip pain, left 07/26/2017   • Hammer toe of second toe of right foot 07/26/2017   • Gastroenteritis 12/26/2017   • Starvation ketoacidosis 12/26/2017   • CKD (chronic kidney disease) stage 3, GFR 30-59 ml/min ([facility]) 12/26/2017   • Dehydration 12/26/2017   • 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   • Hospital discharge follow-up 01/26/2018   • Osteoporosis, post-menopausal 02/12/2018   • Hyperglycemia 10/22/2018   • Carotid atherosclerosis, bilateral 07/12/2019   • Prediabetes 08/01/2019   • Resistant hypertension 02/24/2021   • FH: breast cancer 07/02/2021     Resolved Ambulatory Problems     Diagnosis Date Noted   • Hypercalcemia 02/01/2016   • Secondary hyperparathyroidism, non-renal (HCC) 02/01/2016   • Depression 02/01/2016   • Synovial cyst of popliteal space 02/01/2016   • Rash 12/28/2016   • Allergic reaction caused by a drug 12/28/2016   • Upper respiratory infection 01/26/2017     Past Medical History:   Diagnosis Date   • Cataract    • Deep vein thrombosis (HCC)    • Hyperparathyroidism (HCC)    • Hypertension    • Neuromuscular disorder (HCC)    • Obstructive sleep apnea syndrome 2/1/2016   • Primary familial hypertrophic cardiomyopathy (HCC) 2/1/2016         PAST SURGICAL HISTORY  Past Surgical History:   Procedure Laterality Date   • BUNIONECTOMY Right     FOOT SURGERY   • COLONOSCOPY N/A 2011    Dr. Luis   • HAND SURGERY Right     TUMOR REMOVED ON FOREFINGER   • THYROIDECTOMY Right 4/20/2016    Procedure: RIGHT  SUPERIOR PARATHYROIDECTOMY;  Surgeon: Bel Marina MD;  Location: [facility] OR;  Service:          FAMILY HISTORY  Family History   Problem Relation Age of Onset   • Colon cancer Sister    • Heart disease Brother         CABG   • Other Brother         BRAIN TUMOR   • Cancer Other          SOCIAL HISTORY  Social History     Socioeconomic History   • Marital status: Single   Tobacco Use   • Smoking status: Never Smoker   • Smokeless tobacco: Never Used   Substance and Sexual Activity   • Alcohol use: No   • Drug use: No   • Sexual activity: Defer         ALLERGIES  Atorvastatin, Atorvastatin calcium, and Amoxicillin        REVIEW OF SYSTEMS  Review of Systems     All systems reviewed and negative except for those discussed in HPI.       PHYSICAL EXAM  ED Triage Vitals [01/03/22 1117]   Temp Heart Rate Resp BP SpO2   98.9 °F (37.2 °C) 66 18 (!) 188/79 96 %      Temp src Heart Rate Source Patient Position BP Location FiO2 (%)   -- -- -- -- --         GENERAL: not distressed  HENT: atraumatic  EYES: no scleral icterus  CV: regular rhythm, regular rate  RESPIRATORY: normal effort TAB  ABDOMEN: soft, nontender nondistended  MUSCULOSKELETAL: no deformity.  Large right shoulder effusion.  Patient has some pale orange-min discoloration of the skin to the right shoulder and extending onto the chest wall approaching the midline.  At the edges of the discoloration, particularly the inferior edges it looks like ecchymosis.  There are no lesions and there are no skin texture changes or dimpling.  The nipple is everted appears normal.  It extends slightly onto the most superior breast tissue of the chest wall but otherwise does not extend onto the breast.  NEURO: alert, moves all extremities, follows commands  SKIN: warm, dry    Vital signs and nursing notes reviewed.          LAB RESULTS  Recent Results (from the past 24 hour(s))   Comprehensive Metabolic Panel    Collection Time: 01/03/22 11:51 AM    Specimen: Blood   Result  Value Ref Range    Glucose 123 (H) 65 - 99 mg/dL    BUN 17 8 - 23 mg/dL    Creatinine 0.78 0.57 - 1.00 mg/dL    Sodium 139 136 - 145 mmol/L    Potassium 4.3 3.5 - 5.2 mmol/L    Chloride 103 98 - 107 mmol/L    CO2 29.0 22.0 - 29.0 mmol/L    Calcium 10.1 8.6 - 10.5 mg/dL    Total Protein 7.0 6.0 - 8.5 g/dL    Albumin 4.70 3.50 - 5.20 g/dL    ALT (SGPT) 17 1 - 33 U/L    AST (SGOT) 18 1 - 32 U/L    Alkaline Phosphatase 74 39 - 117 U/L    Total Bilirubin 0.6 0.0 - 1.2 mg/dL    eGFR Non African Amer 70 >60 mL/min/1.73    Globulin 2.3 gm/dL    A/G Ratio 2.0 g/dL    BUN/Creatinine Ratio 21.8 7.0 - 25.0    Anion Gap 7.0 5.0 - 15.0 mmol/L   Troponin    Collection Time: 01/03/22 11:51 AM    Specimen: Blood   Result Value Ref Range    Troponin T <0.010 0.000 - 0.030 ng/mL   Magnesium    Collection Time: 01/03/22 11:51 AM    Specimen: Blood   Result Value Ref Range    Magnesium 1.9 1.6 - 2.4 mg/dL   Green Top (Gel)    Collection Time: 01/03/22 11:51 AM   Result Value Ref Range    Extra Tube Hold for add-ons.    Lavender Top    Collection Time: 01/03/22 11:51 AM   Result Value Ref Range    Extra Tube hold for add-on    Gold Top - SST    Collection Time: 01/03/22 11:51 AM   Result Value Ref Range    Extra Tube Hold for add-ons.    Light Blue Top    Collection Time: 01/03/22 11:51 AM   Result Value Ref Range    Extra Tube hold for add-on    CBC Auto Differential    Collection Time: 01/03/22 11:51 AM    Specimen: Blood   Result Value Ref Range    WBC 11.88 (H) 3.40 - 10.80 10*3/mm3    RBC 4.51 3.77 - 5.28 10*6/mm3    Hemoglobin 13.5 12.0 - 15.9 g/dL    Hematocrit 40.7 34.0 - 46.6 %    MCV 90.2 79.0 - 97.0 fL    MCH 29.9 26.6 - 33.0 pg    MCHC 33.2 31.5 - 35.7 g/dL    RDW 12.2 (L) 12.3 - 15.4 %    RDW-SD 40.2 37.0 - 54.0 fl    MPV 10.5 6.0 - 12.0 fL    Platelets 238 140 - 450 10*3/mm3    Neutrophil % 87.6 (H) 42.7 - 76.0 %    Lymphocyte % 6.2 (L) 19.6 - 45.3 %    Monocyte % 4.4 (L) 5.0 - 12.0 %    Eosinophil % 0.8 0.3 - 6.2 %     Basophil % 0.4 0.0 - 1.5 %    Immature Grans % 0.6 (H) 0.0 - 0.5 %    Neutrophils, Absolute 10.40 (H) 1.70 - 7.00 10*3/mm3    Lymphocytes, Absolute 0.74 0.70 - 3.10 10*3/mm3    Monocytes, Absolute 0.52 0.10 - 0.90 10*3/mm3    Eosinophils, Absolute 0.10 0.00 - 0.40 10*3/mm3    Basophils, Absolute 0.05 0.00 - 0.20 10*3/mm3    Immature Grans, Absolute 0.07 (H) 0.00 - 0.05 10*3/mm3    nRBC 0.0 0.0 - 0.2 /100 WBC   ECG 12 Lead    Collection Time: 01/03/22  7:31 PM   Result Value Ref Range    QT Interval 351 ms   Protime-INR    Collection Time: 01/03/22  7:51 PM    Specimen: Blood   Result Value Ref Range    Protime 13.5 11.7 - 14.2 Seconds    INR 1.05 0.90 - 1.10   aPTT    Collection Time: 01/03/22  7:51 PM    Specimen: Blood   Result Value Ref Range    PTT 27.7 22.7 - 35.4 seconds   Influenza Antigen, Rapid - Swab, Nasopharynx    Collection Time: 01/03/22  7:56 PM    Specimen: Nasopharynx; Swab   Result Value Ref Range    Influenza A Ag, EIA Negative Negative    Influenza B Ag, EIA Negative Negative   [facility] SWAB IN TRANSPORT MEDIA 8-12 HR TAT - Swab, Nasopharynx    Collection Time: 01/03/22  7:56 PM    Specimen: Nasopharynx; Swab   Result Value Ref Range    COVID19 Not Detected Not Detected - Ref. Range   Urinalysis With Microscopic If Indicated (No Culture) - Urine, Clean Catch    Collection Time: 01/03/22  8:49 PM    Specimen: Urine, Clean Catch   Result Value Ref Range    Color, UA Yellow Yellow, Straw    Appearance, UA Clear Clear    pH, UA 7.0 5.0 - 8.0    Specific Gravity, UA 1.009 1.005 - 1.030    Glucose, UA Negative Negative    Ketones, UA Negative Negative    Bilirubin, UA Negative Negative    Blood, UA Negative Negative    Protein, UA Negative Negative    Leuk Esterase, UA Negative Negative    Nitrite, UA Negative Negative    Urobilinogen, UA 0.2 E.U./dL 0.2 - 1.0 E.U./dL       Ordered the above labs and independently reviewed the results.        RADIOLOGY  XR Shoulder 2+  View Left    Result Date: 12/20/2021  Impression: Ordering physician's impression is located in the Encounter Note dated 12/20/21. X-ray performed in the DR room.     XR Shoulder 2+ View Right    Result Date: 12/17/2021  Narrative: RIGHT SHOULDER X-RAY  HISTORY: Redness and swelling with decreased range of motion and pain.  TECHNIQUE: Two x-rays of the right shoulder are provided.  FINDINGS: There is markedly advanced arthropathic change at the right shoulder with a massive, chronic full-thickness rotator cuff tear, deformity of the humeral head with subcortical sclerosis, marginal osteophytes, and extensive osseous remodeling along the undersurface of the distal clavicle and the acromion. There is no osteolysis or periarticular demineralization. Visualized part of the right lung is clear. Soft tissue density around the lateral aspect of the humeral head and in the subacromial space probably represents a large subacromial bursal effusion.      Impression: Chronic, markedly advanced arthritic change at the right glenohumeral joint with evidence of a chronic, massive, full-thickness rotator cuff tear. There is suspected to be a large bursal effusion. This is not unexpected given the advanced arthropathic change. If there are clinical symptoms of infection, arthrocentesis would be the study of choice to attempt to exclude septic arthritis.  This report was finalized on 12/17/2021 10:23 AM by Dr. Hunter Washington M.D.      XR Chest 1 View    Result Date: 1/3/2022  Narrative: PORTABLE CHEST 01/03/2022 AT 11:49 AM  CLINICAL HISTORY: Dyspnea. Dizziness.  The lungs are fairly well-expanded and appear free of focal infiltrates. There are no pleural effusions. The heart is at the upper limits of normal in size. The thoracic aorta is calcified and ectatic.  IMPRESSIONS: No evidence of acute disease within the chest.  This report was finalized on 1/3/2022 12:14 PM by Dr. Jose Morel M.D.        I ordered the above noted  radiological studies. Reviewed by me and discussed with radiologist.  See dictation for official radiology interpretation.    PROCEDURES  Procedures        MEDICATIONS GIVEN IN ER  Medications   sodium chloride 0.9 % flush 10 mL (has no administration in time range)   sodium chloride 0.9 % bolus 500 mL (0 mL Intravenous Stopped 1/3/22 2030)             PROGRESS AND CONSULTS        ED Course as of 01/03/22 2312   Mon Jan 03, 2022 1819 WBC(!): 11.88 [KA]   1820 Hemoglobin: 13.5 [KA]   1820 Troponin T: <0.010 [KA]   1820 Magnesium: 1.9 [KA]   1820 Glucose(!): 123 [KA]   1820 Creatinine: 0.78 [KA]   1820 My interpretation of the chest x-ray is no acute infiltrate.  Cardiomegaly.  No obvious infiltrate. [KA]   1824 Medical chart reviewed.  Office visit 12/17/2021 with primary care for right shoulder pain and a rash on the right shoulder and chest wall.  She was prescribed Keflex 500 mg twice daily for 10 days, follow-up arranged with orthopedics 3 days later.  X-ray showed large joint effusion of the right shoulder and concerns for a large rotator cuff injury.    Orthopedic follow-up on 12/20/2021.  It was noted there was no improvement in her rash after being on the Keflex for 3 days at that time.  It is recommended that a mammogram be ordered [KA]   2039 Influenza A Ag, EIA: Negative [KA]   2039 Influenza B Ag, EIA: Negative [KA]   2039 COVID19: Not Detected  Assessed the patient, she is resting comfortably.  Still feels well, ambulated to and from the bathroom without difficulty.  We discussed all of her labs at this point which are unremarkable.  Minimal elevation in white blood cell count, vitals are stable.  She has given us a urine specimen which will send to the lab.   [KA]   2100 Blood, UA: Negative [KA]   2100 Leukocytes, UA: Negative [KA]   2107 This is the patient, she is still feeling well.  Urinalysis is negative.  She is eager for discharge and I do think she is safe for discharge.  She is asymptomatic,  has ambulated in the ER without difficulty, work-up is unremarkable.  No definitive explanation for the brief symptoms that she had this morning.  I recommended that she recheck with her PCP in 2 to 3 days and continue to follow-up for her mammogram as scheduled and with orthopedics as scheduled.  The discoloration that she has to her chest really does look more significant for an evolving ecchymosis, does not look cellulitic and does not look consistent with malignancy at this time either however importance of following up to further rule this out has been discussed.  She and daughter agreeable with the plan and will be discharged [KA]      ED Course User Index  [KA] Marielal Rodriguez PA             Patient was placed in face mask in first look. Patient was wearing facemask each time I entered the room and throughout our encounter. I wore protective equipment throughout this patient encounter including a face mask, eye shield and gloves. Hand hygiene was performed before donning protective equipment and after removal when leaving the room.        DIAGNOSIS  Final diagnoses:   Non-intractable vomiting with nausea, unspecified vomiting type   General weakness   Superficial bruising of chest wall, right, initial encounter   Osteoarthritis of right shoulder, unspecified osteoarthritis type   Effusion of joint of right shoulder         Follow Up:  Tracy Steiner, ENZO  [address] [address] [phone]    In 2 days        RX:     Medication List      No changes were made to your prescriptions during this visit.           Latest Documented Vital Signs:  As of 23:12 EST  BP- 146/87 HR- 70 Temp- 98.7 °F (37.1 °C) O2 sat- 97%       Mariella Rodriguez PA  01/03/22 [id number]

## 2022-01-03 NOTE — ED NOTES
Patient from home with c/o generalized weakness, nausea and dizziness. Patient family also noticed orange discoloration on her chest. Patient currently A&Ox4 in triage; daughter states patient can be forgetful which is progressively getting worse over the last 3 months.      Viktoria Portillo RN  01/03/22 1117

## 2022-01-04 NOTE — DISCHARGE INSTRUCTIONS
Gentle activities as tolerated.  Follow-up with your primary care doctor in 2 to 3 days for a recheck and follow-up for your mammogram as scheduled.  Return to the ER as needed.

## 2022-01-13 ENCOUNTER — OFFICE VISIT (OUTPATIENT)
Dept: INTERNAL MEDICINE | Facility: CLINIC | Age: 87
End: 2022-01-13

## 2022-01-13 VITALS
HEIGHT: 59 IN | BODY MASS INDEX: 28.43 KG/M2 | TEMPERATURE: 98.2 F | SYSTOLIC BLOOD PRESSURE: 166 MMHG | HEART RATE: 66 BPM | WEIGHT: 141 LBS | OXYGEN SATURATION: 90 % | DIASTOLIC BLOOD PRESSURE: 87 MMHG | RESPIRATION RATE: 18 BRPM

## 2022-01-13 DIAGNOSIS — D72.829 LEUKOCYTOSIS, UNSPECIFIED TYPE: ICD-10-CM

## 2022-01-13 DIAGNOSIS — L53.9 CHEST WALL ERYTHEMA: Primary | ICD-10-CM

## 2022-01-13 PROCEDURE — 99214 OFFICE O/P EST MOD 30 MIN: CPT | Performed by: NURSE PRACTITIONER

## 2022-01-13 NOTE — PROGRESS NOTES
Chief Complaint  Hospital Follow Up Visit (Pt presents here today for a hospital follow up and concerns of chest issue worsening.)    Subjective          Neha Guillory presents to [facility]  Patient presents for follow-up from the emergency room and reevaluation of chest wall redness.  This is an 88-year-old female.  Symptoms have been present for about 1 month and are gradually worsening.  I saw her last on December 17, 2021 at which time she presented with chronic right shoulder stiffness/decreased range of motion as well as a new patch of erythema on the right side of the chest wall extending into the shoulder.  I gave her a 10-day course of Keflex at that time to cover for infectious etiology/cellulitis.  She noticed no difference in her symptoms.  She saw outpatient orthopedic surgery, ENZO Jo December 20, 2021 who recommended holding off on the joint aspiration until obtaining breast imaging to rule out breast malignancy as a contributor to the redness.      She actually went to the emergency room on January 3, 2022 for acute generalized weakness, some nausea/vomiting and worsening of the chest erythema.  She waited a long time in the emergency room and her weakness/nausea and vomiting improved greatly during that wait time per the discharge summary and patient's account.  She had a chest x-ray which showed no acute abnormalities.  Was recommended that she follow-up with PCP and orthopedics.    She is scheduled for a right breast mammogram and ultrasound on January 26, 2021.  In the meantime, patient and daughter state that the chest redness has spread.  It is spreading up her shoulder and down her right arm midway along with down the right breast.  The redness has a clear delineated line around the midline chest wall anteriorly.  She notices no heat to the touch and denies any pain to the chest wall.  She did have a shoulder x-ray when I first saw her in the office  "in December which showed evidence of a large rotator cuff injury as well as large shoulder effusion.    Patient states that she feels a bit more tired over the past week.  Her daughter states that she has noticed a definite decrease in the patient's stamina over the past 2 to 3 weeks.  Patient states that overall she is feeling \"pretty well\" with no immediate pain but is still confused as to why her chest/shoulder remains red.    Of note she did have a mild leukocytosis in the emergency room on January 3, 2022, 11.88 WBC.    No fever, chills or other new symptoms.    Denies development of other new issues today.      Objective   Vital Signs:   /87 (BP Location: Left arm, Patient Position: Sitting, Cuff Size: Adult)   Pulse 66   Temp 98.2 °F (36.8 °C)   Resp 18   Ht 149.9 cm (59\")   Wt 64 kg (141 lb)   SpO2 90%   BMI 28.48 kg/m²     Physical Exam  Vitals and nursing note reviewed. Exam conducted with a chaperone present (Daughter).   Constitutional:       General: She is not in acute distress.     Appearance: Normal appearance. She is well-developed. She is not ill-appearing, toxic-appearing or diaphoretic.   HENT:      Head: Normocephalic and atraumatic.      Right Ear: External ear normal.      Left Ear: External ear normal.   Eyes:      Pupils: Pupils are equal, round, and reactive to light.   Neck:      Vascular: No carotid bruit.   Cardiovascular:      Rate and Rhythm: Normal rate and regular rhythm.      Pulses: Normal pulses.      Heart sounds: Normal heart sounds.   Pulmonary:      Effort: Pulmonary effort is normal. No respiratory distress.      Breath sounds: Normal breath sounds. No stridor. No wheezing, rhonchi or rales.   Chest:      Chest wall: No tenderness.   Breasts:      Right: Skin change present. No swelling, bleeding, inverted nipple, mass, nipple discharge, tenderness, axillary adenopathy or supraclavicular adenopathy.      Left: No swelling, bleeding, inverted nipple, mass, nipple " discharge, skin change, tenderness, axillary adenopathy or supraclavicular adenopathy.            Comments: Large area of ecchymosis/erythema extending from midline chest wall down to the breast tissue and up to the right shoulder, down midway right upper arm.  Clearly delineated demarcation.  No heat to touch.  Nonraised.  Does not extend to the areola.  Abdominal:      General: Bowel sounds are normal. There is no distension.      Palpations: Abdomen is soft.      Tenderness: There is no abdominal tenderness.   Musculoskeletal:         General: Normal range of motion.      Cervical back: Normal range of motion and neck supple. No rigidity or tenderness.   Lymphadenopathy:      Cervical: No cervical adenopathy.      Upper Body:      Right upper body: No supraclavicular, axillary or pectoral adenopathy.      Left upper body: No supraclavicular, axillary or pectoral adenopathy.   Skin:     General: Skin is warm and dry.      Capillary Refill: Capillary refill takes less than 2 seconds.   Neurological:      General: No focal deficit present.      Mental Status: She is alert and oriented to person, place, and time. Mental status is at baseline.   Psychiatric:         Mood and Affect: Mood normal.         Behavior: Behavior normal.         Thought Content: Thought content normal.        Result Review :   The following data was reviewed by: ENZO Larry on 01/13/2022:  Common labs    Common Labsle 2/24/21 2/24/21 2/24/21 8/30/21 8/30/21 8/30/21 1/3/22 1/3/22    1413 1413 1413 1341 1341 1341 1151 1151   Glucose  88   82   123 (A)   BUN  19   19   17   Creatinine  0.93   0.89   0.78   eGFR Non African Am  57 (A)   60 (A)   70   eGFR  Am  69   73      Sodium  141   141   139   Potassium  4.2   4.6   4.3   Chloride  102   103   103   Calcium  9.9   9.8   10.1   Total Protein  6.2   6.9      Albumin  4.20   4.50   4.70   Total Bilirubin  0.6   0.5   0.6   Alkaline Phosphatase  54   54   74   AST (SGOT)  32   23    18   ALT (SGPT)  18   19   17   WBC 7.96   8.95   11.88 (A)    Hemoglobin 12.0   13.3   13.5    Hematocrit 36.8   40.2   40.7    Platelets 265   234   238    Total Cholesterol   134   126     Triglycerides   72   85     HDL Cholesterol   53   51     LDL Cholesterol    67   58     (A) Abnormal value       Comments are available for some flowsheets but are not being displayed.           Current outpatient and discharge medications have been reconciled for the patient.  Reviewed by: ENZO Larry           Assessment and Plan    Diagnoses and all orders for this visit:    1. Chest wall erythema (Primary)  -     CT Chest With Contrast; Future    2. Leukocytosis, unspecified type  -     CBC & Differential      Repeat CBC today-I want to ensure leukocytosis has resolved.    CT of the chest is ordered today for further evaluation of her chest wall/soft tissue structures in comparison to the recent chest x-ray which was normal.    Mammogram/breast ultrasound January 26, 2022 upcoming.    She is educated on signs of infection and when to seek emergent care.    She will follow-up outpatient with orthopedics regarding her shoulder in the near future once breast malignancy/infectious etiology is ruled out.  I do still think that a shoulder aspiration in the near future may be warranted depending upon what her breast imaging/chest CT show.    We will contact patient with imaging and lab results and any further recommendations.  Follow-up as needed and at next scheduled office visit.    35 minutes were spent in direct patient assessment/consultation/chart review and charting.    Follow Up   Return if symptoms worsen or fail to improve, for Next scheduled follow up.  Patient was given instructions and counseling regarding her condition or for health maintenance advice. Please see specific information pulled into the AVS if appropriate.

## 2022-01-14 LAB
BASOPHILS # BLD AUTO: 0.1 X10E3/UL (ref 0–0.2)
BASOPHILS NFR BLD AUTO: 1 %
EOSINOPHIL # BLD AUTO: 0.2 X10E3/UL (ref 0–0.4)
EOSINOPHIL NFR BLD AUTO: 3 %
ERYTHROCYTE [DISTWIDTH] IN BLOOD BY AUTOMATED COUNT: 12.2 % (ref 11.7–15.4)
HCT VFR BLD AUTO: 39.5 % (ref 34–46.6)
HGB BLD-MCNC: 13.4 G/DL (ref 11.1–15.9)
IMM GRANULOCYTES # BLD AUTO: 0 X10E3/UL (ref 0–0.1)
IMM GRANULOCYTES NFR BLD AUTO: 1 %
LYMPHOCYTES # BLD AUTO: 1.1 X10E3/UL (ref 0.7–3.1)
LYMPHOCYTES NFR BLD AUTO: 12 %
MCH RBC QN AUTO: 30.4 PG (ref 26.6–33)
MCHC RBC AUTO-ENTMCNC: 33.9 G/DL (ref 31.5–35.7)
MCV RBC AUTO: 90 FL (ref 79–97)
MONOCYTES # BLD AUTO: 1.1 X10E3/UL (ref 0.1–0.9)
MONOCYTES NFR BLD AUTO: 13 %
NEUTROPHILS # BLD AUTO: 6.3 X10E3/UL (ref 1.4–7)
NEUTROPHILS NFR BLD AUTO: 70 %
PLATELET # BLD AUTO: 258 X10E3/UL (ref 150–450)
RBC # BLD AUTO: 4.41 X10E6/UL (ref 3.77–5.28)
WBC # BLD AUTO: 8.9 X10E3/UL (ref 3.4–10.8)

## 2022-01-14 NOTE — PROGRESS NOTES
Please call patient, states that she does not have access to her MyChart.  Please let her know that her white blood cell count has normalized which is a good sign.  We will proceed with a CT scan of the chest as well as the breast imaging and I will get her results as soon as I have them on these.  Let me know of any questions in the meantime.

## 2022-01-26 ENCOUNTER — HOSPITAL ENCOUNTER (OUTPATIENT)
Dept: ULTRASOUND IMAGING | Facility: HOSPITAL | Age: 87
Discharge: HOME | End: 2022-01-26

## 2022-01-26 ENCOUNTER — HOSPITAL ENCOUNTER (OUTPATIENT)
Dept: MAMMOGRAPHY | Facility: HOSPITAL | Age: 87
Discharge: HOME | End: 2022-01-26

## 2022-01-26 DIAGNOSIS — N64.9 DISORDER OF BREAST, UNSPECIFIED: ICD-10-CM

## 2022-01-26 DIAGNOSIS — R23.4 BREAST SKIN CHANGES: ICD-10-CM

## 2022-01-26 PROCEDURE — 77066 DX MAMMO INCL CAD BI: CPT

## 2022-01-26 PROCEDURE — G0279 TOMOSYNTHESIS, MAMMO: HCPCS

## 2022-01-26 PROCEDURE — 76642 ULTRASOUND BREAST LIMITED: CPT

## 2022-01-28 DIAGNOSIS — R92.8 OTHER ABNORMAL AND INCONCLUSIVE FINDINGS ON DIAGNOSTIC IMAGING OF BREAST: ICD-10-CM

## 2022-01-28 DIAGNOSIS — N63.20 LEFT BREAST MASS: Primary | ICD-10-CM

## 2022-01-28 NOTE — PROGRESS NOTES
Spoke with patient on the phone on 2822 at 12:30 PM.  Explained recent mammogram results, finding of 0.5 cm left breast mass requiring a biopsy.  She is agreeable to this and I placed the orders for an ultrasound-guided breast biopsy per the recommendation of the radiologist.  There were no suspicious findings of the right breast and we will proceed with a CT of the chest for further evaluation of the chest wall erythema/shoulder erythema.  She does state that this has improved.  Have also been in communication with her orthopedic specialist, ENZO Jo who states that once her chest CT is done, if no etiology of the shoulder/chest wall redness she will proceed with ordering IR right shoulder aspiration.

## 2022-02-08 ENCOUNTER — HOSPITAL ENCOUNTER (OUTPATIENT)
Dept: CT IMAGING | Facility: HOSPITAL | Age: 87
Discharge: HOME | End: 2022-02-08
Admitting: NURSE PRACTITIONER

## 2022-02-08 DIAGNOSIS — L53.9 CHEST WALL ERYTHEMA: ICD-10-CM

## 2022-02-08 LAB — CREAT BLDA-MCNC: 1 MG/DL (ref 0.6–1.3)

## 2022-02-08 PROCEDURE — 25010000002 IOPAMIDOL 61 % SOLUTION: Performed by: NURSE PRACTITIONER

## 2022-02-08 PROCEDURE — 71260 CT THORAX DX C+: CPT

## 2022-02-08 PROCEDURE — 82565 ASSAY OF CREATININE: CPT

## 2022-02-08 RX ADMIN — IOPAMIDOL 75 ML: 612 INJECTION, SOLUTION INTRAVENOUS at 13:33

## 2022-02-09 NOTE — PROGRESS NOTES
Monico Philippe, just wanted to make you aware of these CT results as well. Would you like for the pt to go ahead and have the arthrocentesis as well as follow up on her large rotator cuff tear? I am going to place a dermatology referral as well.

## 2022-02-10 DIAGNOSIS — L53.9 CHEST WALL ERYTHEMA: Primary | ICD-10-CM

## 2022-02-10 NOTE — PROGRESS NOTES
I just called and spoke with her, she apologizes for not calling you back yet, she says she was at [facility] when you called.I am going to go ahead and have Dr. Wilsonless with dermatology take a look at her with the comments that the radiologist made about this possibly being dermatologic.  I agree with you about going ahead and performing the aspiration in IR.  She says that she plans to return your call tomorrow so that you can discuss details with her regarding that procedure.Thanks so much for your care of this mutual patient!

## 2022-02-11 DIAGNOSIS — M12.812 ROTATOR CUFF ARTHROPATHY OF BOTH SHOULDERS: ICD-10-CM

## 2022-02-11 DIAGNOSIS — M12.811 ROTATOR CUFF ARTHROPATHY OF BOTH SHOULDERS: ICD-10-CM

## 2022-02-11 DIAGNOSIS — M25.411 EFFUSION OF JOINT OF RIGHT SHOULDER: Primary | ICD-10-CM

## 2022-02-17 ENCOUNTER — HOSPITAL ENCOUNTER (OUTPATIENT)
Dept: MAMMOGRAPHY | Facility: HOSPITAL | Age: 87
Discharge: HOME | End: 2022-02-17

## 2022-02-17 ENCOUNTER — HOSPITAL ENCOUNTER (OUTPATIENT)
Dept: ULTRASOUND IMAGING | Facility: HOSPITAL | Age: 87
Discharge: HOME | End: 2022-02-17

## 2022-02-17 VITALS
SYSTOLIC BLOOD PRESSURE: 179 MMHG | HEIGHT: 59 IN | TEMPERATURE: 98 F | HEART RATE: 64 BPM | OXYGEN SATURATION: 96 % | DIASTOLIC BLOOD PRESSURE: 77 MMHG | RESPIRATION RATE: 18 BRPM | BODY MASS INDEX: 28.02 KG/M2 | WEIGHT: 139 LBS

## 2022-02-17 DIAGNOSIS — R92.8 OTHER ABNORMAL AND INCONCLUSIVE FINDINGS ON DIAGNOSTIC IMAGING OF BREAST: ICD-10-CM

## 2022-02-17 DIAGNOSIS — N63.20 LEFT BREAST MASS: ICD-10-CM

## 2022-02-17 PROCEDURE — 88342 IMHCHEM/IMCYTCHM 1ST ANTB: CPT | Performed by: NURSE PRACTITIONER

## 2022-02-17 PROCEDURE — 77065 DX MAMMO INCL CAD UNI: CPT

## 2022-02-17 PROCEDURE — 0 LIDOCAINE 1 % SOLUTION: Performed by: RADIOLOGY

## 2022-02-17 PROCEDURE — A4648 IMPLANTABLE TISSUE MARKER: HCPCS

## 2022-02-17 PROCEDURE — 88305 TISSUE EXAM BY PATHOLOGIST: CPT | Performed by: NURSE PRACTITIONER

## 2022-02-17 PROCEDURE — 88360 TUMOR IMMUNOHISTOCHEM/MANUAL: CPT | Performed by: NURSE PRACTITIONER

## 2022-02-17 PROCEDURE — 88341 IMHCHEM/IMCYTCHM EA ADD ANTB: CPT | Performed by: NURSE PRACTITIONER

## 2022-02-17 RX ORDER — LIDOCAINE HYDROCHLORIDE AND EPINEPHRINE 10; 10 MG/ML; UG/ML
10 INJECTION, SOLUTION INFILTRATION; PERINEURAL ONCE
Status: COMPLETED | OUTPATIENT
Start: 2022-02-17 | End: 2022-02-17

## 2022-02-17 RX ORDER — LIDOCAINE HYDROCHLORIDE 10 MG/ML
3 INJECTION, SOLUTION INFILTRATION; PERINEURAL ONCE
Status: COMPLETED | OUTPATIENT
Start: 2022-02-17 | End: 2022-02-17

## 2022-02-17 RX ADMIN — LIDOCAINE HYDROCHLORIDE,EPINEPHRINE BITARTRATE 10 ML: 10; .01 INJECTION, SOLUTION INFILTRATION; PERINEURAL at 15:08

## 2022-02-17 RX ADMIN — LIDOCAINE HYDROCHLORIDE 2 ML: 10 INJECTION, SOLUTION INFILTRATION; PERINEURAL at 15:06

## 2022-02-17 NOTE — NURSING NOTE
Call from  to inform that patient has arrived for procedure.  Patient is early and I will bring her back as soon as I finish with current patient.

## 2022-02-17 NOTE — H&P
Name: Neha Guillory ADMIT: 2022   : 1933  PCP: Tracy Steiner APRN    MRN: [id number] LOS: 0 days   AGE/SEX: 89 y.o. female  ROOM: Room/bed info not found       Chief complaint L breast mass    Present Illness or Internal History:  Patient is a 89 y.o. female presents with L breast mass for US bx.     Past Surgical History:  Past Surgical History:   Procedure Laterality Date   • BUNIONECTOMY Right     FOOT SURGERY   • COLONOSCOPY N/A     Dr. Luis   • HAND SURGERY Right     TUMOR REMOVED ON FOREFINGER   • THYROIDECTOMY Right 2016    Procedure: RIGHT SUPERIOR PARATHYROIDECTOMY;  Surgeon: Bel Marina MD;  Location: [facility];  Service:        Past Medical History:  Past Medical History:   Diagnosis Date   • Carotid atherosclerosis, bilateral 2019   • Cataract     BILATERAL   • Deep vein thrombosis (HCC)    • Depression    • Gastroenteritis    • Generalized osteoarthritis 2016   • Hyperlipidemia    • Hyperparathyroidism (HCC)    • Hypertension    • Hypertrophic cardiomyopathy (HCC)    • Neuromuscular disorder (HCC)    • Obstructive sleep apnea syndrome 2016   • Osteopenia    • Osteoporosis    • Primary familial hypertrophic cardiomyopathy (HCC) 2016   • Synovial cyst of popliteal space 2016   • Vitamin D deficiency        Home Medications:  (Not in a hospital admission)      Allergies:  Amoxicillin, Atorvastatin, and Atorvastatin calcium    Family History:  Family History   Problem Relation Age of Onset   • Breast cancer Sister    • Heart disease Brother         CABG   • Other Brother         BRAIN TUMOR   • Colon cancer Brother    • Cancer Other    • Breast cancer Sister    • Breast cancer Sister    • Colon cancer Brother    • Colon cancer Sister    • Colon cancer Maternal Grandfather    • Colon cancer Brother        Social History:  Social History     Tobacco Use   • Smoking status: Never Smoker   • Smokeless tobacco: Never Used   Substance Use Topics   •  Alcohol use: No   • Drug use: No        Objective     Physical Exam:    No exam performed today,    Vital Signs  Temp:  [98 °F (36.7 °C)] 98 °F (36.7 °C)  Heart Rate:  [68] 68  Resp:  [18] 18  BP: (182)/(76) 182/76    Anticipated Surgical Procedure:  Left breast ultrasound guided core needle biopsy    The risks, benefits and alternatives of this procedure have been discussed with the patient or responsible party: Yes        Tamika Snyder MD  02/17/22  14:47 EST

## 2022-02-17 NOTE — NURSING NOTE
Biopsy site to left upper, mid breast clear with Exofin dry and intact. No firmness or swelling noted at or around biopsy site. Denies pain. Ice pack with protective covering applied to biopsy site. Discharge instructions discussed with understanding voiced by patient. Copies provided to patient. No distress noted. To home via private vehicle.

## 2022-02-18 ENCOUNTER — HOSPITAL ENCOUNTER (OUTPATIENT)
Dept: BONE DENSITY | Facility: HOSPITAL | Age: 87
Discharge: HOME | End: 2022-02-18
Admitting: NURSE PRACTITIONER

## 2022-02-18 DIAGNOSIS — M81.0 OSTEOPOROSIS, POST-MENOPAUSAL: Chronic | ICD-10-CM

## 2022-02-18 PROCEDURE — 77080 DXA BONE DENSITY AXIAL: CPT

## 2022-02-21 LAB
LAB AP CASE REPORT: NORMAL
LAB AP CLINICAL INFORMATION: NORMAL
LAB AP SPECIAL STAINS: NORMAL
LAB AP SYNOPTIC CHECKLIST: NORMAL
PATH REPORT.FINAL DX SPEC: NORMAL
PATH REPORT.GROSS SPEC: NORMAL

## 2022-02-22 ENCOUNTER — TELEPHONE (OUTPATIENT)
Dept: SURGERY | Facility: CLINIC | Age: 87
End: 2022-02-22

## 2022-02-22 DIAGNOSIS — C50.912 MALIGNANT NEOPLASM OF LEFT FEMALE BREAST, UNSPECIFIED ESTROGEN RECEPTOR STATUS, UNSPECIFIED SITE OF BREAST: ICD-10-CM

## 2022-02-22 DIAGNOSIS — N63.20 LEFT BREAST MASS: Primary | ICD-10-CM

## 2022-02-22 NOTE — TELEPHONE ENCOUNTER
Dr. Lawson will be going on maternity leave, Dr. Mahan is not taking new patients. Routing referral to Dr. Tejada    Sent email to Katelyn @ Dr. Tejada's office.

## 2022-02-22 NOTE — PROGRESS NOTES
"I spoke with patient on the phone 2/22/2022 at 1240.  Discussed that her recent left breast biopsy is positive for cancer.  We discussed next steps.  She does not wish for referral to oncology at this time stating \"I do not want to go through any type of chemo or radiation.\"  She is agreeable to a consultation with breast surgery, Dr. Briseyda Lawson.  She discussed that she would not be interested in a full mastectomy but that if a lumpectomy was an option she would be agreeable to this.  I discussed with her that breast surgery will provide recommendations based upon the location/pathology of the malignancy.  She states that she is agreeable to this referral and I placed it.  "

## 2022-02-24 ENCOUNTER — OFFICE VISIT (OUTPATIENT)
Dept: SURGERY | Facility: CLINIC | Age: 87
End: 2022-02-24

## 2022-02-24 ENCOUNTER — TELEPHONE (OUTPATIENT)
Dept: SURGERY | Facility: CLINIC | Age: 87
End: 2022-02-24

## 2022-02-24 VITALS — HEIGHT: 59 IN | WEIGHT: 137 LBS | BODY MASS INDEX: 27.62 KG/M2

## 2022-02-24 DIAGNOSIS — Z17.0 CARCINOMA OF UPPER-INNER QUADRANT OF LEFT BREAST IN FEMALE, ESTROGEN RECEPTOR POSITIVE: Primary | ICD-10-CM

## 2022-02-24 DIAGNOSIS — C50.212 CARCINOMA OF UPPER-INNER QUADRANT OF LEFT BREAST IN FEMALE, ESTROGEN RECEPTOR POSITIVE: Primary | ICD-10-CM

## 2022-02-24 PROCEDURE — 99205 OFFICE O/P NEW HI 60 MIN: CPT | Performed by: SURGERY

## 2022-02-24 RX ORDER — VITAMIN B COMPLEX
1 CAPSULE ORAL DAILY
COMMUNITY

## 2022-02-24 NOTE — TELEPHONE ENCOUNTER
----- Message from Bel Marina MD sent at 2/24/2022  1:28 PM EST -----  Regarding: OR scheduling   Please call her daughter Monica and asked that she call the office with an update on the redness and the Ortho note after that visit so that we can decide on timing of her surgery.

## 2022-02-24 NOTE — PROGRESS NOTES
SURGERY  Neha Guillory   2/4/1933 02/24/22    Chief complaint; left upper inner quadrant newly diagnosed invasive lobular cancer, 7 mm, screening    HPI    Ms. Guillory is a very nice 89 y.o. female known to me from my prior parathyroid surgery, who comes in with an unexpected left breast cancer.  This actually is a little unusual in presentation as she had the spontaneous bleed or erythema that began in her right neck extending down to the shoulder and then across the shoulder and breast that led to a visit with ENZO Jo in the [facility], who astutely was concerned about inflammatory breast cancer.  She started her on Keflex, and the area has gradually improved fairly dramatically but still has some mild redness.    Thus rather than a screening mammogram on January 26, 2022, she underwent a diagnostic mammogram and ultrasound of the right breast.  There were no findings on the ultrasound or mammogram of the right breast.  Her mammogram showed an area of focal asymmetry in the posterior one third upper inner quadrant of the left breast, not the affected breast, with suspected mild architectural distortion.  It was persistent on spot compression and with 7 mm.  Ultrasound showed no abnormalities in the right breast, area of redness.  There was however about 5 mm irregular hypoechoic mass in the left breast at 11:00, 8 cm from the nipple.  Ultrasound-guided biopsy on 2/17/2022 was done with 5 mm bowtie clip with pathology showing an invasive lobular carcinoma, grade 1, no lymphatic or perineural invasion.  It is ER positive at 99%, MT positive at 60%, HER-2/sulma negative, Ki-67 4%.    Due to that erythema of the chest wall, at some point she ultimately came to the emergency room where CT scan was done and there was chronic right shoulder disease noted, complete rotator cuff tear with longstanding effusion.  No evidence of intrathoracic malignancy.  Happily she is scheduled for an aspiration of the right  shoulder joint on May 3 and a follow-up with ENZO Jo.  I have suggested that she delay this for least 5 days after that aspiration so we can make sure that the culture is final.    The patient says she will get the appropriate treatment but she really does not want to have radiation and she does not want to have a mastectomy.  She is agreeable to a lumpectomy and sentinel node.    Past Medical History:   Diagnosis Date   • Arthritis    • Carcinoma of upper-inner quadrant of left breast in female, estrogen receptor positive (HCC) 2/24/2022   • Carotid atherosclerosis, bilateral 7/12/2019   • Cataract     BILATERAL   • Depression    • Gastroenteritis    • Generalized osteoarthritis 2/1/2016   • Hyperlipidemia    • Hyperparathyroidism (HCC)    • Hypertension    • Hypertrophic cardiomyopathy (HCC)    • Neuromuscular disorder (HCC)    • Obstructive sleep apnea syndrome 2/1/2016   • Osteopenia    • Osteoporosis    • Primary familial hypertrophic cardiomyopathy (HCC) 2/1/2016   • Synovial cyst of popliteal space 2/1/2016   • Vitamin D deficiency      Past Surgical History:   Procedure Laterality Date   • BREAST BIOPSY Left 02/17/2022   • BUNIONECTOMY Right     FOOT SURGERY   • COLONOSCOPY N/A 2011    Dr. Luis   • HAND SURGERY Right     TUMOR REMOVED ON FOREFINGER   • THYROIDECTOMY Right 4/20/2016    Procedure: RIGHT SUPERIOR PARATHYROIDECTOMY;  Surgeon: Bel Marina MD;  Location: [facility];  Service:      Family History   Problem Relation Age of Onset   • Breast cancer Sister    • Heart disease Brother         CABG   • Other Brother         BRAIN TUMOR   • Colon cancer Brother    • Cancer Other    • Breast cancer Sister    • Breast cancer Sister    • Colon cancer Brother    • Colon cancer Sister    • Colon cancer Maternal Grandfather    • Colon cancer Brother      Social History     Socioeconomic History   • Marital status: Single   Tobacco Use   • Smoking status: Never Smoker   • Smokeless  "tobacco: Never Used   Vaping Use   • Vaping Use: Never used   Substance and Sexual Activity   • Alcohol use: No   • Drug use: No   • Sexual activity: Defer         Current Outpatient Medications:   •  amLODIPine (NORVASC) 5 MG tablet, Take 5 mg by mouth Daily., Disp: , Rfl:   •  aspirin 81 MG EC tablet, Take 81 mg by mouth Daily., Disp: , Rfl:   •  B Complex Vitamins (vitamin b complex) capsule capsule, Take 1 capsule by mouth Daily., Disp: , Rfl:   •  carvedilol (COREG) 25 MG tablet, TAKE ONE TABLET BY MOUTH TWICE A DAY WITH MEALS, Disp: 180 tablet, Rfl: 1  •  cholecalciferol (VITAMIN D3) 1000 units tablet, Take 1,000 Units by mouth Daily., Disp: , Rfl:   •  denosumab (PROLIA) 60 MG/ML solution prefilled syringe syringe, Inject 1 syringe under the skin into the appropriate area as directed Every 6 (Six) Months., Disp: , Rfl:   •  Lactobacillus (PROBIOTIC ACIDOPHILUS PO), Take 1 tablet by mouth Daily., Disp: , Rfl:   •  olmesartan (BENICAR) 20 MG tablet, Take 20 mg by mouth Daily., Disp: , Rfl:   •  pravastatin (PRAVACHOL) 40 MG tablet, Take 1 tablet by mouth Daily., Disp: 90 tablet, Rfl: 1  •  vitamin C (ASCORBIC ACID) 250 MG tablet, Take 500 mg by mouth Daily., Disp: , Rfl:   •  Zinc 50 MG capsule, Take 50 mg by mouth Daily., Disp: , Rfl:     Allergies   Allergen Reactions   • Amoxicillin Hives   • Atorvastatin Myalgia   • Atorvastatin Calcium Myalgia     Review of Systems  Difficulty with mobility of the right arm.  No chest pain or shortness of breath.    Vitals:    02/24/22 1121   Weight: 62.1 kg (137 lb)   Height: 149.9 cm (59.02\")       PHYSICAL EXAM:    Ht 149.9 cm (59.02\")   Wt 62.1 kg (137 lb)   BMI 27.65 kg/m²   Body mass index is 27.65 kg/m².    Constitutional: well developed, well nourished, appears  healthy, stated age  ENMT: Hearing diminished, neck without masses.  I evaluated this pretty closely because of the history of the bleeding beginning there to make sure there was not some sort of enlarged " lymph node and there does not appear to be such  CVS: RRR, no murmur, no peripheral edema.  Superficial spider vein varicosities in the lower legs  Respiratory: CTA, normal respiratory effort   Gastrointestinal: abdomen soft rounded nontender, abdominal hernia not present  Musculoskeletal: gait a little slowed, muscle mass normal for her age, decreased mobility of the right arm  Neurological: awake and alert, seems to have reasonable capacity for understanding for medical decision making  Psychiatric: appears to have reasonable judgement, pleasant    Radiographic/Lab Findings: As above    Pamphlet reviewed: Breast cancer    IMPRESSION:  · Left upper inner quadrant 7 mm invasive lobular cancer, ER/VT positive, HER-2/sulma negative, Ki-67 4%, found on screening.  · Immobility of right shoulder  · Redness of right chest and shoulder with impending joint aspiration  · Chart with diagnosis of DVT in the past, but I have researched this back to 2017 where Dr. Mchugh inserted it and the patient and her daughter Monica who is very supportive both are adamant that this is not an accurate diagnosis.  · Osteoporosis on Prolia    PLAN:  · Discussed with Dr. Hunter Acosta and patient is agreeable to see him but really does not want to have radiation.  I asked that she does see him to talk through what the risk and benefits would be of avoiding that.  He feels like probably will be okay not to proceed with radiation as long as she is willing to accept the risk of recurrence.  · Left lumpectomy with sentinel node biopsy.  Counseled her that invasive lobular cancers are sometimes larger than they appear on mammogram, and MRI would be helpful for evaluation.  However with her shoulder problem I really do not think she could tolerate it and she is not particularly interested and thus we will forego that.  She understands this means I will need to take a larger resection but will fit and well with the idea of not doing radiation.  Case  request not entered as I would like to see the redness recede prior to that.  · Patient and daughter to come after orthopedic follow-up.  · Proceed with aspiration of the right shoulder, and delay her follow-up with orthopedics until that result can be final.  · Referred to Dr. Ricky Dill for consideration of hormonal modulation.  I believe her diagnosis of DVT is inaccurate and I removed that from her prior history .    Bel Marina MD  1:24 PM    In order to provide a more personal and interactive patient experience as well as improve efficiency, this note was started prior to the office visit.    ADDEND  Aspiration was negative and no signs of infection now.  Will go ahead with scheduling.  Case request entered.   Bel Marina MD  03/14/22

## 2022-02-24 NOTE — PROGRESS NOTES
Roula Guillory,   DEXA scan is back and showing osteoporosis at both hips.  For now I will not make a change to the medication regimen with the current happenings with the new breast cancer diagnosis.  We will recheck routinely, let me know if you have questions in the meantime.  Have a great day and call me if you need anything at all,ENZO Larry

## 2022-03-02 ENCOUNTER — APPOINTMENT (OUTPATIENT)
Dept: RADIATION ONCOLOGY | Facility: HOSPITAL | Age: 87
End: 2022-03-02

## 2022-03-02 ENCOUNTER — CONSULT (OUTPATIENT)
Dept: RADIATION ONCOLOGY | Facility: HOSPITAL | Age: 87
End: 2022-03-02

## 2022-03-02 VITALS
HEART RATE: 71 BPM | OXYGEN SATURATION: 94 % | BODY MASS INDEX: 27.65 KG/M2 | DIASTOLIC BLOOD PRESSURE: 76 MMHG | WEIGHT: 137 LBS | SYSTOLIC BLOOD PRESSURE: 159 MMHG

## 2022-03-02 DIAGNOSIS — C50.212 CARCINOMA OF UPPER-INNER QUADRANT OF LEFT BREAST IN FEMALE, ESTROGEN RECEPTOR POSITIVE: Primary | ICD-10-CM

## 2022-03-02 DIAGNOSIS — Z17.0 CARCINOMA OF UPPER-INNER QUADRANT OF LEFT BREAST IN FEMALE, ESTROGEN RECEPTOR POSITIVE: Primary | ICD-10-CM

## 2022-03-02 PROCEDURE — 99204 OFFICE O/P NEW MOD 45 MIN: CPT | Performed by: RADIOLOGY

## 2022-03-02 PROCEDURE — G0463 HOSPITAL OUTPT CLINIC VISIT: HCPCS | Performed by: RADIOLOGY

## 2022-03-02 NOTE — PROGRESS NOTES
Radiation Oncology Consult Note    Name: Neha Guillory  YOB: 1933  MRN #: [id number]  Date of Service: 3/2/2022  Referring Provider: Bel Marina MD  Formerly [facility] [place] [address]  Primary Care Provider: Tracy Steiner APRN      DIAGNOSIS: Left upper inner quadrant, Invasive lobular carcinoma, 7 mm, [id number], ER 99%, WA 60% and Her2-, Ki-67 4%.    Encounter Diagnosis   Name Primary?   • Carcinoma of upper-inner quadrant of left breast in female, estrogen receptor positive (HCC) Yes       REASON FOR CONSULTATION/CHIEF COMPLAINT:  Newly diagnosed Left breast cancer  I was asked to see the patient at the request of the referring provider noted below for advice and recommendations regarding this diagnosis and the role of radiation therapy.                              REQUESTING PHYSICIAN:  Bel Marina Md  [address] [place], [place] [address] [address]    RECORDS OBTAINED:  Records of the patients history including those obtained from the referring provider were reviewed and summarized in detail.    HISTORY OF PRESENT ILLNESS:  Neha Guillory is a 89 y.o. female initially presented with issues arising from what was found to be chronic right shoulder disease, complete rotator cuff tear with longstanding effusion.  She went to her PCP with spontaneous bleed or erythema that began in her right neck extending down to the shoulder and then across the shoulder and breast.    She was started on Keflex and concerns were for potential inflammatory breast cancer on the Right.  A diagnostic MMG and U/S was done on 1/26/2022 and showed no abnormal right breast findings.  However, a small area of focal asymmetry in the posterior one third upper inner quadrant of the left breast was noted.  It was persistent on spt compression and measured 7 mm.  U/S showed no abnormalities in the right breast at the area of redness.  The U/S did show the 5-7 mm irregular hypoechoic mass in the left breast at  11:00, 8 cm from the nipple. U/S guided biopsy on the L was done on 2/17/2022 with bowtie clip placed, path showing invasive lobular carcinoma, grade 1 with no LVSI. It is ER positive at 99%, OK positive at 60%, HER-2/sulma negative, Ki-67 4%.    Due to the erythema on the chest wall, she ultimately came to the ER where CT scan was done and there was chronic right shoulder disease noted, complete rotator cuff tear with longstanding effusion.  No evidence of intrathoracic malignancy.  Happily she is scheduled for an aspiration of the right shoulder joint on May 3 and a follow-up with ENZO Jo.    She has discussed L breast cancer treatment options with Dr Marina, who performed a prior parathyroid surgery on her, and she is motivated for breast conserving surgery but does not want adjuvant XRT, chemotherapy or aggressive measures the patient notes.    Family cancer history is positive for sister with breast cancer, a broth had a brain tumor, a brother/sister/maternal grandfather had colon cancer.    She does have immobility of the right shoulder, redness of right chest and shoulder with impending joint aspiration.  She has been referred to Dr. Dill as well.    The following portions of the patient's history were reviewed and updated as appropriate: allergies, current medications, past family history, past medical history, past social history, past surgical history and problem list. Reviewed with the patient and remain unchanged.    PAST MEDICAL HISTORY:  she  has a past medical history of Arthritis, Carcinoma of upper-inner quadrant of left breast in female, estrogen receptor positive (HCC) (2/24/2022), Carotid artery stenosis, Carotid atherosclerosis, bilateral (7/12/2019), Cataract, Depression, Gastroenteritis, Generalized osteoarthritis (2/1/2016), H/O diastolic dysfunction, Hyperlipidemia, Hyperparathyroidism (HCC), Hyperparathyroidism (HCC), Hypertension, Hypertrophic cardiomyopathy (HCC),  Neuromuscular disorder (HCC), Obstructive sleep apnea syndrome (2/1/2016), Osteopenia, Osteoporosis, Primary familial hypertrophic cardiomyopathy (HCC) (2/1/2016), Pulmonary hypertension (HCC), Synovial cyst of popliteal space (2/1/2016), and Vitamin D deficiency.  MEDICATIONS:   Current Outpatient Medications:   •  amLODIPine (NORVASC) 5 MG tablet, Take 5 mg by mouth Daily., Disp: , Rfl:   •  aspirin 81 MG EC tablet, Take 81 mg by mouth Daily., Disp: , Rfl:   •  B Complex Vitamins (vitamin b complex) capsule capsule, Take 1 capsule by mouth Daily., Disp: , Rfl:   •  carvedilol (COREG) 25 MG tablet, TAKE ONE TABLET BY MOUTH TWICE A DAY WITH MEALS, Disp: 180 tablet, Rfl: 1  •  cholecalciferol (VITAMIN D3) 1000 units tablet, Take 1,000 Units by mouth Daily., Disp: , Rfl:   •  Lactobacillus (PROBIOTIC ACIDOPHILUS PO), Take 1 tablet by mouth Daily., Disp: , Rfl:   •  olmesartan (BENICAR) 20 MG tablet, Take 20 mg by mouth Daily., Disp: , Rfl:   •  pravastatin (PRAVACHOL) 40 MG tablet, Take 1 tablet by mouth Daily., Disp: 90 tablet, Rfl: 1  •  vitamin C (ASCORBIC ACID) 250 MG tablet, Take 500 mg by mouth Daily., Disp: , Rfl:   •  Zinc 50 MG capsule, Take 50 mg by mouth Daily., Disp: , Rfl:   •  denosumab (PROLIA) 60 MG/ML solution prefilled syringe syringe, Inject 1 syringe under the skin into the appropriate area as directed Every 6 (Six) Months., Disp: , Rfl:   ALLERGIES:   Allergies   Allergen Reactions   • Amoxicillin Hives   • Atorvastatin Myalgia   • Atorvastatin Calcium Myalgia     PAST SURGICAL HISTORY: she has a past surgical history that includes Bunionectomy (Right); Hand surgery (Right); Colonoscopy (N/A, 2011); Thyroidectomy (Right, 4/20/2016); and Breast biopsy (Left, 02/17/2022).  PREVIOUS RADIOTHERAPY OR CHEMOTHERAPY: no  FAMILY HISTORY: her family history includes Breast cancer in her sister, sister, and sister; Cancer in an other family member; Colon cancer in her brother, brother, brother, maternal  grandfather, and sister; Heart disease in her brother; Other in her brother.  SOCIAL HISTORY: she  reports that she has never smoked. She has never used smokeless tobacco. She reports that she does not drink alcohol and does not use drugs.  PAIN AND PAIN MANAGEMENT: no pain.  Vitals:    03/02/22 1531   BP: 159/76   Pulse: 71   SpO2: 94%   Weight: 62.1 kg (137 lb)   PainSc: 0-No pain     NUTRITIONAL STATUS:  no issues  KPS: 100: Normal; no evidence of disease        PHQ-9 Total Score: 0     Review of Systems:   General: No fevers, chills, weight change, or drenching night sweats. Skin: No rashes or jaundice.  HEENT: No change in vision or hearing, no headaches.  Neck: No dysphagia or masses.  Heme/Lymph: No easy bruising or bleeding.  Respiratory System: No shortness of breath or cough.  Cardiovascular: No chest pain, palpitations, or dyspnea on exertion.  - Pacemaker. GI: No nausea, vomiting, diarrhea, melena, or hematochezia.  : No dysuria or hematuria.  Endocrine: No heat or cold intolerance. Musculoskeletal: No myalgias or arthralgias.  Neuro: No weakness, numbness, syncope, or seizures. Psych: No mood changes or depression. Ext: Denies swelling.      Objective     Vitals:  Vitals:    03/02/22 1531   BP: 159/76   Pulse: 71   SpO2: 94%   Weight: 62.1 kg (137 lb)   PainSc: 0-No pain         PHYSICAL EXAM:  GENERAL: in no apparent distress, sitting comfortably in room.    HEENT: normocephalic, atraumatic. Pupils are equal, round, reactive to light. Sclera anicteric. Conjunctiva not injected. Oropharynx without erythema, ulcerations or thrush.   NECK: Supple with no masses.  LYMPHATIC: no cervical, supraclavicular or axillary adenopathy appreciated bilaterally.   CARDIOVASCULAR: S1 & S2 detected; no murmurs, rubs or gallops.  CHEST: clear to auscultation bilaterally; no wheezes, crackles or rubs. Work of breathing normal.  ABDOMEN: bowel sounds present. Abdomen is soft, nontender, nondistended.   MUSCULOSKELETAL:  no tenderness to palpation along the spine or scapulae. Normal range of motion.  EXTREMITIES: no clubbing, cyanosis, edema.  SKIN: no erythema, rashes, ulcerations noted.   NEUROLOGIC: cranial nerves II-XII grossly intact bilaterally. No focal neurologic deficits.  PSYCHIATRIC:  alert, aware, and appropriate.  BREASTS: Left breast unremarkable outside biopsy changes, no skin changes, discharge or pain; Right breast unremarkable with no dominant masses, skin changes, discharge or pain.  Exam done given the prior inflammatory question but no concerning findings on my exam.    PERTINENT IMAGING/PATHOLOGY/LABS (Medical Decision Making):     COORDINATION OF CARE: A copy of this note is sent to the referring provider.    PATHOLOGY (Reviewed): as noted above.    IMAGING (Reviewed): as noted above.    LABS (Reviewed):  Hematology WBC   Date Value Ref Range Status   03/07/2022 8.30 3.40 - 10.80 10*3/mm3 Final   01/13/2022 8.9 3.4 - 10.8 x10E3/uL Final     RBC   Date Value Ref Range Status   03/07/2022 4.59 3.77 - 5.28 10*6/mm3 Final   01/13/2022 4.41 3.77 - 5.28 x10E6/uL Final     Hemoglobin   Date Value Ref Range Status   03/07/2022 13.6 12.0 - 15.9 g/dL Final     Hematocrit   Date Value Ref Range Status   03/07/2022 41.7 34.0 - 46.6 % Final     Platelets   Date Value Ref Range Status   03/07/2022 190 140 - 450 10*3/mm3 Final      Chemistry   Lab Results   Component Value Date    GLUCOSE 123 (H) 01/03/2022    BUN 17 01/03/2022    CREATININE 1.00 02/08/2022    EGFRIFNONA 70 01/03/2022    EGFRIFAFRI 73 08/30/2021    BCR 21.8 01/03/2022    K 4.3 01/03/2022    CO2 29.0 01/03/2022    CALCIUM 10.1 01/03/2022    PROTENTOTREF 6.9 08/30/2021    ALBUMIN 4.70 01/03/2022    LABIL2 1.9 08/30/2021    AST 18 01/03/2022    ALT 17 01/03/2022       Assessment/Plan     ASSESSMENT AND PLAN:  1. Carcinoma of upper-inner quadrant of left breast in female, estrogen receptor positive (HCC)       Left upper inner quadrant, Invasive lobular  carcinoma, 7 mm, [id number], ER 99%, [place] 60% and Her2-, Ki-67 4%.  -Good candidate for breast conserving surgery  -Upcoming appt with Ortho for the right shoulder.    I discussed the rationale and national guidelines for adjuvant whole breast radiation therapy.  She is going to go forward with SLNB + lumpectomy and strongly desires no chemotherapy or adjuvant radiation therapy.  I was able to share that in women 70 and older with ER+ smaller tumors that AI alone is a great option without loss of survival benefit compared to AI + adjuvant XRT.    I have discussed this with Dr. Marina and will remain available should final pathology present a surprise or further discussion is available but it appears Breast conserving surgery + AI is a great option alone.        This assessment comes from my review of the imaging, pathology, physician notes and other pertinent information as mentioned.    DISPOSITION: Will remain available to review/discuss final pathology--no anticipated XRT at this time if favorable pathology at that time as expected.      TIME SPENT WITH PATIENT:   I spent 45 minutes caring for Neha on this date of service. This time includes time spent by me in the following activities: preparing for the visit, reviewing tests, obtaining and/or reviewing a separately obtained history, performing a medically appropriate examination and/or evaluation, referring and communicating with other health care professionals, documenting information in the medical record and care coordination         CC: Bel Marina MD Terrell, Amanda, APRN John A Cox, MD  3/7/2022  7:56 AM EST

## 2022-03-07 ENCOUNTER — CONSULT (OUTPATIENT)
Dept: ONCOLOGY | Facility: CLINIC | Age: 87
End: 2022-03-07

## 2022-03-07 ENCOUNTER — LAB REQUISITION (OUTPATIENT)
Dept: LAB | Facility: HOSPITAL | Age: 87
End: 2022-03-07

## 2022-03-07 ENCOUNTER — HOSPITAL ENCOUNTER (OUTPATIENT)
Dept: GENERAL RADIOLOGY | Facility: HOSPITAL | Age: 87
Discharge: HOME | End: 2022-03-07
Admitting: NURSE PRACTITIONER

## 2022-03-07 ENCOUNTER — LAB (OUTPATIENT)
Dept: LAB | Facility: HOSPITAL | Age: 87
End: 2022-03-07

## 2022-03-07 VITALS
RESPIRATION RATE: 18 BRPM | TEMPERATURE: 97.3 F | HEIGHT: 59 IN | OXYGEN SATURATION: 97 % | DIASTOLIC BLOOD PRESSURE: 82 MMHG | WEIGHT: 138.3 LBS | SYSTOLIC BLOOD PRESSURE: 160 MMHG | HEART RATE: 61 BPM | BODY MASS INDEX: 27.88 KG/M2

## 2022-03-07 DIAGNOSIS — M25.411 EFFUSION, RIGHT SHOULDER: ICD-10-CM

## 2022-03-07 DIAGNOSIS — Z80.3 FH: BREAST CANCER: Primary | ICD-10-CM

## 2022-03-07 DIAGNOSIS — M12.811 ROTATOR CUFF ARTHROPATHY OF BOTH SHOULDERS: ICD-10-CM

## 2022-03-07 DIAGNOSIS — C50.212 CARCINOMA OF UPPER-INNER QUADRANT OF LEFT BREAST IN FEMALE, ESTROGEN RECEPTOR POSITIVE: Primary | ICD-10-CM

## 2022-03-07 DIAGNOSIS — M81.8 OTHER OSTEOPOROSIS WITHOUT CURRENT PATHOLOGICAL FRACTURE: ICD-10-CM

## 2022-03-07 DIAGNOSIS — M25.411 EFFUSION OF JOINT OF RIGHT SHOULDER: ICD-10-CM

## 2022-03-07 DIAGNOSIS — M12.812 ROTATOR CUFF ARTHROPATHY OF BOTH SHOULDERS: ICD-10-CM

## 2022-03-07 DIAGNOSIS — E55.9 VITAMIN D DEFICIENCY: ICD-10-CM

## 2022-03-07 DIAGNOSIS — Z79.899 LONG-TERM USE OF HIGH-RISK MEDICATION: ICD-10-CM

## 2022-03-07 DIAGNOSIS — Z17.0 CARCINOMA OF UPPER-INNER QUADRANT OF LEFT BREAST IN FEMALE, ESTROGEN RECEPTOR POSITIVE: Primary | ICD-10-CM

## 2022-03-07 LAB
APPEARANCE FLD: ABNORMAL
BASOPHILS # BLD AUTO: 0.04 10*3/MM3 (ref 0–0.2)
BASOPHILS NFR BLD AUTO: 0.5 % (ref 0–1.5)
COLOR FLD: ABNORMAL
CRYSTALS FLD MICRO: NORMAL
DEPRECATED RDW RBC AUTO: 42.7 FL (ref 37–54)
EOSINOPHIL # BLD AUTO: 0.16 10*3/MM3 (ref 0–0.4)
EOSINOPHIL NFR BLD AUTO: 1.9 % (ref 0.3–6.2)
EOSINOPHIL NFR FLD MANUAL: 1 %
ERYTHROCYTE [DISTWIDTH] IN BLOOD BY AUTOMATED COUNT: 13 % (ref 12.3–15.4)
HCT VFR BLD AUTO: 41.7 % (ref 34–46.6)
HGB BLD-MCNC: 13.6 G/DL (ref 12–15.9)
IMM GRANULOCYTES # BLD AUTO: 0.06 10*3/MM3 (ref 0–0.05)
IMM GRANULOCYTES NFR BLD AUTO: 0.7 % (ref 0–0.5)
LYMPHOCYTES # BLD AUTO: 1 10*3/MM3 (ref 0.7–3.1)
LYMPHOCYTES NFR BLD AUTO: 12 % (ref 19.6–45.3)
LYMPHOCYTES NFR FLD MANUAL: 23 %
MCH RBC QN AUTO: 29.6 PG (ref 26.6–33)
MCHC RBC AUTO-ENTMCNC: 32.6 G/DL (ref 31.5–35.7)
MCV RBC AUTO: 90.8 FL (ref 79–97)
METHOD: ABNORMAL
MONOCYTES # BLD AUTO: 0.94 10*3/MM3 (ref 0.1–0.9)
MONOCYTES NFR BLD AUTO: 11.3 % (ref 5–12)
MONOCYTES NFR FLD: 21 %
MONOS+MACROS NFR FLD: 5 %
NEUTROPHILS NFR BLD AUTO: 6.1 10*3/MM3 (ref 1.7–7)
NEUTROPHILS NFR BLD AUTO: 73.6 % (ref 42.7–76)
NEUTROPHILS NFR FLD MANUAL: 50 %
NRBC BLD AUTO-RTO: 0 /100 WBC (ref 0–0.2)
NUC CELL # FLD: 426 /MM3
PLATELET # BLD AUTO: 190 10*3/MM3 (ref 140–450)
PMV BLD AUTO: 10.9 FL (ref 6–12)
RBC # BLD AUTO: 4.59 10*6/MM3 (ref 3.77–5.28)
RBC # FLD AUTO: ABNORMAL /MM3
WBC NRBC COR # BLD: 8.3 10*3/MM3 (ref 3.4–10.8)

## 2022-03-07 PROCEDURE — 0 LIDOCAINE 1 % SOLUTION: Performed by: RADIOLOGY

## 2022-03-07 PROCEDURE — 87015 SPECIMEN INFECT AGNT CONCNTJ: CPT | Performed by: NURSE PRACTITIONER

## 2022-03-07 PROCEDURE — 89060 EXAM SYNOVIAL FLUID CRYSTALS: CPT | Performed by: NURSE PRACTITIONER

## 2022-03-07 PROCEDURE — 87075 CULTR BACTERIA EXCEPT BLOOD: CPT | Performed by: NURSE PRACTITIONER

## 2022-03-07 PROCEDURE — 36415 COLL VENOUS BLD VENIPUNCTURE: CPT

## 2022-03-07 PROCEDURE — 89051 BODY FLUID CELL COUNT: CPT | Performed by: NURSE PRACTITIONER

## 2022-03-07 PROCEDURE — 87070 CULTURE OTHR SPECIMN AEROBIC: CPT | Performed by: NURSE PRACTITIONER

## 2022-03-07 PROCEDURE — 87205 SMEAR GRAM STAIN: CPT | Performed by: NURSE PRACTITIONER

## 2022-03-07 PROCEDURE — 85025 COMPLETE CBC W/AUTO DIFF WBC: CPT

## 2022-03-07 PROCEDURE — 77002 NEEDLE LOCALIZATION BY XRAY: CPT

## 2022-03-07 PROCEDURE — 99205 OFFICE O/P NEW HI 60 MIN: CPT | Performed by: INTERNAL MEDICINE

## 2022-03-07 RX ORDER — LIDOCAINE HYDROCHLORIDE 10 MG/ML
10 INJECTION, SOLUTION INFILTRATION; PERINEURAL ONCE
Status: COMPLETED | OUTPATIENT
Start: 2022-03-07 | End: 2022-03-07

## 2022-03-07 RX ADMIN — LIDOCAINE HYDROCHLORIDE 2 ML: 10 INJECTION, SOLUTION INFILTRATION; PERINEURAL at 13:22

## 2022-03-07 NOTE — PROGRESS NOTES
Subjective Patient seen with her daughter, feeling reasonably well    REASON FOR CONSULTATION: Left sided early stage breast cancer  Provide an opinion on any further workup or treatment                           REQUESTING PHYSICIAN: ENZO Larry, Bel Marina MD    RECORDS OBTAINED:  Records of the patients history including those obtained from the referring provider were reviewed and summarized in detail.    HISTORY OF PRESENT ILLNESS:  The patient is a 89 y.o. year old female who is here for an opinion about the above issue.    History of Present Illness   The patient is an 89-year-old female with a previously surgically treated hyperparathyroidism.  She had recently developed spontaneous bleeding led additional in her right neck extending to the shoulder and across the shoulder and breast with subsequent certain about inflammatory breast cancer.  She underwent a screening mammogram 1/26/2022 and then diagnostic mammogram and ultrasound right breast.  There was an area of focal asymmetry in the posterior one third upper inner quadrant left breast not the affected breast, persist on spot compression negative findings on ultrasound though there was a 5 mm hypoechoic mass left breast that was noted.  Ultrasound-guided biopsy 2/17 revealed invasive lobular carcinoma grade 1 with no lymphatic or perineural invasion, ER 99%, DE 60%, HER-2 negative and Ki-67 of 4%.     Her chest wall erythema led to an ER visit with CT scan demonstrating chronic right shoulder disease, complete rotator cuff tear.     The patient was seen 2/24/2022 by Dr. Marina general surgery with the surgery requesting not to have radiation therapy at home mastectomy but agreeable to lumpectomy and sentinel lymph node.     Patient now referred to medical oncology for consideration of endocrine therapy as appropriate.      At the time of this dictation she has been seen by radiation therapy.  There are plans to proceed with lumpectomy and  sentinel lymph node biopsy and the fact that women of 70 years or older with ER positive tumors do not gain substantially with the addition of radiation therapy.  This has to be balanced with the fact that the patient is known to have osteoporosis involving both hips with left hip T score -3.1 and right hip T score of -2.8, lumbar T score is -0.8.    These findings are discussed with the patient and her daughter in some detail when they are seen 3/7/2022 particularly recognizing the patient is quite active, lives on her own and continues to manage all her daily activities.  Notably she continues to have issues with bilateral rotator cuff injury right greater than left and is to be seen for aspiration of her right shoulder prior to decision made for her breast surgery.    Past Medical History:   Diagnosis Date   • Arthritis    • Carcinoma of upper-inner quadrant of left breast in female, estrogen receptor positive (HCC) 2/24/2022   • Carotid artery stenosis    • Carotid atherosclerosis, bilateral 7/12/2019   • Cataract     BILATERAL   • Depression    • Gastroenteritis    • Generalized osteoarthritis 2/1/2016   • H/O diastolic dysfunction    • Hyperlipidemia    • Hyperparathyroidism (HCC)    • Hyperparathyroidism (HCC)    • Hypertension    • Hypertrophic cardiomyopathy (HCC)    • Neuromuscular disorder (HCC)    • Obstructive sleep apnea syndrome 2/1/2016   • Osteopenia    • Osteoporosis    • Primary familial hypertrophic cardiomyopathy (HCC) 2/1/2016   • Pulmonary hypertension (HCC)    • Synovial cyst of popliteal space 2/1/2016   • Vitamin D deficiency         Past Surgical History:   Procedure Laterality Date   • BREAST BIOPSY Left 02/17/2022   • BUNIONECTOMY Right     FOOT SURGERY   • COLONOSCOPY N/A 2011    Dr. Luis   • HAND SURGERY Right     TUMOR REMOVED ON FOREFINGER   • THYROIDECTOMY Right 4/20/2016    Procedure: RIGHT SUPERIOR PARATHYROIDECTOMY;  Surgeon: Bel Marina MD;  Location: [facility] OR;   Service:         Current Outpatient Medications on File Prior to Visit   Medication Sig Dispense Refill   • amLODIPine (NORVASC) 5 MG tablet Take 5 mg by mouth Daily.     • aspirin 81 MG EC tablet Take 81 mg by mouth Daily.     • B Complex Vitamins (vitamin b complex) capsule capsule Take 1 capsule by mouth Daily.     • carvedilol (COREG) 25 MG tablet TAKE ONE TABLET BY MOUTH TWICE A DAY WITH MEALS 180 tablet 1   • cholecalciferol (VITAMIN D3) 1000 units tablet Take 1,000 Units by mouth Daily.     • denosumab (PROLIA) 60 MG/ML solution prefilled syringe syringe Inject 1 syringe under the skin into the appropriate area as directed Every 6 (Six) Months.     • Lactobacillus (PROBIOTIC ACIDOPHILUS PO) Take 1 tablet by mouth Daily.     • olmesartan (BENICAR) 20 MG tablet Take 20 mg by mouth Daily.     • pravastatin (PRAVACHOL) 40 MG tablet Take 1 tablet by mouth Daily. 90 tablet 1   • vitamin C (ASCORBIC ACID) 250 MG tablet Take 500 mg by mouth Daily.     • Zinc 50 MG capsule Take 50 mg by mouth Daily.       No current facility-administered medications on file prior to visit.        ALLERGIES:    Allergies   Allergen Reactions   • Amoxicillin Hives   • Atorvastatin Myalgia   • Atorvastatin Calcium Myalgia        Social History     Socioeconomic History   • Marital status: Single   Tobacco Use   • Smoking status: Never Smoker   • Smokeless tobacco: Never Used   Vaping Use   • Vaping Use: Never used   Substance and Sexual Activity   • Alcohol use: No   • Drug use: No   • Sexual activity: Defer        Family History   Problem Relation Age of Onset   • Breast cancer Sister    • Heart disease Brother         CABG   • Other Brother         BRAIN TUMOR   • Colon cancer Brother    • Cancer Other    • Breast cancer Sister    • Breast cancer Sister    • Colon cancer Brother    • Colon cancer Sister    • Colon cancer Maternal Grandfather    • Colon cancer Brother       Family cancer history is positive for sister with breast  "cancer, a broth had a brain tumor, a brother/sister/maternal grandfather had colon cancer.     Review of Systems     Objective     Vitals:    03/07/22 1049   BP: 160/82   Pulse: 61   Resp: 18   Temp: 97.3 °F (36.3 °C)   TempSrc: Temporal   SpO2: 97%   Weight: 62.7 kg (138 lb 4.8 oz)   Height: 149.9 cm (59.02\")   PainSc: 0-No pain     Current Status 3/7/2022   ECOG score 0       Physical Exam  Constitutional:       Appearance: Normal appearance.   HENT:      Head: Normocephalic and atraumatic.      Nose: Nose normal.      Mouth/Throat:      Mouth: Mucous membranes are moist.      Pharynx: Oropharynx is clear.   Eyes:      Extraocular Movements: Extraocular movements intact.      Conjunctiva/sclera: Conjunctivae normal.      Pupils: Pupils are equal, round, and reactive to light.   Neck:      Comments: Status post previous parathyroid surgery, well-healed  Cardiovascular:      Rate and Rhythm: Normal rate and regular rhythm.      Pulses: Normal pulses.      Heart sounds: Normal heart sounds.   Pulmonary:      Effort: Pulmonary effort is normal.      Breath sounds: Normal breath sounds.      Comments: Bilateral breast exam, left breast with upper inner quadrant mass-?  1 cm, movable, nontender  Musculoskeletal:      Cervical back: Normal range of motion and neck supple.   Neurological:      Mental Status: She is alert.           RECENT LABS:  Hematology WBC   Date Value Ref Range Status   03/07/2022 8.30 3.40 - 10.80 10*3/mm3 Final   01/13/2022 8.9 3.4 - 10.8 x10E3/uL Final     RBC   Date Value Ref Range Status   03/07/2022 4.59 3.77 - 5.28 10*6/mm3 Final   01/13/2022 4.41 3.77 - 5.28 x10E6/uL Final     Hemoglobin   Date Value Ref Range Status   03/07/2022 13.6 12.0 - 15.9 g/dL Final     Hematocrit   Date Value Ref Range Status   03/07/2022 41.7 34.0 - 46.6 % Final     Platelets   Date Value Ref Range Status   03/07/2022 190 140 - 450 10*3/mm3 Final          Assessment/Plan          89-year-old female with a history of " osteoarthritis, hyperparathyroidism treated surgically, obstructive sleep apnea, carotid artery disease, hypertrophic cardiomyopathy, osteoporosis and bilateral rotator cuff injuries right greater than left.  She has, recently, been found to have a carcinoma the upper inner quadrant of the left breast-invasive lobular carcinoma overall grade 1, 5 mm, ER 99%, [place] 60%, Ki-67 4%-[id number].     The patient is here with her daughter 3/7/2022 we have discussed her treatment overall for her newfound breast cancer with patient preference to avoid chemotherapy or adjuvant radiation therapy.  There is no evidence that chemotherapy has a role in this patient's care and we have discussed that it would not be necessary.  Radiation therapy benefit has also been discussed and will not be pursued.      Notably the latter is supported by a previous meta-analysis that looked at women greater than 70 years of age with clinical stage I ER positive women who were treated with radiation and tamoxifen with results that indicate that baseline risk is low enough to reasonably avoid RT.    As we discussed endocrine therapy she is also felt better served with tamoxifen therapy as result of her underlying osteoporosis.  She has been on long-term Prolia without substantial effect to this point and might be better served with an alternative therapy such as Reclast.  After considerable discussion plan:    *Patient to proceed this afternoon with review and fluid guided aspiration of her right shoulder    *Patient likely to proceed with left lumpectomy and sentinel lymph node biopsy as planned    *We will ask her to be seen back here in approximately 4 to 5 weeks pending the results of that surgery    *She would be best a candidate for tamoxifen therapy rather than AI treatment (above rationale)    * When she is seen back 4 to 5 weeks we will prepare to provide Reclast pending her status at that point.  She is encouraged to continue vitamin D and  calcium supplementation.    *Patient and her daughter agreeable with this plan and follow-up

## 2022-03-10 LAB
BACTERIA FLD CULT: NORMAL
GRAM STN SPEC: NORMAL
GRAM STN SPEC: NORMAL

## 2022-03-10 NOTE — PROGRESS NOTES
I would like to see if Ms. Guillory would rather do a telephone visit? Could you see if she would like to do that and schedule it as such if so? Her labs have all essentially been normal so far and I wanted to at least offer her the option. Of course, if she prefers to come in I am happy to see her that way as well.

## 2022-03-12 LAB — BACTERIA SPEC ANAEROBE CULT: NORMAL

## 2022-03-14 ENCOUNTER — OFFICE VISIT (OUTPATIENT)
Dept: ORTHOPEDIC SURGERY | Facility: CLINIC | Age: 87
End: 2022-03-14

## 2022-03-14 ENCOUNTER — PREP FOR SURGERY (OUTPATIENT)
Dept: OTHER | Facility: HOSPITAL | Age: 87
End: 2022-03-14

## 2022-03-14 VITALS — HEIGHT: 59 IN | WEIGHT: 138 LBS | BODY MASS INDEX: 27.82 KG/M2

## 2022-03-14 DIAGNOSIS — C50.212 CARCINOMA OF UPPER-INNER QUADRANT OF LEFT BREAST IN FEMALE, ESTROGEN RECEPTOR POSITIVE: Primary | ICD-10-CM

## 2022-03-14 DIAGNOSIS — Z17.0 CARCINOMA OF UPPER-INNER QUADRANT OF LEFT BREAST IN FEMALE, ESTROGEN RECEPTOR POSITIVE: Primary | ICD-10-CM

## 2022-03-14 DIAGNOSIS — M25.411 EFFUSION OF JOINT OF RIGHT SHOULDER: Primary | ICD-10-CM

## 2022-03-14 DIAGNOSIS — M12.812 ROTATOR CUFF ARTHROPATHY OF BOTH SHOULDERS: ICD-10-CM

## 2022-03-14 DIAGNOSIS — M12.811 ROTATOR CUFF ARTHROPATHY OF BOTH SHOULDERS: ICD-10-CM

## 2022-03-14 PROCEDURE — 99442 PR PHYS/QHP TELEPHONE EVALUATION 11-20 MIN: CPT | Performed by: NURSE PRACTITIONER

## 2022-03-14 RX ORDER — ACETAMINOPHEN 500 MG
1000 TABLET ORAL ONCE
Status: CANCELLED | OUTPATIENT
Start: 2022-03-14 | End: 2022-03-14

## 2022-03-14 RX ORDER — SODIUM CHLORIDE 0.9 % (FLUSH) 0.9 %
10 SYRINGE (ML) INJECTION AS NEEDED
Status: CANCELLED | OUTPATIENT
Start: 2022-03-14

## 2022-03-14 RX ORDER — LIDOCAINE AND PRILOCAINE 25; 25 MG/G; MG/G
1 CREAM TOPICAL ONCE
Status: CANCELLED | OUTPATIENT
Start: 2022-03-14 | End: 2022-03-14

## 2022-03-14 RX ORDER — CLINDAMYCIN PHOSPHATE 900 MG/50ML
900 INJECTION INTRAVENOUS ONCE
Status: CANCELLED | OUTPATIENT
Start: 2022-03-14 | End: 2022-03-14

## 2022-03-14 RX ORDER — SODIUM CHLORIDE 0.9 % (FLUSH) 0.9 %
10 SYRINGE (ML) INJECTION EVERY 12 HOURS SCHEDULED
Status: CANCELLED | OUTPATIENT
Start: 2022-03-14

## 2022-03-14 RX ORDER — OXYCODONE HCL 10 MG/1
10 TABLET, FILM COATED, EXTENDED RELEASE ORAL ONCE
Status: CANCELLED | OUTPATIENT
Start: 2022-03-14 | End: 2022-03-14

## 2022-03-14 RX ORDER — DIAZEPAM 5 MG/1
10 TABLET ORAL ONCE
Status: CANCELLED | OUTPATIENT
Start: 2022-03-14 | End: 2022-03-14

## 2022-03-14 NOTE — PROGRESS NOTES
[facility]              Follow Up      Patient Name: Neha Guillory  : 1933  Primary Care Physician: Tracy Steiner APRN        Chief Complaint: Follow-up for right shoulder aspiration    You have chosen to receive care through a telephone visit. Do you consent to use a telephone visit for your medical care today? Yes      HPI:   Neha Guillory is a 89 y.o. year old female who is following up today with me via telemedicine/telephone visit for right shoulder aspiration.  She and her daughter were available to speak with me today via telephone. In person visit was offered but telephone visit was preferred today per the patient.     I last saw her approximately 3 months ago where she presented with a sudden onset of right chest wall redness with history of chronic shoulder pain.  Due to some concerns for possible malignancy referred her back to primary care who ultimately got a diagnostic mammogram showing a left breast malignancy.  She is seeing Dr. Marina for this and is scheduled to undergo surgery with her pending the results of her lab work.    Patient is status post aspiration of the right shoulder effusion due to possible concern for infectious etiology.  She has a history of chronic right shoulder pain and limited range of motion due to to rotator cuff arthropathy.  She has been dealing with the right shoulder for 20 years or more and has not had any specific treatments.  She typically manages her symptoms with activity modification   And medications.    She reports no new changes in the interim other than as outlined above, she says the redness seems to have worsened just a little bit but she is been trying to move the shoulder more.  She states that she did not really get any specific relief from the aspiration of the joint.      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, estrogen receptor positive (HCC) 2/24/2022   • Carotid artery stenosis    • Carotid atherosclerosis, bilateral 7/12/2019   • Cataract     BILATERAL   • Depression    • Gastroenteritis    • Generalized osteoarthritis 2/1/2016   • H/O diastolic dysfunction    • Hyperlipidemia    • Hyperparathyroidism (HCC)    • Hyperparathyroidism (HCC)    • Hypertension    • Hypertrophic cardiomyopathy (HCC)    • Neuromuscular disorder (HCC)    • Obstructive sleep apnea syndrome 2/1/2016   • Osteopenia    • Osteoporosis    • Primary familial hypertrophic cardiomyopathy (HCC) 2/1/2016   • Pulmonary hypertension (HCC)    • Synovial cyst of popliteal space 2/1/2016   • Vitamin D deficiency      Past Surgical History:   Procedure Laterality Date   • BREAST BIOPSY Left 02/17/2022   • BUNIONECTOMY Right     FOOT SURGERY   • COLONOSCOPY N/A 2011    Dr. Luis   • HAND SURGERY Right     TUMOR REMOVED ON FOREFINGER   • THYROIDECTOMY Right 4/20/2016    Procedure: RIGHT SUPERIOR PARATHYROIDECTOMY;  Surgeon: eBl Marina MD;  Location: [facility];  Service:      Social History     Occupational History     Employer: RETIRED   Tobacco Use   • Smoking status: Never Smoker   • Smokeless tobacco: Never Used   Vaping Use   • Vaping Use: Never used   Substance and Sexual Activity   • Alcohol use: No   • Drug use: No   • Sexual activity: Defer      Social History     Social History Narrative   • Not on file     Family History   Problem Relation Age of Onset   • Diabetes Mother    • Heart attack Mother    • Arthritis Father    • Breast cancer Sister    • Breast cancer Sister    • Breast cancer Sister    • Colon cancer Sister    • Heart disease Brother         CABG   • Other Brother         BRAIN TUMOR   • Colon cancer Brother    • Colon cancer Brother    • Colon cancer Brother    • Colon cancer Maternal Grandfather    • Cancer Other        Allergies:    Allergies   Allergen Reactions   • Amoxicillin Hives   • Atorvastatin Myalgia   • Atorvastatin Calcium Myalgia       Medications:   Home Medications:  Current Outpatient Medications on File Prior to Visit   Medication Sig   • amLODIPine (NORVASC) 5 MG tablet Take 5 mg by mouth Daily.   • aspirin 81 MG EC tablet Take 81 mg by mouth Daily.   • B Complex Vitamins (vitamin b complex) capsule capsule Take 1 capsule by mouth Daily.   • carvedilol (COREG) 25 MG tablet TAKE ONE TABLET BY MOUTH TWICE A DAY WITH MEALS   • cholecalciferol (VITAMIN D3) 1000 units tablet Take 1,000 Units by mouth Daily.   • denosumab (PROLIA) 60 MG/ML solution prefilled syringe syringe Inject 1 syringe under the skin into the appropriate area as directed Every 6 (Six) Months.   • Lactobacillus (PROBIOTIC ACIDOPHILUS PO) Take 1 tablet by mouth Daily.   • olmesartan (BENICAR) 20 MG tablet Take 20 mg by mouth Daily.   • pravastatin (PRAVACHOL) 40 MG tablet Take 1 tablet by mouth Daily.   • vitamin C (ASCORBIC ACID) 250 MG tablet Take 500 mg by mouth Daily.   • Zinc 50 MG capsule Take 50 mg by mouth Daily.     No current facility-administered medications on file prior to visit.         ROS:  ROS negative except as listed in the HPI.    Physical Exam:   89 y.o. female  Body mass index is 27.87 kg/m²., 62.6 kg (138 lb)  There were no vitals filed for this visit.    This was a telemedicine visit, no physical exam was performed.      Radiology:    No new images were needed at the visit today.   Prior imaging of the right shoulder showed severe rotator cuff arthropathy with erosion of the acromion and glenohumeral changes.      Misc. Data/Labs:     Crystal Exam, Fluid - Synovial Fluid, (03/07/2022 10:55)   Anaerobic Culture - Swab, Abdominal Wall (03/07/2022 10:55)   Body Fluid Cell Count With Differential - Body Fluid, (03/07/2022 10:55)   Body Fluid Culture - Body Fluid, Abdominal Wall (03/07/2022 10:55)     Assessment & Plan:    This is an 89-y/o  female with markedly advanced RTC arthropathy in the R shoulder. She has had chronic R shoulder pain for a number of years and had managed symptoms conservatively. She had an unusual presentation of chest wall erythema when I last saw her 3 months ago, no worsening shoulder pain or changes in the shoulder specifically. Out of concern for possible metastatic breast cancer I referred her back to her PCP and ultimately a breast mass was found on the left side (opposite) which was cancerous. We elected to proceed with sterile aspiration of her R shoulder in IR and labs were unremarkable for infectious process.     I suspect the redness is probably a hematoma from underlying degenerative changes in the shoulder joint- still somewhat unusual but given how advanced her disease is, not unreasonable. Dermatology consult could still be considered, will defer to PCP.    As far as her shoulder, I am happy to see her back as needed for possible injections, and ultimately she is aware replacement of the joint would be her surgical option. She is not interested in exploring that at this time. We can certainly have her see Dr. Mauirce for a discussion if interested.     She will follow up with Dr. Marina for her breast surgery.     Follow up PRN.      ICD-10-CM ICD-9-CM   1. Effusion of joint of right shoulder  M25.411 719.01   2. Rotator cuff arthropathy of both shoulders  M12.811 716.81    M12.812      No orders of the defined types were placed in this encounter.    No orders of the defined types were placed in this encounter.    Return if symptoms worsen or fail to improve.    ENZO Santos    I spent a total of 12 minutes on the phone with the patient and her daughter discussing her condition, treatment options and recommendations and follow up directions.    Dictated Utilizing Dragon Dictation

## 2022-03-14 NOTE — PROGRESS NOTES
Thank you, I will be seeing her this week as well and will discuss with her, I appreciate your help.

## 2022-03-25 ENCOUNTER — OFFICE VISIT (OUTPATIENT)
Dept: INTERNAL MEDICINE | Facility: CLINIC | Age: 87
End: 2022-03-25

## 2022-03-25 VITALS
TEMPERATURE: 98.2 F | DIASTOLIC BLOOD PRESSURE: 78 MMHG | HEART RATE: 67 BPM | HEIGHT: 59 IN | BODY MASS INDEX: 27.62 KG/M2 | RESPIRATION RATE: 17 BRPM | SYSTOLIC BLOOD PRESSURE: 131 MMHG | OXYGEN SATURATION: 91 % | WEIGHT: 137 LBS

## 2022-03-25 DIAGNOSIS — I10 RESISTANT HYPERTENSION: Primary | Chronic | ICD-10-CM

## 2022-03-25 DIAGNOSIS — E78.49 OTHER HYPERLIPIDEMIA: Chronic | ICD-10-CM

## 2022-03-25 DIAGNOSIS — C50.212 CARCINOMA OF UPPER-INNER QUADRANT OF LEFT BREAST IN FEMALE, ESTROGEN RECEPTOR POSITIVE: Chronic | ICD-10-CM

## 2022-03-25 DIAGNOSIS — I42.2 HYPERTROPHIC CARDIOMYOPATHY: Chronic | ICD-10-CM

## 2022-03-25 DIAGNOSIS — Z17.0 CARCINOMA OF UPPER-INNER QUADRANT OF LEFT BREAST IN FEMALE, ESTROGEN RECEPTOR POSITIVE: Chronic | ICD-10-CM

## 2022-03-25 PROCEDURE — 99214 OFFICE O/P EST MOD 30 MIN: CPT | Performed by: NURSE PRACTITIONER

## 2022-03-25 NOTE — PROGRESS NOTES
"Chief Complaint  Hypertension (Pt presents here today for a 6 month follow up for health maintenance.) and Hyperlipidemia    Subjective          Neha Guillory presents to [facility]  Patient presents accompanied by her daughter for a 6-month follow-up.  This is an 89-year-old female.    She has hyperlipidemia which is well controlled with pravastatin 40 mg daily.    She has hypertrophic cardiomyopathy followed by [place] cardiology, denies any new symptoms.    She has hypertension which is stable with amlodipine 5 mg daily, olmesartan 20 mg daily and Coreg 25 mg twice daily.    She has left breast cancer, following with Dr. Dill, oncology and Dr. Marina, general surgery who is going to perform a left breast lumpectomy next month.  The plan is for eventual tamoxifen therapy per oncology.    Otherwise reports that she is doing very well and denies development of any other new issues today.      Objective   Vital Signs:   /78 (BP Location: Left arm, Patient Position: Sitting, Cuff Size: Adult)   Pulse 67   Temp 98.2 °F (36.8 °C)   Resp 17   Ht 149.9 cm (59\")   Wt 62.1 kg (137 lb)   SpO2 91%   BMI 27.67 kg/m²            Physical Exam  Vitals and nursing note reviewed.   Constitutional:       General: She is not in acute distress.     Appearance: Normal appearance. She is well-developed. She is not ill-appearing, toxic-appearing or diaphoretic.   HENT:      Head: Normocephalic and atraumatic.      Right Ear: External ear normal.      Left Ear: External ear normal.   Eyes:      Pupils: Pupils are equal, round, and reactive to light.   Neck:      Vascular: No carotid bruit.   Cardiovascular:      Rate and Rhythm: Normal rate and regular rhythm.      Pulses: Normal pulses.      Heart sounds: Normal heart sounds.      Comments: No peripheral edema  Pulmonary:      Effort: Pulmonary effort is normal. No respiratory distress.      Breath sounds: Normal breath sounds. No stridor. No " wheezing, rhonchi or rales.   Chest:      Chest wall: No tenderness.   Abdominal:      General: Bowel sounds are normal. There is no distension.      Palpations: Abdomen is soft. There is no mass.      Tenderness: There is no abdominal tenderness. There is no right CVA tenderness, left CVA tenderness, guarding or rebound.      Hernia: No hernia is present.   Musculoskeletal:         General: Normal range of motion.      Cervical back: Normal range of motion and neck supple. No rigidity or tenderness.   Lymphadenopathy:      Cervical: No cervical adenopathy.   Skin:     General: Skin is warm and dry.      Capillary Refill: Capillary refill takes less than 2 seconds.   Neurological:      General: No focal deficit present.      Mental Status: She is alert and oriented to person, place, and time. Mental status is at baseline.   Psychiatric:         Mood and Affect: Mood normal.         Behavior: Behavior normal.         Thought Content: Thought content normal.         Judgment: Judgment normal.        Result Review :   The following data was reviewed by: ENZO Laryr on 03/25/2022:  Common labs    Common Labsle 1/13/22 2/8/22 3/7/22   Creatinine  1.00    WBC 8.9  8.30   Hemoglobin 13.4  13.6   Hematocrit 39.5  41.7   Platelets 258  190      Comments are available for some flowsheets but are not being displayed.           Current outpatient and discharge medications have been reconciled for the patient.  Reviewed by: ENZO Larry           Assessment and Plan {CC Problem List  Visit Diagnosis   ROS  Review (Popup)  [facility] Maintenance  Quality  BestPractice  Medications  SmartSets  SnapShot Encounters  Media :23}   Diagnoses and all orders for this visit:    1. Resistant hypertension (Primary)  Assessment & Plan:  Hypertension is stable, continue amlodipine, Coreg and olmesartan.      2. Other hyperlipidemia  Assessment & Plan:  Stable as of last lipid panel.  Continue pravastatin.  Recheck at  next 3-month follow-up fasting.      3. Hypertrophic cardiomyopathy (HCC)  Assessment & Plan:  Stable with no new symptoms, continue with regular visits with her [facility] cardiologist.  She has an appointment scheduled 3/28/2022.      4. 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 Marina.    Her oncologist is Dr. Dill-the plan is to discontinue Prolia and give Reclast after her lumpectomy to bolster bone density in preparation for starting tamoxifen.      Her right-sided chest redness has almost completely alleviated.  She is going to follow with orthopedics as needed for her right shoulder pain/rotator cuff tear.    Follow-up as needed and I will see her back in 3 to 4 months for fasting labs and follow-up on chronic conditions.    Follow Up   Return in about 3 months (around 6/25/2022).  Patient was given instructions and counseling regarding her condition or for health maintenance advice. Please see specific information pulled into the AVS if appropriate.

## 2022-03-25 NOTE — ASSESSMENT & PLAN NOTE
Stable with no new symptoms, continue with regular visits with her [place] cardiologist.  She has an appointment scheduled 3/28/2022.

## 2022-03-25 NOTE — ASSESSMENT & PLAN NOTE
The current plan is for a lumpectomy on 4/7/2022 with Dr. Marina.    Her oncologist is Dr. Dill-the plan is to discontinue Prolia and give Reclast after her lumpectomy to bolster bone density in preparation for starting tamoxifen.

## 2022-04-05 ENCOUNTER — HOSPITAL ENCOUNTER (OUTPATIENT)
Dept: GENERAL RADIOLOGY | Facility: HOSPITAL | Age: 87
Discharge: HOME | End: 2022-04-05

## 2022-04-05 ENCOUNTER — PRE-ADMISSION TESTING (OUTPATIENT)
Dept: PREADMISSION TESTING | Facility: HOSPITAL | Age: 87
End: 2022-04-05

## 2022-04-05 VITALS
WEIGHT: 138.4 LBS | RESPIRATION RATE: 16 BRPM | HEIGHT: 59 IN | HEART RATE: 71 BPM | OXYGEN SATURATION: 96 % | TEMPERATURE: 98.2 F | SYSTOLIC BLOOD PRESSURE: 167 MMHG | DIASTOLIC BLOOD PRESSURE: 75 MMHG | BODY MASS INDEX: 27.9 KG/M2

## 2022-04-05 DIAGNOSIS — C50.212 CARCINOMA OF UPPER-INNER QUADRANT OF LEFT BREAST IN FEMALE, ESTROGEN RECEPTOR POSITIVE: ICD-10-CM

## 2022-04-05 DIAGNOSIS — Z17.0 CARCINOMA OF UPPER-INNER QUADRANT OF LEFT BREAST IN FEMALE, ESTROGEN RECEPTOR POSITIVE: ICD-10-CM

## 2022-04-05 LAB
ALBUMIN SERPL-MCNC: 4.5 G/DL (ref 3.5–5.2)
ALBUMIN/GLOB SERPL: 1.9 G/DL
ALP SERPL-CCNC: 84 U/L (ref 39–117)
ALT SERPL W P-5'-P-CCNC: 17 U/L (ref 1–33)
ANION GAP SERPL CALCULATED.3IONS-SCNC: 11 MMOL/L (ref 5–15)
AST SERPL-CCNC: 17 U/L (ref 1–32)
BASOPHILS # BLD AUTO: 0.06 10*3/MM3 (ref 0–0.2)
BASOPHILS NFR BLD AUTO: 0.8 % (ref 0–1.5)
BILIRUB SERPL-MCNC: 0.5 MG/DL (ref 0–1.2)
BUN SERPL-MCNC: 20 MG/DL (ref 8–23)
BUN/CREAT SERPL: 23 (ref 7–25)
CALCIUM SPEC-SCNC: 9.8 MG/DL (ref 8.6–10.5)
CHLORIDE SERPL-SCNC: 101 MMOL/L (ref 98–107)
CO2 SERPL-SCNC: 27 MMOL/L (ref 22–29)
CREAT SERPL-MCNC: 0.87 MG/DL (ref 0.57–1)
DEPRECATED RDW RBC AUTO: 40.6 FL (ref 37–54)
EGFRCR SERPLBLD CKD-EPI 2021: 63.8 ML/MIN/1.73
EOSINOPHIL # BLD AUTO: 0.16 10*3/MM3 (ref 0–0.4)
EOSINOPHIL NFR BLD AUTO: 2.1 % (ref 0.3–6.2)
ERYTHROCYTE [DISTWIDTH] IN BLOOD BY AUTOMATED COUNT: 12.4 % (ref 12.3–15.4)
GLOBULIN UR ELPH-MCNC: 2.4 GM/DL
GLUCOSE SERPL-MCNC: 86 MG/DL (ref 65–99)
HCT VFR BLD AUTO: 41 % (ref 34–46.6)
HGB BLD-MCNC: 13.3 G/DL (ref 12–15.9)
IMM GRANULOCYTES # BLD AUTO: 0.05 10*3/MM3 (ref 0–0.05)
IMM GRANULOCYTES NFR BLD AUTO: 0.6 % (ref 0–0.5)
LYMPHOCYTES # BLD AUTO: 0.94 10*3/MM3 (ref 0.7–3.1)
LYMPHOCYTES NFR BLD AUTO: 12.2 % (ref 19.6–45.3)
MCH RBC QN AUTO: 29.3 PG (ref 26.6–33)
MCHC RBC AUTO-ENTMCNC: 32.4 G/DL (ref 31.5–35.7)
MCV RBC AUTO: 90.3 FL (ref 79–97)
MONOCYTES # BLD AUTO: 0.77 10*3/MM3 (ref 0.1–0.9)
MONOCYTES NFR BLD AUTO: 10 % (ref 5–12)
NEUTROPHILS NFR BLD AUTO: 5.72 10*3/MM3 (ref 1.7–7)
NEUTROPHILS NFR BLD AUTO: 74.3 % (ref 42.7–76)
NRBC BLD AUTO-RTO: 0 /100 WBC (ref 0–0.2)
PLATELET # BLD AUTO: 240 10*3/MM3 (ref 140–450)
PMV BLD AUTO: 10.8 FL (ref 6–12)
POTASSIUM SERPL-SCNC: 4.3 MMOL/L (ref 3.5–5.2)
PROT SERPL-MCNC: 6.9 G/DL (ref 6–8.5)
RBC # BLD AUTO: 4.54 10*6/MM3 (ref 3.77–5.28)
SARS-COV-2 ORF1AB RESP QL NAA+PROBE: NOT DETECTED
SODIUM SERPL-SCNC: 139 MMOL/L (ref 136–145)
WBC NRBC COR # BLD: 7.7 10*3/MM3 (ref 3.4–10.8)

## 2022-04-05 PROCEDURE — 80053 COMPREHEN METABOLIC PANEL: CPT

## 2022-04-05 PROCEDURE — 36415 COLL VENOUS BLD VENIPUNCTURE: CPT

## 2022-04-05 PROCEDURE — C9803 HOPD COVID-19 SPEC COLLECT: HCPCS

## 2022-04-05 PROCEDURE — 71046 X-RAY EXAM CHEST 2 VIEWS: CPT

## 2022-04-05 PROCEDURE — 85025 COMPLETE CBC W/AUTO DIFF WBC: CPT

## 2022-04-05 PROCEDURE — U0005 INFEC AGEN DETEC AMPLI PROBE: HCPCS

## 2022-04-05 PROCEDURE — U0004 COV-19 TEST NON-CDC HGH THRU: HCPCS

## 2022-04-05 RX ORDER — CHLORHEXIDINE GLUCONATE 500 MG/1
CLOTH TOPICAL
COMMUNITY
End: 2022-04-07 | Stop reason: HOSPADM

## 2022-04-05 NOTE — DISCHARGE INSTRUCTIONS
Take the following medications the morning of surgery:    AMLODIPINE  CARVEDILOL    ARRIVE AT  0630.      If you are on prescription narcotic pain medication to control your pain you may also take that medication the morning of surgery.    General Instructions:  Do not eat solid food after midnight the night before surgery.  You may drink clear liquids day of surgery but must stop at least one hour before your hospital arrival time.  It is beneficial for you to have a clear drink that contains carbohydrates the day of surgery.  We suggest a 12 to 20 ounce bottle of Gatorade or Powerade for non-diabetic patients or a 12 to 20 ounce bottle of G2 or Powerade Zero for diabetic patients. (Pediatric patients, are not advised to drink a 12 to 20 ounce carbohydrate drink)    Clear liquids are liquids you can see through.  Nothing red in color.     Plain water                               Sports drinks  Sodas                                   Gelatin (Jell-O)  Fruit juices without pulp such as white grape juice and apple juice  Popsicles that contain no fruit or yogurt  Tea or coffee (no cream or milk added)  Gatorade / Powerade  G2 / Powerade Zero    Infants may have breast milk up to four hours before surgery.  Infants drinking formula may drink formula up to six hours before surgery.   Patients who avoid smoking, chewing tobacco and alcohol for 4 weeks prior to surgery have a reduced risk of post-operative complications.  Quit smoking as many days before surgery as you can.  Do not smoke, use chewing tobacco or drink alcohol the day of surgery.   If applicable bring your C-PAP/ BI-PAP machine.  Bring any papers given to you in the doctor’s office.  Wear clean comfortable clothes.  Do not wear contact lenses, false eyelashes or make-up.  Bring a case for your glasses.   Bring crutches or walker if applicable.  Remove all piercings.  Leave jewelry and any other valuables at home.  Hair extensions with metal clips must be  removed prior to surgery.  The Pre-Admission Testing nurse will instruct you to bring medications if unable to obtain an accurate list in Pre-Admission Testing.        If you were given a blood bank ID arm band remember to bring it with you the day of surgery.    Preventing a Surgical Site Infection:  For 2 to 3 days before surgery, avoid shaving with a razor because the razor can irritate skin and make it easier to develop an infection.    Any areas of open skin can increase the risk of a post-operative wound infection by allowing bacteria to enter and travel throughout the body.  Notify your surgeon if you have any skin wounds / rashes even if it is not near the expected surgical site.  The area will need assessed to determine if surgery should be delayed until it is healed.  The night prior to surgery shower using a fresh bar of anti-bacterial soap (such as Dial) and clean washcloth.  Sleep in a clean bed with clean clothing.  Do not allow pets to sleep with you.  Shower on the morning of surgery using a fresh bar of anti-bacterial soap (such as Dial) and clean washcloth.  Dry with a clean towel and dress in clean clothing.  Ask your surgeon if you will be receiving antibiotics prior to surgery.  Make sure you, your family, and all healthcare providers clean their hands with soap and water or an alcohol based hand  before caring for you or your wound.    Day of surgery:  Your arrival time is approximately two hours before your scheduled surgery time.  Upon arrival, a Pre-op nurse and Anesthesiologist will review your health history, obtain vital signs, and answer questions you may have.  The only belongings needed at this time will be a list of your home medications and if applicable your C-PAP/BI-PAP machine.  A Pre-op nurse will start an IV and you may receive medication in preparation for surgery, including something to help you relax.     Please be aware that surgery does come with discomfort.  We  want to make every effort to control your discomfort so please discuss any uncontrolled symptoms with your nurse.   Your doctor will most likely have prescribed pain medications.      If you are going home after surgery you will receive individualized written care instructions before being discharged.  A responsible adult must drive you to and from the hospital on the day of your surgery and stay with you for 24 hours.  Discharge prescriptions can be filled by the hospital pharmacy during regular pharmacy hours.  If you are having surgery late in the day/evening your prescription may be e-prescribed to your pharmacy.  Please verify your pharmacy hours or chose a 24 hour pharmacy to avoid not having access to your prescription because your pharmacy has closed for the day.    If you are staying overnight following surgery, you will be transported to your hospital room following the recovery period.  [facility] has all private rooms.    If you have any questions please call Pre-Admission Testing at [phone].  Deductibles and co-payments are collected on the day of service. Please be prepared to pay the required co-pay, deductible or deposit on the day of service as defined by your plan.    Patient Education for Self-Quarantine Process    Following your COVID testing, we strongly recommend that you wear a mask when you are with other people and practice social distancing.   Limit your activities to only required outings.  Wash your hands with soap and water frequently for at least 20 seconds.   Avoid touching your eyes, nose and mouth with unwashed hands.  Do not share anything - utensils, drinking glasses, food from the same bowl.   Sanitize household surfaces daily. Include all high touch areas (door handles, light switches, phones, countertops, etc.)    Call your surgeon immediately if you experience any of the following symptoms:  Sore Throat  Shortness of Breath or difficulty  breathing  Cough  Chills  Body soreness or muscle pain  Headache  Fever  New loss of taste or smell  Do not arrive for your surgery ill.  Your procedure will need to be rescheduled to another time.  You will need to call your physician before the day of surgery to avoid any unnecessary exposure to hospital staff as well as other patients.      CHLORHEXIDINE CLOTH INSTRUCTIONS  The morning of surgery follow these instructions using the Chlorhexidine cloths you've been given.  These steps reduce bacteria on the body.  Do not use the cloths near your eyes, ears mouth, genitalia or on open wounds.  Throw the cloths away after use but do not try to flush them down a toilet.      Open and remove one cloth at a time from the package.    Leave the cloth unfolded and begin the bathing.  Massage the skin with the cloths using gentle pressure to remove bacteria.  Do not scrub harshly.   Follow the steps below with one 2% CHG cloth per area (6 total cloths).  One cloth for neck, shoulders and chest.  One cloth for both arms, hands, fingers and underarms (do underarms last).  One cloth for the abdomen followed by groin.  One cloth for right leg and foot including between the toes.  One cloth for left leg and foot including between the toes.  The last cloth is to be used for the back of the neck, back and buttocks.    Allow the CHG to air dry 3 minutes on the skin which will give it time to work and decrease the chance of irritation.  The skin may feel sticky until it is dry.  Do not rinse with water or any other liquid or you will lose the beneficial effects of the CHG.  If mild skin irritation occurs, do rinse the skin to remove the CHG.  Report this to the nurse at time of admission.  Do not apply lotions, creams, ointments, deodorants or perfumes after using the clothes. Dress in clean clothes before coming to the hospital.

## 2022-04-07 ENCOUNTER — DOCUMENTATION (OUTPATIENT)
Dept: ONCOLOGY | Facility: CLINIC | Age: 87
End: 2022-04-07

## 2022-04-07 ENCOUNTER — HOSPITAL ENCOUNTER (OUTPATIENT)
Dept: NUCLEAR MEDICINE | Facility: HOSPITAL | Age: 87
Discharge: HOME | End: 2022-04-07

## 2022-04-07 ENCOUNTER — HOSPITAL ENCOUNTER (OUTPATIENT)
Dept: MAMMOGRAPHY | Facility: HOSPITAL | Age: 87
Discharge: HOME | End: 2022-04-07

## 2022-04-07 ENCOUNTER — APPOINTMENT (OUTPATIENT)
Dept: GENERAL RADIOLOGY | Facility: HOSPITAL | Age: 87
End: 2022-04-07

## 2022-04-07 ENCOUNTER — ANESTHESIA EVENT (OUTPATIENT)
Dept: PERIOP | Facility: HOSPITAL | Age: 87
End: 2022-04-07

## 2022-04-07 ENCOUNTER — ANESTHESIA (OUTPATIENT)
Dept: PERIOP | Facility: HOSPITAL | Age: 87
End: 2022-04-07

## 2022-04-07 ENCOUNTER — HOSPITAL ENCOUNTER (OUTPATIENT)
Facility: HOSPITAL | Age: 87
Setting detail: HOSPITAL OUTPATIENT SURGERY
Discharge: HOME | End: 2022-04-07
Attending: SURGERY | Admitting: SURGERY

## 2022-04-07 VITALS
OXYGEN SATURATION: 93 % | TEMPERATURE: 97.2 F | DIASTOLIC BLOOD PRESSURE: 72 MMHG | HEART RATE: 59 BPM | RESPIRATION RATE: 16 BRPM | SYSTOLIC BLOOD PRESSURE: 129 MMHG

## 2022-04-07 DIAGNOSIS — C50.212 CARCINOMA OF UPPER-INNER QUADRANT OF LEFT BREAST IN FEMALE, ESTROGEN RECEPTOR POSITIVE: ICD-10-CM

## 2022-04-07 DIAGNOSIS — Z17.0 CARCINOMA OF UPPER-INNER QUADRANT OF LEFT BREAST IN FEMALE, ESTROGEN RECEPTOR POSITIVE: ICD-10-CM

## 2022-04-07 PROCEDURE — 25010000002 PHENYLEPHRINE 10 MG/ML SOLUTION: Performed by: ANESTHESIOLOGY

## 2022-04-07 PROCEDURE — 38525 BIOPSY/REMOVAL LYMPH NODES: CPT | Performed by: SURGERY

## 2022-04-07 PROCEDURE — 88307 TISSUE EXAM BY PATHOLOGIST: CPT | Performed by: SURGERY

## 2022-04-07 PROCEDURE — 25010000002 ONDANSETRON PER 1 MG: Performed by: ANESTHESIOLOGY

## 2022-04-07 PROCEDURE — 38900 IO MAP OF SENT LYMPH NODE: CPT | Performed by: SURGERY

## 2022-04-07 PROCEDURE — 25010000002 PROPOFOL 10 MG/ML EMULSION: Performed by: ANESTHESIOLOGY

## 2022-04-07 PROCEDURE — C1819 TISSUE LOCALIZATION-EXCISION: HCPCS

## 2022-04-07 PROCEDURE — 38792 RA TRACER ID OF SENTINL NODE: CPT

## 2022-04-07 PROCEDURE — 88342 IMHCHEM/IMCYTCHM 1ST ANTB: CPT | Performed by: SURGERY

## 2022-04-07 PROCEDURE — 25010000002 DEXAMETHASONE SODIUM PHOSPHATE 10 MG/ML SOLUTION: Performed by: ANESTHESIOLOGY

## 2022-04-07 PROCEDURE — 0 TECHETIUM TC99M TILMANOCEPT: Performed by: SURGERY

## 2022-04-07 PROCEDURE — 76098 X-RAY EXAM SURGICAL SPECIMEN: CPT

## 2022-04-07 PROCEDURE — 19301 PARTIAL MASTECTOMY: CPT | Performed by: SURGERY

## 2022-04-07 PROCEDURE — 0 LIDOCAINE 1 % SOLUTION: Performed by: SURGERY

## 2022-04-07 PROCEDURE — 88341 IMHCHEM/IMCYTCHM EA ADD ANTB: CPT | Performed by: SURGERY

## 2022-04-07 PROCEDURE — A9520 TC99 TILMANOCEPT DIAG 0.5MCI: HCPCS | Performed by: SURGERY

## 2022-04-07 PROCEDURE — 25010000002 FENTANYL CITRATE (PF) 100 MCG/2ML SOLUTION: Performed by: ANESTHESIOLOGY

## 2022-04-07 RX ORDER — BUPIVACAINE HYDROCHLORIDE AND EPINEPHRINE 5; 5 MG/ML; UG/ML
INJECTION, SOLUTION EPIDURAL; INTRACAUDAL; PERINEURAL AS NEEDED
Status: DISCONTINUED | OUTPATIENT
Start: 2022-04-07 | End: 2022-04-07 | Stop reason: HOSPADM

## 2022-04-07 RX ORDER — FLUMAZENIL 0.1 MG/ML
0.2 INJECTION INTRAVENOUS AS NEEDED
Status: DISCONTINUED | OUTPATIENT
Start: 2022-04-07 | End: 2022-04-07 | Stop reason: HOSPADM

## 2022-04-07 RX ORDER — ONDANSETRON 4 MG/1
4 TABLET, FILM COATED ORAL EVERY 8 HOURS PRN
Qty: 10 TABLET | Refills: 0 | Status: SHIPPED | OUTPATIENT
Start: 2022-04-07 | End: 2022-04-25

## 2022-04-07 RX ORDER — TRAMADOL HYDROCHLORIDE 50 MG/1
TABLET ORAL
Qty: 7 TABLET | Refills: 0 | Status: SHIPPED | OUTPATIENT
Start: 2022-04-07 | End: 2022-04-25

## 2022-04-07 RX ORDER — LIDOCAINE HYDROCHLORIDE 20 MG/ML
INJECTION, SOLUTION INFILTRATION; PERINEURAL AS NEEDED
Status: DISCONTINUED | OUTPATIENT
Start: 2022-04-07 | End: 2022-04-07 | Stop reason: SURG

## 2022-04-07 RX ORDER — LIDOCAINE AND PRILOCAINE 25; 25 MG/G; MG/G
1 CREAM TOPICAL ONCE
Status: COMPLETED | OUTPATIENT
Start: 2022-04-07 | End: 2022-04-07

## 2022-04-07 RX ORDER — FENTANYL CITRATE 50 UG/ML
50 INJECTION, SOLUTION INTRAMUSCULAR; INTRAVENOUS
Status: DISCONTINUED | OUTPATIENT
Start: 2022-04-07 | End: 2022-04-07 | Stop reason: HOSPADM

## 2022-04-07 RX ORDER — OXYCODONE HCL 10 MG/1
10 TABLET, FILM COATED, EXTENDED RELEASE ORAL ONCE
Status: COMPLETED | OUTPATIENT
Start: 2022-04-07 | End: 2022-04-07

## 2022-04-07 RX ORDER — FENTANYL CITRATE 50 UG/ML
25 INJECTION, SOLUTION INTRAMUSCULAR; INTRAVENOUS
Status: DISCONTINUED | OUTPATIENT
Start: 2022-04-07 | End: 2022-04-07 | Stop reason: HOSPADM

## 2022-04-07 RX ORDER — SODIUM CHLORIDE 0.9 % (FLUSH) 0.9 %
3 SYRINGE (ML) INJECTION EVERY 12 HOURS SCHEDULED
Status: DISCONTINUED | OUTPATIENT
Start: 2022-04-07 | End: 2022-04-07 | Stop reason: HOSPADM

## 2022-04-07 RX ORDER — MAGNESIUM HYDROXIDE 1200 MG/15ML
LIQUID ORAL AS NEEDED
Status: DISCONTINUED | OUTPATIENT
Start: 2022-04-07 | End: 2022-04-07 | Stop reason: HOSPADM

## 2022-04-07 RX ORDER — PHENYLEPHRINE HYDROCHLORIDE 10 MG/ML
INJECTION INTRAVENOUS AS NEEDED
Status: DISCONTINUED | OUTPATIENT
Start: 2022-04-07 | End: 2022-04-07 | Stop reason: SURG

## 2022-04-07 RX ORDER — CLINDAMYCIN PHOSPHATE 900 MG/50ML
900 INJECTION INTRAVENOUS ONCE
Status: COMPLETED | OUTPATIENT
Start: 2022-04-07 | End: 2022-04-07

## 2022-04-07 RX ORDER — LIDOCAINE HYDROCHLORIDE 10 MG/ML
0.5 INJECTION, SOLUTION EPIDURAL; INFILTRATION; INTRACAUDAL; PERINEURAL ONCE AS NEEDED
Status: DISCONTINUED | OUTPATIENT
Start: 2022-04-07 | End: 2022-04-07 | Stop reason: HOSPADM

## 2022-04-07 RX ORDER — DIAZEPAM 5 MG/1
10 TABLET ORAL ONCE
Status: COMPLETED | OUTPATIENT
Start: 2022-04-07 | End: 2022-04-07

## 2022-04-07 RX ORDER — HYDROMORPHONE HYDROCHLORIDE 1 MG/ML
0.2 INJECTION, SOLUTION INTRAMUSCULAR; INTRAVENOUS; SUBCUTANEOUS
Status: DISCONTINUED | OUTPATIENT
Start: 2022-04-07 | End: 2022-04-07 | Stop reason: HOSPADM

## 2022-04-07 RX ORDER — DIPHENHYDRAMINE HYDROCHLORIDE 50 MG/ML
6.25 INJECTION INTRAMUSCULAR; INTRAVENOUS
Status: DISCONTINUED | OUTPATIENT
Start: 2022-04-07 | End: 2022-04-07 | Stop reason: HOSPADM

## 2022-04-07 RX ORDER — HYDROCODONE BITARTRATE AND ACETAMINOPHEN 5; 325 MG/1; MG/1
1 TABLET ORAL ONCE AS NEEDED
Status: DISCONTINUED | OUTPATIENT
Start: 2022-04-07 | End: 2022-04-07 | Stop reason: HOSPADM

## 2022-04-07 RX ORDER — ACETAMINOPHEN 500 MG
1000 TABLET ORAL ONCE
Status: COMPLETED | OUTPATIENT
Start: 2022-04-07 | End: 2022-04-07

## 2022-04-07 RX ORDER — FENTANYL CITRATE 50 UG/ML
INJECTION, SOLUTION INTRAMUSCULAR; INTRAVENOUS AS NEEDED
Status: DISCONTINUED | OUTPATIENT
Start: 2022-04-07 | End: 2022-04-07 | Stop reason: SURG

## 2022-04-07 RX ORDER — SODIUM CHLORIDE 0.9 % (FLUSH) 0.9 %
10 SYRINGE (ML) INJECTION EVERY 12 HOURS SCHEDULED
Status: DISCONTINUED | OUTPATIENT
Start: 2022-04-07 | End: 2022-04-07 | Stop reason: HOSPADM

## 2022-04-07 RX ORDER — ONDANSETRON 2 MG/ML
INJECTION INTRAMUSCULAR; INTRAVENOUS AS NEEDED
Status: DISCONTINUED | OUTPATIENT
Start: 2022-04-07 | End: 2022-04-07 | Stop reason: SURG

## 2022-04-07 RX ORDER — ONDANSETRON 2 MG/ML
4 INJECTION INTRAMUSCULAR; INTRAVENOUS ONCE AS NEEDED
Status: DISCONTINUED | OUTPATIENT
Start: 2022-04-07 | End: 2022-04-07 | Stop reason: HOSPADM

## 2022-04-07 RX ORDER — DEXAMETHASONE SODIUM PHOSPHATE 10 MG/ML
INJECTION, SOLUTION INTRAMUSCULAR; INTRAVENOUS AS NEEDED
Status: DISCONTINUED | OUTPATIENT
Start: 2022-04-07 | End: 2022-04-07 | Stop reason: SURG

## 2022-04-07 RX ORDER — PROPOFOL 10 MG/ML
VIAL (ML) INTRAVENOUS AS NEEDED
Status: DISCONTINUED | OUTPATIENT
Start: 2022-04-07 | End: 2022-04-07 | Stop reason: SURG

## 2022-04-07 RX ORDER — HYDROMORPHONE HYDROCHLORIDE 1 MG/ML
0.25 INJECTION, SOLUTION INTRAMUSCULAR; INTRAVENOUS; SUBCUTANEOUS
Status: DISCONTINUED | OUTPATIENT
Start: 2022-04-07 | End: 2022-04-07 | Stop reason: HOSPADM

## 2022-04-07 RX ORDER — SODIUM CHLORIDE, SODIUM LACTATE, POTASSIUM CHLORIDE, CALCIUM CHLORIDE 600; 310; 30; 20 MG/100ML; MG/100ML; MG/100ML; MG/100ML
9 INJECTION, SOLUTION INTRAVENOUS CONTINUOUS
Status: DISCONTINUED | OUTPATIENT
Start: 2022-04-07 | End: 2022-04-07 | Stop reason: HOSPADM

## 2022-04-07 RX ORDER — OXYCODONE HYDROCHLORIDE AND ACETAMINOPHEN 5; 325 MG/1; MG/1
1 TABLET ORAL ONCE AS NEEDED
Status: DISCONTINUED | OUTPATIENT
Start: 2022-04-07 | End: 2022-04-07 | Stop reason: HOSPADM

## 2022-04-07 RX ORDER — ISOSULFAN BLUE 50 MG/5ML
INJECTION, SOLUTION SUBCUTANEOUS AS NEEDED
Status: DISCONTINUED | OUTPATIENT
Start: 2022-04-07 | End: 2022-04-07 | Stop reason: HOSPADM

## 2022-04-07 RX ORDER — SODIUM CHLORIDE 0.9 % (FLUSH) 0.9 %
10 SYRINGE (ML) INJECTION AS NEEDED
Status: DISCONTINUED | OUTPATIENT
Start: 2022-04-07 | End: 2022-04-07 | Stop reason: HOSPADM

## 2022-04-07 RX ORDER — SODIUM CHLORIDE 0.9 % (FLUSH) 0.9 %
3-10 SYRINGE (ML) INJECTION AS NEEDED
Status: DISCONTINUED | OUTPATIENT
Start: 2022-04-07 | End: 2022-04-07 | Stop reason: HOSPADM

## 2022-04-07 RX ORDER — EPHEDRINE SULFATE 50 MG/ML
5 INJECTION, SOLUTION INTRAVENOUS ONCE AS NEEDED
Status: DISCONTINUED | OUTPATIENT
Start: 2022-04-07 | End: 2022-04-07 | Stop reason: HOSPADM

## 2022-04-07 RX ORDER — LIDOCAINE HYDROCHLORIDE 10 MG/ML
3 INJECTION, SOLUTION INFILTRATION; PERINEURAL ONCE
Status: COMPLETED | OUTPATIENT
Start: 2022-04-07 | End: 2022-04-07

## 2022-04-07 RX ORDER — LABETALOL HYDROCHLORIDE 5 MG/ML
5 INJECTION, SOLUTION INTRAVENOUS
Status: DISCONTINUED | OUTPATIENT
Start: 2022-04-07 | End: 2022-04-07 | Stop reason: HOSPADM

## 2022-04-07 RX ORDER — HYDRALAZINE HYDROCHLORIDE 20 MG/ML
10 INJECTION INTRAMUSCULAR; INTRAVENOUS
Status: DISCONTINUED | OUTPATIENT
Start: 2022-04-07 | End: 2022-04-07 | Stop reason: HOSPADM

## 2022-04-07 RX ORDER — MIDAZOLAM HYDROCHLORIDE 1 MG/ML
0.5 INJECTION INTRAMUSCULAR; INTRAVENOUS
Status: DISCONTINUED | OUTPATIENT
Start: 2022-04-07 | End: 2022-04-07 | Stop reason: HOSPADM

## 2022-04-07 RX ORDER — NALOXONE HCL 0.4 MG/ML
0.4 VIAL (ML) INJECTION AS NEEDED
Status: DISCONTINUED | OUTPATIENT
Start: 2022-04-07 | End: 2022-04-07 | Stop reason: HOSPADM

## 2022-04-07 RX ORDER — FAMOTIDINE 10 MG/ML
20 INJECTION, SOLUTION INTRAVENOUS ONCE
Status: COMPLETED | OUTPATIENT
Start: 2022-04-07 | End: 2022-04-07

## 2022-04-07 RX ADMIN — LIDOCAINE HYDROCHLORIDE 60 MG: 20 INJECTION, SOLUTION INFILTRATION; PERINEURAL at 11:01

## 2022-04-07 RX ADMIN — PHENYLEPHRINE HYDROCHLORIDE 200 MCG: 10 INJECTION, SOLUTION INTRAVENOUS at 11:17

## 2022-04-07 RX ADMIN — DEXAMETHASONE SODIUM PHOSPHATE 8 MG: 10 INJECTION, SOLUTION INTRAMUSCULAR; INTRAVENOUS at 11:05

## 2022-04-07 RX ADMIN — ACETAMINOPHEN 1000 MG: 500 TABLET ORAL at 07:31

## 2022-04-07 RX ADMIN — PHENYLEPHRINE HYDROCHLORIDE 100 MCG: 10 INJECTION, SOLUTION INTRAVENOUS at 11:02

## 2022-04-07 RX ADMIN — PHENYLEPHRINE HYDROCHLORIDE 100 MCG: 10 INJECTION, SOLUTION INTRAVENOUS at 11:34

## 2022-04-07 RX ADMIN — SODIUM CHLORIDE, POTASSIUM CHLORIDE, SODIUM LACTATE AND CALCIUM CHLORIDE 9 ML/HR: 600; 310; 30; 20 INJECTION, SOLUTION INTRAVENOUS at 09:41

## 2022-04-07 RX ADMIN — TILMANOCEPT 1 DOSE: KIT at 09:10

## 2022-04-07 RX ADMIN — OXYCODONE HYDROCHLORIDE 10 MG: 10 TABLET, FILM COATED, EXTENDED RELEASE ORAL at 07:31

## 2022-04-07 RX ADMIN — Medication 3 ML: at 11:49

## 2022-04-07 RX ADMIN — PHENYLEPHRINE HYDROCHLORIDE 100 MCG: 10 INJECTION, SOLUTION INTRAVENOUS at 11:05

## 2022-04-07 RX ADMIN — PHENYLEPHRINE HYDROCHLORIDE 200 MCG: 10 INJECTION, SOLUTION INTRAVENOUS at 11:26

## 2022-04-07 RX ADMIN — LIDOCAINE AND PRILOCAINE 1 APPLICATION: 25; 25 CREAM TOPICAL at 07:20

## 2022-04-07 RX ADMIN — FENTANYL CITRATE 25 MCG: 50 INJECTION INTRAMUSCULAR; INTRAVENOUS at 11:21

## 2022-04-07 RX ADMIN — FAMOTIDINE 20 MG: 10 INJECTION INTRAVENOUS at 09:46

## 2022-04-07 RX ADMIN — DIAZEPAM 5 MG: 5 TABLET ORAL at 07:31

## 2022-04-07 RX ADMIN — PROPOFOL 40 MG: 10 INJECTION, EMULSION INTRAVENOUS at 11:02

## 2022-04-07 RX ADMIN — PHENYLEPHRINE HYDROCHLORIDE 200 MCG: 10 INJECTION, SOLUTION INTRAVENOUS at 11:31

## 2022-04-07 RX ADMIN — ONDANSETRON 4 MG: 2 INJECTION INTRAMUSCULAR; INTRAVENOUS at 11:38

## 2022-04-07 RX ADMIN — CLINDAMYCIN PHOSPHATE 900 MG: 900 INJECTION, SOLUTION INTRAVENOUS at 10:40

## 2022-04-07 RX ADMIN — FENTANYL CITRATE 25 MCG: 50 INJECTION INTRAMUSCULAR; INTRAVENOUS at 11:07

## 2022-04-07 RX ADMIN — PROPOFOL 60 MG: 10 INJECTION, EMULSION INTRAVENOUS at 11:01

## 2022-04-07 NOTE — ANESTHESIA PROCEDURE NOTES
Airway  Urgency: elective    Date/Time: 4/7/2022 11:03 AM  Airway not difficult    General Information and Staff    Patient location during procedure: OR  Anesthesiologist: Ronald Cardenas MD    Indications and Patient Condition  Indications for airway management: airway protection    Preoxygenated: yes  Mask difficulty assessment: 1 - vent by mask    Final Airway Details  Final airway type: supraglottic airway      Successful airway: unique  Size 4    Number of attempts at approach: 1  Assessment: lips, teeth, and gum same as pre-op

## 2022-04-07 NOTE — OP NOTE
SURGERY  Operative Note Lumpectomy :  [facility]    Neha HERNANDEZ Kahlil  2/4/1933      Procedure Date: 04/07/22    Pre-op Diagnosis:  · Left upper inner quadrant 7 mm invasive lobular cancer, ER/[place] positive, HER-2/sulma negative, Ki-67 4%, found on screening.    Post-op Diagnosis:  · Same    Procedure:   · Left breast needle localization lumpectomy with lymphatic tracing and left axillary sentinel node biopsy    Surgeon: Laina    Assistant: Kale    Indications:  · As above    Associated Issues:  · Immobility of right shoulder  · Redness of right chest and shoulder with impending joint aspiration  · Chart with diagnosis of DVT in the past, but I have researched this back to 2017 where Dr. Mchugh inserted it and the patient and her daughter Monica who is very supportive both are adamant that this is not an accurate diagnosis.  · Osteoporosis on Prolia    Findings:   · Closest margins were felt to be medial and anterior based on Faxitron and these margins were reresected.  · Very far inferior sentinel node, single, count of 2000, not blue    Recommendations:   · Routine recuperation.  · Drain was not used in the context of a single sentinel node.  There are large margins in the breast extending all the way to the fascia and through it in some aspects and thus she may develop fluid here but I did not feel that it warranted a drain  · Sponge bath only until drain removed.  Come to office next Monday for drain removal.    Specimens:   · Breast and lymph nodes    EBL: <50 mL    Technique:     Patient went to radiology where technetium sulfur colloid was injected at the areolar border, and 5 cm needle placed under the clip by mammographic guidance by Dr. Tamika Snyder.  She then came to the operating room where IV antibiotics were given, general anesthetic induced, area prepped with ChloraPrep and draped sterilely.  Timeout.  I injected Lymphazurin under the areolar border, 5 mL.    Neoprobe was used to identify the area in  the axilla of the greatest counts, only about 100.  I used loupes and headlight to identify the lymphatics and nodes more accurately.  Because of the low numbers in the axilla, and the upper aspect of the mass, I thought I would go ahead and try to resect the mass first and see if we would get better numbers in the axilla.    We then went to the breast and I marked out the area over the expected site of the abnormality, the needle coursing superior to inferior.  This made the incision about 2 cm inferior to the needle entry site in a semicircular fashion.  After making the incision, I raised flaps about a centimeter thick superiorly and inferiorly to come across the needle entry site and to extend down past the end of the needle.  I then made troughs on either side of the needle cores, trying to stay out at least a centimeter from that.  As I came across the medial aspect somewhat distally I could see the tract of the biopsy and thus went a little further.  The lateral aspect had some fibrotic tissue not included that as well.  I then peeled off the chest wall and some areas actually entering the fascia.  The hook was actually in the fascia inferiorly.    I marked the specimen medial and lateral with the needle entry site being superior.  I then placed a clip at the superior and inferior aspect for orientation.  We got a Faxitron and it looked centered superior and inferior very well.  I then flipped it 90 degrees and thought that the anterior aspect looked the closest.  We visualize the medial aspect with the tract and thus I went back and resected these 2 areas medial and anterior with a new margin.  We irrigated and suctioned to make sure all was clear.    We went to the axilla then and counts were quite high very very low.  I made an incision and tracked well down almost below the level of the nipple and found a single very enlarged lymph node which I resected with no blue coloration and it had a count of 2000.   This area would not require a drain.  Wounds were closed with 3-0 Vicryl to the dermis 5-0 running Vicryl to skin and Exofin.      Assistant: Mara Henley CSA assisted with retraction suction irrigation closure dressing placement and was important for a safe and expeditious case on this 89-year-old

## 2022-04-07 NOTE — PROGRESS NOTES
"Called pt's daughter and explained that the \"port chair\" location means that the RN will start an IV for her Reclast since she does not actually have a port. Daughter v/u.   "

## 2022-04-07 NOTE — ANESTHESIA POSTPROCEDURE EVALUATION
Patient: Neha Guillory    Procedure Summary     Date: 04/07/22 Room / [facility]:  [facility]  [facility]    Anesthesia Start: 1056 Anesthesia Stop: 1159    Procedure: LEFT BREAST LUMPECTOMY WITH SENTINEL NODE BIOPSY AND NEEDLE LOCALIZATION (Left Breast) Diagnosis:       Carcinoma of upper-inner quadrant of left breast in female, estrogen receptor positive (HCC)      (Carcinoma of upper-inner quadrant of left breast in female, estrogen receptor positive (HCC) [C50.212, Z17.0])    Surgeons: Bel Marina MD Provider: Ronald Cardenas MD    Anesthesia Type: general ASA Status: 4          Anesthesia Type: general    Vitals  Vitals Value Taken Time   /70 04/07/22 1316   Temp 36.2 °C (97.2 °F) 04/07/22 1315   Pulse 54 04/07/22 1316   Resp 16 04/07/22 1315   SpO2 92 % 04/07/22 1316   Vitals shown include unvalidated device data.        Post Anesthesia Care and Evaluation    Patient location during evaluation: bedside  Patient participation: complete - patient participated  Level of consciousness: awake and alert  Pain management: adequate  Airway patency: patent  Anesthetic complications: No anesthetic complications    Cardiovascular status: acceptable  Respiratory status: acceptable  Hydration status: acceptable    Comments: /72 (BP Location: Right arm, Patient Position: Lying)   Pulse 59   Temp 36.2 °C (97.2 °F) (Temporal)   Resp 16   SpO2 93%

## 2022-04-07 NOTE — H&P
SURGERY  Neha Guillory   2/4/1933 04/07/22       Chief complaint; left upper inner quadrant newly diagnosed invasive lobular cancer, 7 mm, screening     HPI    Here for lumpectomy and sentinel node (if easy to proceed with) with prior history as listed below:     Ms. Guillory is a very nice 89 y.o. female known to me from my prior parathyroid surgery, who comes in with an unexpected left breast cancer.  This actually is a little unusual in presentation as she had the spontaneous bleed or erythema that began in her right neck extending down to the shoulder and then across the shoulder and breast that led to a visit with ENZO Jo in the [facility], who astutely was concerned about inflammatory breast cancer.  She started her on Keflex, and the area has gradually improved fairly dramatically but still has some mild redness.     Thus rather than a screening mammogram on January 26, 2022, she underwent a diagnostic mammogram and ultrasound of the right breast.  There were no findings on the ultrasound or mammogram of the right breast.  Her mammogram showed an area of focal asymmetry in the posterior one third upper inner quadrant of the left breast, not the affected breast, with suspected mild architectural distortion.  It was persistent on spot compression and with 7 mm.  Ultrasound showed no abnormalities in the right breast, area of redness.  There was however about 5 mm irregular hypoechoic mass in the left breast at 11:00, 8 cm from the nipple.  Ultrasound-guided biopsy on 2/17/2022 was done with 5 mm bowtie clip with pathology showing an invasive lobular carcinoma, grade 1, no lymphatic or perineural invasion.  It is ER positive at 99%, WA positive at 60%, HER-2/sulma negative, Ki-67 4%.     Due to that erythema of the chest wall, at some point she ultimately came to the emergency room where CT scan was done and there was chronic right shoulder disease noted, complete rotator cuff tear with longstanding  effusion.  No evidence of intrathoracic malignancy.  Happily she is scheduled for an aspiration of the right shoulder joint on May 3 and a follow-up with ENZO Jo.  I have suggested that she delay this for least 5 days after that aspiration so we can make sure that the culture is final.     The patient says she will get the appropriate treatment but she really does not want to have radiation and she does not want to have a mastectomy.  She is agreeable to a lumpectomy and sentinel node.     Medical History        Past Medical History:   Diagnosis Date   • Arthritis     • Carcinoma of upper-inner quadrant of left breast in female, estrogen receptor positive (HCC) 2/24/2022   • Carotid atherosclerosis, bilateral 7/12/2019   • Cataract       BILATERAL   • Depression     • Gastroenteritis     • Generalized osteoarthritis 2/1/2016   • Hyperlipidemia     • Hyperparathyroidism (HCC)     • Hypertension     • Hypertrophic cardiomyopathy (HCC)     • Neuromuscular disorder (HCC)     • Obstructive sleep apnea syndrome 2/1/2016   • Osteopenia     • Osteoporosis     • Primary familial hypertrophic cardiomyopathy (HCC) 2/1/2016   • Synovial cyst of popliteal space 2/1/2016   • Vitamin D deficiency           Surgical History         Past Surgical History:   Procedure Laterality Date   • BREAST BIOPSY Left 02/17/2022   • BUNIONECTOMY Right       FOOT SURGERY   • COLONOSCOPY N/A 2011     Dr. Luis   • HAND SURGERY Right       TUMOR REMOVED ON FOREFINGER   • THYROIDECTOMY Right 4/20/2016     Procedure: RIGHT SUPERIOR PARATHYROIDECTOMY;  Surgeon: Bel Marina MD;  Location: [facility];  Service:                Family History   Problem Relation Age of Onset   • Breast cancer Sister     • Heart disease Brother           CABG   • Other Brother           BRAIN TUMOR   • Colon cancer Brother     • Cancer Other     • Breast cancer Sister     • Breast cancer Sister     • Colon cancer Brother     • Colon cancer Sister    "  • Colon cancer Maternal Grandfather     • Colon cancer Brother        Social History   Social History           Socioeconomic History   • Marital status: Single   Tobacco Use   • Smoking status: Never Smoker   • Smokeless tobacco: Never Used   Vaping Use   • Vaping Use: Never used   Substance and Sexual Activity   • Alcohol use: No   • Drug use: No   • Sexual activity: Defer               Current Outpatient Medications:   •  amLODIPine (NORVASC) 5 MG tablet, Take 5 mg by mouth Daily., Disp: , Rfl:   •  aspirin 81 MG EC tablet, Take 81 mg by mouth Daily., Disp: , Rfl:   •  B Complex Vitamins (vitamin b complex) capsule capsule, Take 1 capsule by mouth Daily., Disp: , Rfl:   •  carvedilol (COREG) 25 MG tablet, TAKE ONE TABLET BY MOUTH TWICE A DAY WITH MEALS, Disp: 180 tablet, Rfl: 1  •  cholecalciferol (VITAMIN D3) 1000 units tablet, Take 1,000 Units by mouth Daily., Disp: , Rfl:   •  denosumab (PROLIA) 60 MG/ML solution prefilled syringe syringe, Inject 1 syringe under the skin into the appropriate area as directed Every 6 (Six) Months., Disp: , Rfl:   •  Lactobacillus (PROBIOTIC ACIDOPHILUS PO), Take 1 tablet by mouth Daily., Disp: , Rfl:   •  olmesartan (BENICAR) 20 MG tablet, Take 20 mg by mouth Daily., Disp: , Rfl:   •  pravastatin (PRAVACHOL) 40 MG tablet, Take 1 tablet by mouth Daily., Disp: 90 tablet, Rfl: 1  •  vitamin C (ASCORBIC ACID) 250 MG tablet, Take 500 mg by mouth Daily., Disp: , Rfl:   •  Zinc 50 MG capsule, Take 50 mg by mouth Daily., Disp: , Rfl:           Allergies   Allergen Reactions   • Amoxicillin Hives   • Atorvastatin Myalgia   • Atorvastatin Calcium Myalgia      Review of Systems  Difficulty with mobility of the right arm.  No chest pain or shortness of breath.     Vitals       Vitals:     02/24/22 1121   Weight: 62.1 kg (137 lb)   Height: 149.9 cm (59.02\")            PHYSICAL EXAM:     Ht 149.9 cm (59.02\")   Wt 62.1 kg (137 lb)   BMI 27.65 kg/m²   Body mass index is 27.65 " kg/m².     Constitutional: well developed, well nourished, appears  healthy, stated age  ENMT: Hearing diminished, neck without masses.  I evaluated this pretty closely because of the history of the bleeding beginning there to make sure there was not some sort of enlarged lymph node and there does not appear to be such  CVS: RRR, no murmur, no peripheral edema.  Superficial spider vein varicosities in the lower legs  Respiratory: CTA, normal respiratory effort   Gastrointestinal: abdomen soft rounded nontender, abdominal hernia not present  Musculoskeletal: gait a little slowed, muscle mass normal for her age, decreased mobility of the right arm  Neurological: awake and alert, seems to have reasonable capacity for understanding for medical decision making  Psychiatric: appears to have reasonable judgement, pleasant     Radiographic/Lab Findings: As above     Pamphlet reviewed: Breast cancer     IMPRESSION:  · Left upper inner quadrant 7 mm invasive lobular cancer, ER/DE positive, HER-2/sulma negative, Ki-67 4%, found on screening.  · Immobility of right shoulder  · Redness of right chest and shoulder with impending joint aspiration  · Chart with diagnosis of DVT in the past, but I have researched this back to 2017 where Dr. Mchugh inserted it and the patient and her daughter Monica who is very supportive both are adamant that this is not an accurate diagnosis.  · Osteoporosis on Prolia     PLAN:  · Discussed with Dr. Hunter Acosta and patient is agreeable to see him but really does not want to have radiation.  I asked that she does see him to talk through what the risk and benefits would be of avoiding that.  He feels like probably will be okay not to proceed with radiation as long as she is willing to accept the risk of recurrence.  · Left lumpectomy with sentinel node biopsy.  Counseled her that invasive lobular cancers are sometimes larger than they appear on mammogram, and MRI would be helpful for evaluation.   However with her shoulder problem I really do not think she could tolerate it and she is not particularly interested and thus we will forego that.  She understands this means I will need to take a larger resection but will fit and well with the idea of not doing radiation.  Case request not entered as I would like to see the redness recede prior to that.  · Patient and daughter to come after orthopedic follow-up.  · Proceed with aspiration of the right shoulder, and delay her follow-up with orthopedics until that result can be final.  · Referred to Dr. Ricky Dill for consideration of hormonal modulation.  I believe her diagnosis of DVT is inaccurate and I removed that from her prior history .     Bel Marina MD  1:24 PM     In order to provide a more personal and interactive patient experience as well as improve efficiency, this note was started prior to the office visit.     ADDEND  Aspiration was negative and no signs of infection now.  Will go ahead with scheduling.  Case request entered.   Bel Marina MD  03/14/22

## 2022-04-07 NOTE — ANESTHESIA PREPROCEDURE EVALUATION
Anesthesia Evaluation     NPO Solid Status: > 8 hours  NPO Liquid Status: > 2 hours           Airway   Mallampati: II  No difficulty expected  Dental      Pulmonary    (+) sleep apnea on CPAP,   Cardiovascular     Rhythm: regular  Rate: normal    (+) hypertension, CHF (hypertrophic cardiomyopathy) , hyperlipidemia,  carotid artery disease carotid bilateral    ROS comment: Pulmonary HTN    Echo (7/29/21):    -Normal LV systolic function     -Ejection fraction 66%     -Mild concentric LVH with basal septal predominance but no significant LVOT gradient     -Mildly dilated left atrium     -Mild calcific aortic stenosis     -Mild aortic regurgitation     -Severe posterior mitral annular calcification     -Mild mitral regurgitation     -Mild tricuspid regurgitation     -Borderline dimension of the ascending aorta (3.6 cm)      Neuro/Psych  GI/Hepatic/Renal/Endo    (+)   renal disease (stage 3 CKD),   Diabetes: preDM.    ROS Comment: Hyperparathyroidism    Musculoskeletal     (+) arthralgias,   Abdominal    Substance History      OB/GYN          Other   arthritis,    history of cancer (breast cancer)                  Anesthesia Plan    ASA 4     general     intravenous induction     Anesthetic plan, all risks, benefits, and alternatives have been provided, discussed and informed consent has been obtained with: patient.    Plan discussed with CRNA.        CODE STATUS:

## 2022-04-08 LAB
LAB AP CASE REPORT: NORMAL
LAB AP DIAGNOSIS COMMENT: NORMAL
LAB AP SYNOPTIC CHECKLIST: NORMAL
PATH REPORT.FINAL DX SPEC: NORMAL
PATH REPORT.GROSS SPEC: NORMAL

## 2022-04-11 ENCOUNTER — OFFICE VISIT (OUTPATIENT)
Dept: ONCOLOGY | Facility: CLINIC | Age: 87
End: 2022-04-11

## 2022-04-11 ENCOUNTER — APPOINTMENT (OUTPATIENT)
Dept: LAB | Facility: HOSPITAL | Age: 87
End: 2022-04-11

## 2022-04-11 ENCOUNTER — INFUSION (OUTPATIENT)
Dept: ONCOLOGY | Facility: HOSPITAL | Age: 87
End: 2022-04-11

## 2022-04-11 VITALS
RESPIRATION RATE: 16 BRPM | HEIGHT: 59 IN | TEMPERATURE: 98.2 F | SYSTOLIC BLOOD PRESSURE: 168 MMHG | WEIGHT: 138.6 LBS | DIASTOLIC BLOOD PRESSURE: 93 MMHG | HEART RATE: 73 BPM | BODY MASS INDEX: 27.94 KG/M2 | OXYGEN SATURATION: 97 %

## 2022-04-11 DIAGNOSIS — Z79.899 LONG-TERM USE OF HIGH-RISK MEDICATION: ICD-10-CM

## 2022-04-11 DIAGNOSIS — Z79.899 LONG-TERM USE OF HIGH-RISK MEDICATION: Primary | ICD-10-CM

## 2022-04-11 DIAGNOSIS — M81.8 OTHER OSTEOPOROSIS WITHOUT CURRENT PATHOLOGICAL FRACTURE: ICD-10-CM

## 2022-04-11 DIAGNOSIS — Z17.0 CARCINOMA OF UPPER-INNER QUADRANT OF LEFT BREAST IN FEMALE, ESTROGEN RECEPTOR POSITIVE: Primary | Chronic | ICD-10-CM

## 2022-04-11 DIAGNOSIS — C50.212 CARCINOMA OF UPPER-INNER QUADRANT OF LEFT BREAST IN FEMALE, ESTROGEN RECEPTOR POSITIVE: Primary | Chronic | ICD-10-CM

## 2022-04-11 LAB
ALBUMIN SERPL-MCNC: 4.1 G/DL (ref 3.5–5.2)
ALBUMIN/GLOB SERPL: 1.4 G/DL (ref 1.1–2.4)
ALP SERPL-CCNC: 81 U/L (ref 38–116)
ALT SERPL W P-5'-P-CCNC: 21 U/L (ref 0–33)
ANION GAP SERPL CALCULATED.3IONS-SCNC: 10.6 MMOL/L (ref 5–15)
AST SERPL-CCNC: 29 U/L (ref 0–32)
BASOPHILS # BLD AUTO: 0.04 10*3/MM3 (ref 0–0.2)
BASOPHILS NFR BLD AUTO: 0.5 % (ref 0–1.5)
BILIRUB SERPL-MCNC: 0.6 MG/DL (ref 0.2–1.2)
BUN SERPL-MCNC: 21 MG/DL (ref 6–20)
BUN/CREAT SERPL: 23.3 (ref 7.3–30)
CALCIUM SPEC-SCNC: 10.1 MG/DL (ref 8.5–10.2)
CHLORIDE SERPL-SCNC: 102 MMOL/L (ref 98–107)
CO2 SERPL-SCNC: 26.4 MMOL/L (ref 22–29)
CREAT SERPL-MCNC: 0.9 MG/DL (ref 0.6–1.1)
DEPRECATED RDW RBC AUTO: 43.6 FL (ref 37–54)
EGFRCR SERPLBLD CKD-EPI 2021: 61.2 ML/MIN/1.73
EOSINOPHIL # BLD AUTO: 0.21 10*3/MM3 (ref 0–0.4)
EOSINOPHIL NFR BLD AUTO: 2.5 % (ref 0.3–6.2)
ERYTHROCYTE [DISTWIDTH] IN BLOOD BY AUTOMATED COUNT: 13.2 % (ref 12.3–15.4)
GLOBULIN UR ELPH-MCNC: 2.9 GM/DL (ref 1.8–3.5)
GLUCOSE SERPL-MCNC: 129 MG/DL (ref 74–124)
HCT VFR BLD AUTO: 42.7 % (ref 34–46.6)
HGB BLD-MCNC: 13.6 G/DL (ref 12–15.9)
IMM GRANULOCYTES # BLD AUTO: 0.07 10*3/MM3 (ref 0–0.05)
IMM GRANULOCYTES NFR BLD AUTO: 0.8 % (ref 0–0.5)
LYMPHOCYTES # BLD AUTO: 0.86 10*3/MM3 (ref 0.7–3.1)
LYMPHOCYTES NFR BLD AUTO: 10.3 % (ref 19.6–45.3)
MAGNESIUM SERPL-MCNC: 1.9 MG/DL (ref 1.8–2.5)
MCH RBC QN AUTO: 29 PG (ref 26.6–33)
MCHC RBC AUTO-ENTMCNC: 31.9 G/DL (ref 31.5–35.7)
MCV RBC AUTO: 91 FL (ref 79–97)
MONOCYTES # BLD AUTO: 0.92 10*3/MM3 (ref 0.1–0.9)
MONOCYTES NFR BLD AUTO: 11 % (ref 5–12)
NEUTROPHILS NFR BLD AUTO: 6.27 10*3/MM3 (ref 1.7–7)
NEUTROPHILS NFR BLD AUTO: 74.9 % (ref 42.7–76)
NRBC BLD AUTO-RTO: 0 /100 WBC (ref 0–0.2)
PHOSPHATE SERPL-MCNC: 4.5 MG/DL (ref 2.5–4.5)
PLATELET # BLD AUTO: 244 10*3/MM3 (ref 140–450)
PMV BLD AUTO: 10.9 FL (ref 6–12)
POTASSIUM SERPL-SCNC: 4.7 MMOL/L (ref 3.5–4.7)
PROT SERPL-MCNC: 7 G/DL (ref 6.3–8)
RBC # BLD AUTO: 4.69 10*6/MM3 (ref 3.77–5.28)
SODIUM SERPL-SCNC: 139 MMOL/L (ref 134–145)
WBC NRBC COR # BLD: 8.37 10*3/MM3 (ref 3.4–10.8)

## 2022-04-11 PROCEDURE — 25010000002 ZOLEDRONIC ACID 5 MG/100ML SOLUTION: Performed by: INTERNAL MEDICINE

## 2022-04-11 PROCEDURE — 96375 TX/PRO/DX INJ NEW DRUG ADDON: CPT

## 2022-04-11 PROCEDURE — 84100 ASSAY OF PHOSPHORUS: CPT

## 2022-04-11 PROCEDURE — 99214 OFFICE O/P EST MOD 30 MIN: CPT | Performed by: INTERNAL MEDICINE

## 2022-04-11 PROCEDURE — 85025 COMPLETE CBC W/AUTO DIFF WBC: CPT

## 2022-04-11 PROCEDURE — 80053 COMPREHEN METABOLIC PANEL: CPT

## 2022-04-11 PROCEDURE — 83735 ASSAY OF MAGNESIUM: CPT

## 2022-04-11 PROCEDURE — 96374 THER/PROPH/DIAG INJ IV PUSH: CPT

## 2022-04-11 RX ORDER — ZOLEDRONIC ACID 5 MG/100ML
5 INJECTION, SOLUTION INTRAVENOUS ONCE
Status: CANCELLED | OUTPATIENT
Start: 2022-04-11

## 2022-04-11 RX ORDER — SODIUM CHLORIDE 9 MG/ML
250 INJECTION, SOLUTION INTRAVENOUS ONCE
Status: CANCELLED | OUTPATIENT
Start: 2022-04-11

## 2022-04-11 RX ORDER — TAMOXIFEN CITRATE 20 MG/1
20 TABLET ORAL DAILY
Qty: 30 TABLET | Refills: 3 | Status: SHIPPED | OUTPATIENT
Start: 2022-04-11 | End: 2022-06-27 | Stop reason: SDUPTHER

## 2022-04-11 RX ORDER — ZOLEDRONIC ACID 5 MG/100ML
5 INJECTION, SOLUTION INTRAVENOUS ONCE
Status: COMPLETED | OUTPATIENT
Start: 2022-04-11 | End: 2022-04-11

## 2022-04-11 RX ORDER — SODIUM CHLORIDE 9 MG/ML
250 INJECTION, SOLUTION INTRAVENOUS ONCE
Status: COMPLETED | OUTPATIENT
Start: 2022-04-11 | End: 2022-04-11

## 2022-04-11 RX ADMIN — ZOLEDRONIC ACID 5 MG: 0.05 INJECTION, SOLUTION INTRAVENOUS at 10:59

## 2022-04-11 RX ADMIN — SODIUM CHLORIDE 250 ML: 9 INJECTION, SOLUTION INTRAVENOUS at 10:59

## 2022-04-11 NOTE — PROGRESS NOTES
Subjective     REASON FOR CONSULTATION: Left sided early stage breast cancer  Provide an opinion on any further workup or treatment                           REQUESTING PHYSICIAN: ENZO Larry, Bel Marina MD    RECORDS OBTAINED:  Records of the patients history including those obtained from the referring provider were reviewed and summarized in detail.    HISTORY OF PRESENT ILLNESS:  The patient is a 89 y.o. year old female who is here for an opinion about the above issue.    History of Present Illness   The patient is an 89-year-old female with a previously surgically treated hyperparathyroidism.  She had recently developed spontaneous bleeding led additional in her right neck extending to the shoulder and across the shoulder and breast with subsequent certain about inflammatory breast cancer.  She underwent a screening mammogram 1/26/2022 and then diagnostic mammogram and ultrasound right breast.  There was an area of focal asymmetry in the posterior one third upper inner quadrant left breast not the affected breast, persist on spot compression negative findings on ultrasound though there was a 5 mm hypoechoic mass left breast that was noted.  Ultrasound-guided biopsy 2/17 revealed invasive lobular carcinoma grade 1 with no lymphatic or perineural invasion, ER 99%, SC 60%, HER-2 negative and Ki-67 of 4%.     Her chest wall erythema led to an ER visit with CT scan demonstrating chronic right shoulder disease, complete rotator cuff tear.     The patient was seen 2/24/2022 by Dr. Marina general surgery with the surgery requesting not to have radiation therapy at home mastectomy but agreeable to lumpectomy and sentinel lymph node.     Patient now referred to medical oncology for consideration of endocrine therapy as appropriate.      At the time of this dictation she has been seen by radiation therapy.  There are plans to proceed with lumpectomy and sentinel lymph node biopsy and the fact that women of  70 years or older with ER positive tumors do not gain substantially with the addition of radiation therapy.  This has to be balanced with the fact that the patient is known to have osteoporosis involving both hips with left hip T score -3.1 and right hip T score of -2.8, lumbar T score is -0.8.    These findings are discussed with the patient and her daughter in some detail when they are seen 3/7/2022 particularly recognizing the patient is quite active, lives on her own and continues to manage all her daily activities.  Notably she continues to have issues with bilateral rotator cuff injury right greater than left and is to be seen for aspiration of her right shoulder prior to decision made for her breast surgery.    Patient proceeded to right shoulder aspiration that was negative for infectious concerns and then to to surgery 4/7/2022 undergoing a left needle localization lumpectomy with tracing and left axillary sentinel node biopsy.  Her findings revealed invasive lobular carcinoma measuring 5 mm x 5 mm x 4 mm Olivia grade 1, negative margins sentinel lymph nodes x1 --[id number] ER/MT positive, HER-2 negative.    The patient is seen with her daughter 4/11/2022 fortunately having recovered quickly post surgery.  We discussed the findings of her pathology and her current status with plans to initiate tamoxifen (previously discussed) and proceed with Reclast.          Past Medical History:   Diagnosis Date   • Arthritis    • Carcinoma of upper-inner quadrant of left breast in female, estrogen receptor positive (HCC) 2/24/2022   • Carotid artery stenosis    • Carotid atherosclerosis, bilateral 7/12/2019   • Cataract     BILATERAL   • Depression    • Gastroenteritis    • Generalized osteoarthritis 2/1/2016   • H/O diastolic dysfunction    • Hyperlipidemia    • Hyperparathyroidism (HCC)    • Hyperparathyroidism (HCC)    • Hypertension    • Hypertrophic cardiomyopathy (HCC)    • Neuromuscular disorder (HCC)    •  Obstructive sleep apnea syndrome 2/1/2016   • Osteopenia    • Osteoporosis    • Primary familial hypertrophic cardiomyopathy (HCC) 2/1/2016   • Pulmonary hypertension (HCC)    • Synovial cyst of popliteal space 2/1/2016   • Vitamin D deficiency         Past Surgical History:   Procedure Laterality Date   • BREAST BIOPSY Left 02/17/2022   • BREAST LUMPECTOMY WITH SENTINEL NODE BIOPSY Left 4/7/2022    Procedure: LEFT BREAST LUMPECTOMY WITH SENTINEL NODE BIOPSY AND NEEDLE LOCALIZATION;  Surgeon: Bel Marina MD;  Location: [facility], [place], operated by [facility];  Service: General;  Laterality: Left;   • BUNIONECTOMY Right     FOOT SURGERY   • COLONOSCOPY N/A 2011    Dr. Luis   • HAND SURGERY Right     TUMOR REMOVED ON FOREFINGER   • THYROIDECTOMY Right 4/20/2016    Procedure: RIGHT SUPERIOR PARATHYROIDECTOMY;  Surgeon: Bel Marina MD;  Location: [facility];  Service:         Current Outpatient Medications on File Prior to Visit   Medication Sig Dispense Refill   • amLODIPine (NORVASC) 5 MG tablet Take 5 mg by mouth Daily.     • aspirin 81 MG EC tablet Take 81 mg by mouth Daily. HOLD PER MD INSTR     • B Complex Vitamins (vitamin b complex) capsule capsule Take 1 capsule by mouth Daily. HOLD PER MD INSTR     • carvedilol (COREG) 25 MG tablet TAKE ONE TABLET BY MOUTH TWICE A DAY WITH MEALS 180 tablet 1   • cholecalciferol (VITAMIN D3) 1000 units tablet Take 1,000 Units by mouth Daily.     • Lactobacillus (PROBIOTIC ACIDOPHILUS PO) Take 1 tablet by mouth Daily.     • olmesartan (BENICAR) 20 MG tablet Take 20 mg by mouth Daily.     • ondansetron (Zofran) 4 MG tablet Take 1 tablet by mouth Every 8 (Eight) Hours As Needed for Nausea or Vomiting for up to 10 doses. 10 tablet 0   • pravastatin (PRAVACHOL) 40 MG tablet Take 1 tablet by mouth Daily. 90 tablet 1   • traMADol (ULTRAM) 50 MG tablet Use only if pain unrelieved by tylenol or NSAIDs, 1 tablet q 4 hours, put preferably as single dose at night if unrelieved pain 7 tablet 0   •  "vitamin C (ASCORBIC ACID) 250 MG tablet Take 500 mg by mouth Daily. HOLD PER MD INSTR     • Zinc 50 MG capsule Take 50 mg by mouth Daily. HOLD PER MD INSTR       No current facility-administered medications on file prior to visit.        ALLERGIES:    Allergies   Allergen Reactions   • Amoxicillin Hives   • Atorvastatin Myalgia        Social History     Socioeconomic History   • Marital status: Single   Tobacco Use   • Smoking status: Never Smoker   • Smokeless tobacco: Never Used   Vaping Use   • Vaping Use: Never used   Substance and Sexual Activity   • Alcohol use: No   • Drug use: No   • Sexual activity: Defer        Family History   Problem Relation Age of Onset   • Diabetes Mother    • Heart attack Mother    • Arthritis Father    • Breast cancer Sister    • Breast cancer Sister    • Breast cancer Sister    • Colon cancer Sister    • Heart disease Brother         CABG   • Other Brother         BRAIN TUMOR   • Colon cancer Brother    • Colon cancer Brother    • Colon cancer Brother    • Colon cancer Maternal Grandfather    • Cancer Other    • Malig Hyperthermia Neg Hx       Family cancer history is positive for sister with breast cancer, a broth had a brain tumor, a brother/sister/maternal grandfather had colon cancer.     Review of Systems     Objective     Vitals:    04/11/22 0950   BP: 168/93   Pulse: 73   Resp: 16   Temp: 98.2 °F (36.8 °C)   TempSrc: Temporal   SpO2: 97%   Weight: 62.9 kg (138 lb 9.6 oz)   Height: 149.9 cm (59.02\")   PainSc: 0-No pain     Current Status 4/11/2022   ECOG score 0       Physical Exam  Constitutional:       Appearance: Normal appearance.   HENT:      Head: Normocephalic and atraumatic.      Nose: Nose normal.      Mouth/Throat:      Mouth: Mucous membranes are moist.      Pharynx: Oropharynx is clear.   Eyes:      Extraocular Movements: Extraocular movements intact.      Conjunctiva/sclera: Conjunctivae normal.      Pupils: Pupils are equal, round, and reactive to light.   Neck: "      Comments: Status post previous parathyroid surgery, well-healed  Cardiovascular:      Rate and Rhythm: Normal rate and regular rhythm.      Pulses: Normal pulses.      Heart sounds: Normal heart sounds.   Pulmonary:      Effort: Pulmonary effort is normal.      Breath sounds: Normal breath sounds.      Comments: Left breast lumpectomy site well-healing, left axillary site also without additional inflammation  Musculoskeletal:      Cervical back: Normal range of motion and neck supple.   Neurological:      Mental Status: She is alert.           RECENT LABS:  Hematology WBC   Date Value Ref Range Status   04/11/2022 8.37 3.40 - 10.80 10*3/mm3 Final   01/13/2022 8.9 3.4 - 10.8 x10E3/uL Final     RBC   Date Value Ref Range Status   04/11/2022 4.69 3.77 - 5.28 10*6/mm3 Final   01/13/2022 4.41 3.77 - 5.28 x10E6/uL Final     Hemoglobin   Date Value Ref Range Status   04/11/2022 13.6 12.0 - 15.9 g/dL Final     Hematocrit   Date Value Ref Range Status   04/11/2022 42.7 34.0 - 46.6 % Final     Platelets   Date Value Ref Range Status   04/11/2022 244 140 - 450 10*3/mm3 Final          Assessment/Plan          89-year-old female with a history of osteoarthritis, hyperparathyroidism treated surgically, obstructive sleep apnea, carotid artery disease, hypertrophic cardiomyopathy, osteoporosis and bilateral rotator cuff injuries right greater than left.  She has, recently, been found to have a carcinoma the upper inner quadrant of the left breast-invasive lobular carcinoma overall grade 1, 5 mm, ER 99%, [place] 60%, Ki-67 [id number].     The patient is here with her daughter 3/7/2022 we have discussed her treatment overall for her [facility] breast cancer with patient preference to avoid chemotherapy or adjuvant radiation therapy.      There is no evidence that chemotherapy has a role in this patient's care and we have discussed that it would not be necessary.  Radiation therapy benefit has also been discussed and will not be  pursued.      Notably the latter is supported by a previous meta-analysis that looked at women greater than 70 years of age with clinical stage I ER positive women who were treated with radiation and tamoxifen with results that indicate that baseline risk is low enough to reasonably avoid RT.    As we discussed endocrine therapy she is also felt better served with tamoxifen therapy as result of her underlying osteoporosis.  She has been on long-term Prolia without substantial effect to this point and might be better served with an alternative therapy such as Reclast.          After discussion the patient went on to have fluid aspiration from her right shoulder joint .  Her studies were unremarkable for infectious process and the erythema was thought to be related to hematoma from DJD.                                                                                                                                                    She was able to proceed to breast surgery 4/7/2022 undergoing a left needle localization lumpectomy with tracing and left axillary sentinel node biopsy.  Her findings revealed invasive lobular carcinoma measuring 5 mm x 5 mm x 4 mm Olivia grade 1, negative margins sentinel lymph nodes x1 --[id number] ER/SC positive, HER-2 negative.       After, again, discussion    *Patient to proceed today with Reclast, continue vitamin D and calcium supplementation      *Patient agreeable to start Tamoxifen -E scribed to pharmacy 20 mg daily      *2-month return, toxicity assessment      *Patient likely to proceed with steroid injection right shoulder      *Patient and her daughter agreeable with this plan and follow-up

## 2022-04-25 ENCOUNTER — OFFICE VISIT (OUTPATIENT)
Dept: SURGERY | Facility: CLINIC | Age: 87
End: 2022-04-25

## 2022-04-25 DIAGNOSIS — Z17.0 CARCINOMA OF UPPER-INNER QUADRANT OF LEFT BREAST IN FEMALE, ESTROGEN RECEPTOR POSITIVE: Primary | Chronic | ICD-10-CM

## 2022-04-25 DIAGNOSIS — C50.212 CARCINOMA OF UPPER-INNER QUADRANT OF LEFT BREAST IN FEMALE, ESTROGEN RECEPTOR POSITIVE: Primary | Chronic | ICD-10-CM

## 2022-04-25 PROCEDURE — 99024 POSTOP FOLLOW-UP VISIT: CPT | Performed by: SURGERY

## 2022-04-25 NOTE — PROGRESS NOTES
SURGERY: [facility]  Post op Visit  Neha Guillory  04/25/2022    Ms. Guillory  presents today after having undergone Surgery 4/7/2022, of a left breast needle localization lumpectomy with lymphatic tracing and left axillary sentinel node biopsy.  This was for a left upper inner quadrant 7 mm invasive lobular cancer, ER/WA positive, HER2/sulma negative found on screening.  Her case was notable for a very far inferior sentinel node.    Her final pathology returned as a 5 mm invasive lobular carcinoma, grade 1, all margins negative, closest being the 7 mm.  Merom node was negative.  This makes her a pathologic staging of [id number].    Today she comes in and she is just done outstanding.  I did not place a drain and she is really recuperated beautifully and is very thankful.  I talked to her about her staging.  She is already seen Dr. Chawla and he put her on tamoxifen.  He saw Dr. Acosta and he confirmed that radiation was not necessary in the context of her desires.    Her mammogram last year was ordered that an orthopedic APRN.  I have offered to do so next year or she can do so through Dr. Chawla or through her regular doctor.  I am happy to see her if she would like but feel confident that Dr. Dill will not manage her well.    Bel Marina MD  15:54 EDT

## 2022-06-25 NOTE — PROGRESS NOTES
Subjective     REASON FOR FOLLOW-UP left sided early stage breast cancer                           History of Present Illness   The patient is an 89-year-old female with a previously surgically treated hyperparathyroidism.  She had recently developed spontaneous bleeding led additional in her right neck extending to the shoulder and across the shoulder and breast with subsequent certain about inflammatory breast cancer.  She underwent a screening mammogram 1/26/2022 and then diagnostic mammogram and ultrasound right breast.  There was an area of focal asymmetry in the posterior one third upper inner quadrant left breast not the affected breast, persist on spot compression negative findings on ultrasound though there was a 5 mm hypoechoic mass left breast that was noted.  Ultrasound-guided biopsy 2/17 revealed invasive lobular carcinoma grade 1 with no lymphatic or perineural invasion, ER 99%, NC 60%, HER-2 negative and Ki-67 of 4%.     Her chest wall erythema led to an ER visit with CT scan demonstrating chronic right shoulder disease, complete rotator cuff tear.     The patient was seen 2/24/2022 by Dr. Marina general surgery with the surgery requesting not to have radiation therapy at home mastectomy but agreeable to lumpectomy and sentinel lymph node.     Patient now referred to medical oncology for consideration of endocrine therapy as appropriate.      At the time of this dictation she has been seen by radiation therapy.  There are plans to proceed with lumpectomy and sentinel lymph node biopsy and the fact that women of 70 years or older with ER positive tumors do not gain substantially with the addition of radiation therapy.  This has to be balanced with the fact that the patient is known to have osteoporosis involving both hips with left hip T score -3.1 and right hip T score of -2.8, lumbar T score is -0.8.    These findings are discussed with the patient and her daughter in some detail when they are  seen 3/7/2022 particularly recognizing the patient is quite active, lives on her own and continues to manage all her daily activities.  Notably she continues to have issues with bilateral rotator cuff injury right greater than left and is to be seen for aspiration of her right shoulder prior to decision made for her breast surgery.    Patient proceeded to right shoulder aspiration that was negative for infectious concerns and then to to surgery 4/7/2022 undergoing a left needle localization lumpectomy with tracing and left axillary sentinel node biopsy.  Her findings revealed invasive lobular carcinoma measuring 5 mm x 5 mm x 4 mm Meherrin grade 1, negative margins sentinel lymph nodes x1 --[id number] ER/MA positive, HER-2 negative.    The patient is seen with her daughter 4/11/2022 fortunately having recovered quickly post surgery.  We discussed the findings of her pathology and her current status with plans to initiate tamoxifen (previously discussed) and proceed with Reclast.    The patient went on to be treated as described with Reclast and fortunately had no additional side effects.  She is next seen 6/27/2022 and 2 axumin 2 months also without side effect profile.  Both she and her daughter indicate that she is doing quite well.          Past Medical History:   Diagnosis Date   • Arthritis    • Carcinoma of upper-inner quadrant of left breast in female, estrogen receptor positive (HCC) 2/24/2022   • Carotid artery stenosis    • Carotid atherosclerosis, bilateral 7/12/2019   • Cataract     BILATERAL   • Depression    • Gastroenteritis    • Generalized osteoarthritis 2/1/2016   • H/O diastolic dysfunction    • Hyperlipidemia    • Hyperparathyroidism (HCC)    • Hyperparathyroidism (HCC)    • Hypertension    • Hypertrophic cardiomyopathy (HCC)    • Neuromuscular disorder (HCC)    • Obstructive sleep apnea syndrome 2/1/2016   • Osteopenia    • Osteoporosis    • Primary familial hypertrophic cardiomyopathy (HCC)  2/1/2016   • Pulmonary hypertension (HCC)    • Synovial cyst of popliteal space 2/1/2016   • Vitamin D deficiency         Past Surgical History:   Procedure Laterality Date   • BREAST BIOPSY Left 02/17/2022   • BREAST LUMPECTOMY WITH SENTINEL NODE BIOPSY Left 4/7/2022    Procedure: LEFT BREAST LUMPECTOMY WITH SENTINEL NODE BIOPSY AND NEEDLE LOCALIZATION;  Surgeon: Bel Marina MD;  Location: [facility];  Service: General;  Laterality: Left;   • BUNIONECTOMY Right     FOOT SURGERY   • COLONOSCOPY N/A 2011    Dr. Luis   • HAND SURGERY Right     TUMOR REMOVED ON FOREFINGER   • THYROIDECTOMY Right 4/20/2016    Procedure: RIGHT SUPERIOR PARATHYROIDECTOMY;  Surgeon: Bel Marina MD;  Location: [facility];  Service:         Current Outpatient Medications on File Prior to Visit   Medication Sig Dispense Refill   • amLODIPine (NORVASC) 5 MG tablet Take 5 mg by mouth Daily.     • aspirin 81 MG EC tablet Take 81 mg by mouth Daily.     • B Complex Vitamins (vitamin b complex) capsule capsule Take 1 capsule by mouth Daily.     • carvedilol (COREG) 25 MG tablet TAKE ONE TABLET BY MOUTH TWICE A DAY WITH MEALS 180 tablet 1   • cholecalciferol (VITAMIN D3) 1000 units tablet Take 1,000 Units by mouth Daily.     • Lactobacillus (PROBIOTIC ACIDOPHILUS PO) Take 1 tablet by mouth Daily.     • olmesartan (BENICAR) 20 MG tablet Take 20 mg by mouth Daily.     • pravastatin (PRAVACHOL) 40 MG tablet Take 1 tablet by mouth Daily. 90 tablet 1   • vitamin C (ASCORBIC ACID) 250 MG tablet Take 500 mg by mouth Daily.     • Zinc 50 MG capsule Take 50 mg by mouth Daily.     • [DISCONTINUED] tamoxifen (NOLVADEX) 20 MG chemo tablet Take 1 tablet by mouth Daily. 30 tablet 3     No current facility-administered medications on file prior to visit.        ALLERGIES:    Allergies   Allergen Reactions   • Amoxicillin Hives   • Atorvastatin Myalgia        Social History     Socioeconomic History   • Marital status: Single   Tobacco Use   •  "Smoking status: Never Smoker   • Smokeless tobacco: Never Used   Vaping Use   • Vaping Use: Never used   Substance and Sexual Activity   • Alcohol use: No   • Drug use: No   • Sexual activity: Defer        Family History   Problem Relation Age of Onset   • Diabetes Mother    • Heart attack Mother    • Arthritis Father    • Breast cancer Sister    • Breast cancer Sister    • Breast cancer Sister    • Colon cancer Sister    • Heart disease Brother         CABG   • Other Brother         BRAIN TUMOR   • Colon cancer Brother    • Colon cancer Brother    • Colon cancer Brother    • Colon cancer Maternal Grandfather    • Cancer Other    • Malig Hyperthermia Neg Hx       Family cancer history is positive for sister with breast cancer, a broth had a brain tumor, a brother/sister/maternal grandfather had colon cancer.     Review of Systems     Objective     Vitals:    06/27/22 1240   BP: 151/74   Pulse: 74   Resp: 16   Temp: 96.6 °F (35.9 °C)   TempSrc: Temporal   SpO2: 94%   Weight: 59.1 kg (130 lb 4.8 oz)   Height: 149.9 cm (59.02\")   PainSc: 0-No pain     Current Status 6/27/2022   ECOG score 1       Physical Exam  Constitutional:       Appearance: Normal appearance.   HENT:      Head: Normocephalic and atraumatic.      Nose: Nose normal.      Mouth/Throat:      Mouth: Mucous membranes are moist.      Pharynx: Oropharynx is clear.   Eyes:      Extraocular Movements: Extraocular movements intact.      Conjunctiva/sclera: Conjunctivae normal.      Pupils: Pupils are equal, round, and reactive to light.   Neck:      Comments: Status post previous parathyroid surgery, well-healed  Cardiovascular:      Rate and Rhythm: Normal rate and regular rhythm.      Pulses: Normal pulses.      Heart sounds: Normal heart sounds.   Pulmonary:      Effort: Pulmonary effort is normal.      Breath sounds: Normal breath sounds.      Comments: Left breast lumpectomy site well-healing, left axillary site also without additional " inflammation  Musculoskeletal:      Cervical back: Normal range of motion and neck supple.   Neurological:      Mental Status: She is alert.           RECENT LABS:  Hematology WBC   Date Value Ref Range Status   06/27/2022 11.55 (H) 3.40 - 10.80 10*3/mm3 Final   01/13/2022 8.9 3.4 - 10.8 x10E3/uL Final     RBC   Date Value Ref Range Status   06/27/2022 4.23 3.77 - 5.28 10*6/mm3 Final   01/13/2022 4.41 3.77 - 5.28 x10E6/uL Final     Hemoglobin   Date Value Ref Range Status   06/27/2022 12.5 12.0 - 15.9 g/dL Final     Hematocrit   Date Value Ref Range Status   06/27/2022 37.6 34.0 - 46.6 % Final     Platelets   Date Value Ref Range Status   06/27/2022 214 140 - 450 10*3/mm3 Final          Assessment & Plan          89-year-old female with a history of osteoarthritis, hyperparathyroidism treated surgically, obstructive sleep apnea, carotid artery disease, hypertrophic cardiomyopathy, osteoporosis and bilateral rotator cuff injuries right greater than left.  She has, recently, been found to have a carcinoma the upper inner quadrant of the left breast-invasive lobular carcinoma overall grade 1, 5 mm, ER 99%, DC 60%, Ki-67 4%-[id number].     The patient is here with her daughter 3/7/2022 we have discussed her treatment overall for her [facility] breast cancer with patient preference to avoid chemotherapy or adjuvant radiation therapy.      There is no evidence that chemotherapy has a role in this patient's care and we have discussed that it would not be necessary.  Radiation therapy benefit has also been discussed and will not be pursued.      Notably the latter is supported by a previous meta-analysis that looked at women greater than 70 years of age with clinical stage I ER positive women who were treated with radiation and tamoxifen with results that indicate that baseline risk is low enough to reasonably avoid RT.    As we discussed endocrine therapy she is also felt better served with tamoxifen therapy as result of her  underlying osteoporosis.  She has been on long-term Prolia without substantial effect to this point and might be better served with an alternative therapy such as Reclast.          After discussion the patient went on to have fluid aspiration from her right shoulder joint .  Her studies were unremarkable for infectious process and the erythema was thought to be related to hematoma from DJD.                                                                                                                                                    She was able to proceed to breast surgery 4/7/2022 undergoing a left needle localization lumpectomy with tracing and left axillary sentinel node biopsy.  Her findings revealed invasive lobular carcinoma measuring 5 mm x 5 mm x 4 mm Olivia grade 1, negative margins sentinel lymph nodes x1 --[id number] ER/MA positive, HER-2 negative.    We went on to proceed with Reclast, continue vitamin D and calcium supplementation 4/11/2022.  The patient began tamoxifen and is seen back 6/27/2022 without any side effect profile.  We discussed the patient's mild leukocytosis and that she should be aware of any fever or symptoms of infection.  Nothing is present today that might indicate this.  We will also inquire again as to whether she has had any steroid injections but this is not the case thus far.      *6-month follow-up MD, CBC, continue tamoxifen      *Notify our practice if she develops any symptoms referable potential infection and we discussed this in office today 6/27/2022.      *Patient and her daughter agreeable with this plan and follow-up

## 2022-06-27 ENCOUNTER — OFFICE VISIT (OUTPATIENT)
Dept: ONCOLOGY | Facility: CLINIC | Age: 87
End: 2022-06-27

## 2022-06-27 ENCOUNTER — LAB (OUTPATIENT)
Dept: LAB | Facility: HOSPITAL | Age: 87
End: 2022-06-27

## 2022-06-27 VITALS
SYSTOLIC BLOOD PRESSURE: 151 MMHG | RESPIRATION RATE: 16 BRPM | HEIGHT: 59 IN | HEART RATE: 74 BPM | BODY MASS INDEX: 26.27 KG/M2 | DIASTOLIC BLOOD PRESSURE: 74 MMHG | WEIGHT: 130.3 LBS | TEMPERATURE: 96.6 F | OXYGEN SATURATION: 94 %

## 2022-06-27 DIAGNOSIS — C50.212 CARCINOMA OF UPPER-INNER QUADRANT OF LEFT BREAST IN FEMALE, ESTROGEN RECEPTOR POSITIVE: ICD-10-CM

## 2022-06-27 DIAGNOSIS — Z17.0 CARCINOMA OF UPPER-INNER QUADRANT OF LEFT BREAST IN FEMALE, ESTROGEN RECEPTOR POSITIVE: Primary | Chronic | ICD-10-CM

## 2022-06-27 DIAGNOSIS — C50.212 CARCINOMA OF UPPER-INNER QUADRANT OF LEFT BREAST IN FEMALE, ESTROGEN RECEPTOR POSITIVE: Primary | Chronic | ICD-10-CM

## 2022-06-27 DIAGNOSIS — Z17.0 CARCINOMA OF UPPER-INNER QUADRANT OF LEFT BREAST IN FEMALE, ESTROGEN RECEPTOR POSITIVE: ICD-10-CM

## 2022-06-27 LAB
BASOPHILS # BLD AUTO: 0.06 10*3/MM3 (ref 0–0.2)
BASOPHILS NFR BLD AUTO: 0.5 % (ref 0–1.5)
DEPRECATED RDW RBC AUTO: 43.4 FL (ref 37–54)
EOSINOPHIL # BLD AUTO: 0.1 10*3/MM3 (ref 0–0.4)
EOSINOPHIL NFR BLD AUTO: 0.9 % (ref 0.3–6.2)
ERYTHROCYTE [DISTWIDTH] IN BLOOD BY AUTOMATED COUNT: 13.2 % (ref 12.3–15.4)
HCT VFR BLD AUTO: 37.6 % (ref 34–46.6)
HGB BLD-MCNC: 12.5 G/DL (ref 12–15.9)
IMM GRANULOCYTES # BLD AUTO: 0.05 10*3/MM3 (ref 0–0.05)
IMM GRANULOCYTES NFR BLD AUTO: 0.4 % (ref 0–0.5)
LYMPHOCYTES # BLD AUTO: 0.79 10*3/MM3 (ref 0.7–3.1)
LYMPHOCYTES NFR BLD AUTO: 6.8 % (ref 19.6–45.3)
MCH RBC QN AUTO: 29.6 PG (ref 26.6–33)
MCHC RBC AUTO-ENTMCNC: 33.2 G/DL (ref 31.5–35.7)
MCV RBC AUTO: 88.9 FL (ref 79–97)
MONOCYTES # BLD AUTO: 0.92 10*3/MM3 (ref 0.1–0.9)
MONOCYTES NFR BLD AUTO: 8 % (ref 5–12)
NEUTROPHILS NFR BLD AUTO: 83.4 % (ref 42.7–76)
NEUTROPHILS NFR BLD AUTO: 9.63 10*3/MM3 (ref 1.7–7)
NRBC BLD AUTO-RTO: 0 /100 WBC (ref 0–0.2)
PLATELET # BLD AUTO: 214 10*3/MM3 (ref 140–450)
PMV BLD AUTO: 11.5 FL (ref 6–12)
RBC # BLD AUTO: 4.23 10*6/MM3 (ref 3.77–5.28)
WBC NRBC COR # BLD: 11.55 10*3/MM3 (ref 3.4–10.8)

## 2022-06-27 PROCEDURE — 36415 COLL VENOUS BLD VENIPUNCTURE: CPT

## 2022-06-27 PROCEDURE — 99213 OFFICE O/P EST LOW 20 MIN: CPT | Performed by: INTERNAL MEDICINE

## 2022-06-27 PROCEDURE — 85025 COMPLETE CBC W/AUTO DIFF WBC: CPT

## 2022-06-27 RX ORDER — TAMOXIFEN CITRATE 20 MG/1
20 TABLET ORAL DAILY
Qty: 90 TABLET | Refills: 3 | Status: SHIPPED | OUTPATIENT
Start: 2022-06-27

## 2022-06-28 ENCOUNTER — OFFICE VISIT (OUTPATIENT)
Dept: INTERNAL MEDICINE | Facility: CLINIC | Age: 87
End: 2022-06-28

## 2022-06-28 VITALS
HEART RATE: 68 BPM | DIASTOLIC BLOOD PRESSURE: 79 MMHG | WEIGHT: 133 LBS | SYSTOLIC BLOOD PRESSURE: 139 MMHG | BODY MASS INDEX: 26.81 KG/M2 | OXYGEN SATURATION: 94 % | HEIGHT: 59 IN | TEMPERATURE: 98.1 F | RESPIRATION RATE: 17 BRPM

## 2022-06-28 DIAGNOSIS — Z00.00 HEALTHCARE MAINTENANCE: Chronic | ICD-10-CM

## 2022-06-28 DIAGNOSIS — C50.212 CARCINOMA OF UPPER-INNER QUADRANT OF LEFT BREAST IN FEMALE, ESTROGEN RECEPTOR POSITIVE: Chronic | ICD-10-CM

## 2022-06-28 DIAGNOSIS — M81.0 OSTEOPOROSIS, POST-MENOPAUSAL: Chronic | ICD-10-CM

## 2022-06-28 DIAGNOSIS — E78.49 OTHER HYPERLIPIDEMIA: Primary | Chronic | ICD-10-CM

## 2022-06-28 DIAGNOSIS — R73.03 PREDIABETES: Chronic | ICD-10-CM

## 2022-06-28 DIAGNOSIS — I10 ESSENTIAL HYPERTENSION: ICD-10-CM

## 2022-06-28 DIAGNOSIS — Z17.0 CARCINOMA OF UPPER-INNER QUADRANT OF LEFT BREAST IN FEMALE, ESTROGEN RECEPTOR POSITIVE: Chronic | ICD-10-CM

## 2022-06-28 DIAGNOSIS — I42.2 HYPERTROPHIC CARDIOMYOPATHY: ICD-10-CM

## 2022-06-28 DIAGNOSIS — N18.31 STAGE 3A CHRONIC KIDNEY DISEASE: ICD-10-CM

## 2022-06-28 PROCEDURE — 99214 OFFICE O/P EST MOD 30 MIN: CPT | Performed by: NURSE PRACTITIONER

## 2022-06-28 NOTE — ASSESSMENT & PLAN NOTE
Hypertension is stable, continue amlodipine, Coreg and olmesartan.  Routine home monitoring for goal of less than 140/80 and DASH diet.

## 2022-06-28 NOTE — PROGRESS NOTES
"Chief Complaint  Hypertension (3-month follow-up)    Subjective        Neha Guillory presents to [facility]  Patient presents for 3-month follow-up on chronic conditions.  This is an 89-year-old female.    She has hyperlipidemia and takes pravastatin 40 mg daily with good compliance.    For hypertension she takes amlodipine 5 mg daily, Coreg 25 mg twice daily and olmesartan 20 mg daily.  Hypertension is stable.  She checks her blood pressure routinely at home.  Her [place] cardiologist, Dr. Freeman manages her antihypertensive regimen as she has a history of resistant hypertension.    She has a history of breast cancer, currently on tamoxifen per the direction of oncology.  Status postlumpectomy in April 2022.    She has osteoporosis and recently switched from Prolia to Reclast per the direction of her oncologist.    Yesterday she had a CBC done which showed mild elevation in white blood cells.  She denies any symptoms of infection.    History of prediabetes, she endorses a well-balanced diet.    Otherwise reports that she is doing well and denies development of other new issues today.      Objective   Vital Signs:  /79 (BP Location: Left arm, Patient Position: Sitting, Cuff Size: Adult)   Pulse 68   Temp 98.1 °F (36.7 °C)   Resp 17   Ht 149.9 cm (59.02\")   Wt 60.3 kg (133 lb)   SpO2 94%   BMI 26.84 kg/m²   Estimated body mass index is 26.84 kg/m² as calculated from the following:    Height as of this encounter: 149.9 cm (59.02\").    Weight as of this encounter: 60.3 kg (133 lb).          Physical Exam  Vitals and nursing note reviewed. Exam conducted with a chaperone present (Accompanied by her daughter).   Constitutional:       General: She is not in acute distress.     Appearance: Normal appearance. She is well-developed. She is not ill-appearing, toxic-appearing or diaphoretic.   HENT:      Head: Normocephalic and atraumatic.      Right Ear: External ear normal.      Left " Ear: External ear normal.      Ears:      Comments: Bilateral hearing aids intact  Eyes:      Pupils: Pupils are equal, round, and reactive to light.   Neck:      Vascular: No carotid bruit.   Cardiovascular:      Rate and Rhythm: Normal rate and regular rhythm.      Pulses: Normal pulses.      Heart sounds: Normal heart sounds.      Comments: No peripheral edema  Pulmonary:      Effort: Pulmonary effort is normal. No respiratory distress.      Breath sounds: Normal breath sounds. No stridor. No wheezing, rhonchi or rales.   Chest:      Chest wall: No tenderness.   Abdominal:      General: Bowel sounds are normal. There is no distension.      Palpations: Abdomen is soft. There is no mass.      Tenderness: There is no abdominal tenderness. There is no right CVA tenderness, guarding or rebound.      Hernia: No hernia is present.   Musculoskeletal:         General: Normal range of motion.      Cervical back: Normal range of motion and neck supple. No rigidity or tenderness.   Lymphadenopathy:      Cervical: No cervical adenopathy.   Skin:     General: Skin is warm and dry.      Capillary Refill: Capillary refill takes less than 2 seconds.   Neurological:      General: No focal deficit present.      Mental Status: She is alert and oriented to person, place, and time. Mental status is at baseline.   Psychiatric:         Mood and Affect: Mood normal.         Behavior: Behavior normal.         Thought Content: Thought content normal.         Judgment: Judgment normal.        Result Review :  The following data was reviewed by: NEZO Larry on 06/28/2022:  Common labs    Common Labsle 4/5/22 4/5/22 4/11/22 4/11/22 6/27/22    1158 1158 0923 0923    Glucose  86  129 (A)    BUN  20  21 (A)    Creatinine  0.87  0.90    Sodium  139  139    Potassium  4.3  4.7    Chloride  101  102    Calcium  9.8  10.1    Albumin  4.50  4.10    Total Bilirubin  0.5  0.6    Alkaline Phosphatase  84  81    AST (SGOT)  17  29    ALT (SGPT)   17  21    WBC 7.70  8.37  11.55 (A)   Hemoglobin 13.3  13.6  12.5   Hematocrit 41.0  42.7  37.6   Platelets 240  244  214   (A) Abnormal value       Comments are available for some flowsheets but are not being displayed.           Data reviewed: Consultant notes Office Visit with Ricky Dill MD (06/27/2022)          Assessment and Plan   Diagnoses and all orders for this visit:    1. Other hyperlipidemia (Primary)  Assessment & Plan:  Continue pravastatin.  Lipid panel today and we will adjust therapy if needed.    Orders:  -     Lipid panel; Future    2. Essential hypertension  Assessment & Plan:  Hypertension is stable, continue amlodipine, Coreg and olmesartan.  Routine home monitoring for goal of less than 140/80 and DASH diet.    Orders:  -     CBC No Differential; Future  -     Comprehensive metabolic panel; Future  -     TSH Rfx On Abnormal To Free T4; Future    3. Stage 3a chronic kidney disease (HCC)  Assessment & Plan:  Continued avoidance of nephrotoxic medications.  Recheck of CMP with labs.      4. Healthcare maintenance  Assessment & Plan:  Continue with routine dental and vision exams.      5. Osteoporosis, post-menopausal  Assessment & Plan:  She is now on Reclast per oncology.  Tolerating well.      6. Prediabetes  Assessment & Plan:  Continue well-balanced diet, A1c with labs.    Orders:  -     Hemoglobin A1c; Future    7. Hypertrophic cardiomyopathy (HCC)  Assessment & Plan:  Stable with no new symptoms.  Her [place] cardiologist is retiring soon and she requests a referral to a [facility] provider.  I placed a referral for [facility] to establish for care of her hypertrophic cardiomyopathy.    Orders:  -     Ambulatory Referral to Cardiology    8. Carcinoma of upper-inner quadrant of left breast in female, estrogen receptor positive (HCC)  Assessment & Plan:  She is doing well, status postlumpectomy in April of this year, following with Dr. Dill, oncology, maintained on tamoxifen  currently and reports no side effects.  Continue with her next oncology follow-up December 2022.    Current outpatient and discharge medications have been reconciled for the patient.  Reviewed by: ENZO Larry         We will contact patient with lab results and further recommendations.  Follow-up as needed and I will see her back in 3 months for a Medicare wellness visit.    Follow Up   Return in about 3 months (around 9/28/2022) for Medicare Wellness.  Patient was given instructions and counseling regarding her condition or for health maintenance advice. Please see specific information pulled into the AVS if appropriate.

## 2022-06-28 NOTE — ASSESSMENT & PLAN NOTE
She is doing well, status postlumpectomy in April of this year, following with Dr. Dill, oncology, maintained on tamoxifen currently and reports no side effects.  Continue with her next oncology follow-up December 2022.

## 2022-06-28 NOTE — ASSESSMENT & PLAN NOTE
Stable with no new symptoms.  Her [place] cardiologist is retiring soon and she requests a referral to a [facility] provider.  I placed a referral for [facility] to establish for care of her hypertrophic cardiomyopathy.

## 2022-07-01 DIAGNOSIS — R73.03 PREDIABETES: Chronic | ICD-10-CM

## 2022-07-01 DIAGNOSIS — E78.49 OTHER HYPERLIPIDEMIA: Chronic | ICD-10-CM

## 2022-07-01 DIAGNOSIS — I10 ESSENTIAL HYPERTENSION: ICD-10-CM

## 2022-07-06 LAB
ALBUMIN SERPL-MCNC: 3.8 G/DL (ref 3.6–4.6)
ALBUMIN/GLOB SERPL: 1.7 {RATIO} (ref 1.2–2.2)
ALP SERPL-CCNC: 49 IU/L (ref 44–121)
ALT SERPL-CCNC: 20 IU/L (ref 0–32)
AST SERPL-CCNC: 19 IU/L (ref 0–40)
BILIRUB SERPL-MCNC: 0.5 MG/DL (ref 0–1.2)
BUN SERPL-MCNC: 20 MG/DL (ref 8–27)
BUN/CREAT SERPL: 19 (ref 12–28)
CALCIUM SERPL-MCNC: 9.5 MG/DL (ref 8.7–10.3)
CHLORIDE SERPL-SCNC: 108 MMOL/L (ref 96–106)
CHOLEST SERPL-MCNC: 101 MG/DL (ref 100–199)
CO2 SERPL-SCNC: 23 MMOL/L (ref 20–29)
CREAT SERPL-MCNC: 1.08 MG/DL (ref 0.57–1)
EGFRCR SERPLBLD CKD-EPI 2021: 49 ML/MIN/1.73
ERYTHROCYTE [DISTWIDTH] IN BLOOD BY AUTOMATED COUNT: 12.9 % (ref 11.7–15.4)
GLOBULIN SER CALC-MCNC: 2.2 G/DL (ref 1.5–4.5)
GLUCOSE SERPL-MCNC: 93 MG/DL (ref 65–99)
HBA1C MFR BLD: 5.9 % (ref 4.8–5.6)
HCT VFR BLD AUTO: 38.8 % (ref 34–46.6)
HDLC SERPL-MCNC: 43 MG/DL
HGB BLD-MCNC: 12.5 G/DL (ref 11.1–15.9)
LDLC SERPL CALC-MCNC: 41 MG/DL (ref 0–99)
MCH RBC QN AUTO: 29.2 PG (ref 26.6–33)
MCHC RBC AUTO-ENTMCNC: 32.2 G/DL (ref 31.5–35.7)
MCV RBC AUTO: 91 FL (ref 79–97)
PLATELET # BLD AUTO: 244 X10E3/UL (ref 150–450)
POTASSIUM SERPL-SCNC: 4.5 MMOL/L (ref 3.5–5.2)
PROT SERPL-MCNC: 6 G/DL (ref 6–8.5)
RBC # BLD AUTO: 4.28 X10E6/UL (ref 3.77–5.28)
SODIUM SERPL-SCNC: 144 MMOL/L (ref 134–144)
TRIGL SERPL-MCNC: 83 MG/DL (ref 0–149)
TSH SERPL DL<=0.005 MIU/L-ACNC: 0.86 UIU/ML (ref 0.45–4.5)
VLDLC SERPL CALC-MCNC: 17 MG/DL (ref 5–40)
WBC # BLD AUTO: 9 X10E3/UL (ref 3.4–10.8)

## 2022-07-07 NOTE — PROGRESS NOTES
Please give Ms. Guillory a call, labs are back.  A1c is stable at 5.9, mildly higher than last check but stable in the prediabetic range.  Continue with a well-balanced diet.  Thyroid numbers are normal along with a very well-controlled cholesterol panel.  Mild elevation in creatinine which likely reflects some mild dehydration for fasting for labs.  Please ensure drinking adequate water each day.  Electrolytes and liver enzymes are stable.  Blood count is perfect with no anemia, normal white blood cells and platelets.  Please let me know of any questions.

## 2022-07-08 ENCOUNTER — TELEPHONE (OUTPATIENT)
Dept: INTERNAL MEDICINE | Facility: CLINIC | Age: 87
End: 2022-07-08

## 2022-07-08 NOTE — TELEPHONE ENCOUNTER
----- Message from ENZO Larry sent at 7/7/2022 11:41 AM EDT -----  Please give Ms. Guillory a call, labs are back.  A1c is stable at 5.9, mildly higher than last check but stable in the prediabetic range.  Continue with a well-balanced diet.  Thyroid numbers are normal along with a very well-controlled cholesterol panel.  Mild elevation in creatinine which likely reflects some mild dehydration for fasting for labs.  Please ensure drinking adequate water each day.  Electrolytes and liver enzymes are stable.  Blood count is perfect with no anemia, normal white blood cells and platelets.  Please let me know of any questions.   details…

## 2022-07-08 NOTE — TELEPHONE ENCOUNTER
LVM for Pt's daughter. Advising of lab results.     Pt and daughter advised to return call to clinic for questions or concerns.   [Follow Up] : follow up GYN visit [FreeTextEntry1] : infertility

## 2022-07-23 PROBLEM — N18.31 STAGE 3A CHRONIC KIDNEY DISEASE: Status: ACTIVE | Noted: 2017-12-26

## 2022-08-16 NOTE — TELEPHONE ENCOUNTER
Pt's daughter called and stated she would like to have her mother's sleep study done at home. But you would have to put in the order for that to be done. Can you place the order for the sleep study to be done at home.     Dr. Nelson Sahu -sleep expert     [phone]   none

## 2022-08-24 ENCOUNTER — OFFICE VISIT (OUTPATIENT)
Dept: CARDIOLOGY | Facility: CLINIC | Age: 87
End: 2022-08-24

## 2022-08-24 ENCOUNTER — OFFICE VISIT (OUTPATIENT)
Dept: INTERNAL MEDICINE | Facility: CLINIC | Age: 87
End: 2022-08-24

## 2022-08-24 VITALS
BODY MASS INDEX: 26.93 KG/M2 | DIASTOLIC BLOOD PRESSURE: 78 MMHG | WEIGHT: 133.6 LBS | HEIGHT: 59 IN | HEART RATE: 70 BPM | SYSTOLIC BLOOD PRESSURE: 150 MMHG

## 2022-08-24 VITALS
HEART RATE: 67 BPM | SYSTOLIC BLOOD PRESSURE: 155 MMHG | DIASTOLIC BLOOD PRESSURE: 80 MMHG | WEIGHT: 133 LBS | HEIGHT: 59 IN | BODY MASS INDEX: 26.81 KG/M2 | RESPIRATION RATE: 18 BRPM | OXYGEN SATURATION: 94 % | TEMPERATURE: 97.2 F

## 2022-08-24 DIAGNOSIS — I35.0 AORTIC STENOSIS, MILD: ICD-10-CM

## 2022-08-24 DIAGNOSIS — C50.212 CARCINOMA OF UPPER-INNER QUADRANT OF LEFT BREAST IN FEMALE, ESTROGEN RECEPTOR POSITIVE: ICD-10-CM

## 2022-08-24 DIAGNOSIS — R22.1 MASS OF LATERAL NECK: Primary | ICD-10-CM

## 2022-08-24 DIAGNOSIS — I10 ESSENTIAL HYPERTENSION: Chronic | ICD-10-CM

## 2022-08-24 DIAGNOSIS — I42.2 HYPERTROPHIC CARDIOMYOPATHY: Primary | Chronic | ICD-10-CM

## 2022-08-24 DIAGNOSIS — Z17.0 CARCINOMA OF UPPER-INNER QUADRANT OF LEFT BREAST IN FEMALE, ESTROGEN RECEPTOR POSITIVE: ICD-10-CM

## 2022-08-24 DIAGNOSIS — M25.812 MASS OF JOINT OF LEFT SHOULDER: ICD-10-CM

## 2022-08-24 PROCEDURE — 99204 OFFICE O/P NEW MOD 45 MIN: CPT | Performed by: INTERNAL MEDICINE

## 2022-08-24 PROCEDURE — 99213 OFFICE O/P EST LOW 20 MIN: CPT | Performed by: NURSE PRACTITIONER

## 2022-08-24 RX ORDER — OLMESARTAN MEDOXOMIL 20 MG/1
20 TABLET ORAL DAILY
Qty: 90 TABLET | Refills: 3 | Status: SHIPPED | OUTPATIENT
Start: 2022-08-24

## 2022-08-24 NOTE — PROGRESS NOTES
"      CARDIOLOGY    Zack Mohr MD    ENCOUNTER DATE:  08/24/2022    Neha Guillory / 89 y.o. / female        CHIEF COMPLAINT / REASON FOR OFFICE VISIT     Hypertrophic cardiomyopathy  Hypertension  Mild aortic stenosis    HISTORY OF PRESENT ILLNESS       HPI  Neha Guillory is a 89 y.o. female who presents today for       The following portions of the patient's history were reviewed and updated as appropriate: allergies, current medications, past family history, past medical history, past social history, past surgical history and problem list.      VITAL SIGNS     Visit Vitals  /78 (BP Location: Right arm, Patient Position: Sitting)   Pulse 70   Ht 149.9 cm (59.02\")   Wt 60.6 kg (133 lb 9.6 oz)   BMI 26.97 kg/m²         Wt Readings from Last 3 Encounters:   08/24/22 60.6 kg (133 lb 9.6 oz)   06/28/22 60.3 kg (133 lb)   06/27/22 59.1 kg (130 lb 4.8 oz)     Body mass index is 26.97 kg/m².      REVIEW OF SYSTEMS   ROS        PHYSICAL EXAMINATION     Vitals reviewed.   Constitutional:       Appearance: Healthy appearance.   Pulmonary:      Effort: Pulmonary effort is normal.   Cardiovascular:      Normal rate. Regular rhythm. Normal S1. Normal S2.      Murmurs: There is a grade 1/6 harsh midsystolic murmur at the URSB, radiating to the neck.      No gallop. No click. No rub.   Pulses:     Intact distal pulses.   Edema:     Peripheral edema absent.   Neurological:      Mental Status: Alert and oriented to person, place and time.           REVIEWED DATA     Procedures    Cardiac Procedures:  1.     Lipid Panel    Lipid Panel 8/30/21 7/5/22   Total Cholesterol 126 101   Triglycerides 85 83   HDL Cholesterol 51 43   VLDL Cholesterol 17 17   LDL Cholesterol  58 41      Comments are available for some flowsheets but are not being displayed.               ASSESSMENT & PLAN      Diagnosis Plan   1. Hypertrophic cardiomyopathy (HCC)     2. Essential hypertension     3. Aortic stenosis, mild   "         SUMMARY/DISCUSSION  1. Hypertrophic obstructive cardiomyopathy.  Patient just had echocardiogram done in July 2022.  She had no obstruction on her outflow tract.  2. Mild aortic stenosis.  Again asymptomatic.  3. Hypertension.  Patient's blood pressure has been fairly stable.  She had a good list of the blood pressures and they range from 123-153/61-68.  Clinically she is doing well.  We did have a discussion about salt but she was really not aware of completely.  4. I told would see her about once a year and less course she has issues.  Unfortunately she did throughout her bottle of olmesartan by accident so we will refill that today.  We will transition her over to me as her cardiology provider.        MEDICATIONS         Discharge Medications          Accurate as of August 24, 2022 12:53 PM. If you have any questions, ask your nurse or doctor.            Continue These Medications      Instructions Start Date   amLODIPine 5 MG tablet  Commonly known as: NORVASC   5 mg, Oral, Daily      aspirin 81 MG EC tablet   81 mg, Oral, Daily      carvedilol 25 MG tablet  Commonly known as: COREG   TAKE ONE TABLET BY MOUTH TWICE A DAY WITH MEALS      cholecalciferol 25 MCG (1000 UT) tablet  Commonly known as: VITAMIN D3   1,000 Units, Oral, Daily      olmesartan 20 MG tablet  Commonly known as: BENICAR   20 mg, Oral, Daily      pravastatin 40 MG tablet  Commonly known as: PRAVACHOL   40 mg, Oral, Daily      PROBIOTIC ACIDOPHILUS PO   1 tablet, Oral, Daily      tamoxifen 20 MG chemo tablet  Commonly known as: NOLVADEX   20 mg, Oral, Daily      vitamin b complex capsule capsule   1 capsule, Oral, Daily      vitamin C 250 MG tablet  Commonly known as: ASCORBIC ACID   500 mg, Oral, Daily      Zinc 50 MG capsule   50 mg, Oral, Daily                 **Dragon Disclaimer:   Much of this encounter note is an electronic transcription/translation of spoken language to printed text. The electronic translation of spoken language  may permit erroneous, or at times, nonsensical words or phrases to be inadvertently transcribed. Although I have reviewed the note for such errors, some may still exist.

## 2022-08-25 ENCOUNTER — TELEPHONE (OUTPATIENT)
Dept: INTERNAL MEDICINE | Facility: CLINIC | Age: 87
End: 2022-08-25

## 2022-08-25 LAB
BASOPHILS # BLD AUTO: 0.1 X10E3/UL (ref 0–0.2)
BASOPHILS NFR BLD AUTO: 1 %
EOSINOPHIL # BLD AUTO: 0.2 X10E3/UL (ref 0–0.4)
EOSINOPHIL NFR BLD AUTO: 2 %
ERYTHROCYTE [DISTWIDTH] IN BLOOD BY AUTOMATED COUNT: 12.8 % (ref 11.7–15.4)
HCT VFR BLD AUTO: 39.2 % (ref 34–46.6)
HGB BLD-MCNC: 12.2 G/DL (ref 11.1–15.9)
IMM GRANULOCYTES # BLD AUTO: 0.1 X10E3/UL (ref 0–0.1)
IMM GRANULOCYTES NFR BLD AUTO: 1 %
LYMPHOCYTES # BLD AUTO: 1.4 X10E3/UL (ref 0.7–3.1)
LYMPHOCYTES NFR BLD AUTO: 14 %
MCH RBC QN AUTO: 28.1 PG (ref 26.6–33)
MCHC RBC AUTO-ENTMCNC: 31.1 G/DL (ref 31.5–35.7)
MCV RBC AUTO: 90 FL (ref 79–97)
MONOCYTES # BLD AUTO: 1 X10E3/UL (ref 0.1–0.9)
MONOCYTES NFR BLD AUTO: 10 %
NEUTROPHILS # BLD AUTO: 7.4 X10E3/UL (ref 1.4–7)
NEUTROPHILS NFR BLD AUTO: 72 %
PLATELET # BLD AUTO: 243 X10E3/UL (ref 150–450)
RBC # BLD AUTO: 4.34 X10E6/UL (ref 3.77–5.28)
WBC # BLD AUTO: 10 X10E3/UL (ref 3.4–10.8)

## 2022-08-25 NOTE — PROGRESS NOTES
Please call pt and let her know white blood cells and hemoglobin are looking good. Please schedule the CT as soon as contacted.

## 2022-08-25 NOTE — TELEPHONE ENCOUNTER
Caller: Monica Hernández    Relationship: Emergency Contact    Best call back number: [phone]    What was the call regarding: PATIENT'S DAUGHTER WAS CALLING BACK REGARDING TEST RESULTS.     Do you require a callback: YES, ATTEMPTED WARM TRANSFER, NO ANSWER.

## 2022-08-25 NOTE — TELEPHONE ENCOUNTER
----- Message from Ricky Dill MD sent at 8/25/2022  2:29 PM EDT -----  Regarding: RE: [place] patient  Thanks, we will review the scans as they are completed as well, DAVID  ----- Message -----  From: Tracy Steiner APRN  Sent: 8/24/2022   2:51 PM EDT  To: Ricky Dill MD  Subject: [place] patient                                   Hi Dr. Dill, I wanted to send a note on this mutual patient. She is s/p lumpectomy April of 2022.  I saw her today because she has developed some masses to the left lateral neck descending down the neck into the left shoulder.  I am concerned for metastasis/involvement of that lymph node chain.  This has developed rapidly over the past 2 months.  I ordered a CT of the neck and chest with contrast for further evaluation.  Please let me know if you would like any other interventions at this time and I will keep you up-to-date with what I find out as well.    Have a great day,    ENZO Larry

## 2022-09-12 ENCOUNTER — HOSPITAL ENCOUNTER (OUTPATIENT)
Dept: CT IMAGING | Facility: HOSPITAL | Age: 87
Discharge: HOME | End: 2022-09-12
Admitting: NURSE PRACTITIONER

## 2022-09-12 DIAGNOSIS — C50.212 CARCINOMA OF UPPER-INNER QUADRANT OF LEFT BREAST IN FEMALE, ESTROGEN RECEPTOR POSITIVE: ICD-10-CM

## 2022-09-12 DIAGNOSIS — Z17.0 CARCINOMA OF UPPER-INNER QUADRANT OF LEFT BREAST IN FEMALE, ESTROGEN RECEPTOR POSITIVE: ICD-10-CM

## 2022-09-12 DIAGNOSIS — M25.812 MASS OF JOINT OF LEFT SHOULDER: ICD-10-CM

## 2022-09-12 DIAGNOSIS — R22.1 MASS OF LATERAL NECK: ICD-10-CM

## 2022-09-12 PROCEDURE — 82565 ASSAY OF CREATININE: CPT

## 2022-09-12 PROCEDURE — 71260 CT THORAX DX C+: CPT

## 2022-09-12 PROCEDURE — 25010000002 IOPAMIDOL 61 % SOLUTION: Performed by: NURSE PRACTITIONER

## 2022-09-12 PROCEDURE — 70491 CT SOFT TISSUE NECK W/DYE: CPT

## 2022-09-12 RX ADMIN — IOPAMIDOL 100 ML: 612 INJECTION, SOLUTION INTRAVENOUS at 19:05

## 2022-09-13 LAB — CREAT BLDA-MCNC: 1.1 MG/DL (ref 0.6–1.3)

## 2022-09-16 ENCOUNTER — TELEPHONE (OUTPATIENT)
Dept: INTERNAL MEDICINE | Facility: CLINIC | Age: 87
End: 2022-09-16

## 2022-09-16 DIAGNOSIS — R91.8 LUNG NODULES: Primary | ICD-10-CM

## 2022-09-16 DIAGNOSIS — Z85.3 HISTORY OF BREAST CANCER: ICD-10-CM

## 2022-09-16 DIAGNOSIS — R93.89 ABNORMAL CT SCAN, CHEST: ICD-10-CM

## 2022-09-16 NOTE — PROGRESS NOTES
I have been in contact with patient's oncologist, Dr. Frias.  Based on the CT he suggests a referral to Dr. Rasheed Cantrell, orthopedic oncology for further evaluation of the lesion of her left shoulder extending up the neck.  I placed an urgent referral.  I have also placed an order for 3-month follow-up CT scan on her lung nodules.  I discussed these findings with her daughter, Monica on the phone 9/16/2022.

## 2022-09-16 NOTE — TELEPHONE ENCOUNTER
"----- Message from Ricky Dill MD sent at 9/15/2022  2:39 PM EDT -----  Regarding: RE: [place] patient  Reviewed the scan, it is quite extensive but does appear to be bony in location.  I would suggest she see Dr. Rasheed Cantrell-orthopedic oncologist-to assess this.  Thanks, DAVID  ----- Message -----  From: Tracy Steiner APRN  Sent: 9/14/2022   3:28 PM EDT  To: Ricky Dill MD  Subject: [place] patient                                   Hi Dr. Dill, I wanted to follow up with you on Ms. Guillory- I had messaged a couple of weeks ago about her mass to the left shoulder ascending the left lateral neck. Her CT neck and chest are back and the radiologist is describing some changes- \"Multilobulated, cystic density collection extends superiorly from the left acromioclavicular articulation and the left supraclavicular region  and cervical soft tissues. Findings of increased in extent since  02/08/2022.\" When I saw her in the office, this was a new finding, acutely begun and quickly growing. She had a large mass on the left upper shoulder area that appeared to follow up the neck. I was concerned given her cancer history.     Do you mind taking a look at the CT results? I am wondering if she needs a biopsy of this area or general surgery consult? The CT results do not make the findings sound as marked and acutely different from baseline as the clinical assessment was.     Thank you for your time,     ENZO Larry      "

## 2022-09-27 DIAGNOSIS — M79.89 SOFT TISSUE MASS: Primary | ICD-10-CM

## 2022-10-10 ENCOUNTER — OFFICE VISIT (OUTPATIENT)
Dept: INTERNAL MEDICINE | Facility: CLINIC | Age: 87
End: 2022-10-10

## 2022-10-10 ENCOUNTER — APPOINTMENT (OUTPATIENT)
Dept: GENERAL RADIOLOGY | Facility: HOSPITAL | Age: 87
End: 2022-10-10

## 2022-10-10 ENCOUNTER — HOSPITAL ENCOUNTER (INPATIENT)
Facility: HOSPITAL | Age: 87
LOS: 2 days | Discharge: HOME | End: 2022-10-14
Attending: EMERGENCY MEDICINE | Admitting: INTERNAL MEDICINE

## 2022-10-10 ENCOUNTER — TELEPHONE (OUTPATIENT)
Dept: INTERNAL MEDICINE | Facility: CLINIC | Age: 87
End: 2022-10-10

## 2022-10-10 VITALS
HEART RATE: 76 BPM | DIASTOLIC BLOOD PRESSURE: 74 MMHG | BODY MASS INDEX: 26.38 KG/M2 | OXYGEN SATURATION: 93 % | TEMPERATURE: 98.6 F | SYSTOLIC BLOOD PRESSURE: 117 MMHG | WEIGHT: 130.6 LBS

## 2022-10-10 DIAGNOSIS — H66.011 ACUTE SUPPURATIVE OTITIS MEDIA OF RIGHT EAR WITH SPONTANEOUS RUPTURE OF TYMPANIC MEMBRANE, RECURRENCE NOT SPECIFIED: ICD-10-CM

## 2022-10-10 DIAGNOSIS — J18.9 MULTIFOCAL PNEUMONIA: Primary | ICD-10-CM

## 2022-10-10 DIAGNOSIS — J18.9 PNEUMONIA OF BOTH LUNGS DUE TO INFECTIOUS ORGANISM, UNSPECIFIED PART OF LUNG: Primary | ICD-10-CM

## 2022-10-10 DIAGNOSIS — Z78.9 FAILURE OF OUTPATIENT TREATMENT: ICD-10-CM

## 2022-10-10 LAB
ALBUMIN SERPL-MCNC: 3.7 G/DL (ref 3.5–5.2)
ALBUMIN/GLOB SERPL: 1.5 G/DL
ALP SERPL-CCNC: 58 U/L (ref 39–117)
ALT SERPL W P-5'-P-CCNC: 15 U/L (ref 1–33)
ANION GAP SERPL CALCULATED.3IONS-SCNC: 11 MMOL/L (ref 5–15)
AST SERPL-CCNC: 17 U/L (ref 1–32)
B PARAPERT DNA SPEC QL NAA+PROBE: NOT DETECTED
B PERT DNA SPEC QL NAA+PROBE: NOT DETECTED
BASOPHILS # BLD AUTO: 0.07 10*3/MM3 (ref 0–0.2)
BASOPHILS NFR BLD AUTO: 0.4 % (ref 0–1.5)
BILIRUB SERPL-MCNC: 0.4 MG/DL (ref 0–1.2)
BUN SERPL-MCNC: 24 MG/DL (ref 8–23)
BUN/CREAT SERPL: 20.9 (ref 7–25)
C PNEUM DNA NPH QL NAA+NON-PROBE: NOT DETECTED
CALCIUM SPEC-SCNC: 9 MG/DL (ref 8.6–10.5)
CHLORIDE SERPL-SCNC: 102 MMOL/L (ref 98–107)
CO2 SERPL-SCNC: 25 MMOL/L (ref 22–29)
CREAT SERPL-MCNC: 1.15 MG/DL (ref 0.57–1)
D-LACTATE SERPL-SCNC: 1.2 MMOL/L (ref 0.5–2)
DEPRECATED RDW RBC AUTO: 41.8 FL (ref 37–54)
EGFRCR SERPLBLD CKD-EPI 2021: 45.6 ML/MIN/1.73
EOSINOPHIL # BLD AUTO: 0.17 10*3/MM3 (ref 0–0.4)
EOSINOPHIL NFR BLD AUTO: 1 % (ref 0.3–6.2)
ERYTHROCYTE [DISTWIDTH] IN BLOOD BY AUTOMATED COUNT: 12.7 % (ref 12.3–15.4)
FLUAV SUBTYP SPEC NAA+PROBE: NOT DETECTED
FLUBV RNA ISLT QL NAA+PROBE: NOT DETECTED
GLOBULIN UR ELPH-MCNC: 2.5 GM/DL
GLUCOSE SERPL-MCNC: 110 MG/DL (ref 65–99)
HADV DNA SPEC NAA+PROBE: NOT DETECTED
HCOV 229E RNA SPEC QL NAA+PROBE: NOT DETECTED
HCOV HKU1 RNA SPEC QL NAA+PROBE: NOT DETECTED
HCOV NL63 RNA SPEC QL NAA+PROBE: NOT DETECTED
HCOV OC43 RNA SPEC QL NAA+PROBE: NOT DETECTED
HCT VFR BLD AUTO: 37.1 % (ref 34–46.6)
HGB BLD-MCNC: 11.8 G/DL (ref 12–15.9)
HMPV RNA NPH QL NAA+NON-PROBE: NOT DETECTED
HPIV1 RNA ISLT QL NAA+PROBE: NOT DETECTED
HPIV2 RNA SPEC QL NAA+PROBE: NOT DETECTED
HPIV3 RNA NPH QL NAA+PROBE: NOT DETECTED
HPIV4 P GENE NPH QL NAA+PROBE: NOT DETECTED
IMM GRANULOCYTES # BLD AUTO: 0.12 10*3/MM3 (ref 0–0.05)
IMM GRANULOCYTES NFR BLD AUTO: 0.7 % (ref 0–0.5)
L PNEUMO1 AG UR QL IA: NEGATIVE
LYMPHOCYTES # BLD AUTO: 1.08 10*3/MM3 (ref 0.7–3.1)
LYMPHOCYTES NFR BLD AUTO: 6.5 % (ref 19.6–45.3)
M PNEUMO IGG SER IA-ACNC: NOT DETECTED
MCH RBC QN AUTO: 28.6 PG (ref 26.6–33)
MCHC RBC AUTO-ENTMCNC: 31.8 G/DL (ref 31.5–35.7)
MCV RBC AUTO: 89.8 FL (ref 79–97)
MONOCYTES # BLD AUTO: 1.73 10*3/MM3 (ref 0.1–0.9)
MONOCYTES NFR BLD AUTO: 10.3 % (ref 5–12)
NEUTROPHILS NFR BLD AUTO: 13.57 10*3/MM3 (ref 1.7–7)
NEUTROPHILS NFR BLD AUTO: 81.1 % (ref 42.7–76)
NRBC BLD AUTO-RTO: 0 /100 WBC (ref 0–0.2)
PLATELET # BLD AUTO: 206 10*3/MM3 (ref 140–450)
PMV BLD AUTO: 10.7 FL (ref 6–12)
POTASSIUM SERPL-SCNC: 4.2 MMOL/L (ref 3.5–5.2)
PROCALCITONIN SERPL-MCNC: 0.08 NG/ML (ref 0–0.25)
PROT SERPL-MCNC: 6.2 G/DL (ref 6–8.5)
RBC # BLD AUTO: 4.13 10*6/MM3 (ref 3.77–5.28)
RHINOVIRUS RNA SPEC NAA+PROBE: NOT DETECTED
RSV RNA NPH QL NAA+NON-PROBE: NOT DETECTED
S PNEUM AG SPEC QL LA: NEGATIVE
SARS-COV-2 RNA NPH QL NAA+NON-PROBE: NOT DETECTED
SODIUM SERPL-SCNC: 138 MMOL/L (ref 136–145)
WBC NRBC COR # BLD: 16.74 10*3/MM3 (ref 3.4–10.8)

## 2022-10-10 PROCEDURE — 84145 PROCALCITONIN (PCT): CPT | Performed by: PHYSICIAN ASSISTANT

## 2022-10-10 PROCEDURE — 87449 NOS EACH ORGANISM AG IA: CPT | Performed by: NURSE PRACTITIONER

## 2022-10-10 PROCEDURE — 36415 COLL VENOUS BLD VENIPUNCTURE: CPT

## 2022-10-10 PROCEDURE — 25010000002 CEFTRIAXONE PER 250 MG: Performed by: PHYSICIAN ASSISTANT

## 2022-10-10 PROCEDURE — 71046 X-RAY EXAM CHEST 2 VIEWS: CPT

## 2022-10-10 PROCEDURE — 85025 COMPLETE CBC W/AUTO DIFF WBC: CPT

## 2022-10-10 PROCEDURE — 99213 OFFICE O/P EST LOW 20 MIN: CPT

## 2022-10-10 PROCEDURE — 0202U NFCT DS 22 TRGT SARS-COV-2: CPT | Performed by: PHYSICIAN ASSISTANT

## 2022-10-10 PROCEDURE — 80053 COMPREHEN METABOLIC PANEL: CPT

## 2022-10-10 PROCEDURE — 99285 EMERGENCY DEPT VISIT HI MDM: CPT

## 2022-10-10 PROCEDURE — 25010000002 ENOXAPARIN PER 10 MG: Performed by: NURSE PRACTITIONER

## 2022-10-10 PROCEDURE — 87040 BLOOD CULTURE FOR BACTERIA: CPT | Performed by: PHYSICIAN ASSISTANT

## 2022-10-10 PROCEDURE — 83605 ASSAY OF LACTIC ACID: CPT | Performed by: PHYSICIAN ASSISTANT

## 2022-10-10 PROCEDURE — G0378 HOSPITAL OBSERVATION PER HR: HCPCS

## 2022-10-10 PROCEDURE — 25010000002 AZITHROMYCIN PER 500 MG: Performed by: PHYSICIAN ASSISTANT

## 2022-10-10 RX ORDER — ENOXAPARIN SODIUM 100 MG/ML
40 INJECTION SUBCUTANEOUS DAILY
Status: DISCONTINUED | OUTPATIENT
Start: 2022-10-10 | End: 2022-10-11

## 2022-10-10 RX ORDER — ONDANSETRON 2 MG/ML
4 INJECTION INTRAMUSCULAR; INTRAVENOUS EVERY 6 HOURS PRN
Status: DISCONTINUED | OUTPATIENT
Start: 2022-10-10 | End: 2022-10-14 | Stop reason: HOSPADM

## 2022-10-10 RX ORDER — ALUMINA, MAGNESIA, AND SIMETHICONE 2400; 2400; 240 MG/30ML; MG/30ML; MG/30ML
15 SUSPENSION ORAL EVERY 6 HOURS PRN
Status: DISCONTINUED | OUTPATIENT
Start: 2022-10-10 | End: 2022-10-14 | Stop reason: HOSPADM

## 2022-10-10 RX ORDER — IPRATROPIUM BROMIDE AND ALBUTEROL SULFATE 2.5; .5 MG/3ML; MG/3ML
3 SOLUTION RESPIRATORY (INHALATION) EVERY 4 HOURS PRN
Status: DISCONTINUED | OUTPATIENT
Start: 2022-10-10 | End: 2022-10-14 | Stop reason: HOSPADM

## 2022-10-10 RX ORDER — SODIUM CHLORIDE 0.9 % (FLUSH) 0.9 %
10 SYRINGE (ML) INJECTION AS NEEDED
Status: DISCONTINUED | OUTPATIENT
Start: 2022-10-10 | End: 2022-10-14 | Stop reason: HOSPADM

## 2022-10-10 RX ORDER — ACETAMINOPHEN 650 MG/1
650 SUPPOSITORY RECTAL EVERY 4 HOURS PRN
Status: DISCONTINUED | OUTPATIENT
Start: 2022-10-10 | End: 2022-10-14 | Stop reason: HOSPADM

## 2022-10-10 RX ORDER — NITROGLYCERIN 0.4 MG/1
0.4 TABLET SUBLINGUAL
Status: DISCONTINUED | OUTPATIENT
Start: 2022-10-10 | End: 2022-10-14 | Stop reason: HOSPADM

## 2022-10-10 RX ORDER — ACETAMINOPHEN 160 MG/5ML
650 SOLUTION ORAL EVERY 4 HOURS PRN
Status: DISCONTINUED | OUTPATIENT
Start: 2022-10-10 | End: 2022-10-14 | Stop reason: HOSPADM

## 2022-10-10 RX ORDER — ACETAMINOPHEN 325 MG/1
650 TABLET ORAL EVERY 4 HOURS PRN
Status: DISCONTINUED | OUTPATIENT
Start: 2022-10-10 | End: 2022-10-14 | Stop reason: HOSPADM

## 2022-10-10 RX ORDER — ONDANSETRON 4 MG/1
4 TABLET, FILM COATED ORAL EVERY 6 HOURS PRN
Status: DISCONTINUED | OUTPATIENT
Start: 2022-10-10 | End: 2022-10-14 | Stop reason: HOSPADM

## 2022-10-10 RX ADMIN — CEFTRIAXONE 2 G: 2 INJECTION, POWDER, FOR SOLUTION INTRAMUSCULAR; INTRAVENOUS at 19:00

## 2022-10-10 RX ADMIN — ENOXAPARIN SODIUM 40 MG: 100 INJECTION SUBCUTANEOUS at 21:24

## 2022-10-10 RX ADMIN — AZITHROMYCIN MONOHYDRATE 500 MG: 500 INJECTION, POWDER, LYOPHILIZED, FOR SOLUTION INTRAVENOUS at 19:36

## 2022-10-10 NOTE — PROGRESS NOTES
"Chief Complaint  Pneumonia, Fever, and Ear Drainage    Subjective        Neha Guillory presents to [facility]  History of Present Illness  89 y.o. female presenting with concerns of pneumonia, fever, ear drainage. Pt of Tracy Steiner APRN. First symptom was sore throat on Friday. Visited [facility] on Saturday. Productive cough with gray green phlegm. This AM, pussy drainage from right ear. Has been vomiting and only able to eat toast. Has to force fluids down. Fever up to 102 last night.         Objective   Vital Signs:  /74 (BP Location: Right arm, Patient Position: Sitting, Cuff Size: Adult)   Pulse 76   Temp 98.6 °F (37 °C) (Infrared)   Wt 59.2 kg (130 lb 9.6 oz)   SpO2 93%   BMI 26.38 kg/m²   Estimated body mass index is 26.38 kg/m² as calculated from the following:    Height as of 8/24/22: 149.9 cm (59\").    Weight as of this encounter: 59.2 kg (130 lb 9.6 oz).          Physical Exam  Vitals reviewed.   Constitutional:       Appearance: She is ill-appearing.   HENT:      Head: Normocephalic.      Right Ear: Tympanic membrane is perforated.      Left Ear: Tympanic membrane normal.      Nose: Congestion present.      Mouth/Throat:      Mouth: Mucous membranes are moist.      Pharynx: Oropharynx is clear.   Eyes:      Pupils: Pupils are equal, round, and reactive to light.   Pulmonary:      Effort: Pulmonary effort is normal.      Breath sounds: Examination of the right-upper field reveals wheezing. Examination of the left-upper field reveals wheezing. Examination of the right-middle field reveals wheezing. Examination of the left-middle field reveals wheezing. Wheezing present.   Neurological:      Mental Status: She is alert.        Result Review :    Common labs    Common Labs 10/12/22 10/12/22 10/13/22 10/13/22 10/14/22 10/14/22    0444 0444 0435 0435 0436 0436   Glucose  96  83  78   BUN  14  13  14   Creatinine  0.81  0.77  0.84   Sodium  142  139  138 "   Potassium  3.7  3.8  3.8   Chloride  111 (A)  107  107   Calcium  7.8 (A)  8.4 (A)  8.4 (A)   WBC 10.62  11.69 (A)  10.90 (A)    Hemoglobin 10.0 (A)  10.2 (A)  10.0 (A)    Hematocrit 30.2 (A)  31.5 (A)  30.5 (A)    Platelets 164  190  184    (A) Abnormal value       Comments are available for some flowsheets but are not being displayed.           Current Outpatient Medications on File Prior to Visit   Medication Sig Dispense Refill   • amLODIPine (NORVASC) 5 MG tablet Take 5 mg by mouth Daily.     • aspirin 81 MG EC tablet Take 81 mg by mouth Daily.     • B Complex Vitamins (vitamin b complex) capsule capsule Take 1 capsule by mouth Daily.     • cholecalciferol (VITAMIN D3) 1000 units tablet Take 1,000 Units by mouth Daily.     • Lactobacillus (PROBIOTIC ACIDOPHILUS PO) Take 1 tablet by mouth Daily.     • olmesartan (BENICAR) 20 MG tablet Take 1 tablet by mouth Daily. 90 tablet 3   • pravastatin (PRAVACHOL) 40 MG tablet Take 1 tablet by mouth Daily. 90 tablet 1   • tamoxifen (NOLVADEX) 20 MG chemo tablet Take 1 tablet by mouth Daily. 90 tablet 3   • vitamin C (ASCORBIC ACID) 250 MG tablet Take 4 tablets by mouth Every Night.     • Zinc 50 MG capsule Take 50 mg by mouth Daily.     • [DISCONTINUED] carvedilol (COREG) 25 MG tablet TAKE ONE TABLET BY MOUTH TWICE A DAY WITH MEALS (Patient taking differently: 12.5 mg.) 180 tablet 1     No current facility-administered medications on file prior to visit.                 Assessment and Plan   Diagnoses and all orders for this visit:    1. Pneumonia of both lungs due to infectious organism, unspecified part of lung (Primary)      Head to ER for imaging and evaluation.        Follow Up   No follow-ups on file.  Patient was given instructions and counseling regarding her condition or for health maintenance advice. Please see specific information pulled into the AVS if appropriate.

## 2022-10-10 NOTE — TELEPHONE ENCOUNTER
Caller: Monica Hernández    Relationship: Emergency Contact    Best call back number:[phone]    What is the best time to reach you: ANYTIME    Who are you requesting to speak with (clinical staff, provider,  specific staff member): CLINICAL    What was the call regarding: PATIENT'S DAUGHTER STATES THAT HER MOTHER WAS SEEN AT THE [facility] ON 10/8/22 AND THEY SAID THAT WITH MILAGRO SYMPTOMS SHE MAY HAVE PNEUMONIA BUT THEY COULD NOT TELL A DEFINITE YES OR NO WITHOUT AN X -RAY. HE PATIENT WAS PRESCRIBED Doxycycline 100 MG ONCE A DAY AND THAT SEEMS TO BE HELPING.PATIENT'S DAUGHTER STATES THAT THIS MORNING THE PATIENT HAS PUS DRAINING OUT OF RIGHT EAR.PATIENT REQUESTING AC ALL BACK TO KNOW IF THE DOXYCYCLINE WILL TREAT THE EAR PROBLEMS AS WELL AND FOR SOME CLINICAL ADVICE.

## 2022-10-10 NOTE — ED PROVIDER NOTES
MD ATTESTATION NOTE  I wore full protective equipment throughout this patient encounter including an N95 face mask, googles, gown and gloves. Hand hygiene was performed before donning protective equipment and after removal when leaving the room.    The GREGORIA and I have discussed this patient's history, physical exam, and treatment plan. I have reviewed the documentation and personally had a face to face interaction with the patient. I affirm the GREGORIA documentation and agree with their diagnostics, findings, treatment, plan, and disposition.    I provided a substantive portion of the care of this patient.  I personally performed the physical exam, in its entirety.  The attached note describes my personal findings.    Neha Guillory is a 89 y.o. female who presents to the ED c/o fever and cough.  History supplied by patient and patient's daughter.  Patient began feeling ill on Thursday, initially had sore throat, on Friday began having cough productive of green mucus.  Patient has been having fevers at home with T-max of 102, endorses shakes and chills, no night sweats.  Patient reports that she was seen by urgent care on Saturday, prescribed doxycycline.  Patient reports that she has been compliant with the medicine but began vomiting today, has had 2 episodes of emesis, emesis nonbloody nonbilious.  Patient reports that she has been throwing up after taking the doxycycline.  Patient denies any abdominal pain, no diarrhea.  No chest pain or shortness of breath.  Patient denies any recent sick contacts, has not been vaccinated against COVID-19.    On exam:  General: NAD.  Head: NCAT.  ENT: nares patent, no scleral icterus  Neck: Supple, trachea midline.  Cardiac: regular rate and rhythm.  Lungs: normal effort, no sensory muscle use, satting fine on room air, left-sided crackles, no wheezing or rhonchi.  Abdomen: Soft, NTTP.   Extremities: Moves all extremities well, no peripheral edema  Neuro: alert, MAEW, follows  commands  Psych: calm, cooperative  Skin: Warm, dry.    Medical Decision Making:  After the initial H&P, I discussed pertinent information from history and physical exam with patient/family.  Discussed differential diagnosis.  Discussed plan for ED evaluation/work-up/treatment.  All questions answered.  Patient/family is agreeable with plan.    ED Course as of 10/11/22 1624   Mon Oct 10, 2022   1828 WBC(!): 16.74 [DC]   1832 Patient here with daughter for evaluation of cough, fevers.  Was started on doxycycline on Saturday after evaluation at a [facility] clinic for suspected pneumonia.  Followed up with PCP today and was sent to ER.  Labs back on initial evaluation with leukocytosis.  Right TM ruptured with purulent drainage.  Will obtain additional labs, blood cultures and administer IV antibiotics.  Will plan for hospital admission. [DC]   1909 Procalcitonin: 0.08 [DC]   1926 Lactate: 1.2 [DC]   2009 Discussed case with nurse practitioner Adelaida Manning, hospitalist service, who will admit the patient under supervision of Dr. Perez. [DC]      ED Course User Index  [DC] Sari Horton PA       Diagnosis  Final diagnoses:   Multifocal pneumonia   Failure of outpatient treatment   Acute suppurative otitis media of right ear with spontaneous rupture of tympanic membrane, recurrence not specified        Daljit Keyes MD  10/10/22 2151       Daljit Keyes MD  10/11/22 1624

## 2022-10-10 NOTE — TELEPHONE ENCOUNTER
Yes, please schedule her to see me so I can take a look in her ear and provide good recommendations as well as possibly order a chest xray.

## 2022-10-10 NOTE — ED TRIAGE NOTES
Fever to 102 - tyl at 1330.  She has had sore throat,  cough c green mucous, ear drainage (pcp told her right ear had ruptured)    Patient was placed in face mask during first look triage.  Patient was wearing a face mask throughout encounter.  I wore personal protective equipment throughout the encounter.  Hand hygiene was performed before and after patient encounter.

## 2022-10-11 ENCOUNTER — APPOINTMENT (OUTPATIENT)
Dept: CT IMAGING | Facility: HOSPITAL | Age: 87
End: 2022-10-11

## 2022-10-11 PROBLEM — R73.9 HYPERGLYCEMIA: Status: ACTIVE | Noted: 2022-10-11

## 2022-10-11 PROBLEM — N17.9 ACUTE KIDNEY FAILURE: Status: ACTIVE | Noted: 2022-10-11

## 2022-10-11 PROBLEM — Z66 DNR (DO NOT RESUSCITATE): Status: ACTIVE | Noted: 2022-10-11

## 2022-10-11 LAB
BACTERIA SPEC RESP CULT: NORMAL
CREAT UR-MCNC: 150.3 MG/DL
GRAM STN SPEC: NORMAL
OSMOLALITY UR: 670 MOSM/KG
SODIUM UR-SCNC: 73 MMOL/L

## 2022-10-11 PROCEDURE — 82570 ASSAY OF URINE CREATININE: CPT | Performed by: INTERNAL MEDICINE

## 2022-10-11 PROCEDURE — 87205 SMEAR GRAM STAIN: CPT | Performed by: NURSE PRACTITIONER

## 2022-10-11 PROCEDURE — 87070 CULTURE OTHR SPECIMN AEROBIC: CPT | Performed by: INTERNAL MEDICINE

## 2022-10-11 PROCEDURE — 87205 SMEAR GRAM STAIN: CPT | Performed by: INTERNAL MEDICINE

## 2022-10-11 PROCEDURE — G0378 HOSPITAL OBSERVATION PER HR: HCPCS

## 2022-10-11 PROCEDURE — 25010000002 CEFTRIAXONE PER 250 MG: Performed by: NURSE PRACTITIONER

## 2022-10-11 PROCEDURE — 71250 CT THORAX DX C-: CPT

## 2022-10-11 PROCEDURE — 83935 ASSAY OF URINE OSMOLALITY: CPT | Performed by: INTERNAL MEDICINE

## 2022-10-11 PROCEDURE — 25010000002 ENOXAPARIN PER 10 MG: Performed by: INTERNAL MEDICINE

## 2022-10-11 PROCEDURE — 84300 ASSAY OF URINE SODIUM: CPT | Performed by: INTERNAL MEDICINE

## 2022-10-11 RX ORDER — GUAIFENESIN 600 MG/1
600 TABLET, EXTENDED RELEASE ORAL EVERY 12 HOURS SCHEDULED
Status: DISCONTINUED | OUTPATIENT
Start: 2022-10-11 | End: 2022-10-14 | Stop reason: HOSPADM

## 2022-10-11 RX ORDER — CARVEDILOL 12.5 MG/1
12.5 TABLET ORAL EVERY 12 HOURS SCHEDULED
Status: DISCONTINUED | OUTPATIENT
Start: 2022-10-11 | End: 2022-10-14 | Stop reason: HOSPADM

## 2022-10-11 RX ORDER — AMLODIPINE BESYLATE 5 MG/1
5 TABLET ORAL DAILY
Status: DISCONTINUED | OUTPATIENT
Start: 2022-10-11 | End: 2022-10-14 | Stop reason: HOSPADM

## 2022-10-11 RX ORDER — ASPIRIN 81 MG/1
81 TABLET ORAL DAILY
Status: DISCONTINUED | OUTPATIENT
Start: 2022-10-11 | End: 2022-10-14 | Stop reason: HOSPADM

## 2022-10-11 RX ORDER — SODIUM CHLORIDE 9 MG/ML
75 INJECTION, SOLUTION INTRAVENOUS CONTINUOUS
Status: DISCONTINUED | OUTPATIENT
Start: 2022-10-11 | End: 2022-10-12

## 2022-10-11 RX ORDER — PRAVASTATIN SODIUM 40 MG
40 TABLET ORAL DAILY
Status: DISCONTINUED | OUTPATIENT
Start: 2022-10-11 | End: 2022-10-14 | Stop reason: HOSPADM

## 2022-10-11 RX ORDER — TAMOXIFEN CITRATE 10 MG/1
20 TABLET ORAL DAILY
Status: DISCONTINUED | OUTPATIENT
Start: 2022-10-11 | End: 2022-10-14 | Stop reason: HOSPADM

## 2022-10-11 RX ORDER — ENOXAPARIN SODIUM 100 MG/ML
30 INJECTION SUBCUTANEOUS DAILY
Status: DISCONTINUED | OUTPATIENT
Start: 2022-10-11 | End: 2022-10-14 | Stop reason: HOSPADM

## 2022-10-11 RX ADMIN — SODIUM CHLORIDE 75 ML/HR: 9 INJECTION, SOLUTION INTRAVENOUS at 02:16

## 2022-10-11 RX ADMIN — TAMOXIFEN CITRATE 20 MG: 10 TABLET, FILM COATED ORAL at 12:28

## 2022-10-11 RX ADMIN — SODIUM CHLORIDE 75 ML/HR: 9 INJECTION, SOLUTION INTRAVENOUS at 22:04

## 2022-10-11 RX ADMIN — CARVEDILOL 12.5 MG: 12.5 TABLET, FILM COATED ORAL at 12:28

## 2022-10-11 RX ADMIN — CEFTRIAXONE SODIUM 1 G: 1 INJECTION, POWDER, FOR SOLUTION INTRAMUSCULAR; INTRAVENOUS at 17:56

## 2022-10-11 RX ADMIN — GUAIFENESIN 600 MG: 600 TABLET, EXTENDED RELEASE ORAL at 13:51

## 2022-10-11 RX ADMIN — CARVEDILOL 12.5 MG: 12.5 TABLET, FILM COATED ORAL at 21:38

## 2022-10-11 RX ADMIN — GUAIFENESIN 600 MG: 600 TABLET, EXTENDED RELEASE ORAL at 21:38

## 2022-10-11 RX ADMIN — ASPIRIN 81 MG: 81 TABLET, FILM COATED ORAL at 12:28

## 2022-10-11 RX ADMIN — AMLODIPINE BESYLATE 5 MG: 5 TABLET ORAL at 12:28

## 2022-10-11 RX ADMIN — PRAVASTATIN SODIUM 40 MG: 40 TABLET ORAL at 12:28

## 2022-10-11 RX ADMIN — ENOXAPARIN SODIUM 30 MG: 30 INJECTION SUBCUTANEOUS at 11:08

## 2022-10-11 NOTE — PROGRESS NOTES
Name: Neha Guillory ADMIT: 10/10/2022   : 1933  PCP: Tracy Steiner APRN    MRN: [id number] LOS: 0 days   AGE/SEX: 89 y.o. female  ROOM: /     Subjective   Subjective   Patient feels better.  No fever or chills.  No shortness of breath.  No wheezing.  Positive cough productive of dark sputum.  No hemoptysis.  No chest pain.  No PND or orthopnea.  No ankle edema.    Review of Systems  GI.  Resolved nausea and vomiting.  No abdominal pain.  Normal bowel habits without constipation/diarrhea/bleeding per rectum/melena.  .  No dysuria or hematuria.  CNS.  No headache.  No dizziness.  No focal neurological symptoms     Objective   Objective   Vital Signs  Temp:  [98.6 °F (37 °C)-99.2 °F (37.3 °C)] 99 °F (37.2 °C)  Heart Rate:  [73-88] 84  Resp:  [16] 16  BP: (117-159)/(53-81) 137/63  SpO2:  [91 %-98 %] 91 %  on  Flow (L/min):  [2] 2;   Device (Oxygen Therapy): room air    Intake/Output Summary (Last 24 hours) at 10/11/2022 1024  Last data filed at 10/11/2022 0812  Gross per 24 hour   Intake 360 ml   Output --   Net 360 ml     Body mass index is 26.26 kg/m².      10/10/22  1857   Weight: 59 kg (130 lb)     Physical Exam  General.  Elderly female.  Alert and oriented x3.  No apparent pain/distress/diaphoresis.  Normal mood and affect.  Eyes.  Status post bilateral cataract surgery.  Pupils equal round and reactive.  Intact extraocular musculature.  No pallor or jaundice.    Oral cavity.  Mildly dry mucous membrane with no lesions positive postnasal drip.  Cardiovascular.  Regular rate and rhythm and grade 3 systolic murmur.  Chest.  Clear to auscultation bilaterally with poor bilateral air entry and no added sounds.  Abdomen.  Soft lax.  No tenderness.  No organomegaly.  No guarding or rebound.  Extremities.  No clubbing or cyanosis.  Trace bilateral lower extremity edema.  CNS.  No acute focal neurological deficits.      Results Review:      Results from last 7 days   Lab Units 10/10/22  1633   SODIUM  mmol/L 138   POTASSIUM mmol/L 4.2   CHLORIDE mmol/L 102   CO2 mmol/L 25.0   BUN mg/dL 24*   CREATININE mg/dL 1.15*   GLUCOSE mg/dL 110*   CALCIUM mg/dL 9.0   AST (SGOT) U/L 17   ALT (SGPT) U/L 15     Estimated Creatinine Clearance: 27.7 mL/min (A) (by C-G formula based on SCr of 1.15 mg/dL (H)).                            Invalid input(s):  PHOS        Invalid input(s): LDLCALC  Results from last 7 days   Lab Units 10/10/22  1633   WBC 10*3/mm3 16.74*   HEMOGLOBIN g/dL 11.8*   HEMATOCRIT % 37.1   PLATELETS 10*3/mm3 206   MCV fL 89.8   MCH pg 28.6   MCHC g/dL 31.8   RDW % 12.7   RDW-SD fl 41.8   MPV fL 10.7   NEUTROPHIL % % 81.1*   LYMPHOCYTE % % 6.5*   MONOCYTES % % 10.3   EOSINOPHIL % % 1.0   BASOPHIL % % 0.4   IMM GRAN % % 0.7*   NEUTROS ABS 10*3/mm3 13.57*   LYMPHS ABS 10*3/mm3 1.08   MONOS ABS 10*3/mm3 1.73*   EOS ABS 10*3/mm3 0.17   BASOS ABS 10*3/mm3 0.07   IMMATURE GRANS (ABS) 10*3/mm3 0.12*   NRBC /100 WBC 0.0             Results from last 7 days   Lab Units 10/10/22  1850 10/10/22  1633   PROCALCITONIN ng/mL  --  0.08   LACTATE mmol/L 1.2  --                  Results from last 7 days   Lab Units 10/10/22  1853   ADENOVIRUS DETECTION BY PCR  Not Detected   CORONAVIRUS 229E  Not Detected   CORONAVIRUS HKU1  Not Detected   CORONAVIRUS NL63  Not Detected   CORONAVIRUS OC43  Not Detected   HUMAN METAPNEUMOVIRUS  Not Detected   HUMAN RHINOVIRUS/ENTEROVIRUS  Not Detected   INFLUENZA B PCR  Not Detected   PARAINFLUENZA 1  Not Detected   PARAINFLUENZA VIRUS 2  Not Detected   PARAINFLUENZA VIRUS 3  Not Detected   PARAINFLUENZA VIRUS 4  Not Detected   BORDETELLA PERTUSSIS PCR  Not Detected   CHLAMYDOPHILA PNEUMONIAE PCR  Not Detected   MYCOPLAMA PNEUMO PCR  Not Detected   INFLUENZA A PCR  Not Detected   RSV, PCR  Not Detected               Imaging:  Imaging Results (Last 24 Hours)     Procedure Component Value Units Date/Time    XR Chest 2 View [[id number]] Collected: 10/10/22 1654     Updated: 10/10/22 1659     Narrative:      CHEST: 2 VIEWS     HISTORY: Cough, sore throat.     COMPARISON: CT chest 09/12/2022, 2 view chest 04/05/2022.     FINDINGS:There has developed abnormal hazy bilateral lower lobe  pulmonary opacities, more extensive on the left and these are suspicious  for infiltrates and project over the lingula and right middle lobe on  the lateral view. Lungs are hyperexpanded and there is flattening of the  hemidiaphragms. Cardiomediastinal silhouette is not changed. Within the  left lung apex superimposing the left posterior 4th rib there is a 4 mm  nodular opacity that is without radiographic change when compared with  exam 01/03/2022, though is without clear corresponding CT abnormality.  Aortic vascular calcifications are present. 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 There  is a scoliotic curvature of the thoracolumbar spine.       Impression:      Since the chest CT 09/12/2022, there has developed abnormal  airspace opacifications within the lingula and right middle lobe that  appears most extensive within the anterior-inferior left upper  lobe/lingula. These opacities are consistent with multilobar infiltrates  in the proper clinical setting.     This report was finalized on 10/10/2022 4:56 PM by Dr. Misael Turk M.D.                I reviewed the patient's new clinical results / labs / tests / procedures      Assessment/Plan     Active Hospital Problems    Diagnosis  POA   • **Multifocal pneumonia [J18.9]  Yes   • Stage 3a chronic kidney disease (HCC) [N18.31]  Yes   • Pulmonary hypertension (HCC) [I27.20]  Yes   • RICH on CPAP [G47.33, Z99.89]  Not Applicable   • Hyperlipidemia [E78.5]  Yes   • Essential hypertension [I10]  Yes      Resolved Hospital Problems   No resolved problems to display.           · Bilateral multifocal community-acquired pneumonia.  Chest x-ray with bilateral pneumonia left more than the right.  Positive leukocytosis.   Normal procalcitonin and lactic acid.  Respiratory PCR panel is negative.  Urine Legionella and Streptococcus antigen are negative.  Sputum and blood cultures are pending.  We will continue Rocephin for now.  Continue as needed oxygen.  Add Mucinex.  Continue incentive spirometry.  Continue as needed DuoNebs.  Check noncontrast CT scan of the chest in view of history of breast cancer (patient with acute renal failure).  · Acute on top of stage IIIa chronic renal failure.  Acute mostly secondary to prerenal azotemia because of hypovolemia.  Will hold Benicar.  We will start slow IV fluid.  Will check urine electrolytes and uric acid.  Will monitor.  Baseline creatinine is 1.08.  · Hypertension/mild aortic stenosis/chronic diastolic cardiac dysfunction/hypertrophic cardiomyopathy/pulmonary hypertension.  There is no evidence of angina or congestive heart failure.  We will continue Norvasc/aspirin/Coreg.  Hold Benicar for now because of the acute renal failure.  · History of obstructive sleep apnea on CPAP.  · History of hyperlipidemia and bilateral carotid stenosis.  Continue aspirin and pravastatin.  · History of hyperparathyroidism.  Calcium is normal.  · Mild anemia.  Will observe.  · Hyperglycemia.  Check A1c.  · VTE prophylaxis with Lovenox.  · DNR status.  Patient has a living well and wishes of a DO NOT RESUSCITATE and this will be    Discussed my findings and plan of treatment with the patient/daughter/ER nurse  Disposition.  Hopefully home in 2 days if no complications.          Cristofer Peters MD  [facility]  10/11/22  10:24 EDT

## 2022-10-11 NOTE — PLAN OF CARE
Problem: Fall Injury Risk  Goal: Absence of Fall and Fall-Related Injury  10/11/2022 1630 by Marianna Bhardwaj RN  Outcome: Ongoing, Progressing  10/11/2022 1630 by Marianna Bhardwaj RN  Outcome: Ongoing, Progressing  Intervention: Identify and Manage Contributors  Description: Develop a fall prevention plan with the patient and caregiver/family.  Provide reorientation, appropriate sensory stimulation and routines with changes in mental status to decrease risk of fall.  Promote use of personal vision and auditory aids.  Assess assistance level required for safe and effective self-care; provide support as needed, such as toileting, mobilization. For age 65 and older, implement timed toileting with assistance.  Encourage physical activity, such as performance of mobility and self-care at highest level of patient ability, multicomponent exercise program and provision of appropriate assistive devices.  If fall occurs, assess the severity of injury; implement fall injury protocol. Determine the cause and revise fall injury prevention plan.  Regularly review medication contribution to fall risk; adjust medication administration times to minimize risk of falling.  Consider risk related to polypharmacy and age.  Balance adequate pain management with potential for oversedation.  Recent Flowsheet Documentation  Taken 10/11/2022 1616 by Marianna Bhardwaj RN  Medication Review/Management: medications reviewed  Intervention: Promote Injury-Free Environment  Description: Provide a safe, barrier-free environment that encourages independent activity.  Keep care area uncluttered and well-lighted.  Determine need for increased observation or monitoring.  Avoid use of devices that minimize mobility, such as restraints or indwelling urinary catheter.  Recent Flowsheet Documentation  Taken 10/11/2022 1616 by Marianna Bhardwaj RN  Safety Promotion/Fall Prevention:   assistive device/personal items within reach   safety round/check completed      Problem: Infection (Pneumonia)  Goal: Resolution of Infection Signs and Symptoms  10/11/2022 1630 by Marianna Bhardwaj, RN  Outcome: Ongoing, Progressing  10/11/2022 1630 by Marianna Bhardwaj, RN  Outcome: Ongoing, Progressing   Goal Outcome Evaluation:

## 2022-10-11 NOTE — H&P
Patient Name:  Neha Guillory  YOB: 1933  MRN:  [id number]  Admit Date:  10/10/2022  Patient Care Team:  Tracy Steiner APRN as PCP - General (Nurse Practitioner)  Bel Freeman MD as Consulting Physician (Cardiology)  Bel Marina MD as Referring Physician (General Surgery)  Hunter Acosta MD as Consulting Physician (Radiation Oncology)  Ricky Dill MD as Consulting Physician (Hematology and Oncology)  Jose Martin Liu APRN as Nurse Practitioner (Family Medicine)      Subjective   History Present Illness     Chief Complaint   Patient presents with   • Sore Throat   • Ear Drainage   • Cough       Ms. Guillory is a 89 y.o. non-smoker with a history of hypertension, hyperlipidemia, pulmonary hypertension, CKD stage 3a, and RICH on CPAP  that presents to [facility] complaining of cough, fever, and vomiting.  She reports fevers and an ongoing cough productive of green sputum since Friday.  She reports a temperature max of 102 and associated chills.  She was seen at an urgent treatment center on Saturday and prescribed doxycycline.  She was told she most likely had pneumonia along with an ear infection and right ear drum rupture, and the doxycycline should cover for both.  She states she has taken 3 doses, but began vomiting today and is not sure how much of the dose she received.  She denies chest pain, shortness of breath, abdominal pain, diarrhea, and ear pain.  A chest x-ray performed in the ED revealed abnormal airspace opacification within the lingula and right middle lobe that appears most extensive within the anterior-inferior left upper lobe/lingula.  These opacities are consistent with multilobar infiltrates.  She received Rocephin and Azithromycin and is being admitted for further evaluation.      History of Present Illness  Review of Systems   Constitutional: Positive for chills and fever.   HENT: Negative for sore throat.    Eyes: Negative for photophobia and  visual disturbance.   Respiratory: Positive for cough. Negative for chest tightness and shortness of breath.    Cardiovascular: Negative for chest pain, palpitations and leg swelling.   Gastrointestinal: Positive for vomiting. Negative for abdominal pain, constipation, diarrhea and nausea.   Endocrine: Negative for polyphagia and polyuria.   Genitourinary: Negative for dysuria, flank pain and frequency.   Musculoskeletal: Negative for back pain, gait problem and neck pain.   Skin: Negative for rash and wound.   Neurological: Negative for dizziness, weakness, light-headedness, numbness and headaches.        Personal History     Past Medical History:   Diagnosis Date   • Aortic stenosis, mild 8/24/2022   • Arthritis    • Carcinoma of upper-inner quadrant of left breast in female, estrogen receptor positive (HCC) 02/24/2022   • Carotid artery stenosis    • Carotid atherosclerosis, bilateral 07/12/2019   • Cataract     BILATERAL   • Depression    • Gastroenteritis    • Generalized osteoarthritis 02/01/2016   • H/O diastolic dysfunction    • Hyperlipidemia    • Hyperparathyroidism (HCC)    • Hyperparathyroidism (HCC)    • Hypertension    • Hypertrophic cardiomyopathy (HCC)    • Multifocal pneumonia 10/10/2022   • Neuromuscular disorder (HCC)    • Obstructive sleep apnea syndrome 02/01/2016   • Osteopenia    • Osteoporosis    • Primary familial hypertrophic cardiomyopathy (HCC) 02/01/2016   • Pulmonary hypertension (HCC)    • Synovial cyst of popliteal space 02/01/2016   • Vitamin D deficiency      Past Surgical History:   Procedure Laterality Date   • BREAST BIOPSY Left 02/17/2022   • BREAST LUMPECTOMY WITH SENTINEL NODE BIOPSY Left 4/7/2022    Procedure: LEFT BREAST LUMPECTOMY WITH SENTINEL NODE BIOPSY AND NEEDLE LOCALIZATION;  Surgeon: Bel Marina MD;  Location: [facility];  Service: General;  Laterality: Left;   • BUNIONECTOMY Right     FOOT SURGERY   • COLONOSCOPY N/A 2011    Dr. Luis   • HAND SURGERY  Right     TUMOR REMOVED ON FOREFINGER   • THYROIDECTOMY Right 4/20/2016    Procedure: RIGHT SUPERIOR PARATHYROIDECTOMY;  Surgeon: Bel Marina MD;  Location: [facility] OR;  Service:      Family History   Problem Relation Age of Onset   • Diabetes Mother    • Heart attack Mother    • Arthritis Father    • Breast cancer Sister    • Breast cancer Sister    • Breast cancer Sister    • Colon cancer Sister    • Heart disease Brother         CABG   • Other Brother         BRAIN TUMOR   • Colon cancer Brother    • Colon cancer Brother    • Colon cancer Brother    • Colon cancer Maternal Grandfather    • Cancer Other    • Malig Hyperthermia Neg Hx      Social History     Tobacco Use   • Smoking status: Never   • Smokeless tobacco: Never   Vaping Use   • Vaping Use: Never used   Substance Use Topics   • Alcohol use: No   • Drug use: No     (Not in a hospital admission)    Allergies:    Allergies   Allergen Reactions   • Amoxicillin Hives   • Atorvastatin Myalgia       Objective    Objective     Vital Signs  Temp:  [98.6 °F (37 °C)-99.2 °F (37.3 °C)] 98.7 °F (37.1 °C)  Heart Rate:  [73-85] 85  Resp:  [16] 16  BP: (117-159)/(53-81) 159/81  SpO2:  [92 %-98 %] 98 %  on  Flow (L/min):  [2] 2;   Device (Oxygen Therapy): nasal cannula  Body mass index is 26.26 kg/m².    Physical Exam  Vitals and nursing note reviewed.   Constitutional:       Appearance: Normal appearance.   HENT:      Head: Normocephalic and atraumatic.      Right Ear: Decreased hearing noted.      Left Ear: Decreased hearing noted.      Nose: Nose normal.      Mouth/Throat:      Mouth: Mucous membranes are moist.      Pharynx: Oropharynx is clear.   Eyes:      Extraocular Movements: Extraocular movements intact.      Conjunctiva/sclera: Conjunctivae normal.   Cardiovascular:      Rate and Rhythm: Normal rate and regular rhythm.      Pulses: Normal pulses.      Heart sounds: Normal heart sounds.   Pulmonary:      Effort: Pulmonary effort is normal.      Breath  sounds: Examination of the right-lower field reveals decreased breath sounds. Examination of the left-lower field reveals decreased breath sounds. Decreased breath sounds present.   Abdominal:      General: Bowel sounds are normal.      Palpations: Abdomen is soft.   Musculoskeletal:         General: No swelling. Normal range of motion.      Cervical back: Normal range of motion and neck supple.   Skin:     General: Skin is warm and dry.   Neurological:      General: No focal deficit present.      Mental Status: She is alert and oriented to person, place, and time.   Psychiatric:         Mood and Affect: Mood normal.         Behavior: Behavior normal.         Results Review:  I reviewed the patient's new clinical results.  I reviewed the patient's new imaging results and agree with the interpretation.  I reviewed the patient's other test results and agree with the interpretation  I personally viewed and interpreted the patient's EKG/Telemetry data  Discussed with ED provider.    Lab Results (last 24 hours)     Procedure Component Value Units Date/Time    CBC & Differential [[id number]]  (Abnormal) Collected: 10/10/22 1633    Specimen: Blood Updated: 10/10/22 1643    Narrative:      The following orders were created for panel order CBC & Differential.  Procedure                               Abnormality         Status                     ---------                               -----------         ------                     CBC Auto Differential[[id number]]        Abnormal            Final result                 Please view results for these tests on the individual orders.    Comprehensive Metabolic Panel [[id number]]  (Abnormal) Collected: 10/10/22 1633    Specimen: Blood Updated: 10/10/22 1700     Glucose 110 mg/dL      BUN 24 mg/dL      Creatinine 1.15 mg/dL      Sodium 138 mmol/L      Potassium 4.2 mmol/L      Chloride 102 mmol/L      CO2 25.0 mmol/L      Calcium 9.0 mg/dL      Total Protein 6.2 g/dL      Albumin  3.70 g/dL      ALT (SGPT) 15 U/L      AST (SGOT) 17 U/L      Alkaline Phosphatase 58 U/L      Total Bilirubin 0.4 mg/dL      Globulin 2.5 gm/dL      A/G Ratio 1.5 g/dL      BUN/Creatinine Ratio 20.9     Anion Gap 11.0 mmol/L      eGFR 45.6 mL/min/1.73      Comment: National Kidney Foundation and American Society of Nephrology (ASN) Task Force recommended calculation based on the Chronic Kidney Disease Epidemiology Collaboration (CKD-EPI) equation refit without adjustment for race.       Narrative:      GFR Normal >60  Chronic Kidney Disease <60  Kidney Failure <15      CBC Auto Differential [[id number]]  (Abnormal) Collected: 10/10/22 1633    Specimen: Blood Updated: 10/10/22 1643     WBC 16.74 10*3/mm3      RBC 4.13 10*6/mm3      Hemoglobin 11.8 g/dL      Hematocrit 37.1 %      MCV 89.8 fL      MCH 28.6 pg      MCHC 31.8 g/dL      RDW 12.7 %      RDW-SD 41.8 fl      MPV 10.7 fL      Platelets 206 10*3/mm3      Neutrophil % 81.1 %      Lymphocyte % 6.5 %      Monocyte % 10.3 %      Eosinophil % 1.0 %      Basophil % 0.4 %      Immature Grans % 0.7 %      Neutrophils, Absolute 13.57 10*3/mm3      Lymphocytes, Absolute 1.08 10*3/mm3      Monocytes, Absolute 1.73 10*3/mm3      Eosinophils, Absolute 0.17 10*3/mm3      Basophils, Absolute 0.07 10*3/mm3      Immature Grans, Absolute 0.12 10*3/mm3      nRBC 0.0 /100 WBC     Procalcitonin [[id number]]  (Normal) Collected: 10/10/22 1633    Specimen: Blood Updated: 10/10/22 1905     Procalcitonin 0.08 ng/mL     Narrative:      As a Marker for Sepsis (Non-Neonates):    1. <0.5 ng/mL represents a low risk of severe sepsis and/or septic shock.  2. >2 ng/mL represents a high risk of severe sepsis and/or septic shock.    As a Marker for Lower Respiratory Tract Infections that require antibiotic therapy:    PCT on Admission    Antibiotic Therapy       6-12 Hrs later    >0.5                Strongly Recommended  >0.25 - <0.5        Recommended   0.1 - 0.25          Discouraged           "    Remeasure/reassess PCT  <0.1                Strongly Discouraged     Remeasure/reassess PCT    As 28 day mortality risk marker: \"Change in Procalcitonin Result\" (>80% or <=80%) if Day 0 (or Day 1) and Day 4 values are available. Refer to http://[email or web address]-pct-calculator.[email or web address]    Change in PCT <=80%  A decrease of PCT levels below or equal to 80% defines a positive change in PCT test result representing a higher risk for 28-day all-cause mortality of patients diagnosed with severe sepsis for septic shock.    Change in PCT >80%  A decrease of PCT levels of more than 80% defines a negative change in PCT result representing a lower risk for 28-day all-cause mortality of patients diagnosed with severe sepsis or septic shock.       Lactic Acid, Plasma [[id number]]  (Normal) Collected: 10/10/22 1850    Specimen: Blood from Arm, Left Updated: 10/10/22 1921     Lactate 1.2 mmol/L     Blood Culture - Blood, Arm, Left [[id number]] Collected: 10/10/22 1850    Specimen: Blood from Arm, Left Updated: 10/10/22 1858    Blood Culture - Blood, Arm, Left [[id number]] Collected: 10/10/22 1853    Specimen: Blood from Arm, Left Updated: 10/10/22 1858    Respiratory Panel PCR w/COVID-19(SARS-CoV-2) JONA/KALEY/RUBY/PAD/COR/MAD/OSBALDO In-House, NP Swab in UTM/[facility], 3-4 HR TAT - Swab, Nasopharynx [[id number]]  (Normal) Collected: 10/10/22 1853    Specimen: Swab from Nasopharynx Updated: 10/10/22 1952     ADENOVIRUS, PCR Not Detected     Coronavirus 229E Not Detected     Coronavirus HKU1 Not Detected     Coronavirus NL63 Not Detected     Coronavirus OC43 Not Detected     COVID19 Not Detected     Human Metapneumovirus Not Detected     Human Rhinovirus/Enterovirus Not Detected     Influenza A PCR Not Detected     Influenza B PCR Not Detected     Parainfluenza Virus 1 Not Detected     Parainfluenza Virus 2 Not Detected     Parainfluenza Virus 3 Not Detected     Parainfluenza Virus 4 Not Detected     RSV, PCR Not Detected     Bordetella pertussis pcr Not " Detected     Bordetella parapertussis PCR Not Detected     Chlamydophila pneumoniae PCR Not Detected     Mycoplasma pneumo by PCR Not Detected    Narrative:      In the setting of a positive respiratory panel with a viral infection PLUS a negative procalcitonin without other underlying concern for bacterial infection, consider observing off antibiotics or discontinuation of antibiotics and continue supportive care. If the respiratory panel is positive for atypical bacterial infection (Bordetella pertussis, Chlamydophila pneumoniae, or Mycoplasma pneumoniae), consider antibiotic de-escalation to target atypical bacterial infection.    Legionella Antigen, Urine - Urine, Urine, Clean Catch [[id number]]  (Normal) Collected: 10/10/22 2322    Specimen: Urine, Clean Catch Updated: 10/10/22 2352     LEGIONELLA ANTIGEN, URINE Negative    S. Pneumo Ag Urine or CSF - Urine, Urine, Clean Catch [[id number]] Collected: 10/10/22 2322    Specimen: Urine, Clean Catch Updated: 10/10/22 2330          Imaging Results (Last 24 Hours)     Procedure Component Value Units Date/Time    XR Chest 2 View [[id number]] Collected: 10/10/22 1654     Updated: 10/10/22 1659    Narrative:      CHEST: 2 VIEWS     HISTORY: Cough, sore throat.     COMPARISON: CT chest 09/12/2022, 2 view chest 04/05/2022.     FINDINGS:There has developed abnormal hazy bilateral lower lobe  pulmonary opacities, more extensive on the left and these are suspicious  for infiltrates and project over the lingula and right middle lobe on  the lateral view. Lungs are hyperexpanded and there is flattening of the  hemidiaphragms. Cardiomediastinal silhouette is not changed. Within the  left lung apex superimposing the left posterior 4th rib there is a 4 mm  nodular opacity that is without radiographic change when compared with  exam 01/03/2022, though is without clear corresponding CT abnormality.  Aortic vascular calcifications are present. Severe arthritis is present  of the right  shoulder where there is a chronic rotator cuff tear. There  is a scoliotic curvature of the thoracolumbar spine.       Impression:      Since the chest CT 09/12/2022, there has developed abnormal  airspace opacifications within the lingula and right middle lobe that  appears most extensive within the anterior-inferior left upper  lobe/lingula. These opacities are consistent with multilobar infiltrates  in the proper clinical setting.     This report was finalized on 10/10/2022 4:56 PM by Dr. Misael Turk M.D.                 No orders to display        Assessment/Plan     Active Hospital Problems    Diagnosis  POA   • **Multifocal pneumonia [J18.9]  Yes   • Stage 3a chronic kidney disease (HCC) [N18.31]  Yes   • Pulmonary hypertension (HCC) [I27.20]  Yes   • RICH on CPAP [G47.33, Z99.89]  Not Applicable   • Hyperlipidemia [E78.5]  Yes   • Essential hypertension [I10]  Yes       Multifocal Pneumonia  -She is sating 94-95% on room air  -She has leukocytosis, procal and lactate ok  -She has been afebrile  -RVP negative, continue Rocephin 1 gm daily  -Blood cultures pending  -Check sputum culture  -Check strep pneumo urine Ag and Legionella antigen  -Pulmonary toilet    Hypertension  -Blood pressures stable. Continue home regimen  -Monitor    CKD Stage 3a  -Baseline creatinine less than 1  -Slow IVF overnight   -Avoid nephrotoxins  -Repeat lab work in AM    Hyperlipidemia  -Continue daily aspirin and statin    RICH on CPAP  -She did not bring her home CPAP to the hospital, her daughter is to bring the machine tomorrow  -Supplemental oxygen as needed to keep sats greater than or equal to 92%    -Will address and continue any further appropriate home medications once med rec is completed.    -I discussed the patients findings and my recommendations with patient and family.    VTE Prophylaxis - Lovenox 40 mg SC daily.  Code Status - Full code.       ENZO Holguin  [facility]  10/10/22  23:56 EDT

## 2022-10-11 NOTE — ED PROVIDER NOTES
EMERGENCY DEPARTMENT ENCOUNTER    Room Number: 08/08  Date Seen: 10/11/2022  Time Seen: 18:21 EDT  PCP: Tracy Steiner APRN    Historian: Patient, daughter      HISTORY OF PRESENT ILLNESS    Chief Complaint: Cough, fever    Context: Neha Guillory is a 89 y.o. female with PMHx of breast cancer on tamoxifen, hypertension, hyperlipidemia who presents to the ED with c/o cough, fever, ear pain, sore throat.  She reports her symptoms started last week about 4 to 5 days ago.  She began feeling worse on Saturday 10/8 and was seen at a [facility] clinic and started on doxycycline for suspected pneumonia.  She has been taking this as prescribed but has continued to have fevers up to 102 °F.  Today she began vomiting and was unable to keep her antibiotic down.  She was seen by her primary care doctor who referred her to the ER for further evaluation.  Patient reports cough is productive of purulent sputum.  She denies any chest pain.  She also has been having drainage from her right ear with associated pain.  She denies any known sick contacts or COVID exposure.        MEDICAL RECORD REVIEW:    Reviewed in epic    PAST MEDICAL HISTORY    Active Ambulatory Problems     Diagnosis Date Noted   • Hyperlipidemia 02/01/2016   • Essential hypertension 02/01/2016   • Hypertrophic cardiomyopathy (HCC) 02/01/2016   • Generalized osteoarthritis 02/01/2016   • RICH on CPAP 02/01/2016   • Osteoporosis 02/01/2016   • Vitamin D deficiency 02/01/2016   • Colon polyps 02/24/2016   • Family hx of colon cancer 02/24/2016   • H/O parathyroidectomy (HCC) 04/20/2016   • Pulmonary hypertension (HCC) 04/25/2016   • Echocardiogram abnormal 03/16/2015   • Carotid artery stenosis 04/25/2016   • Ischemic rest pain of lower extremity 07/07/2016   • Bilateral carpal tunnel syndrome 07/07/2016   • Visit for screening mammogram 07/07/2016   • Osteopenia 08/05/2016   • Hyperuricemia 09/09/2016   • Senile osteoporosis 02/07/2017   • Diastolic dysfunction  03/17/2017   • Healthcare maintenance 07/26/2017   • Hip pain, left 07/26/2017   • Hammer toe of second toe of right foot 07/26/2017   • Gastroenteritis 12/26/2017   • Starvation ketoacidosis 12/26/2017   • Stage 3a chronic kidney disease (HCC) 12/26/2017   • Dehydration 12/26/2017   • 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   • Hospital discharge follow-up 01/26/2018   • Osteoporosis, post-menopausal 02/12/2018   • Hyperglycemia 10/22/2018   • Carotid atherosclerosis, bilateral 07/12/2019   • Prediabetes 08/01/2019   • Resistant hypertension 02/24/2021   • FH: breast cancer 07/02/2021   • Carcinoma of upper-inner quadrant of left breast in female, estrogen receptor positive (HCC) 02/24/2022   • Long-term use of high-risk medication    • Aortic stenosis, mild 08/24/2022     Resolved Ambulatory Problems     Diagnosis Date Noted   • Hypercalcemia 02/01/2016   • Secondary hyperparathyroidism, non-renal (HCC) 02/01/2016   • Depression 02/01/2016   • Synovial cyst of popliteal space 02/01/2016   • Thrombophlebitis of deep veins of lower extremity (HCC) 02/01/2016   • Rash 12/28/2016   • Allergic reaction caused by a drug 12/28/2016   • Upper respiratory infection 01/26/2017   • Deep vein thrombosis (HCC)      Past Medical History:   Diagnosis Date   • Arthritis    • Cataract    • H/O diastolic dysfunction    • Hyperparathyroidism (HCC)    • Hyperparathyroidism (HCC)    • Hypertension    • Multifocal pneumonia 10/10/2022   • Neuromuscular disorder (HCC)    • Obstructive sleep apnea syndrome 02/01/2016   • Primary familial hypertrophic cardiomyopathy (HCC) 02/01/2016         PAST SURGICAL HISTORY    Past Surgical History:   Procedure Laterality Date   • BREAST BIOPSY Left 02/17/2022   • BREAST LUMPECTOMY WITH SENTINEL NODE BIOPSY Left 4/7/2022    Procedure: LEFT BREAST LUMPECTOMY WITH SENTINEL NODE BIOPSY AND NEEDLE LOCALIZATION;  Surgeon: Bel Marina MD;  Location: [facility] OR [facility];  Service: General;  Laterality: Left;   • BUNIONECTOMY Right      FOOT SURGERY   • COLONOSCOPY N/A 2011    Dr. Luis   • HAND SURGERY Right     TUMOR REMOVED ON FOREFINGER   • THYROIDECTOMY Right 4/20/2016    Procedure: RIGHT SUPERIOR PARATHYROIDECTOMY;  Surgeon: Bel Marina MD;  Location: [facility] OR;  Service:          FAMILY HISTORY    Family History   Problem Relation Age of Onset   • Diabetes Mother    • Heart attack Mother    • Arthritis Father    • Breast cancer Sister    • Breast cancer Sister    • Breast cancer Sister    • Colon cancer Sister    • Heart disease Brother         CABG   • Other Brother         BRAIN TUMOR   • Colon cancer Brother    • Colon cancer Brother    • Colon cancer Brother    • Colon cancer Maternal Grandfather    • Cancer Other    • Malig Hyperthermia Neg Hx          SOCIAL HISTORY    Social History     Socioeconomic History   • Marital status: Single   Tobacco Use   • Smoking status: Never   • Smokeless tobacco: Never   Vaping Use   • Vaping Use: Never used   Substance and Sexual Activity   • Alcohol use: No   • Drug use: No   • Sexual activity: Defer         ALLERGIES    Amoxicillin and Atorvastatin      REVIEW OF SYSTEMS    Review of Systems   Constitutional: Positive for chills, fatigue and fever.   HENT: Positive for congestion, ear discharge, ear pain and sore throat.    Respiratory: Positive for cough and shortness of breath.    Cardiovascular: Negative for chest pain.   Gastrointestinal: Positive for vomiting. Negative for abdominal pain.   Genitourinary: Negative for dysuria.   Neurological: Positive for weakness (Generalized).       All systems reviewed and negative except those discussed in HPI.      PHYSICAL EXAM    ED Triage Vitals   Temp Heart Rate Resp BP SpO2   10/10/22 1531 10/10/22 1531 10/10/22 1531 10/10/22 1857 10/10/22 1531   99.2 °F (37.3 °C) 79 16 146/74 95 %      Temp src Heart Rate Source Patient Position BP Location FiO2 (%)   10/10/22 1531 10/10/22 1531 10/10/22 1857 10/10/22 1857 --   Tympanic Monitor Lying  Right arm        I have reviewed the triage vital signs and nursing notes.    Constitutional: Patient appears ill but nontoxic  Head: Atraumatic, normocephalic  Neck: No midline tenderness, Full painless ROM  Eyes: No scleral icterus, no scleral injection  ENT: Nares patent, right TM ruptured with purulent drainage, purulent effusion to left TM  CV: Regular rate, regular rhythm, distal pulses symmetric  Respiratory/Chest: No distress, coarse breath sounds to bilateral lung fields, no chest wall tenderness  Abdomen: Abdomen soft, nontender  Back: No midline tenderness, Full ROM, No CVA tenderness  Extremities: No deformity, soft compartments, no edema  Skin: Warm, dry, no rash  Neuro: A&Ox4, moves all extremities, follows commands, no focal deficits  Psych: Normal mood        LAB RESULTS    Recent Results (from the past 24 hour(s))   Comprehensive Metabolic Panel    Collection Time: 10/10/22  4:33 PM    Specimen: Blood   Result Value Ref Range    Glucose 110 (H) 65 - 99 mg/dL    BUN 24 (H) 8 - 23 mg/dL    Creatinine 1.15 (H) 0.57 - 1.00 mg/dL    Sodium 138 136 - 145 mmol/L    Potassium 4.2 3.5 - 5.2 mmol/L    Chloride 102 98 - 107 mmol/L    CO2 25.0 22.0 - 29.0 mmol/L    Calcium 9.0 8.6 - 10.5 mg/dL    Total Protein 6.2 6.0 - 8.5 g/dL    Albumin 3.70 3.50 - 5.20 g/dL    ALT (SGPT) 15 1 - 33 U/L    AST (SGOT) 17 1 - 32 U/L    Alkaline Phosphatase 58 39 - 117 U/L    Total Bilirubin 0.4 0.0 - 1.2 mg/dL    Globulin 2.5 gm/dL    A/G Ratio 1.5 g/dL    BUN/Creatinine Ratio 20.9 7.0 - 25.0    Anion Gap 11.0 5.0 - 15.0 mmol/L    eGFR 45.6 (L) >60.0 mL/min/1.73   CBC Auto Differential    Collection Time: 10/10/22  4:33 PM    Specimen: Blood   Result Value Ref Range    WBC 16.74 (H) 3.40 - 10.80 10*3/mm3    RBC 4.13 3.77 - 5.28 10*6/mm3    Hemoglobin 11.8 (L) 12.0 - 15.9 g/dL    Hematocrit 37.1 34.0 - 46.6 %    MCV 89.8 79.0 - 97.0 fL    MCH 28.6 26.6 - 33.0 pg    MCHC 31.8 31.5 - 35.7 g/dL    RDW 12.7 12.3 - 15.4 %    RDW-SD  41.8 37.0 - 54.0 fl    MPV 10.7 6.0 - 12.0 fL    Platelets 206 140 - 450 10*3/mm3    Neutrophil % 81.1 (H) 42.7 - 76.0 %    Lymphocyte % 6.5 (L) 19.6 - 45.3 %    Monocyte % 10.3 5.0 - 12.0 %    Eosinophil % 1.0 0.3 - 6.2 %    Basophil % 0.4 0.0 - 1.5 %    Immature Grans % 0.7 (H) 0.0 - 0.5 %    Neutrophils, Absolute 13.57 (H) 1.70 - 7.00 10*3/mm3    Lymphocytes, Absolute 1.08 0.70 - 3.10 10*3/mm3    Monocytes, Absolute 1.73 (H) 0.10 - 0.90 10*3/mm3    Eosinophils, Absolute 0.17 0.00 - 0.40 10*3/mm3    Basophils, Absolute 0.07 0.00 - 0.20 10*3/mm3    Immature Grans, Absolute 0.12 (H) 0.00 - 0.05 10*3/mm3    nRBC 0.0 0.0 - 0.2 /100 WBC   Procalcitonin    Collection Time: 10/10/22  4:33 PM    Specimen: Blood   Result Value Ref Range    Procalcitonin 0.08 0.00 - 0.25 ng/mL   Lactic Acid, Plasma    Collection Time: 10/10/22  6:50 PM    Specimen: Arm, Left; Blood   Result Value Ref Range    Lactate 1.2 0.5 - 2.0 mmol/L   Respiratory Panel PCR w/COVID-19(SARS-CoV-2) JONA/KALEY/RUBY/PAD/COR/MAD/OSBALDO In-House, NP Swab in [facility], 3-4 HR TAT - Swab, Nasopharynx    Collection Time: 10/10/22  6:53 PM    Specimen: Nasopharynx; Swab   Result Value Ref Range    ADENOVIRUS, PCR Not Detected Not Detected    Coronavirus 229E Not Detected Not Detected    Coronavirus HKU1 Not Detected Not Detected    Coronavirus NL63 Not Detected Not Detected    Coronavirus OC43 Not Detected Not Detected    COVID19 Not Detected Not Detected - Ref. Range    Human Metapneumovirus Not Detected Not Detected    Human Rhinovirus/Enterovirus Not Detected Not Detected    Influenza A PCR Not Detected Not Detected    Influenza B PCR Not Detected Not Detected    Parainfluenza Virus 1 Not Detected Not Detected    Parainfluenza Virus 2 Not Detected Not Detected    Parainfluenza Virus 3 Not Detected Not Detected    Parainfluenza Virus 4 Not Detected Not Detected    RSV, PCR Not Detected Not Detected    Bordetella pertussis pcr Not Detected Not Detected    Bordetella  parapertussis PCR Not Detected Not Detected    Chlamydophila pneumoniae PCR Not Detected Not Detected    Mycoplasma pneumo by PCR Not Detected Not Detected   Legionella Antigen, Urine - Urine, Urine, Clean Catch    Collection Time: 10/10/22 11:22 PM    Specimen: Urine, Clean Catch   Result Value Ref Range    LEGIONELLA ANTIGEN, URINE Negative Negative   S. Pneumo Ag Urine or CSF - Urine, Urine, Clean Catch    Collection Time: 10/10/22 11:22 PM    Specimen: Urine, Clean Catch   Result Value Ref Range    Strep Pneumo Ag Negative Negative       I ordered the above labs and independently reviewed the results.    RADIOLOGY RESULTS    XR Chest 2 View    Result Date: 10/10/2022  CHEST: 2 VIEWS  HISTORY: Cough, sore throat.  COMPARISON: CT chest 09/12/2022, 2 view chest 04/05/2022.  FINDINGS:There has developed abnormal hazy bilateral lower lobe pulmonary opacities, more extensive on the left and these are suspicious for infiltrates and project over the lingula and right middle lobe on the lateral view. Lungs are hyperexpanded and there is flattening of the hemidiaphragms. Cardiomediastinal silhouette is not changed. Within the left lung apex superimposing the left posterior 4th rib there is a 4 mm nodular opacity that is without radiographic change when compared with exam 01/03/2022, though is without clear corresponding CT abnormality. Aortic vascular calcifications are present. Severe arthritis is present of the right shoulder where there is a chronic rotator cuff tear. There is a scoliotic curvature of the thoracolumbar spine.      Since the chest CT 09/12/2022, there has developed abnormal airspace opacifications within the lingula and right middle lobe that appears most extensive within the anterior-inferior left upper lobe/lingula. These opacities are consistent with multilobar infiltrates in the proper clinical setting.  This report was finalized on 10/10/2022 4:56 PM by Dr. Misael Turk M.D.        I ordered  the above noted radiological studies and reviewed the images on the PACS system.     PROCEDURES    None      MEDICATIONS GIVEN IN ER    Medications   sodium chloride 0.9 % flush 10 mL (has no administration in time range)   Enoxaparin Sodium (LOVENOX) syringe 40 mg (40 mg Subcutaneous Given 10/10/22 2124)   nitroglycerin (NITROSTAT) SL tablet 0.4 mg (has no administration in time range)   acetaminophen (TYLENOL) tablet 650 mg (has no administration in time range)     Or   acetaminophen (TYLENOL) 160 MG/5ML solution 650 mg (has no administration in time range)     Or   acetaminophen (TYLENOL) suppository 650 mg (has no administration in time range)   ondansetron (ZOFRAN) tablet 4 mg (has no administration in time range)     Or   ondansetron (ZOFRAN) injection 4 mg (has no administration in time range)   aluminum-magnesium hydroxide-simethicone (MAALOX MAX) 400-400-40 MG/5ML suspension 15 mL (has no administration in time range)   cefTRIAXone (ROCEPHIN) 1 g in sodium chloride 0.9 % 100 mL IVPB-VTB (has no administration in time range)   ipratropium-albuterol (DUO-NEB) nebulizer solution 3 mL (has no administration in time range)   cefTRIAXone (ROCEPHIN) 2 g in sodium chloride 0.9 % 100 mL IVPB-VTB (0 g Intravenous Stopped 10/10/22 1936)   azithromycin (ZITHROMAX) 500 mg in sodium chloride 0.9 % 250 mL IVPB-VTB (500 mg Intravenous Given 10/10/22 1936)         PROGRESS, CONSULTS, and MEDICAL DECISION MAKING        ED Course as of 10/11/22 0014   Mon Oct 10, 2022   1828 WBC(!): 16.74 [DC]   1832 Patient here with daughter for evaluation of cough, fevers.  Was started on doxycycline on Saturday after evaluation at a [facility] clinic for suspected pneumonia.  Followed up with PCP today and was sent to ER.  Labs back on initial evaluation with leukocytosis.  Right TM ruptured with purulent drainage.  Will obtain additional labs, blood cultures and administer IV antibiotics.  Will plan for hospital admission. [DC]   1900  Procalcitonin: 0.08 [DC]   1926 Lactate: 1.2 [DC]   2009 Discussed case with nurse practitioner Adelaida Manning, hospitalist service, who will admit the patient under supervision of Dr. Perez. [DC]      ED Course User Index  [DC] Sari Horton PA     Patient here for evaluation of ongoing fevers, productive cough, right ear pain.  Multifocal pneumonia on chest x-ray.  Patient has ruptured right TM with purulent drainage.  Leukocytosis of 16.74.  Has been on doxycycline the past 2 days but unable to tolerate p.o. today.  Started on IV Rocephin and azithromycin in ER and will admit to hospitalist service for further evaluation and management.      DIAGNOSIS  Final diagnoses:   Multifocal pneumonia   Failure of outpatient treatment   Acute suppurative otitis media of right ear with spontaneous rupture of tympanic membrane, recurrence not specified       DISPOSITION  ED Disposition     ED Disposition   Decision to Admit    Condition   --    Comment   Level of Care: Med/Surg [1]   Diagnosis: Multifocal pneumonia [[id number]]                   Patient was placed in face mask in first look. Patient was wearing facemask when I entered the room and throughout our encounter. I wore full protective equipment throughout this patient encounter including a face mask, and gloves. Hand hygiene was performed before donning protective equipment and after removal when leaving the room.    Dictated utilizing Dragon dictation.      Note Disclaimer: At [facility], we believe that sharing information builds trust and better relationships. You are receiving this note because you recently visited [facility]. It is possible you will see health information before a provider has talked with you about it. This kind of information can be easy to misunderstand. To help you fully understand what it means for your health, we urge you to discuss this note with your provider.           Sari Horton PA  10/11/22 0016

## 2022-10-12 LAB
ANION GAP SERPL CALCULATED.3IONS-SCNC: 9 MMOL/L (ref 5–15)
BASOPHILS # BLD AUTO: 0.04 10*3/MM3 (ref 0–0.2)
BASOPHILS NFR BLD AUTO: 0.4 % (ref 0–1.5)
BUN SERPL-MCNC: 14 MG/DL (ref 8–23)
BUN/CREAT SERPL: 17.3 (ref 7–25)
CALCIUM SPEC-SCNC: 7.8 MG/DL (ref 8.6–10.5)
CHLORIDE SERPL-SCNC: 111 MMOL/L (ref 98–107)
CO2 SERPL-SCNC: 22 MMOL/L (ref 22–29)
CREAT SERPL-MCNC: 0.81 MG/DL (ref 0.57–1)
DEPRECATED RDW RBC AUTO: 40.8 FL (ref 37–54)
EGFRCR SERPLBLD CKD-EPI 2021: 69.5 ML/MIN/1.73
EOSINOPHIL # BLD AUTO: 0.27 10*3/MM3 (ref 0–0.4)
EOSINOPHIL NFR BLD AUTO: 2.5 % (ref 0.3–6.2)
ERYTHROCYTE [DISTWIDTH] IN BLOOD BY AUTOMATED COUNT: 12.9 % (ref 12.3–15.4)
FERRITIN SERPL-MCNC: 189 NG/ML (ref 13–150)
FOLATE SERPL-MCNC: >20 NG/ML (ref 4.78–24.2)
GLUCOSE SERPL-MCNC: 96 MG/DL (ref 65–99)
HAPTOGLOB SERPL-MCNC: 193 MG/DL (ref 30–200)
HCT VFR BLD AUTO: 30.2 % (ref 34–46.6)
HEMOCCULT STL QL: NEGATIVE
HGB BLD-MCNC: 10 G/DL (ref 12–15.9)
IMM GRANULOCYTES # BLD AUTO: 0.11 10*3/MM3 (ref 0–0.05)
IMM GRANULOCYTES NFR BLD AUTO: 1 % (ref 0–0.5)
IRON 24H UR-MRATE: 24 MCG/DL (ref 37–145)
IRON SATN MFR SERPL: 12 % (ref 20–50)
LDH SERPL-CCNC: 183 U/L (ref 135–214)
LYMPHOCYTES # BLD AUTO: 0.98 10*3/MM3 (ref 0.7–3.1)
LYMPHOCYTES NFR BLD AUTO: 9.2 % (ref 19.6–45.3)
MCH RBC QN AUTO: 29.1 PG (ref 26.6–33)
MCHC RBC AUTO-ENTMCNC: 33.1 G/DL (ref 31.5–35.7)
MCV RBC AUTO: 87.8 FL (ref 79–97)
MONOCYTES # BLD AUTO: 1.33 10*3/MM3 (ref 0.1–0.9)
MONOCYTES NFR BLD AUTO: 12.5 % (ref 5–12)
NEUTROPHILS NFR BLD AUTO: 7.89 10*3/MM3 (ref 1.7–7)
NEUTROPHILS NFR BLD AUTO: 74.4 % (ref 42.7–76)
NRBC BLD AUTO-RTO: 0 /100 WBC (ref 0–0.2)
PLATELET # BLD AUTO: 164 10*3/MM3 (ref 140–450)
PMV BLD AUTO: 11.3 FL (ref 6–12)
POTASSIUM SERPL-SCNC: 3.7 MMOL/L (ref 3.5–5.2)
RBC # BLD AUTO: 3.44 10*6/MM3 (ref 3.77–5.28)
RETICS # AUTO: 0.03 10*6/MM3 (ref 0.02–0.13)
RETICS/RBC NFR AUTO: 0.93 % (ref 0.7–1.9)
SODIUM SERPL-SCNC: 142 MMOL/L (ref 136–145)
TIBC SERPL-MCNC: 204 MCG/DL (ref 298–536)
TRANSFERRIN SERPL-MCNC: 137 MG/DL (ref 200–360)
VIT B12 BLD-MCNC: 573 PG/ML (ref 211–946)
WBC NRBC COR # BLD: 10.62 10*3/MM3 (ref 3.4–10.8)

## 2022-10-12 PROCEDURE — 82746 ASSAY OF FOLIC ACID SERUM: CPT | Performed by: INTERNAL MEDICINE

## 2022-10-12 PROCEDURE — 94640 AIRWAY INHALATION TREATMENT: CPT

## 2022-10-12 PROCEDURE — 82272 OCCULT BLD FECES 1-3 TESTS: CPT | Performed by: INTERNAL MEDICINE

## 2022-10-12 PROCEDURE — 80048 BASIC METABOLIC PNL TOTAL CA: CPT | Performed by: INTERNAL MEDICINE

## 2022-10-12 PROCEDURE — 94760 N-INVAS EAR/PLS OXIMETRY 1: CPT

## 2022-10-12 PROCEDURE — 85045 AUTOMATED RETICULOCYTE COUNT: CPT | Performed by: INTERNAL MEDICINE

## 2022-10-12 PROCEDURE — 36415 COLL VENOUS BLD VENIPUNCTURE: CPT | Performed by: INTERNAL MEDICINE

## 2022-10-12 PROCEDURE — 82607 VITAMIN B-12: CPT | Performed by: INTERNAL MEDICINE

## 2022-10-12 PROCEDURE — 97116 GAIT TRAINING THERAPY: CPT

## 2022-10-12 PROCEDURE — 83010 ASSAY OF HAPTOGLOBIN QUANT: CPT | Performed by: INTERNAL MEDICINE

## 2022-10-12 PROCEDURE — 25010000002 CEFTRIAXONE PER 250 MG: Performed by: NURSE PRACTITIONER

## 2022-10-12 PROCEDURE — 82728 ASSAY OF FERRITIN: CPT | Performed by: INTERNAL MEDICINE

## 2022-10-12 PROCEDURE — 83540 ASSAY OF IRON: CPT | Performed by: INTERNAL MEDICINE

## 2022-10-12 PROCEDURE — 84466 ASSAY OF TRANSFERRIN: CPT | Performed by: INTERNAL MEDICINE

## 2022-10-12 PROCEDURE — 94799 UNLISTED PULMONARY SVC/PX: CPT

## 2022-10-12 PROCEDURE — 83615 LACTATE (LD) (LDH) ENZYME: CPT | Performed by: INTERNAL MEDICINE

## 2022-10-12 PROCEDURE — 94761 N-INVAS EAR/PLS OXIMETRY MLT: CPT

## 2022-10-12 PROCEDURE — 85025 COMPLETE CBC W/AUTO DIFF WBC: CPT | Performed by: INTERNAL MEDICINE

## 2022-10-12 PROCEDURE — 97161 PT EVAL LOW COMPLEX 20 MIN: CPT

## 2022-10-12 PROCEDURE — 25010000002 ENOXAPARIN PER 10 MG: Performed by: INTERNAL MEDICINE

## 2022-10-12 RX ORDER — LOSARTAN POTASSIUM 50 MG/1
50 TABLET ORAL
Status: DISCONTINUED | OUTPATIENT
Start: 2022-10-12 | End: 2022-10-14 | Stop reason: HOSPADM

## 2022-10-12 RX ADMIN — ASPIRIN 81 MG: 81 TABLET, FILM COATED ORAL at 08:31

## 2022-10-12 RX ADMIN — PRAVASTATIN SODIUM 40 MG: 40 TABLET ORAL at 08:31

## 2022-10-12 RX ADMIN — GUAIFENESIN 600 MG: 600 TABLET, EXTENDED RELEASE ORAL at 08:31

## 2022-10-12 RX ADMIN — AMLODIPINE BESYLATE 5 MG: 5 TABLET ORAL at 08:31

## 2022-10-12 RX ADMIN — IPRATROPIUM BROMIDE AND ALBUTEROL SULFATE 3 ML: .5; 3 SOLUTION RESPIRATORY (INHALATION) at 09:16

## 2022-10-12 RX ADMIN — LOSARTAN POTASSIUM 50 MG: 50 TABLET, FILM COATED ORAL at 10:59

## 2022-10-12 RX ADMIN — CARVEDILOL 12.5 MG: 12.5 TABLET, FILM COATED ORAL at 20:47

## 2022-10-12 RX ADMIN — ENOXAPARIN SODIUM 30 MG: 30 INJECTION SUBCUTANEOUS at 08:31

## 2022-10-12 RX ADMIN — CEFTRIAXONE SODIUM 1 G: 1 INJECTION, POWDER, FOR SOLUTION INTRAMUSCULAR; INTRAVENOUS at 18:25

## 2022-10-12 RX ADMIN — GUAIFENESIN 600 MG: 600 TABLET, EXTENDED RELEASE ORAL at 20:47

## 2022-10-12 RX ADMIN — TAMOXIFEN CITRATE 20 MG: 10 TABLET, FILM COATED ORAL at 08:31

## 2022-10-12 RX ADMIN — CARVEDILOL 12.5 MG: 12.5 TABLET, FILM COATED ORAL at 08:31

## 2022-10-12 NOTE — PLAN OF CARE
Goal Outcome Evaluation:  Plan of Care Reviewed With: patient      VSS, on 2 L nasal canula, productive cough and SOB with activity present, voiding per BRP, stand by assist, plans to d/c home tomorrow pending, educated on bp meds and monitoring.    Progress: improving

## 2022-10-12 NOTE — THERAPY EVALUATION
Patient Name: Neha Guillory  : 1933    MRN: [id number]                              Today's Date: 10/12/2022       Admit Date: 10/10/2022    Visit Dx:     ICD-10-CM ICD-9-CM   1. Multifocal pneumonia  J18.9 486   2. Failure of outpatient treatment  Z78.9 V49.89   3. Acute suppurative otitis media of right ear with spontaneous rupture of tympanic membrane, recurrence not specified  H66.011 382.01     Patient Active Problem List   Diagnosis   • Hyperlipidemia   • Essential hypertension   • Hypertrophic cardiomyopathy (HCC)   • Generalized osteoarthritis   • RICH on CPAP   • Osteoporosis   • Vitamin D deficiency   • Colon polyps   • Family hx of colon cancer   • H/O parathyroidectomy ([facility])   • Pulmonary hypertension ([facility])   • Echocardiogram abnormal   • Carotid artery stenosis   • Ischemic rest pain of lower extremity   • Bilateral carpal tunnel syndrome   • Visit for screening mammogram   • Osteopenia   • Hyperuricemia   • Senile osteoporosis   • Diastolic dysfunction   • Healthcare maintenance   • Hip pain, left   • Hammer toe of second toe of right foot   • Gastroenteritis   • Starvation ketoacidosis   • Stage 3a chronic kidney disease (HCC)   • Dehydration   • Tophus   • Hospital discharge follow-up   • Osteoporosis, post-menopausal   • Hyperglycemia   • Bilateral carotid artery stenosis   • Prediabetes   • Resistant hypertension   • FH: breast cancer   • Carcinoma of upper-inner quadrant of left breast in female, estrogen receptor positive ([facility])   • Long-term use of high-risk medication   • Aortic stenosis, mild   • Multifocal pneumonia   • Acute kidney failure ([facility])   • DNR (do not resuscitate)   • Hyperglycemia     Past Medical History:   Diagnosis Date   • Aortic stenosis, mild 2022   • Arthritis    • Carcinoma of upper-inner quadrant of left breast in female, estrogen receptor positive (HCC) 2022   • Carotid artery stenosis    • Carotid atherosclerosis, bilateral 2019   • Cataract      BILATERAL   • Depression    • Gastroenteritis    • Generalized osteoarthritis 02/01/2016   • H/O diastolic dysfunction    • Hyperlipidemia    • Hyperparathyroidism (HCC)    • Hyperparathyroidism (HCC)    • Hypertension    • Hypertrophic cardiomyopathy (HCC)    • Multifocal pneumonia 10/10/2022   • Neuromuscular disorder (HCC)    • Obstructive sleep apnea syndrome 02/01/2016   • Osteopenia    • Osteoporosis    • Primary familial hypertrophic cardiomyopathy (HCC) 02/01/2016   • Pulmonary hypertension (HCC)    • Synovial cyst of popliteal space 02/01/2016   • Vitamin D deficiency      Past Surgical History:   Procedure Laterality Date   • BREAST BIOPSY Left 02/17/2022   • BREAST LUMPECTOMY WITH SENTINEL NODE BIOPSY Left 4/7/2022    Procedure: LEFT BREAST LUMPECTOMY WITH SENTINEL NODE BIOPSY AND NEEDLE LOCALIZATION;  Surgeon: Bel Marina MD;  Location[place] [facility] [place] [facility] [place]  Service: General;  Laterality: Left;   • BUNIONECTOMY Right     FOOT SURGERY   • COLONOSCOPY N/A 2011    Dr. Luis   • HAND SURGERY Right     TUMOR REMOVED ON FOREFINGER   • THYROIDECTOMY Right 4/20/2016    Procedure: RIGHT SUPERIOR PARATHYROIDECTOMY;  Surgeon: Bel Marina MD;  Location: [facility] OR;  Service:       General Information     [facility] Name 10/12/22 [facility]          Physical Therapy Time and Intention    Document Type evaluation  -     Mode of Treatment individual therapy;physical therapy  -     Row Name 10/12/22 [facility]1          General Information    Patient Profile Reviewed yes  -     Prior Level of Function independent:;gait;transfer;bed mobility  -     Existing Precautions/Restrictions fall  -     Barriers to Rehab none identified  -     Row Name 10/12/22 1354          Living Environment    People in Home child(alex), adult  -     Row Name 10/12/22 1351          Home Main Entrance    Number of Stairs, Main Entrance none  -     Row Name 10/12/22 1350          Stairs Within Home, Primary    Number of Stairs, Within Home,  Primary other (see comments)  Laundry in basement  -     Row Name 10/12/22 1357          Cognition    Orientation Status (Cognition) oriented x 4  -     Row Name 10/12/22 1357          Safety Issues, Functional Mobility    Impairments Affecting Function (Mobility) balance;endurance/activity tolerance;strength  -           User Key  (r) = Recorded By, (t) = Taken By, (c) = Cosigned By    Initials Name Provider Type     Mony Carter PT Physical Therapist               Mobility     Row Name 10/12/22 1404          Bed Mobility    Bed Mobility supine-sit  -     Supine-Sit [place] (Bed Mobility) modified independence;verbal cues  -     Assistive Device (Bed Mobility) bed rails;head of bed elevated  -     Row Name 10/12/22 1404          Sit-Stand Transfer    Sit-Stand [place] (Transfers) verbal cues;standby assist  -     Row Name 10/12/22 1404          Gait/Stairs (Locomotion)    [place] Level (Gait) verbal cues;contact guard;standby assist  -     Distance in Feet (Gait) 300ft  -     Deviations/Abnormal Patterns (Gait) gait speed decreased;stride length decreased;base of support, narrow  -           User Key  (r) = Recorded By, (t) = Taken By, (c) = Cosigned By    Initials Name Provider Type     Mony Carter PT Physical Therapist               Obj/Interventions     Row Name 10/12/22 1404          Range of Motion Comprehensive    General Range of Motion bilateral lower extremity ROM WFL  -     Row Name 10/12/22 1404          Strength Comprehensive (MMT)    General Manual Muscle Testing (MMT) Assessment lower extremity strength deficits identified  -     Comment, General Manual Muscle Testing (MMT) Assessment Generalized weakness, BLE grossly 4/5  -     Row Name 10/12/22 1404          Balance    Balance Assessment sitting static balance;sitting dynamic balance;standing static balance;standing dynamic balance  -     Static Sitting Balance modified independence  -      Dynamic Sitting Balance modified independence  -     Position, Sitting Balance unsupported;sitting edge of bed  -     Static Standing Balance standby assist  -     Dynamic Standing Balance standby assist;contact guard  -     Position/Device Used, Standing Balance unsupported  -     Balance Interventions sitting;standing;sit to stand;supported;static;dynamic  -     Row Name 10/12/22 1404          Sensory Assessment (Somatosensory)    Sensory Assessment (Somatosensory) LE sensation intact  -           User Key  (r) = Recorded By, (t) = Taken By, (c) = Cosigned By    Initials Name Provider Type     Mony Carter PT Physical Therapist               Goals/Plan    No documentation.                Clinical Impression     Row Name 10/12/22 1401          Pain    Pretreatment Pain Rating 0/10 - no pain  -     Posttreatment Pain Rating 0/10 - no pain  -     Row Name 10/12/22 1407          Plan of Care Review    Plan of Care Reviewed With patient;daughter  -     Outcome Evaluation Pt is an 89 yo F admitted with c/o a cough, fever, ear pain, and sore throat x4-5 days. Pt also with recent bout of nausea at home. Work-up revealed pneumonia, on 2L O2 today but does not wear O2 at BL. Pt lives with her son and grandson and is able to do everything for herself normally and takes care of her family at home. She does not use any AD, but has a WC, rwx, and cane if needed. Pt has no LUIS ALBERTO, but laundry in the basement. Pt presents to PT with minimal functional deficits, reporting she feels she is getting around near/at her BL. Pt transferred to EOB with mod I, STS with SBA, and ambulated 300ft with SBA-CGA and no AD. Pt with somewhat narrow RADHA and mildly unsteady with no AD, but no overt LOB or safety concerns. Pt returned to room and agreeable to sitting [facility]. Pt did ambulate w/o O2 and denied any SOA during ambulation, though was breathing heavily on return. O2 immediately checked upon sitting and satting 80% but  recovered to 90% on RA within 10-15 seconds, O2 reapplied. Pt repositioned in chair and left with all needs met. Encouraged pt to continue walking with nsg and family throughout the day as well as to use cane or walker at home for the first few days if needed. Pt is safe to DC home with HHPT and family to assist as needed. PT will sign-off.  -     Row Name 10/12/22 1405          Therapy Assessment/Plan (PT)    Therapy Frequency (PT) evaluation only  -     Row Name 10/12/22 1405          Vital Signs    Pre SpO2 (%) 95  -     O2 Delivery Pre Treatment supplemental O2  -     Intra SpO2 (%) 80  -     O2 Delivery Intra Treatment room air  -     Post SpO2 (%) 95  -     O2 Delivery Post Treatment supplemental O2  -     Row Name 10/12/22 1405          Positioning and Restraints    Pre-Treatment Position in bed  -     Post Treatment Position chair  -     In Chair reclined;call light within reach;encouraged to call for assist;exit alarm on;with family/caregiver;notified [facility]  -           User Key  (r) = Recorded By, (t) = Taken By, (c) = Cosigned By    Initials Name Provider Type     Mony Carter, PT Physical Therapist               Outcome Measures     Row Name 10/12/22 1411 10/12/22 0831       How much help from another person do you currently need...    Turning from your back to your side while in flat bed without using bedrails? 4  - 3  -EK    Moving from lying on back to sitting on the side of a flat bed without bedrails? 4  - 3  -EK    Moving to and from a bed to a chair (including a wheelchair)? 4  - 3  -EK    Standing up from a chair using your arms (e.g., wheelchair, bedside chair)? 4  - 3  -EK    Climbing 3-5 steps with a railing? 3  - 3  -EK    To walk in hospital room? 3  - 4  -EK    AM-PAC 6 Clicks Score (PT) 22  - 19  -EK    Highest level of mobility 7 --> Walked 25 feet or more  - 6 --> Walked 10 steps or more  -    Row Name 10/12/22 1411          Functional  Assessment    Outcome Measure Options AM-PAC 6 Clicks Basic Mobility (PT)  -           User Key  (r) = Recorded By, (t) = Taken By, (c) = Cosigned By    Initials Name Provider Type    Vicki Prieto, RN Registered Nurse     Mony Carter PT Physical Therapist                             Physical Therapy Education     Title: PT OT SLP Therapies (Done)     Topic: Physical Therapy (Done)     Point: Mobility training (Done)     Learning Progress Summary           Patient Acceptance, E,TB,D, VU by  at 10/12/2022 1412                   Point: Home exercise program (Done)     Learning Progress Summary           Patient Acceptance, E,TB,D, VU by  at 10/12/2022 1412                   Point: Body mechanics (Done)     Learning Progress Summary           Patient Acceptance, E,TB,D, VU by  at 10/12/2022 1412                   Point: Precautions (Done)     Learning Progress Summary           Patient Acceptance, E,TB,D, VU by  at 10/12/2022 1412                               User Key     Initials Effective Dates Name Provider Type Discipline     04/08/22 -  Mony Carter PT Physical Therapist PT              PT Recommendation and Plan     Plan of Care Reviewed With: patient, daughter  Outcome Evaluation: Pt is an 88 yo F admitted with c/o a cough, fever, ear pain, and sore throat x4-5 days. Pt also with recent bout of nausea at home. Work-up revealed pneumonia, on 2L O2 today but does not wear O2 at BL. Pt lives with her son and grandson and is able to do everything for herself normally and takes care of her family at home. She does not use any AD, but has a WC, rwx, and cane if needed. Pt has no LUIS ALBERTO, but laundry in the basement. Pt presents to PT with minimal functional deficits, reporting she feels she is getting around near/at her BL. Pt transferred to EOB with mod I, STS with SBA, and ambulated 300ft with SBA-CGA and no AD. Pt with somewhat narrow RADHA and mildly unsteady with no AD, but no overt LOB or  safety concerns. Pt returned to room and agreeable to sitting [facility]. Pt did ambulate w/o O2 and denied any SOA during ambulation, though was breathing heavily on return. O2 immediately checked upon sitting and satting 80% but recovered to 90% on RA within 10-15 seconds, O2 reapplied. Pt repositioned in chair and left with all needs met. Encouraged pt to continue walking with nsg and family throughout the day as well as to use cane or walker at home for the first few days if needed. Pt is safe to DC home with HHPT and family to assist as needed. PT will sign-off.     Time Calculation:    PT Charges     Row Name 10/12/22 1413             Time Calculation    Start Time 1106  -      Stop Time 1124  -      Time Calculation (min) 18 min  -      PT Received On 10/12/22  -         Time Calculation- PT    Total Timed Code Minutes- PT 15 minute(s)  -         Timed Charges    40677 - Gait Training Minutes  10  -      52519 - PT Therapeutic Activity Minutes 5  -BH         Untimed Charges    PT Eval/Re-eval Minutes 5  -         Total Minutes    Timed Charges Total Minutes 15  -BH      Untimed Charges Total Minutes 5  -BH       Total Minutes 20  -BH            User Key  (r) = Recorded By, (t) = Taken By, (c) = Cosigned By    Initials Name Provider Type     Mony Carter PT Physical Therapist              Therapy Charges for Today     Code Description Service Date Service Provider Modifiers Qty    11313598477 HC GAIT TRAINING EA 15 MIN 10/12/2022 Mony Carter, PT GP 1    [id number]  PT EVAL LOW COMPLEXITY 2 10/12/2022 Mony Carter, PT GP 1          PT G-Codes  Outcome Measure Options: AM-PAC 6 Clicks Basic Mobility (PT)  AM-PAC 6 Clicks Score (PT): 22    Mony Carter PT  10/12/2022

## 2022-10-12 NOTE — PLAN OF CARE
Goal Outcome Evaluation:  Plan of Care Reviewed With: patient, daughter           Outcome Evaluation: Pt is an 89 yo F admitted with c/o a cough, fever, ear pain, and sore throat x4-5 days. Pt also with recent bout of nausea at home. Work-up revealed pneumonia, on 2L O2 today but does not wear O2 at BL. Pt lives with her son and grandson and is able to do everything for herself normally and takes care of her family at home. She does not use any AD, but has a WC, rwx, and cane if needed. Pt has no LUIS ALBERTO, but laundry in the basement. Pt presents to PT with minimal functional deficits, reporting she feels she is getting around near/at her BL. Pt transferred to EOB with mod I, STS with SBA, and ambulated 300ft with SBA-CGA and no AD. Pt with somewhat narrow RADHA and mildly unsteady with no AD, but no overt LOB or safety concerns. Pt returned to room and agreeable to sitting [facility]. Pt did ambulate w/o O2 and denied any SOA during ambulation, though was breathing heavily on return. O2 immediately checked upon sitting and satting 80% but recovered to 90% on RA within 10-15 seconds, O2 reapplied. Pt repositioned in chair and left with all needs met. Encouraged pt to continue walking with nsg and family throughout the day as well as to use cane or walker at home for the first few days if needed. Pt is safe to DC home with HHPT and family to assist as needed. PT will sign-off.

## 2022-10-12 NOTE — PLAN OF CARE
Goal Outcome Evaluation:  Plan of Care Reviewed With: patient        Progress: improving  Outcome Evaluation: VSS, 02 2 liters, voiding BRP with stand by assist. some wheezing noted with activity. no sticks or B/P left arm. productive loose cough. additional IV site, instead of a/c for IV fluids

## 2022-10-12 NOTE — PROGRESS NOTES
Name: Neha Guillory ADMIT: 10/10/2022   : 1933  PCP: Tracy Steiner APRN    MRN: [id number] LOS: 0 days   AGE/SEX: 89 y.o. female  ROOM: [facility]/     Subjective   Subjective   Patient reports decreasing shortness of breath/cough productive of yellowish sputum.  No fever or chills.  No chest pain.  No palpitation.  No hemoptysis.  No wheezing.       Review of Systems  GI.  Resolved nausea and vomiting.  No abdominal pain.  Normal bowel habits without constipation/diarrhea/bleeding per rectum/melena.  .  No dysuria or hematuria.       Objective   Objective   Vital Signs  Temp:  [98.1 °F (36.7 °C)-99.4 °F (37.4 °C)] 98.1 °F (36.7 °C)  Heart Rate:  [] 78  Resp:  [16] 16  BP: (120-158)/(62-84) 158/74  SpO2:  [90 %-95 %] 93 %  on  Flow (L/min):  [2] 2;   Device (Oxygen Therapy): nasal cannula    Intake/Output Summary (Last 24 hours) at 10/12/2022 0943  Last data filed at 10/12/2022 0911  Gross per 24 hour   Intake 1727 ml   Output 601 ml   Net 1126 ml     Body mass index is 26.26 kg/m².      10/10/22  1857   Weight: 59 kg (130 lb)     Physical Exam    General.  Elderly female.  Alert and oriented x3.  No apparent pain/distress/diaphoresis.  Normal mood and affect.  Eyes.  Status post bilateral cataract surgery.  Pupils equal round and reactive.  Intact extraocular musculature.  No pallor or jaundice.    Oral cavity.  Moist mucous membrane with no lesions positive postnasal drip.  Cardiovascular.  Regular rate and rhythm and grade 3 systolic murmur.  Chest.  Poor bilateral air entry with scattered bilateral rhonchi abdomen.  Soft lax.  No tenderness.  No organomegaly.  No guarding or rebound.  Extremities.  No clubbing or cyanosis.  Trace bilateral lower extremity edema.  CNS.  No acute focal neurological deficits.      Results Review:      Results from last 7 days   Lab Units 10/12/22  0444 10/10/22  1633   SODIUM mmol/L 142 138   POTASSIUM mmol/L 3.7 4.2   CHLORIDE mmol/L 111* 102   CO2 mmol/L 22.0  25.0   BUN mg/dL 14 24*   CREATININE mg/dL 0.81 1.15*   GLUCOSE mg/dL 96 110*   CALCIUM mg/dL 7.8* 9.0   AST (SGOT) U/L  --  17   ALT (SGPT) U/L  --  15     Estimated Creatinine Clearance: 39.3 mL/min (by C-G formula based on SCr of 0.81 mg/dL).                            Invalid input(s):  PHOS        Invalid input(s): LDLCALC  Results from last 7 days   Lab Units 10/12/22  0444 10/10/22  1633   WBC 10*3/mm3 10.62 16.74*   HEMOGLOBIN g/dL 10.0* 11.8*   HEMATOCRIT % 30.2* 37.1   PLATELETS 10*3/mm3 164 206   MCV fL 87.8 89.8   MCH pg 29.1 28.6   MCHC g/dL 33.1 31.8   RDW % 12.9 12.7   RDW-SD fl 40.8 41.8   MPV fL 11.3 10.7   NEUTROPHIL % % 74.4 81.1*   LYMPHOCYTE % % 9.2* 6.5*   MONOCYTES % % 12.5* 10.3   EOSINOPHIL % % 2.5 1.0   BASOPHIL % % 0.4 0.4   IMM GRAN % % 1.0* 0.7*   NEUTROS ABS 10*3/mm3 7.89* 13.57*   LYMPHS ABS 10*3/mm3 0.98 1.08   MONOS ABS 10*3/mm3 1.33* 1.73*   EOS ABS 10*3/mm3 0.27 0.17   BASOS ABS 10*3/mm3 0.04 0.07   IMMATURE GRANS (ABS) 10*3/mm3 0.11* 0.12*   NRBC /100 WBC 0.0 0.0             Results from last 7 days   Lab Units 10/10/22  1850 10/10/22  1633   PROCALCITONIN ng/mL  --  0.08   LACTATE mmol/L 1.2  --              Results from last 7 days   Lab Units 10/11/22  0736 10/10/22  1853 10/10/22  1850   BLOODCX   --  No growth at 24 hours No growth at 24 hours   RESPCX  Rejected  --   --      Results from last 7 days   Lab Units 10/10/22  1853   ADENOVIRUS DETECTION BY PCR  Not Detected   CORONAVIRUS 229E  Not Detected   CORONAVIRUS HKU1  Not Detected   CORONAVIRUS NL63  Not Detected   CORONAVIRUS OC43  Not Detected   HUMAN METAPNEUMOVIRUS  Not Detected   HUMAN RHINOVIRUS/ENTEROVIRUS  Not Detected   INFLUENZA B PCR  Not Detected   PARAINFLUENZA 1  Not Detected   PARAINFLUENZA VIRUS 2  Not Detected   PARAINFLUENZA VIRUS 3  Not Detected   PARAINFLUENZA VIRUS 4  Not Detected   BORDETELLA PERTUSSIS PCR  Not Detected   CHLAMYDOPHILA PNEUMONIAE PCR  Not Detected   MYCOPLAMA PNEUMO PCR  Not Detected    INFLUENZA A PCR  Not Detected   RSV, PCR  Not Detected         Results from last 7 days   Lab Units 10/11/22  1155   SODIUM UR mmol/L 73   CREATININE UR mg/dL 150.3   OSMOLALITY UR mOsm/kg 670       Imaging:  Imaging Results (Last 24 Hours)     Procedure Component Value Units Date/Time    CT Chest Without Contrast Diagnostic [[id number]] Collected: 10/11/22 1403     Updated: 10/11/22 1403    Narrative:      CT CHEST WITHOUT CONTRAST     HISTORY: 89-year-old female with pneumonia. Breast cancer history.     TECHNIQUE: Radiation dose reduction techniques were utilized, including  automated exposure control and exposure modulation based on body size.   3 mm images were obtained through the chest without the administration  of IV contrast. Compared with chest CT 09/12/2022.     FINDINGS: There are patchy mixed density airspace consolidations at the  right middle lobe, left upper and lower lobes. There are no pleural or  pericardial effusions. There has been marginal increase in size of some  of the mediastinal nodes with the largest in the precarinal region  measuring 1.6 x 1.6 cm, image 37. Appearance of the left breast is  largely unchanged given differences in technique. There are extensive  thoracic aortic atherosclerotic changes without aneurysmal dilatation.  Mild multinodular goiter is stable. Incidentally noted is  cholelithiasis. 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       Impression:      1. Multifocal pneumonia at the right middle lobe, left upper and lower  lobes.  2. Slight increase in size likely reactive mediastinal nodes.  Reevaluation is recommended with a chest CT in 3 months.                I reviewed the patient's new clinical results / labs / tests / procedures      Assessment/Plan     Active Hospital Problems    Diagnosis  POA   • **Multifocal pneumonia [J18.9]  Yes   • Acute kidney failure (HCC) [N17.9]  Yes   • DNR (do not resuscitate) [Z66]  Yes   •  Hyperglycemia [R73.9]  Yes   • Bilateral carotid artery stenosis [I65.23]  Yes   • Stage 3a chronic kidney disease (HCC) [N18.31]  Yes   • Pulmonary hypertension (HCC) [I27.20]  Yes   • RICH on CPAP [G47.33, Z99.89]  Not Applicable   • Hyperlipidemia [E78.5]  Yes   • Essential hypertension [I10]  Yes      Resolved Hospital Problems   No resolved problems to display.           · Bilateral multifocal community-acquired pneumonia.  CT scan of the chest without contrast and chest x-ray are noted.  No obvious masses.  Resolved leukocytosis.    Normal procalcitonin and lactic acid.  Respiratory PCR panel is negative.  Urine Legionella and Streptococcus antigen are negative.  Sputum and blood cultures are negative till now.  On Rocephin.  Still requiring oxygen.  Continue incentive spirometry and Mucinex and as needed DuoNebs.   · Acute on top of stage IIIa chronic renal failure.  Acute mostly secondary to prerenal azotemia because of hypovolemia.  This has resolved with IV fluid and holding pending culture.  I will resume Benicar and stop IV fluid.  Renal functions better than baseline.  Baseline creatinine is 1.08.  · Hypertension/mild aortic stenosis/chronic diastolic cardiac dysfunction/hypertrophic cardiomyopathy/pulmonary hypertension.  There is no evidence of angina or congestive heart failure.  We will continue Norvasc/aspirin/Coreg.  We will resume Benicar as the renal failure has resolved.  Normal sinus rhythm.  Blood pressure mildly elevated.    · History of obstructive sleep apnea on CPAP.  · History of hyperlipidemia and bilateral carotid stenosis.  Continue aspirin and pravastatin.  · History of hyperparathyroidism.  Calcium is normal.  · Mild anemia.  Positive hemoglobin drop.  Will work-up.   · Hyperglycemia.  Resolved  · VTE prophylaxis with Lovenox.  · DNR status.  Patient has a living well and wishes of a DO NOT RESUSCITATE and this will be    Discussed my findings and plan of treatment with the  patient  Disposition.  Hopefully home tomorrow if able to wean off oxygen and continues to improve and physical therapy evaluated the patient.        Cristofer Peters MD  [facility]  10/12/22  09:43 EDT

## 2022-10-13 PROBLEM — D50.9 IRON DEFICIENCY ANEMIA: Status: ACTIVE | Noted: 2022-10-13

## 2022-10-13 LAB
ANION GAP SERPL CALCULATED.3IONS-SCNC: 10 MMOL/L (ref 5–15)
BACTERIA SPEC RESP CULT: NORMAL
BASOPHILS # BLD AUTO: 0.06 10*3/MM3 (ref 0–0.2)
BASOPHILS NFR BLD AUTO: 0.5 % (ref 0–1.5)
BUN SERPL-MCNC: 13 MG/DL (ref 8–23)
BUN/CREAT SERPL: 16.9 (ref 7–25)
CALCIUM SPEC-SCNC: 8.4 MG/DL (ref 8.6–10.5)
CHLORIDE SERPL-SCNC: 107 MMOL/L (ref 98–107)
CO2 SERPL-SCNC: 22 MMOL/L (ref 22–29)
CREAT SERPL-MCNC: 0.77 MG/DL (ref 0.57–1)
DEPRECATED RDW RBC AUTO: 41 FL (ref 37–54)
EGFRCR SERPLBLD CKD-EPI 2021: 73.8 ML/MIN/1.73
EOSINOPHIL # BLD AUTO: 0.22 10*3/MM3 (ref 0–0.4)
EOSINOPHIL NFR BLD AUTO: 1.9 % (ref 0.3–6.2)
ERYTHROCYTE [DISTWIDTH] IN BLOOD BY AUTOMATED COUNT: 12.8 % (ref 12.3–15.4)
GLUCOSE SERPL-MCNC: 83 MG/DL (ref 65–99)
GRAM STN SPEC: NORMAL
HCT VFR BLD AUTO: 31.5 % (ref 34–46.6)
HGB BLD-MCNC: 10.2 G/DL (ref 12–15.9)
IMM GRANULOCYTES # BLD AUTO: 0.14 10*3/MM3 (ref 0–0.05)
IMM GRANULOCYTES NFR BLD AUTO: 1.2 % (ref 0–0.5)
LYMPHOCYTES # BLD AUTO: 1.06 10*3/MM3 (ref 0.7–3.1)
LYMPHOCYTES NFR BLD AUTO: 9.1 % (ref 19.6–45.3)
MCH RBC QN AUTO: 28.5 PG (ref 26.6–33)
MCHC RBC AUTO-ENTMCNC: 32.4 G/DL (ref 31.5–35.7)
MCV RBC AUTO: 88 FL (ref 79–97)
MONOCYTES # BLD AUTO: 1.24 10*3/MM3 (ref 0.1–0.9)
MONOCYTES NFR BLD AUTO: 10.6 % (ref 5–12)
NEUTROPHILS NFR BLD AUTO: 76.7 % (ref 42.7–76)
NEUTROPHILS NFR BLD AUTO: 8.97 10*3/MM3 (ref 1.7–7)
NRBC BLD AUTO-RTO: 0 /100 WBC (ref 0–0.2)
PLATELET # BLD AUTO: 190 10*3/MM3 (ref 140–450)
PMV BLD AUTO: 11.5 FL (ref 6–12)
POTASSIUM SERPL-SCNC: 3.8 MMOL/L (ref 3.5–5.2)
RBC # BLD AUTO: 3.58 10*6/MM3 (ref 3.77–5.28)
SODIUM SERPL-SCNC: 139 MMOL/L (ref 136–145)
WBC NRBC COR # BLD: 11.69 10*3/MM3 (ref 3.4–10.8)

## 2022-10-13 PROCEDURE — 25010000002 ENOXAPARIN PER 10 MG: Performed by: INTERNAL MEDICINE

## 2022-10-13 PROCEDURE — 94618 PULMONARY STRESS TESTING: CPT

## 2022-10-13 PROCEDURE — 94760 N-INVAS EAR/PLS OXIMETRY 1: CPT

## 2022-10-13 PROCEDURE — 25010000002 CEFTRIAXONE PER 250 MG: Performed by: NURSE PRACTITIONER

## 2022-10-13 PROCEDURE — 80048 BASIC METABOLIC PNL TOTAL CA: CPT | Performed by: INTERNAL MEDICINE

## 2022-10-13 PROCEDURE — 85025 COMPLETE CBC W/AUTO DIFF WBC: CPT | Performed by: INTERNAL MEDICINE

## 2022-10-13 RX ADMIN — LOSARTAN POTASSIUM 50 MG: 50 TABLET, FILM COATED ORAL at 09:44

## 2022-10-13 RX ADMIN — ASPIRIN 81 MG: 81 TABLET, FILM COATED ORAL at 09:44

## 2022-10-13 RX ADMIN — ENOXAPARIN SODIUM 30 MG: 30 INJECTION SUBCUTANEOUS at 09:44

## 2022-10-13 RX ADMIN — CARVEDILOL 12.5 MG: 12.5 TABLET, FILM COATED ORAL at 20:16

## 2022-10-13 RX ADMIN — CARVEDILOL 12.5 MG: 12.5 TABLET, FILM COATED ORAL at 09:44

## 2022-10-13 RX ADMIN — CEFTRIAXONE SODIUM 1 G: 1 INJECTION, POWDER, FOR SOLUTION INTRAMUSCULAR; INTRAVENOUS at 19:08

## 2022-10-13 RX ADMIN — PRAVASTATIN SODIUM 40 MG: 40 TABLET ORAL at 09:44

## 2022-10-13 RX ADMIN — AMLODIPINE BESYLATE 5 MG: 5 TABLET ORAL at 09:44

## 2022-10-13 RX ADMIN — TAMOXIFEN CITRATE 20 MG: 10 TABLET, FILM COATED ORAL at 09:44

## 2022-10-13 RX ADMIN — GUAIFENESIN 600 MG: 600 TABLET, EXTENDED RELEASE ORAL at 09:44

## 2022-10-13 RX ADMIN — GUAIFENESIN 600 MG: 600 TABLET, EXTENDED RELEASE ORAL at 20:16

## 2022-10-13 NOTE — PROGRESS NOTES
Exercise Oximetry    Patient Name:Neha Guillory   MRN: [id number]   Date: 10/13/22             ROOM AIR BASELINE   SpO2% 96   Heart Rate 98   Blood Pressure      EXERCISE ON ROOM AIR SpO2% EXERCISE ON O2 @  LPM SpO2%   1 MINUTE 96 1 MINUTE    2 MINUTES 95 2 MINUTES    3 MINUTES 94 3 MINUTES    4 MINUTES 93 4 MINUTES    5 MINUTES 93 5 MINUTES    6 MINUTES 94 6 MINUTES               Distance Walked   Distance Walked   Dyspnea (Bernadette Scale)   Dyspnea (Bernadette Scale)   Fatigue (Bernadette Scale)   Fatigue (Bernadette Scale)   SpO2% Post Exercise 96 SpO2% Post Exercise   HR Post Exercise  97 HR Post Exercise   Time to Recovery   Time to Recovery     Comments: o2 sats 96% on room air at rest with forehead probe, ambulated in hallway with forehead probe and PPE mask, o2 sats on room air during ambulation 96% to 93%. Renea Reyes RRT

## 2022-10-13 NOTE — PLAN OF CARE
Goal Outcome Evaluation:  Plan of Care Reviewed With: patient        Progress: improving  Outcome Evaluation: VSS, still using 02 2L to maintain normal 02 sats, noted SOA after activity, but subsides shortly after rest. temp max 100.7. ambulating well, voiding well, stool for OB neg.

## 2022-10-13 NOTE — CASE MANAGEMENT/SOCIAL WORK
Discharge Planning Assessment  [facility]     Patient Name: Neha Guillory  MRN: [id number]  Today's Date: 10/13/2022    Admit Date: 10/10/2022    Plan: Home with family   Discharge Needs Assessment     Row Name 10/13/22 1321       Living Environment    People in Home child(alex), adult;grandchild(alex)    Current Living Arrangements home    Primary Care Provided by self    Provides Primary Care For no one    Family Caregiver if Needed child(alex), adult    Quality of Family Relationships helpful;involved;supportive       Resource/Environmental Concerns    Resource/Environmental Concerns none       Transition Planning    Patient/Family Anticipates Transition to home with family    Patient/Family Anticipated Services at Transition none    Transportation Anticipated family or friend will provide       Discharge Needs Assessment    Readmission Within the Last 30 Days no previous admission in last 30 days    Equipment Currently Used at Home none    Concerns to be Addressed no discharge needs identified               Discharge Plan     Row Name 10/13/22 1322       Plan    Plan Home with family    Patient/Family in Agreement with Plan yes    Plan Comments CCP met with patient at bedside. CCP role identified and facesheet verified. The patient lives with her son and grandson. There are 3-4 [address] and has a basement and she manages the stairs ok at home. She is IADL's. She does have DME (W/C, walker, cane) if needed. She has no HH/SNF history. She declines HH referrals at this time and plans to return home. Her family can transport. She is now on RA. Ahsan PEREZ RN CCP              Continued Care and Services - Admitted Since 10/10/2022    Coordination has not been started for this encounter.       Expected Discharge Date and Time     Expected Discharge Date Expected Discharge Time    Oct 14, 2022          Demographic Summary     Row Name 10/13/22 1321       General Information    Arrived From home    Preferred Language English                Functional Status     Row Name 10/13/22 1321       Functional Status    Usual Activity Tolerance good    Current Activity Tolerance good       Functional Status, IADL    Medications independent    Meal Preparation independent    Housekeeping independent    Laundry independent    Shopping independent               Psychosocial    No documentation.                Abuse/Neglect    No documentation.                Legal    No documentation.                Substance Abuse    No documentation.                Patient Forms    No documentation.                   Ahsan Leon RN

## 2022-10-13 NOTE — PROGRESS NOTES
Name: Neha Guillory ADMIT: 10/10/2022   : 1933  PCP: Tracy Steiner APRN    MRN: [id number] LOS: 1 days   AGE/SEX: 89 y.o. female  ROOM: [employer]     Subjective   Subjective   Patient feels well.  No shortness of breath.  Decreased cough now productive of clear sputum.  No fever or chills.  No chest pain.  No palpitation.  No hemoptysis.  No wheezing.       Review of Systems  GI.  Resolved nausea and vomiting.  No abdominal pain.  Normal bowel habits without constipation/diarrhea/bleeding per rectum/melena.  .  No dysuria or hematuria.       Objective   Objective   Vital Signs  Temp:  [98.2 °F (36.8 °C)-100.7 °F (38.2 °C)] 98.2 °F (36.8 °C)  Heart Rate:  [] 99  Resp:  [16-18] 18  BP: (118-170)/(62-89) 147/89  SpO2:  [88 %-95 %] 94 %  on  Flow (L/min):  [2] 2;   Device (Oxygen Therapy): room air    Intake/Output Summary (Last 24 hours) at 10/13/2022 1028  Last data filed at 10/13/2022 1004  Gross per 24 hour   Intake 1333.75 ml   Output 750 ml   Net 583.75 ml     Body mass index is 26.26 kg/m².      10/10/22  1857   Weight: 59 kg (130 lb)     Physical Exam    General.  Elderly female.  Alert and oriented x3.  No apparent pain/distress/diaphoresis.  Normal mood and affect.  Eyes.  Status post bilateral cataract surgery.  Pupils equal round and reactive.  Intact extraocular musculature.  No pallor or jaundice.    Oral cavity.  Moist mucous membrane with no lesions positive postnasal drip.  Cardiovascular.  Regular rate and rhythm and grade 3 systolic murmur.  Chest.  Poor bilateral air entry with minimal scattered bilateral rhonchi abdomen.  Soft lax.  No tenderness.  No organomegaly.  No guarding or rebound.  Extremities.  No clubbing or cyanosis.  Trace bilateral lower extremity edema.  CNS.  No acute focal neurological deficits.      Results Review:      Results from last 7 days   Lab Units 10/13/22  0435 10/12/22  0444 10/10/22  1633   SODIUM mmol/L 139 142 138   POTASSIUM mmol/L 3.8 3.7 4.2  Stable.   CHLORIDE mmol/L 107 111* 102   CO2 mmol/L 22.0 22.0 25.0   BUN mg/dL 13 14 24*   CREATININE mg/dL 0.77 0.81 1.15*   GLUCOSE mg/dL 83 96 110*   CALCIUM mg/dL 8.4* 7.8* 9.0   AST (SGOT) U/L  --   --  17   ALT (SGPT) U/L  --   --  15     Estimated Creatinine Clearance: 41.4 mL/min (by C-G formula based on SCr of 0.77 mg/dL).                            Invalid input(s):  PHOS        Invalid input(s): LDLCALC  Results from last 7 days   Lab Units 10/13/22  0435 10/12/22  0444 10/10/22  1633   WBC 10*3/mm3 11.69* 10.62 16.74*   HEMOGLOBIN g/dL 10.2* 10.0* 11.8*   HEMATOCRIT % 31.5* 30.2* 37.1   PLATELETS 10*3/mm3 190 164 206   MCV fL 88.0 87.8 89.8   MCH pg 28.5 29.1 28.6   MCHC g/dL 32.4 33.1 31.8   RDW % 12.8 12.9 12.7   RDW-SD fl 41.0 40.8 41.8   MPV fL 11.5 11.3 10.7   NEUTROPHIL % % 76.7* 74.4 81.1*   LYMPHOCYTE % % 9.1* 9.2* 6.5*   MONOCYTES % % 10.6 12.5* 10.3   EOSINOPHIL % % 1.9 2.5 1.0   BASOPHIL % % 0.5 0.4 0.4   IMM GRAN % % 1.2* 1.0* 0.7*   NEUTROS ABS 10*3/mm3 8.97* 7.89* 13.57*   LYMPHS ABS 10*3/mm3 1.06 0.98 1.08   MONOS ABS 10*3/mm3 1.24* 1.33* 1.73*   EOS ABS 10*3/mm3 0.22 0.27 0.17   BASOS ABS 10*3/mm3 0.06 0.04 0.07   IMMATURE GRANS (ABS) 10*3/mm3 0.14* 0.11* 0.12*   NRBC /100 WBC 0.0 0.0 0.0             Results from last 7 days   Lab Units 10/10/22  1850 10/10/22  1633   PROCALCITONIN ng/mL  --  0.08   LACTATE mmol/L 1.2  --              Results from last 7 days   Lab Units 10/11/22  1429 10/11/22  0736 10/10/22  1853 10/10/22  1850   BLOODCX   --   --  No growth at 2 days No growth at 2 days   RESPCX  Rare Normal respiratory jil. No S. aureus or Pseudomonas aeruginosa detected. Final report. Rejected  --   --      Results from last 7 days   Lab Units 10/10/22  1853   ADENOVIRUS DETECTION BY PCR  Not Detected   CORONAVIRUS 229E  Not Detected   CORONAVIRUS HKU1  Not Detected   CORONAVIRUS NL63  Not Detected   CORONAVIRUS OC43  Not Detected   HUMAN METAPNEUMOVIRUS  Not Detected   HUMAN  RHINOVIRUS/ENTEROVIRUS  Not Detected   INFLUENZA B PCR  Not Detected   PARAINFLUENZA 1  Not Detected   PARAINFLUENZA VIRUS 2  Not Detected   PARAINFLUENZA VIRUS 3  Not Detected   PARAINFLUENZA VIRUS 4  Not Detected   BORDETELLA PERTUSSIS PCR  Not Detected   CHLAMYDOPHILA PNEUMONIAE PCR  Not Detected   MYCOPLAMA PNEUMO PCR  Not Detected   INFLUENZA A PCR  Not Detected   RSV, PCR  Not Detected         Results from last 7 days   Lab Units 10/11/22  1155   SODIUM UR mmol/L 73   CREATININE UR mg/dL 150.3   OSMOLALITY UR mOsm/kg 670       Imaging:  Imaging Results (Last 24 Hours)     Procedure Component Value Units Date/Time    CT Chest Without Contrast Diagnostic [[id number]] Collected: 10/11/22 1403     Updated: 10/12/22 1638    Narrative:      CT CHEST WITHOUT CONTRAST     HISTORY: 89-year-old female with pneumonia. Breast cancer history.     TECHNIQUE: Radiation dose reduction techniques were utilized, including  automated exposure control and exposure modulation based on body size.   3 mm images were obtained through the chest without the administration  of IV contrast. Compared with chest CT 09/12/2022.     FINDINGS: There are patchy mixed density airspace consolidations at the  right middle lobe, left upper and lower lobes. There are no pleural or  pericardial effusions. There has been marginal increase in size of some  of the mediastinal nodes with the largest in the precarinal region  measuring 1.6 x 1.6 cm, image 37. Appearance of the left breast is  largely unchanged given differences in technique. There are extensive  thoracic aortic atherosclerotic changes without aneurysmal dilatation.  Mild multinodular goiter is stable. Incidentally noted is  cholelithiasis. 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       Impression:      1. Multifocal pneumonia at the right middle lobe, left upper and lower  lobes.  2. Slight increase in size likely reactive mediastinal nodes.  Reevaluation  is recommended with a chest CT in 3 months.     This report was finalized on 10/12/2022 4:35 PM by Dr. Abbie Armas M.D.                I reviewed the patient's new clinical results / labs / tests / procedures      Assessment/Plan     Active Hospital Problems    Diagnosis  POA   • **Multifocal pneumonia [J18.9]  Yes   • Acute kidney failure (HCC) [N17.9]  Yes   • DNR (do not resuscitate) [Z66]  Yes   • Hyperglycemia [R73.9]  Yes   • Bilateral carotid artery stenosis [I65.23]  Yes   • Stage 3a chronic kidney disease (HCC) [N18.31]  Yes   • Pulmonary hypertension (HCC) [I27.20]  Yes   • RICH on CPAP [G47.33, Z99.89]  Not Applicable   • Hyperlipidemia [E78.5]  Yes   • Essential hypertension [I10]  Yes      Resolved Hospital Problems   No resolved problems to display.           · Bilateral multifocal community-acquired pneumonia.  CT scan of the chest without contrast and chest x-ray are noted.  No obvious masses.  Resolved leukocytosis.    Normal procalcitonin and lactic acid.  Respiratory PCR panel is negative.  Urine Legionella and Streptococcus antigen are negative.  Sputum and blood cultures are negative till now.  On Rocephin.  Still requiring oxygen.  Continue incentive spirometry and Mucinex and as needed DuoNebs.  Will do up walking pulse ox to evaluate for oxygen need at home.  Positive temperature spike within the last 24 hours.  · Acute on top of stage IIIa chronic renal failure.  Acute mostly secondary to prerenal azotemia because of hypovolemia.  This has resolved with IV fluid and holding Benicar.  IV fluid DC'd and Benicar resumed yesterday.    Renal functions better than baseline.  Baseline creatinine is 1.08.  · Hypertension/mild aortic stenosis/chronic diastolic cardiac dysfunction/hypertrophic cardiomyopathy/pulmonary hypertension.  There is no evidence of angina or congestive heart failure.  Good blood pressure control in normal sinus rhythm.  continue Norvasc/aspirin/Coreg/Benicar.     · History of  obstructive sleep apnea on CPAP.  · History of hyperlipidemia and bilateral carotid stenosis.  Continue aspirin and pravastatin.  · History of hyperparathyroidism.  Calcium is normal.  · Mild iron deficiency anemia.  Hemoglobin stable.  GI work-up as an outpatient.  · Hyperglycemia.  Resolved  · VTE prophylaxis with Lovenox.  · DNR status.  Patient has a living well and wishes of a DO NOT RESUSCITATE and this will be    Discussed my findings and plan of treatment with the patient  Disposition.  Hopefully home tomorrow after determination of need of home oxygen and if no more temperature spikes and cultures remain negative.      Cristofer Peters MD  [facility]  10/13/22  10:28 EDT

## 2022-10-14 ENCOUNTER — TELEPHONE (OUTPATIENT)
Dept: INTERNAL MEDICINE | Facility: CLINIC | Age: 87
End: 2022-10-14

## 2022-10-14 ENCOUNTER — READMISSION MANAGEMENT (OUTPATIENT)
Dept: CALL CENTER | Facility: HOSPITAL | Age: 87
End: 2022-10-14

## 2022-10-14 VITALS
OXYGEN SATURATION: 94 % | RESPIRATION RATE: 16 BRPM | SYSTOLIC BLOOD PRESSURE: 171 MMHG | HEART RATE: 76 BPM | HEIGHT: 59 IN | WEIGHT: 130 LBS | DIASTOLIC BLOOD PRESSURE: 69 MMHG | BODY MASS INDEX: 26.21 KG/M2 | TEMPERATURE: 97.9 F

## 2022-10-14 PROBLEM — R73.9 HYPERGLYCEMIA: Status: RESOLVED | Noted: 2022-10-11 | Resolved: 2022-10-14

## 2022-10-14 PROBLEM — N17.9 ACUTE KIDNEY FAILURE: Status: RESOLVED | Noted: 2022-10-11 | Resolved: 2022-10-14

## 2022-10-14 LAB
ANION GAP SERPL CALCULATED.3IONS-SCNC: 9 MMOL/L (ref 5–15)
BASOPHILS # BLD AUTO: 0.04 10*3/MM3 (ref 0–0.2)
BASOPHILS NFR BLD AUTO: 0.4 % (ref 0–1.5)
BUN SERPL-MCNC: 14 MG/DL (ref 8–23)
BUN/CREAT SERPL: 16.7 (ref 7–25)
CALCIUM SPEC-SCNC: 8.4 MG/DL (ref 8.6–10.5)
CHLORIDE SERPL-SCNC: 107 MMOL/L (ref 98–107)
CO2 SERPL-SCNC: 22 MMOL/L (ref 22–29)
CREAT SERPL-MCNC: 0.84 MG/DL (ref 0.57–1)
DEPRECATED RDW RBC AUTO: 39.2 FL (ref 37–54)
EGFRCR SERPLBLD CKD-EPI 2021: 66.5 ML/MIN/1.73
EOSINOPHIL # BLD AUTO: 0.14 10*3/MM3 (ref 0–0.4)
EOSINOPHIL NFR BLD AUTO: 1.3 % (ref 0.3–6.2)
ERYTHROCYTE [DISTWIDTH] IN BLOOD BY AUTOMATED COUNT: 12.4 % (ref 12.3–15.4)
GLUCOSE SERPL-MCNC: 78 MG/DL (ref 65–99)
HCT VFR BLD AUTO: 30.5 % (ref 34–46.6)
HGB BLD-MCNC: 10 G/DL (ref 12–15.9)
IMM GRANULOCYTES # BLD AUTO: 0.24 10*3/MM3 (ref 0–0.05)
IMM GRANULOCYTES NFR BLD AUTO: 2.2 % (ref 0–0.5)
LYMPHOCYTES # BLD AUTO: 1.07 10*3/MM3 (ref 0.7–3.1)
LYMPHOCYTES NFR BLD AUTO: 9.8 % (ref 19.6–45.3)
MCH RBC QN AUTO: 28.8 PG (ref 26.6–33)
MCHC RBC AUTO-ENTMCNC: 32.8 G/DL (ref 31.5–35.7)
MCV RBC AUTO: 87.9 FL (ref 79–97)
MONOCYTES # BLD AUTO: 1.33 10*3/MM3 (ref 0.1–0.9)
MONOCYTES NFR BLD AUTO: 12.2 % (ref 5–12)
NEUTROPHILS NFR BLD AUTO: 74.1 % (ref 42.7–76)
NEUTROPHILS NFR BLD AUTO: 8.08 10*3/MM3 (ref 1.7–7)
NRBC BLD AUTO-RTO: 0 /100 WBC (ref 0–0.2)
PLATELET # BLD AUTO: 184 10*3/MM3 (ref 140–450)
PMV BLD AUTO: 11.2 FL (ref 6–12)
POTASSIUM SERPL-SCNC: 3.8 MMOL/L (ref 3.5–5.2)
RBC # BLD AUTO: 3.47 10*6/MM3 (ref 3.77–5.28)
SODIUM SERPL-SCNC: 138 MMOL/L (ref 136–145)
WBC NRBC COR # BLD: 10.9 10*3/MM3 (ref 3.4–10.8)

## 2022-10-14 PROCEDURE — 85025 COMPLETE CBC W/AUTO DIFF WBC: CPT | Performed by: INTERNAL MEDICINE

## 2022-10-14 PROCEDURE — 36415 COLL VENOUS BLD VENIPUNCTURE: CPT | Performed by: INTERNAL MEDICINE

## 2022-10-14 PROCEDURE — 80048 BASIC METABOLIC PNL TOTAL CA: CPT | Performed by: INTERNAL MEDICINE

## 2022-10-14 PROCEDURE — 25010000002 ENOXAPARIN PER 10 MG: Performed by: INTERNAL MEDICINE

## 2022-10-14 RX ORDER — GUAIFENESIN 600 MG/1
600 TABLET, EXTENDED RELEASE ORAL EVERY 12 HOURS SCHEDULED
Qty: 60 TABLET | Refills: 0 | Status: SHIPPED | OUTPATIENT
Start: 2022-10-14 | End: 2023-01-04

## 2022-10-14 RX ORDER — LEVOFLOXACIN 250 MG/1
250 TABLET ORAL DAILY
Qty: 6 TABLET | Refills: 0 | Status: SHIPPED | OUTPATIENT
Start: 2022-10-14 | End: 2022-10-19 | Stop reason: SDUPTHER

## 2022-10-14 RX ORDER — CARVEDILOL 12.5 MG/1
12.5 TABLET ORAL EVERY 12 HOURS SCHEDULED
Qty: 60 TABLET | Refills: 3 | Status: SHIPPED | OUTPATIENT
Start: 2022-10-14

## 2022-10-14 RX ADMIN — ASPIRIN 81 MG: 81 TABLET, FILM COATED ORAL at 09:09

## 2022-10-14 RX ADMIN — LOSARTAN POTASSIUM 50 MG: 50 TABLET, FILM COATED ORAL at 09:10

## 2022-10-14 RX ADMIN — GUAIFENESIN 600 MG: 600 TABLET, EXTENDED RELEASE ORAL at 09:10

## 2022-10-14 RX ADMIN — CARVEDILOL 12.5 MG: 12.5 TABLET, FILM COATED ORAL at 09:10

## 2022-10-14 RX ADMIN — TAMOXIFEN CITRATE 20 MG: 10 TABLET, FILM COATED ORAL at 09:09

## 2022-10-14 RX ADMIN — PRAVASTATIN SODIUM 40 MG: 40 TABLET ORAL at 09:10

## 2022-10-14 RX ADMIN — ENOXAPARIN SODIUM 30 MG: 30 INJECTION SUBCUTANEOUS at 09:10

## 2022-10-14 RX ADMIN — AMLODIPINE BESYLATE 5 MG: 5 TABLET ORAL at 09:10

## 2022-10-14 NOTE — PLAN OF CARE
Goal Outcome Evaluation:              Outcome Evaluation: Patient discharged to home with family, discharge instructions given, needs attended. VSS, afebrile, stable condition.

## 2022-10-14 NOTE — DISCHARGE SUMMARY
Patient Name: Neha Guillory  : 1933  MRN: [id number]    Date of Admission: 10/10/2022  Date of Discharge:  10/14/2022  Primary Care Physician: Tracy Steiner APRN      Discharge Diagnoses     Active Hospital Problems    Diagnosis  POA   • **Multifocal pneumonia [J18.9]  Yes   • Iron deficiency anemia [D50.9]  No   • DNR (do not resuscitate) [Z66]  Yes   • Bilateral carotid artery stenosis [I65.23]  Yes   • Stage 3a chronic kidney disease (HCC) [N18.31]  Yes   • Pulmonary hypertension (HCC) [I27.20]  Yes   • RICH on CPAP [G47.33, Z99.89]  Not Applicable   • Hyperlipidemia [E78.5]  Yes   • Essential hypertension [I10]  Yes      Resolved Hospital Problems    Diagnosis Date Resolved POA   • Acute kidney failure (HCC) [N17.9] 10/14/2022 Yes   • Hyperglycemia [R73.9] 10/14/2022 Yes        Hospital Course     Brief admission history and physical.  Please refer to the H&P for full details.  A pleasant 89 years old white female with a past history of hypertension/dyslipidemia/pulmonary hypertension/obstructive sleep apnea on CPAP/stage IIIa chronic renal failure/breast cancer/carotid peripheral vascular disease who presents to the emergency department with congestion and sore throat and cough associated with fever and production of green sputum after have failed doxycycline from an urgent care.  Positive vomiting.  No other significant symptomatology.  Physical examination on admission included a temperature of 99.2 a pulse of 85 respiratory rate of 16 and blood pressure 159/81 and O2 sats of 98% on room air the rest of the examination is remarkable for decreased hearing/decreased bilateral air entry.  Hospital course.  Initial ER evaluation included a CMP that was normal except a glucose of 110, BUN 24, creatinine 1.15, GFR of 45.6.  CBC was normal except a white count of 16.7 and hemoglobin 11.8.  Procalcitonin and lactic acid are normal.  Respiratory PCR panel is negative.  Legionella antigen was negative.   Chest x-ray with multilobar infiltrates bilaterally.  Patient was admitted with pneumonia and failure of outpatient treatment.  The CT scan of the chest without contrast revealed bilateral multifocal pneumonia but no masses.  She was empirically started on Rocephin.  Procalcitonin and lactic acid were normal.  Respiratory PCR panel was normal.  The urine Legionella antigen was negative.  Sputum culture was negative.  Streptococcus pneumonia antigen was negative.  Blood cultures were negative by the time of discharge.  She was also started on Mucinex and bronchodilators.  Her respiratory status improved and her leukocytosis has resolved together with improvement of her temperature curve.  Walking pulse ox indicated that the patient does not need oxygen.  However she continues to drop her oxygen at night because of her obstructive sleep apnea and she was counseled to resume her CPAP machine at home.  She was switched to p.o. Levaquin to complete a total of 10 days of antibiotic at the time of discharge.  She will have to have a repeat CAT scan of her chest in about 1 month.  She was noted to have an acute on top of stage IIIa chronic renal failure during this admission.  Acute component was thought to be secondary to prerenal azotemia because of hypovolemia and IV fluid has been initiated and Benicar was stopped and this has resolved her kidney function is actually better than her baseline at grade 2 renal failure at discharge.  Benicar was resumed.  Her IV fluid was DC'd and she remained euvolemic at discharge.  She does have a history of hypertension/chronic diastolic congestive heart failure/hypertrophic cardiomyopathy/pulmonary hypertension/mild aortic stenosis.  This remained stable during this admission with no evidence of angina or congestive heart failure with good blood pressure control on Norvasc/aspirin/Coreg/Benicar.  She has a history of hyperlipidemia and bilateral carotid stenosis that remained stable  with negative CNS examination during this admission while on aspirin and statins.  History of hyperparathyroidism calcium was normal during this admission.  Noted to have anemia anemia work-up confirmed anemia of chronic disease.  I will leave it up to the family physician to see if the patient requires GI work-up as an outpatient.  Noted to have hyperglycemia that has spontaneously resolved.  She remained a DO NOT RESUSCITATE during her admission based on previous wishes and current wishes.  At the time of discharge she was hemodynamically stable had low-grade fever.  Consultants     Consult Orders (all) (From admission, onward)     Start     Ordered    10/10/22 2001  [facility] (on-call MD unless specified) Details  Once        Specialty:  Hospitalist  Provider:  (Not yet assigned)    10/10/22 2000              Procedures     Imaging Results (All)     Procedure Component Value Units Date/Time    CT Chest Without Contrast Diagnostic [[id number]] Collected: 10/11/22 1403     Updated: 10/12/22 1638    Narrative:      CT CHEST WITHOUT CONTRAST     HISTORY: 89-year-old female with pneumonia. Breast cancer history.     TECHNIQUE: Radiation dose reduction techniques were utilized, including  automated exposure control and exposure modulation based on body size.   3 mm images were obtained through the chest without the administration  of IV contrast. Compared with chest CT 09/12/2022.     FINDINGS: There are patchy mixed density airspace consolidations at the  right middle lobe, left upper and lower lobes. There are no pleural or  pericardial effusions. There has been marginal increase in size of some  of the mediastinal nodes with the largest in the precarinal region  measuring 1.6 x 1.6 cm, image 37. Appearance of the left breast is  largely unchanged given differences in technique. There are extensive  thoracic aortic atherosclerotic changes without aneurysmal dilatation.  Mild multinodular goiter is stable. Incidentally noted  is  cholelithiasis. 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       Impression:      1. Multifocal pneumonia at the right middle lobe, left upper and lower  lobes.  2. Slight increase in size likely reactive mediastinal nodes.  Reevaluation is recommended with a chest CT in 3 months.     This report was finalized on 10/12/2022 4:35 PM by Dr. Abbie Armas M.D.       XR Chest 2 View [[id number]] Collected: 10/10/22 1654     Updated: 10/10/22 1659    Narrative:      CHEST: 2 VIEWS     HISTORY: Cough, sore throat.     COMPARISON: CT chest 09/12/2022, 2 view chest 04/05/2022.     FINDINGS:There has developed abnormal hazy bilateral lower lobe  pulmonary opacities, more extensive on the left and these are suspicious  for infiltrates and project over the lingula and right middle lobe on  the lateral view. Lungs are hyperexpanded and there is flattening of the  hemidiaphragms. Cardiomediastinal silhouette is not changed. Within the  left lung apex superimposing the left posterior 4th rib there is a 4 mm  nodular opacity that is without radiographic change when compared with  exam 01/03/2022, though is without clear corresponding CT abnormality.  Aortic vascular calcifications are present. 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 There  is a scoliotic curvature of the thoracolumbar spine.       Impression:      Since the chest CT 09/12/2022, there has developed abnormal  airspace opacifications within the lingula and right middle lobe that  appears most extensive within the anterior-inferior left upper  lobe/lingula. These opacities are consistent with multilobar infiltrates  in the proper clinical setting.     This report was finalized on 10/10/2022 4:56 PM by Dr. Misael Turk M.D.             Pertinent Labs     Results from last 7 days   Lab Units 10/14/22  0436 10/13/22  0435 10/12/22  0444 10/10/22  1633   WBC 10*3/mm3 10.90* 11.69*  10.62 16.74*   HEMOGLOBIN g/dL 10.0* 10.2* 10.0* 11.8*   PLATELETS 10*3/mm3 184 190 164 206     Results from last 7 days   Lab Units 10/14/22  0436 10/13/22  0435 10/12/22  0444 10/10/22  1633   SODIUM mmol/L 138 139 142 138   POTASSIUM mmol/L 3.8 3.8 3.7 4.2   CHLORIDE mmol/L 107 107 111* 102   CO2 mmol/L 22.0 22.0 22.0 25.0   BUN mg/dL 14 13 14 24*   CREATININE mg/dL 0.84 0.77 0.81 1.15*   GLUCOSE mg/dL 78 83 96 110*   Estimated Creatinine Clearance: 37.9 mL/min (by C-G formula based on SCr of 0.84 mg/dL).  Results from last 7 days   Lab Units 10/10/22  1633   ALBUMIN g/dL 3.70   BILIRUBIN mg/dL 0.4   ALK PHOS U/L 58   AST (SGOT) U/L 17   ALT (SGPT) U/L 15     Results from last 7 days   Lab Units 10/14/22  0436 10/13/22  0435 10/12/22  0444 10/10/22  1633   CALCIUM mg/dL 8.4* 8.4* 7.8* 9.0   ALBUMIN g/dL  --   --   --  3.70         Results from last 7 days   Lab Units 10/11/22  1155   SODIUM UR mmol/L 73   CREATININE UR mg/dL 150.3   OSMOLALITY UR mOsm/kg 670         Invalid input(s): LDLCALC  Results from last 7 days   Lab Units 10/11/22  1429 10/11/22  0736 10/10/22  1853 10/10/22  1850   BLOODCX   --   --  No growth at 3 days No growth at 3 days   RESPCX  Rare Normal respiratory jil. No S. aureus or Pseudomonas aeruginosa detected. Final report. Rejected  --   --      Imaging Results (Last 24 Hours)     ** No results found for the last 24 hours. **          Test Results Pending at Discharge     Pending Labs     Order Current Status    Blood Culture - Blood, Arm, Left Preliminary result    Blood Culture - Blood, Arm, Left Preliminary result            Discharge Exam   Physical Exam  Vitals.  Temperature 99.4 a pulse of 76 respiratory rate of 16 and blood pressure 171/69 and O2 sats of 94% on room air.  General.  Elderly female.  Alert and oriented x3.  No apparent pain/distress/diaphoresis.  Normal mood and affect.  Eyes.  Status post bilateral cataract surgery.  Pupils equal round and reactive.  Intact  extraocular musculature.  No pallor or jaundice.    Oral cavity.  Moist mucous membrane with no lesions  Cardiovascular.  Regular rate and rhythm and grade 2 systolic murmur.  Chest.  Poor bilateral air entry with  no added sounds.    Abdomen.  Soft lax.  No tenderness.  No organomegaly.  No guarding or rebound.  Extremities.  No clubbing/edema or cyanosis.    CNS.  No acute focal neurological deficits.    Discharge Details        Discharge Medications      New Medications      Instructions Start Date   guaiFENesin 600 MG 12 hr tablet  Commonly known as: MUCINEX   600 mg, Oral, Every 12 Hours Scheduled      levoFLOXacin 250 MG tablet  Commonly known as: Levaquin   250 mg, Oral, Daily         Changes to Medications      Instructions Start Date   carvedilol 12.5 MG tablet  Commonly known as: COREG  What changed:   · medication strength  · how much to take  · when to take this   12.5 mg, Oral, Every 12 Hours Scheduled         Continue These Medications      Instructions Start Date   amLODIPine 5 MG tablet  Commonly known as: NORVASC   5 mg, Oral, Daily      aspirin 81 MG EC tablet   81 mg, Oral, Daily      cholecalciferol 25 MCG (1000 UT) tablet  Commonly known as: VITAMIN D3   1,000 Units, Oral, Daily      olmesartan 20 MG tablet  Commonly known as: BENICAR   20 mg, Oral, Daily      pravastatin 40 MG tablet  Commonly known as: PRAVACHOL   40 mg, Oral, Daily      PROBIOTIC ACIDOPHILUS PO   1 tablet, Oral, Daily      tamoxifen 20 MG chemo tablet  Commonly known as: NOLVADEX   20 mg, Oral, Daily      vitamin b complex capsule capsule   1 capsule, Oral, Daily      vitamin C 250 MG tablet  Commonly known as: ASCORBIC ACID   1,000 mg, Oral, Nightly      Zinc 50 MG capsule   50 mg, Oral, Daily             Allergies   Allergen Reactions   • Amoxicillin Hives   • Atorvastatin Myalgia         Discharge Disposition:  Condition: Stable    Diet:   Diet Order   Procedures   • Diet Regular; Cardiac       Activity:   Activity  Instructions     Activity as Tolerated            Counseling :    CODE STATUS:    Code Status and Medical Interventions:   Ordered at: 10/11/22 1026     Medical Intervention Limits:    NO intubation (DNI)    NO cardioversion     Level Of Support Discussed With:    Patient     Code Status (Patient has no pulse and is not breathing):    No CPR (Do Not Attempt to Resuscitate)     Medical Interventions (Patient has pulse or is breathing):    Limited Support     Release to patient:    Routine Release       Future Appointments   Date Time Provider Department Center   10/24/2022 12:00 PM [facility] 1  [facility]   11/14/2022 11:15 AM Tracy Steiner APRN [facility]  [facility]   12/29/2022 12:40 PM LAB CHAIR 1 [facility]  [facility]   12/29/2022  1:20 PM Ricky Dill, MD [facility]     Additional Instructions for the Follow-ups that You Need to Schedule     Call MD With Problems / Concerns   As directed      Instructions: Call MD or return to ER if chest pain/shortness of breath/palpitations/fever or chills/hemoptysis/wheezing    Order Comments: Instructions: Call MD or return to ER if chest pain/shortness of breath/palpitations/fever or chills/hemoptysis/wheezing          Discharge Follow-up with PCP   As directed       Currently Documented PCP:    Tracy Steiner APRN    PCP Phone Number:    [phone]     Follow Up Details: Primary MD.  1 week.  Multifocal bilateral pneumonia/carotid peripheral vascular disease/hyperlipidemia/hypertension/obstructive sleep apnea/pulmonary hypertension/stage III chronic renal failure/iron deficiency anemia/history of breast cancer         Discharge Follow-up with Specified Provider: Cardiology.  As scheduled.   As directed      To: Cardiology.  As scheduled.    Follow Up Details: Hypertension/pulmonary hypertension            Follow-up Information     Tracy Steiner APRN .    Specialties: Nurse Practitioner, Family Medicine  Why: Primary MD.  1 week.   Multifocal bilateral pneumonia/carotid peripheral vascular disease/hyperlipidemia/hypertension/obstructive sleep apnea/pulmonary hypertension/stage III chronic renal failure/iron deficiency anemia/history of breast cancer  Contact information:  [address] [address] [phone]                           Time Spent on Discharge:  Greater than 30 minutes      Cristofer Peters MD  [facility]  10/14/22  08:15 EDT

## 2022-10-14 NOTE — PLAN OF CARE
Goal Outcome Evaluation:  Plan of Care Reviewed With: patient        Progress: improving  Outcome Evaluation: VSS, RA during day,attempted to wean 02 during night, still needing 2 L to maintain 90% or higher. walking oximetry done, temp max 100. anticipating home today

## 2022-10-14 NOTE — PLAN OF CARE
Goal Outcome Evaluation:   PT is alert and O x 4. Pleasant and cooperative. VSS. No elevated temps this shift. On room air w/o difficulty. Will continue to monitor.

## 2022-10-14 NOTE — TELEPHONE ENCOUNTER
Hub staff attempted to follow warm transfer process and was unsuccessful     Caller: Monica Hernández    Relationship to patient: Emergency Contact    Best call back number: [phone]     Patient is needing:     PATIENT NEEDS A HOSPITAL FOLLOW UP.  SHE WAS DISCHARGED TODAY TO BE SEEN WITH IN A WEEK.  NO APPOINTMENTS AVAILABLE.        PLEASE CALL AND ADVISE

## 2022-10-14 NOTE — OUTREACH NOTE
Prep Survey    Flowsheet Row Responses   [facility] patient discharged from? [place]   Is LACE score < 7 ? No   Emergency Room discharge w/ pulse ox? No   Eligibility [facility]   Date of Admission 10/10/22   Date of Discharge 10/14/22   Discharge Disposition Home or Self Care   Discharge diagnosis Multifocal pneumonia    Does the patient have one of the following disease processes/diagnoses(primary or secondary)? Pneumonia   Does the patient have Home health ordered? No   Is there a DME ordered? No   Prep survey completed? Yes          FRITZ WARNER - Registered Nurse

## 2022-10-15 LAB
BACTERIA SPEC AEROBE CULT: NORMAL
BACTERIA SPEC AEROBE CULT: NORMAL

## 2022-10-17 ENCOUNTER — TRANSITIONAL CARE MANAGEMENT TELEPHONE ENCOUNTER (OUTPATIENT)
Dept: CALL CENTER | Facility: HOSPITAL | Age: 87
End: 2022-10-17

## 2022-10-17 NOTE — OUTREACH NOTE
Call Center TCM Note    Flowsheet Row Responses   [facility] patient discharged from? [place]   Does the patient have one of the following disease processes/diagnoses(primary or secondary)? Pneumonia   TCM attempt successful? Yes  [VR listing DTR on file]   Call start time 1308   Call end time 1310   Discharge diagnosis Multifocal pneumonia    Is patient permission given to speak with other caregiver? Yes   List who call center can speak with dtr   Person spoke with today (if not patient) and relationship dtr   Meds reviewed with patient/caregiver? Yes   Is the patient having any side effects they believe may be caused by any medication additions or changes? No   Does the patient have all medications ordered at discharge? Yes   Is the patient taking all medications as directed (includes completed medication regime)? Yes   Comments TCM f/u appt 10/19/2022 11:30 AM    DME comments CPAP at night.   Pulse Ox monitoring Intermittent   Pulse Ox device source Patient   O2 Sat comments RA 92-98%   O2 Sat: education provided Sat levels, Monitoring frequency, When to seek care   Psychosocial issues? No   Comments Infrequent dry cough. Appetite WNL. Much improved.   Is the patient/caregiver able to teach back signs and symptoms of worsening condition: Fever/chills, Shortness of breath   Is the patient/caregiver able to teach back importance of completing antibiotic course of treatment? Yes   TCM call completed? Yes          Eri Gray RN    10/17/2022, 13:10 EDT

## 2022-10-17 NOTE — CASE MANAGEMENT/SOCIAL WORK
Case Management Discharge Note      Final Note: Home.         Selected Continued Care - Discharged on 10/14/2022 Admission date: 10/10/2022 - Discharge disposition: Home or Self Care    Destination    No services have been selected for the patient.              Durable Medical Equipment    No services have been selected for the patient.              Dialysis/Infusion    No services have been selected for the patient.              Home Medical Care    No services have been selected for the patient.              Therapy    No services have been selected for the patient.              Community Resources    No services have been selected for the patient.              Community & DME    No services have been selected for the patient.                       Final Discharge Disposition Code: 01 - home or self-care

## 2022-10-19 ENCOUNTER — OFFICE VISIT (OUTPATIENT)
Dept: INTERNAL MEDICINE | Facility: CLINIC | Age: 87
End: 2022-10-19

## 2022-10-19 VITALS
OXYGEN SATURATION: 95 % | BODY MASS INDEX: 25.8 KG/M2 | SYSTOLIC BLOOD PRESSURE: 130 MMHG | DIASTOLIC BLOOD PRESSURE: 70 MMHG | WEIGHT: 128 LBS | TEMPERATURE: 98 F | HEIGHT: 59 IN | HEART RATE: 72 BPM

## 2022-10-19 DIAGNOSIS — J18.9 MULTIFOCAL PNEUMONIA: ICD-10-CM

## 2022-10-19 DIAGNOSIS — J02.9 SORE THROAT: ICD-10-CM

## 2022-10-19 DIAGNOSIS — Z09 HOSPITAL DISCHARGE FOLLOW-UP: Primary | ICD-10-CM

## 2022-10-19 PROCEDURE — 99495 TRANSJ CARE MGMT MOD F2F 14D: CPT | Performed by: NURSE PRACTITIONER

## 2022-10-19 PROCEDURE — 1111F DSCHRG MED/CURRENT MED MERGE: CPT | Performed by: NURSE PRACTITIONER

## 2022-10-19 RX ORDER — LEVOFLOXACIN 250 MG/1
250 TABLET ORAL DAILY
Qty: 4 TABLET | Refills: 0 | Status: SHIPPED | OUTPATIENT
Start: 2022-10-19 | End: 2023-01-04

## 2022-10-19 NOTE — PROGRESS NOTES
Transitional Care Follow Up Visit  Subjective     Neha Guillory is a 89 y.o. female who presents for a transitional care management visit.    Within 48 business hours after discharge our office contacted her via telephone to coordinate her care and needs.      I reviewed and discussed the details of that call along with the discharge summary, hospital problems, inpatient lab results, inpatient diagnostic studies, and consultation reports with Neha.     Current outpatient and discharge medications have been reconciled for the patient.  Reviewed by: ENZO Larry      Date of TCM Phone Call 10/14/2022   [facility]   Date of Admission 10/10/2022   Date of Discharge 10/14/2022   Discharge Disposition Home or Self Care     Risk for Readmission (LACE) Score: 13 (10/14/2022  6:00 AM)      History of Present Illness  Patient presents for [facility] follow-up.  This is an 89-year-old female who was admitted at [facility] with multifocal pneumonia 10/10/2022 through 10/14/2022.  Blood cultures came back negative.  She was treated with bronchodilators, Mucinex and IV antibiotics.  She was transition to oral antibiotics and per the discharge summary note, she was to complete 10 days of Levaquin at discharge but only 6 days were sent to the pharmacy.  She has 1 day left of Levaquin at this point, 250 mg.    Her white blood cell count was still elevated at discharge.  She had an FOBT at the hospital which was negative.    Reports that she is feeling better, still a bit weak and having a bit of a sore throat from coughing but overall the cough has improved as well as overall symptoms.  She is taking Mucinex as needed as well.    Denies development of other new issues today.     Course During Hospital Stay: See HPI.     The following portions of the patient's history were reviewed and updated as appropriate: allergies, current medications, past family history, past medical history,  "past social history, past surgical history and problem list.    Review of Systems   HENT: Positive for sore throat.    Respiratory: Positive for cough.    All other systems reviewed and are negative.      Objective    /70 (BP Location: Right arm, Patient Position: Sitting, Cuff Size: Adult)   Pulse 72   Temp 98 °F (36.7 °C) (Infrared)   Ht 149.9 cm (59\")   Wt 58.1 kg (128 lb)   SpO2 95%   BMI 25.85 kg/m²     Physical Exam  Vitals and nursing note reviewed.   Constitutional:       Appearance: Normal appearance. She is well-developed.   HENT:      Head: Normocephalic and atraumatic.      Right Ear: External ear normal.      Left Ear: External ear normal.   Eyes:      Pupils: Pupils are equal, round, and reactive to light.   Cardiovascular:      Rate and Rhythm: Normal rate and regular rhythm.      Pulses: Normal pulses.      Heart sounds: Normal heart sounds.      Comments: No peripheral edema  Pulmonary:      Effort: Pulmonary effort is normal. No respiratory distress.      Breath sounds: Normal breath sounds. No stridor. No wheezing, rhonchi or rales.   Chest:      Chest wall: No tenderness.   Abdominal:      General: Bowel sounds are normal. There is no distension.      Palpations: Abdomen is soft.      Tenderness: There is no abdominal tenderness.   Musculoskeletal:         General: Normal range of motion.      Cervical back: Normal range of motion and neck supple.   Skin:     General: Skin is warm and dry.      Capillary Refill: Capillary refill takes less than 2 seconds.   Neurological:      General: No focal deficit present.      Mental Status: She is alert and oriented to person, place, and time. Mental status is at baseline.   Psychiatric:         Mood and Affect: Mood normal.         Behavior: Behavior normal.         Thought Content: Thought content normal.         Judgment: Judgment normal.         Assessment & Plan   Diagnoses and all orders for this visit:    1. Hospital discharge follow-up " (Primary)  -     phenol (CHLORASEPTIC) 1.4 % liquid liquid; Apply 1 spray to the mouth or throat Every 2 (Two) Hours As Needed (sore throat).  Dispense: 118 mL; Refill: 0    2. Multifocal pneumonia  -     levoFLOXacin (Levaquin) 250 MG tablet; Take 1 tablet by mouth Daily.  Dispense: 4 tablet; Refill: 0  -     CBC & Differential    3. Sore throat  -     phenol (CHLORASEPTIC) 1.4 % liquid liquid; Apply 1 spray to the mouth or throat Every 2 (Two) Hours As Needed (sore throat).  Dispense: 118 mL; Refill: 0    She has a repeat chest CT ordered, I have asked her to get this pushed up with scheduling to November instead of December so that we can reevaluate to ensure  complete resolution of the pneumonia.    Repeat CBC today to ensure leukocytosis trending in the right direction.    Her throat is a bit raw from coughing and I have sent in Chloraseptic throat spray to be used every 2 hours as needed sparingly.  Also encouraged warm liquids/hot tea.    I will extend her Levaquin out to a full 10 days per the discharge summary notes as only 6 days were sent to the pharmacy at discharge.  I have sent in an additional 4 days for total of 10 days of therapy outpatient p.o.    We will contact patient with results and further recommendations.  Follow-up as needed and at next scheduled office visit.

## 2022-10-20 LAB
BASOPHILS # BLD AUTO: 0.02 10*3/MM3 (ref 0–0.2)
BASOPHILS NFR BLD AUTO: 0.4 % (ref 0–1.5)
EOSINOPHIL # BLD AUTO: 0.01 10*3/MM3 (ref 0–0.4)
EOSINOPHIL NFR BLD AUTO: 0.2 % (ref 0.3–6.2)
ERYTHROCYTE [DISTWIDTH] IN BLOOD BY AUTOMATED COUNT: 12.5 % (ref 12.3–15.4)
HCT VFR BLD AUTO: 35.7 % (ref 34–46.6)
HGB BLD-MCNC: 11.8 G/DL (ref 12–15.9)
IMM GRANULOCYTES # BLD AUTO: 0.2 10*3/MM3 (ref 0–0.05)
IMM GRANULOCYTES NFR BLD AUTO: 3.6 % (ref 0–0.5)
LYMPHOCYTES # BLD AUTO: 0.93 10*3/MM3 (ref 0.7–3.1)
LYMPHOCYTES NFR BLD AUTO: 16.6 % (ref 19.6–45.3)
MCH RBC QN AUTO: 28.3 PG (ref 26.6–33)
MCHC RBC AUTO-ENTMCNC: 33.1 G/DL (ref 31.5–35.7)
MCV RBC AUTO: 85.6 FL (ref 79–97)
MONOCYTES # BLD AUTO: 0.86 10*3/MM3 (ref 0.1–0.9)
MONOCYTES NFR BLD AUTO: 15.4 % (ref 5–12)
NEUTROPHILS # BLD AUTO: 3.57 10*3/MM3 (ref 1.7–7)
NEUTROPHILS NFR BLD AUTO: 63.8 % (ref 42.7–76)
NRBC BLD AUTO-RTO: 0 /100 WBC (ref 0–0.2)
PLATELET # BLD AUTO: 212 10*3/MM3 (ref 140–450)
RBC # BLD AUTO: 4.17 10*6/MM3 (ref 3.77–5.28)
WBC # BLD AUTO: 5.59 10*3/MM3 (ref 3.4–10.8)

## 2022-10-20 NOTE — PROGRESS NOTES
Please call and let Ms. Guillory know that her hemoglobin has improved which is great and her white blood cells have normalized which is a great sign.

## 2022-10-24 ENCOUNTER — HOSPITAL ENCOUNTER (OUTPATIENT)
Dept: MRI IMAGING | Facility: HOSPITAL | Age: 87
Discharge: HOME | End: 2022-10-24
Admitting: ORTHOPAEDIC SURGERY

## 2022-10-24 DIAGNOSIS — M79.89 SOFT TISSUE MASS: ICD-10-CM

## 2022-10-24 PROCEDURE — 73220 MRI UPPR EXTREMITY W/O&W/DYE: CPT

## 2022-10-24 PROCEDURE — A9577 INJ MULTIHANCE: HCPCS | Performed by: ORTHOPAEDIC SURGERY

## 2022-10-24 PROCEDURE — 0 GADOBENATE DIMEGLUMINE 529 MG/ML SOLUTION: Performed by: ORTHOPAEDIC SURGERY

## 2022-10-24 RX ADMIN — GADOBENATE DIMEGLUMINE 12 ML: 529 INJECTION, SOLUTION INTRAVENOUS at 12:49

## 2022-10-25 ENCOUNTER — READMISSION MANAGEMENT (OUTPATIENT)
Dept: CALL CENTER | Facility: HOSPITAL | Age: 87
End: 2022-10-25

## 2022-10-25 NOTE — OUTREACH NOTE
COPD/PN Week 2 Survey    Flowsheet Row Responses   [facility] patient discharged from? [place]   Does the patient have one of the following disease processes/diagnoses(primary or secondary)? Pneumonia   Week 2 attempt successful? Yes   Call start time 1254   Call end time 1303   Is the patient taking all medications as directed (includes completed medication regime)? Yes   Comments regarding appointments Pt has followed up with pcp.   Does the patient have a primary care provider?  Yes   Has the patient kept scheduled appointments due by today? Yes   Comments Pt had MRI on October 26, 2022.   Has home health visited the patient within 72 hours of discharge? N/A   Pulse Ox monitoring Intermittent   What is the patient's perception of their health status since discharge? Same   Nursing Interventions Nurse provided patient education   Week 2 call completed? Yes   Wrap up additional comments Daughter reports pt is declining at this time. Pt had been feeling better until the weekend. Pt has decreased energy and having diffiulty walking. Pt is aware of her decline suddenly. Dagobertought is in contact with pcp office. Daughter will arrange to see pcp with blood work and possible u/a.          YOMAIRA WARNER - Registered Nurse

## 2022-11-02 ENCOUNTER — READMISSION MANAGEMENT (OUTPATIENT)
Dept: CALL CENTER | Facility: HOSPITAL | Age: 87
End: 2022-11-02

## 2022-11-02 NOTE — OUTREACH NOTE
COPD/PN Week 1 Survey    Flowsheet Row Responses   [facility] discharged from? [place]   Does the patient have one of the following disease processes/diagnoses(primary or secondary)? Pneumonia          COLLIN SMYTH - Licensed Nurse

## 2022-11-08 ENCOUNTER — READMISSION MANAGEMENT (OUTPATIENT)
Dept: CALL CENTER | Facility: HOSPITAL | Age: 87
End: 2022-11-08

## 2022-11-08 NOTE — OUTREACH NOTE
COPD/PN Week 4 Survey    Flowsheet Row Responses   [facility] patient discharged from? [place]   Does the patient have one of the following disease processes/diagnoses(primary or secondary)? Pneumonia   Week 4 attempt successful? Yes   Call start time 1328   Call end time 1333   Discharge diagnosis Multifocal pneumonia    Is patient permission given to speak with other caregiver? Yes   List who call center can speak with EdgarMonica Daughter    Person spoke with today (if not patient) and relationship Monica Hernández Daughter    Meds reviewed with patient/caregiver? Yes   Is the patient taking all medications as directed (includes completed medication regime)? Yes   Has the patient kept scheduled appointments due by today? Yes   Is the patient still receiving Home Health Services? N/A   Pulse Ox monitoring Intermittent   Pulse Ox device source Patient   O2 Sat comments Daughter reports sats generally good,  patient with drop into high 80's with exertion, but able to rebound back into 90's with rest.    O2 Sat: education provided Sat levels, Monitoring frequency, When to seek care   Psychosocial issues? No   What is the patient's perception of their health status since discharge? Improving  [Daughter reports patient finally showing some improvement from pneumonia symptoms. ]   Nursing Interventions Nurse provided patient education   If the patient is a current smoker, are they able to teach back resources for cessation? Not a smoker   Is the patient/caregiver able to teach back the hierarchy of who to call/visit for symptoms/problems? PCP, Specialist, Home health nurse, Urgent Care, ED, 911 Yes   Week 4 call completed? Yes   Would the patient like one additional call? No   Graduated Yes   Is the patient interested in additional calls from an ambulatory ?  NOTE:  applies to high risk patients requiring additional follow-up. No   Did the patient feel the follow up calls were helpful during their  recovery period? Yes   Was the number of calls appropriate? Yes          YODIT STOCKTON - Registered Nurse

## 2022-11-14 ENCOUNTER — OFFICE VISIT (OUTPATIENT)
Dept: INTERNAL MEDICINE | Facility: CLINIC | Age: 87
End: 2022-11-14

## 2022-11-14 VITALS
WEIGHT: 129 LBS | OXYGEN SATURATION: 94 % | TEMPERATURE: 98 F | BODY MASS INDEX: 26 KG/M2 | HEART RATE: 63 BPM | SYSTOLIC BLOOD PRESSURE: 150 MMHG | DIASTOLIC BLOOD PRESSURE: 84 MMHG | HEIGHT: 59 IN

## 2022-11-14 DIAGNOSIS — M25.612 DECREASED RANGE OF MOTION OF SHOULDER, LEFT: ICD-10-CM

## 2022-11-14 DIAGNOSIS — M71.319 SYNOVIAL CYST OF SHOULDER: Primary | ICD-10-CM

## 2022-11-14 PROCEDURE — 99213 OFFICE O/P EST LOW 20 MIN: CPT | Performed by: NURSE PRACTITIONER

## 2022-11-14 NOTE — PROGRESS NOTES
"Chief Complaint  Follow-up and MRI    Subjective        Neha Guillory presents to [facility]  History of Present Illness  Patient presents for evaluation/follow-up on a recent MRI of the left brachial plexus/upper extremity that she had performed on 10/24/2022.  This is an 89-year-old female.  The MRI was ordered by Dr. Rasheed Cantrell, orthopedic oncology.  Of note, she does have left-sided breast cancer.    She has a large synovial cyst extending above the AC joint with several loculations and a massive chronic left rotator cuff tear with muscular arthropathy.    She presents for follow-up on these results/recommendations.    Reports that the range of motion is worsening along with discomfort.  She denies any swelling or erythema to the distal portion of her arm.    Denies development of other new issues today.      Objective   Vital Signs:  /84 (BP Location: Left arm, Patient Position: Sitting, Cuff Size: Adult)   Pulse 63   Temp 98 °F (36.7 °C) (Infrared)   Ht 149.9 cm (59\")   Wt 58.5 kg (129 lb)   SpO2 94%   BMI 26.05 kg/m²   Estimated body mass index is 26.05 kg/m² as calculated from the following:    Height as of this encounter: 149.9 cm (59\").    Weight as of this encounter: 58.5 kg (129 lb).          Physical Exam  Constitutional:       General: She is not in acute distress.     Appearance: She is not ill-appearing, toxic-appearing or diaphoretic.   HENT:      Head: Normocephalic.   Cardiovascular:      Pulses: Normal pulses.      Heart sounds: Normal heart sounds.   Pulmonary:      Effort: Pulmonary effort is normal.   Musculoskeletal:      Left shoulder: Deformity and tenderness present. Decreased range of motion.   Neurological:      General: No focal deficit present.      Mental Status: She is alert. Mental status is at baseline.   Psychiatric:         Mood and Affect: Mood normal.         Thought Content: Thought content normal.         Judgment: Judgment normal. "        Result Review :  The following data was reviewed by: ENZO Larry on 11/14/2022:  Common labs    Common Labs 10/13/22 10/13/22 10/14/22 10/14/22 10/19/22    0435 0435 0436 0436    Glucose  83  78    BUN  13  14    Creatinine  0.77  0.84    Sodium  139  138    Potassium  3.8  3.8    Chloride  107  107    Calcium  8.4 (A)  8.4 (A)    WBC 11.69 (A)  10.90 (A)  5.59   Hemoglobin 10.2 (A)  10.0 (A)  11.8 (A)   Hematocrit 31.5 (A)  30.5 (A)  35.7   Platelets 190  184  212   (A) Abnormal value       Comments are available for some flowsheets but are not being displayed.           Data reviewed: Radiologic studies MRI brachial plexus upper extremity left w wo contrast (10/24/2022 1:13 PM)     Current outpatient and discharge medications have been reconciled for the patient.  Reviewed by: ENZO Larry           Assessment and Plan   Diagnoses and all orders for this visit:    1. Synovial cyst of shoulder (Primary)    2. Decreased range of motion of shoulder, left      We reviewed her MRI together.  I have asked that she follow-up with Dr. Cantrell/orthopedic oncology on further recommendations.  She is hoping for possible drainage of the cyst to give her some pain relief and range of motion return.  I have also sent Dr. Cantrell a message to discuss results and recommendations.    Follow-up as needed and routinely at next scheduled office visit.       Follow Up   Return if symptoms worsen or fail to improve, for Next scheduled follow up.  Patient was given instructions and counseling regarding her condition or for health maintenance advice. Please see specific information pulled into the AVS if appropriate.

## 2022-11-15 ENCOUNTER — TELEPHONE (OUTPATIENT)
Dept: INTERNAL MEDICINE | Facility: CLINIC | Age: 87
End: 2022-11-15

## 2022-11-15 DIAGNOSIS — M71.319 SYNOVIAL CYST OF SHOULDER: ICD-10-CM

## 2022-11-15 NOTE — TELEPHONE ENCOUNTER
----- Message from Rasheed Cantrell MD sent at 11/14/2022  8:17 PM EST -----  Regarding: RE: Mobile patient  Thank you for reaching out.  There has been a change in my practice location and she should have received a letter outling this.  I happy to see her again.  Please advise them to call [phone].  If she has difficulty,  let me know.  I'll look out for her appointment.  ----- Message -----  From: Tracy Steiner APRN  Sent: 11/14/2022  11:53 AM EST  To: Rasheed Cantrell MD  Subject: Mobile patient                                   Hi Dr. Cantrell,   I wanted to send a message regarding this mutual patient, thank you for seeing her.     She came to see me today with her daughter to go over her MRI.  It looks like she has a large synovial cyst.    She states that her range of motion is worsening along with discomfort.  She states that you had discussed possibly draining the cyst to give her some relief.  Her daughter says that she has been trying to call to get ahold of your office but has been unable to get a call back; I wondered if you might let me know what your recommendations would be and have your office give her a call to get scheduled for a follow-up with you.    Again thank you so much for your time and care of this mutual patient.    Have a wonderful day,    ENZO Larry

## 2022-12-05 DIAGNOSIS — M71.319 SYNOVIAL CYST OF SHOULDER: Primary | ICD-10-CM

## 2022-12-05 PROCEDURE — 88112 CYTOPATH CELL ENHANCE TECH: CPT | Performed by: ORTHOPAEDIC SURGERY

## 2022-12-05 PROCEDURE — 88305 TISSUE EXAM BY PATHOLOGIST: CPT | Performed by: ORTHOPAEDIC SURGERY

## 2022-12-06 LAB
CYTO UR: NORMAL
LAB AP CASE REPORT: NORMAL
PATH REPORT.FINAL DX SPEC: NORMAL
PATH REPORT.GROSS SPEC: NORMAL

## 2022-12-16 ENCOUNTER — APPOINTMENT (OUTPATIENT)
Dept: CT IMAGING | Facility: HOSPITAL | Age: 87
End: 2022-12-16

## 2022-12-22 ENCOUNTER — TELEPHONE (OUTPATIENT)
Dept: INTERNAL MEDICINE | Facility: CLINIC | Age: 87
End: 2022-12-22

## 2022-12-22 NOTE — TELEPHONE ENCOUNTER
"Patient has been very nauseous on and off for about a week. She is also having issues with her b/p shooting up. Monica is concerned and she just reports that \"something is off\". She is taking patient to the UC to be checked.  "

## 2023-01-04 ENCOUNTER — OFFICE VISIT (OUTPATIENT)
Dept: INTERNAL MEDICINE | Facility: CLINIC | Age: 88
End: 2023-01-04
Payer: MEDICARE

## 2023-01-04 VITALS
SYSTOLIC BLOOD PRESSURE: 114 MMHG | HEART RATE: 72 BPM | HEIGHT: 59 IN | BODY MASS INDEX: 26.33 KG/M2 | TEMPERATURE: 96.9 F | WEIGHT: 130.6 LBS | DIASTOLIC BLOOD PRESSURE: 60 MMHG | OXYGEN SATURATION: 96 %

## 2023-01-04 DIAGNOSIS — I35.0 AORTIC STENOSIS, MILD: ICD-10-CM

## 2023-01-04 DIAGNOSIS — R73.03 PREDIABETES: Chronic | ICD-10-CM

## 2023-01-04 DIAGNOSIS — M81.0 OSTEOPOROSIS, POST-MENOPAUSAL: Chronic | ICD-10-CM

## 2023-01-04 DIAGNOSIS — I10 ESSENTIAL HYPERTENSION: Primary | Chronic | ICD-10-CM

## 2023-01-04 DIAGNOSIS — E78.49 OTHER HYPERLIPIDEMIA: Chronic | ICD-10-CM

## 2023-01-04 DIAGNOSIS — C50.212 CARCINOMA OF UPPER-INNER QUADRANT OF LEFT BREAST IN FEMALE, ESTROGEN RECEPTOR POSITIVE: Chronic | ICD-10-CM

## 2023-01-04 DIAGNOSIS — I42.2 HYPERTROPHIC CARDIOMYOPATHY: Chronic | ICD-10-CM

## 2023-01-04 DIAGNOSIS — Z17.0 CARCINOMA OF UPPER-INNER QUADRANT OF LEFT BREAST IN FEMALE, ESTROGEN RECEPTOR POSITIVE: Chronic | ICD-10-CM

## 2023-01-04 PROCEDURE — 99214 OFFICE O/P EST MOD 30 MIN: CPT | Performed by: NURSE PRACTITIONER

## 2023-01-04 NOTE — PROGRESS NOTES
Chief Complaint  Hypertension (Establish care ) and Dizziness     Subjective:      History of Present Illness {CC  Problem List  Visit  Diagnosis   Encounters  Notes  Medications  Labs  Result Review Imaging  Media :23}     Neha Guillory presents to [facility] for:      This patient was previously with Tracy Steiner NP who is no longer at this practice.    She is new to me and is here to establish care today.  The patient was last seen on: 11/14/2022  Prior records reviewed.   The patient chronically has: hypertension, hyperlipidemia, pulm hypertension, carotid artery stenosis, hypertrophic obstructive cardiomyopathy, mild aortic stenosis, breast ca (ER/NE positive, HER-2 negative), hyperparathyroid.     I have met her before when patient of TERI Steiner:     She is here with her daughter today.   She had had some dizziness, but improved.   No change in vision.     Hypertension: chronic and continues coreg, norvasc, benicar    Hyperlipidemia: continues pravastatin     Breast cancer: continues tamoxifen     Taking several supplements that are redundant and stopped today.     I have reviewed patient's medical history, any new submitted information provided by patient or medical assistant and updated medical record.      Objective:      Physical Exam  Vitals reviewed.   Constitutional:       Appearance: Normal appearance. She is well-developed.   HENT:      Head:      Comments: Wearing mask due to COVID   Neck:      Thyroid: No thyromegaly.   Cardiovascular:      Rate and Rhythm: Normal rate and regular rhythm.      Pulses: Normal pulses.      Heart sounds: Normal heart sounds.   Pulmonary:      Effort: Pulmonary effort is normal.      Breath sounds: Normal breath sounds.      Comments: E/U   Abdominal:      General: Bowel sounds are normal.   Musculoskeletal:      Right lower leg: No edema.      Left lower leg: No edema.   Skin:     General: Skin is warm and dry.       "Capillary Refill: Capillary refill takes 2 to 3 seconds.   Neurological:      Mental Status: She is alert and oriented to person, place, and time.   Psychiatric:         Mood and Affect: Mood normal.         Behavior: Behavior normal. Behavior is cooperative.         Thought Content: Thought content normal.         Judgment: Judgment normal.        Result Review  Data Reviewed:{ Labs  Result Review  Imaging  Med Tab  Media :23}     The following data was reviewed by: Yvan Ruiz III, NP-C on 01/04/2023  Common labs    Common Labs 10/19/22 1/6/23 1/11/23   Creatinine  1.20    WBC 5.59  9.13   Hemoglobin 11.8 (A)  12.1   Hematocrit 35.7  37.3   Platelets 212  170   (A) Abnormal value       Comments are available for some flowsheets but are not being displayed.                  Vital Signs:   /60 (BP Location: Left arm, Patient Position: Sitting, Cuff Size: Adult)   Pulse 72   Temp 96.9 °F (36.1 °C) (Temporal)   Ht 149.9 cm (59\")   Wt 59.2 kg (130 lb 9.6 oz)   SpO2 96%   BMI 26.38 kg/m²         Requested Prescriptions      No prescriptions requested or ordered in this encounter       Routine medications provided by this office will also be refilled via pharmacy request.       Current Outpatient Medications:   •  amLODIPine (NORVASC) 5 MG tablet, Take 5 mg by mouth Daily., Disp: , Rfl:   •  aspirin 81 MG EC tablet, Take 81 mg by mouth Daily., Disp: , Rfl:   •  B Complex Vitamins (vitamin b complex) capsule capsule, Take 1 capsule by mouth Daily., Disp: , Rfl:   •  carvedilol (COREG) 12.5 MG tablet, Take 1 tablet by mouth Every 12 (Twelve) Hours., Disp: 60 tablet, Rfl: 3  •  cholecalciferol (VITAMIN D3) 1000 units tablet, Take 1,000 Units by mouth Daily., Disp: , Rfl:   •  Lactobacillus (PROBIOTIC ACIDOPHILUS PO), Take 1 tablet by mouth Daily., Disp: , Rfl:   •  olmesartan (BENICAR) 20 MG tablet, Take 1 tablet by mouth Daily., Disp: 90 tablet, Rfl: 3  •  pravastatin (PRAVACHOL) 40 MG " tablet, Take 1 tablet by mouth Daily., Disp: 90 tablet, Rfl: 1  •  tamoxifen (NOLVADEX) 20 MG chemo tablet, Take 1 tablet by mouth Daily., Disp: 90 tablet, Rfl: 3     Assessment and Plan:      Assessment and Plan {CC Problem List  Visit Diagnosis  ROS  Review (Popup)  Health Maintenance  Quality  BestPractice  Medications  SmartSets  SnapShot Encounters  Media :23}     Problem List Items Addressed This Visit        Cardiac and Vasculature    Hypertrophic cardiomyopathy (HCC) (Chronic)    Aortic stenosis, mild (Chronic)    Essential hypertension - Primary (Chronic)    Current Assessment & Plan     Hypertension is improving with treatment.  Continue current treatment regimen.  Dietary sodium restriction.  Regular aerobic exercise.  Continue current medications.  Blood pressure will be reassessed at the next regular appointment.         Hyperlipidemia (Chronic)    Current Assessment & Plan     Lipid abnormalities are improving with treatment.  Pharmacotherapy as ordered.  Lipids will be reassessed in 6 months.    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      Pressor vision supplement     Restart probiotic     Follow Up {Instructions Charge Capture  Follow-up Communications :23}     Return in about 6 months (around 7/4/2023) for Medicare Wellness.      Patient was given instructions and counseling regarding her condition or for health maintenance advice. Please see specific information  pulled into the AVS if appropriate.    Semaj disclaimer:   Much of this encounter note is an electronic transcription/translation of spoken language to printed text. The electronic translation of spoken language may permit erroneous, or at times, nonsensical words or phrases to be inadvertently transcribed; Although I have reviewed the note for such errors, some may still exist.     Additional Patient Counseling:       There are no Patient Instructions on file for this visit.

## 2023-01-06 ENCOUNTER — HOSPITAL ENCOUNTER (OUTPATIENT)
Dept: CT IMAGING | Facility: HOSPITAL | Age: 88
Discharge: HOME | End: 2023-01-06
Admitting: NURSE PRACTITIONER
Payer: MEDICARE

## 2023-01-06 DIAGNOSIS — R93.89 ABNORMAL CT SCAN, CHEST: ICD-10-CM

## 2023-01-06 DIAGNOSIS — R91.8 LUNG NODULES: ICD-10-CM

## 2023-01-06 PROCEDURE — 71260 CT THORAX DX C+: CPT

## 2023-01-06 PROCEDURE — 82565 ASSAY OF CREATININE: CPT

## 2023-01-06 PROCEDURE — 25010000002 IOPAMIDOL 61 % SOLUTION: Performed by: INTERNAL MEDICINE

## 2023-01-06 RX ADMIN — IOPAMIDOL 75 ML: 612 INJECTION, SOLUTION INTRAVENOUS at 14:17

## 2023-01-07 LAB — CREAT BLDA-MCNC: 1.2 MG/DL (ref 0.6–1.3)

## 2023-01-11 ENCOUNTER — OFFICE VISIT (OUTPATIENT)
Dept: ONCOLOGY | Facility: CLINIC | Age: 88
End: 2023-01-11
Payer: MEDICARE

## 2023-01-11 ENCOUNTER — LAB (OUTPATIENT)
Dept: LAB | Facility: HOSPITAL | Age: 88
End: 2023-01-11
Payer: MEDICARE

## 2023-01-11 VITALS
SYSTOLIC BLOOD PRESSURE: 152 MMHG | WEIGHT: 129.5 LBS | OXYGEN SATURATION: 95 % | RESPIRATION RATE: 17 BRPM | TEMPERATURE: 97.8 F | BODY MASS INDEX: 26.11 KG/M2 | HEIGHT: 59 IN | HEART RATE: 67 BPM | DIASTOLIC BLOOD PRESSURE: 104 MMHG

## 2023-01-11 DIAGNOSIS — M25.9 SHOULDER DISORDER: ICD-10-CM

## 2023-01-11 DIAGNOSIS — M15.9 GENERALIZED OSTEOARTHRITIS: Primary | ICD-10-CM

## 2023-01-11 DIAGNOSIS — Z17.0 CARCINOMA OF UPPER-INNER QUADRANT OF LEFT BREAST IN FEMALE, ESTROGEN RECEPTOR POSITIVE: ICD-10-CM

## 2023-01-11 DIAGNOSIS — Z17.0 CARCINOMA OF UPPER-INNER QUADRANT OF LEFT BREAST IN FEMALE, ESTROGEN RECEPTOR POSITIVE: Chronic | ICD-10-CM

## 2023-01-11 DIAGNOSIS — C50.212 CARCINOMA OF UPPER-INNER QUADRANT OF LEFT BREAST IN FEMALE, ESTROGEN RECEPTOR POSITIVE: ICD-10-CM

## 2023-01-11 DIAGNOSIS — C50.212 CARCINOMA OF UPPER-INNER QUADRANT OF LEFT BREAST IN FEMALE, ESTROGEN RECEPTOR POSITIVE: Chronic | ICD-10-CM

## 2023-01-11 LAB
BASOPHILS # BLD AUTO: 0.06 10*3/MM3 (ref 0–0.2)
BASOPHILS NFR BLD AUTO: 0.7 % (ref 0–1.5)
DEPRECATED RDW RBC AUTO: 45.8 FL (ref 37–54)
EOSINOPHIL # BLD AUTO: 0.23 10*3/MM3 (ref 0–0.4)
EOSINOPHIL NFR BLD AUTO: 2.5 % (ref 0.3–6.2)
ERYTHROCYTE [DISTWIDTH] IN BLOOD BY AUTOMATED COUNT: 14.3 % (ref 12.3–15.4)
HCT VFR BLD AUTO: 37.3 % (ref 34–46.6)
HGB BLD-MCNC: 12.1 G/DL (ref 12–15.9)
IMM GRANULOCYTES # BLD AUTO: 0.04 10*3/MM3 (ref 0–0.05)
IMM GRANULOCYTES NFR BLD AUTO: 0.4 % (ref 0–0.5)
LYMPHOCYTES # BLD AUTO: 1.3 10*3/MM3 (ref 0.7–3.1)
LYMPHOCYTES NFR BLD AUTO: 14.2 % (ref 19.6–45.3)
MCH RBC QN AUTO: 28.5 PG (ref 26.6–33)
MCHC RBC AUTO-ENTMCNC: 32.4 G/DL (ref 31.5–35.7)
MCV RBC AUTO: 88 FL (ref 79–97)
MONOCYTES # BLD AUTO: 1.08 10*3/MM3 (ref 0.1–0.9)
MONOCYTES NFR BLD AUTO: 11.8 % (ref 5–12)
NEUTROPHILS NFR BLD AUTO: 6.42 10*3/MM3 (ref 1.7–7)
NEUTROPHILS NFR BLD AUTO: 70.4 % (ref 42.7–76)
NRBC BLD AUTO-RTO: 0 /100 WBC (ref 0–0.2)
PLATELET # BLD AUTO: 170 10*3/MM3 (ref 140–450)
PMV BLD AUTO: 11.5 FL (ref 6–12)
RBC # BLD AUTO: 4.24 10*6/MM3 (ref 3.77–5.28)
WBC NRBC COR # BLD: 9.13 10*3/MM3 (ref 3.4–10.8)

## 2023-01-11 PROCEDURE — 85025 COMPLETE CBC W/AUTO DIFF WBC: CPT

## 2023-01-11 PROCEDURE — 36415 COLL VENOUS BLD VENIPUNCTURE: CPT

## 2023-01-11 PROCEDURE — 99214 OFFICE O/P EST MOD 30 MIN: CPT | Performed by: INTERNAL MEDICINE

## 2023-01-11 NOTE — PROGRESS NOTES
Subjective Patient feeling well    REASON FOR FOLLOW-UP left sided early stage breast cancer                           History of Present Illness   The patient is an 89-year-old female with a previously surgically treated hyperparathyroidism.  She had recently developed spontaneous bleeding led additional in her right neck extending to the shoulder and across the shoulder and breast with subsequent certain about inflammatory breast cancer.  She underwent a screening mammogram 1/26/2022 and then diagnostic mammogram and ultrasound right breast.  There was an area of focal asymmetry in the posterior one third upper inner quadrant left breast not the affected breast, persist on spot compression negative findings on ultrasound though there was a 5 mm hypoechoic mass left breast that was noted.  Ultrasound-guided biopsy 2/17 revealed invasive lobular carcinoma grade 1 with no lymphatic or perineural invasion, ER 99%, NC 60%, HER-2 negative and Ki-67 of 4%.     Her chest wall erythema led to an ER visit with CT scan demonstrating chronic right shoulder disease, complete rotator cuff tear.     The patient was seen 2/24/2022 by Dr. Marina general surgery with the surgery requesting not to have radiation therapy at home mastectomy but agreeable to lumpectomy and sentinel lymph node.     Patient now referred to medical oncology for consideration of endocrine therapy as appropriate.      At the time of this dictation she has been seen by radiation therapy.  There are plans to proceed with lumpectomy and sentinel lymph node biopsy and the fact that women of 70 years or older with ER positive tumors do not gain substantially with the addition of radiation therapy.  This has to be balanced with the fact that the patient is known to have osteoporosis involving both hips with left hip T score -3.1 and right hip T score of -2.8, lumbar T score is -0.8.    These findings are discussed with the patient and her daughter in some  detail when they are seen 3/7/2022 particularly recognizing the patient is quite active, lives on her own and continues to manage all her daily activities.  Notably she continues to have issues with bilateral rotator cuff injury right greater than left and is to be seen for aspiration of her right shoulder prior to decision made for her breast surgery.    Patient proceeded to right shoulder aspiration that was negative for infectious concerns and then to to surgery 4/7/2022 undergoing a left needle localization lumpectomy with tracing and left axillary sentinel node biopsy.  Her findings revealed invasive lobular carcinoma measuring 5 mm x 5 mm x 4 mm [place] grade 1, negative margins sentinel lymph nodes x1 --[id number] ER/LA positive, HER-2 negative.    The patient is seen with her daughter 4/11/2022 fortunately having recovered quickly post surgery.  We discussed the findings of her pathology and her current status with plans to initiate tamoxifen (previously discussed) and proceed with Reclast.    The patient went on to be treated as described with Reclast and fortunately had no additional side effects.  She is next seen 6/27/2022 and  2 months also without side effect profile.  Both she and her daughter indicate that she is doing quite well.    Patient next evaluated 1/11/2023.  Fortunately she is doing quite well and follow-up scan shows no substantial change from previous with multifocal pneumonia and prior to exam having resolved, mediastinal no enlargement with improvement in 5 mm right upper lobe nodule that is also present new from 10/11/2022.  She is having considerable issues with her shoulder reviewed by Dr. Cantrell and request an assessment by [facility].          Past Medical History:   Diagnosis Date   • Aortic stenosis, mild 8/24/2022   • Arthritis    • Carcinoma of upper-inner quadrant of left breast in female, estrogen receptor positive (HCC) 02/24/2022   • Carotid artery stenosis     • Carotid atherosclerosis, bilateral 07/12/2019   • Cataract     BILATERAL   • Depression    • Gastroenteritis    • Generalized osteoarthritis 02/01/2016   • H/O diastolic dysfunction    • Hyperlipidemia    • Hyperparathyroidism (HCC)    • Hyperparathyroidism (HCC)    • Hypertension    • Hypertrophic cardiomyopathy (HCC)    • Iron deficiency anemia 10/13/2022   • Multifocal pneumonia 10/10/2022   • Neuromuscular disorder (HCC)    • Obstructive sleep apnea syndrome 02/01/2016   • Osteopenia    • Osteoporosis    • Primary familial hypertrophic cardiomyopathy (HCC) 02/01/2016   • Pulmonary hypertension (HCC)    • Synovial cyst of popliteal space 02/01/2016   • Vitamin D deficiency         Past Surgical History:   Procedure Laterality Date   • BREAST BIOPSY Left 02/17/2022   • BREAST LUMPECTOMY WITH SENTINEL NODE BIOPSY Left 4/7/2022    Procedure: LEFT BREAST LUMPECTOMY WITH SENTINEL NODE BIOPSY AND NEEDLE LOCALIZATION;  Surgeon: Bel Marina MD;  Location: [facility];  Service: General;  Laterality: Left;   • BUNIONECTOMY Right     FOOT SURGERY   • COLONOSCOPY N/A 2011    Dr. Luis   • HAND SURGERY Right     TUMOR REMOVED ON FOREFINGER   • THYROIDECTOMY Right 4/20/2016    Procedure: RIGHT SUPERIOR PARATHYROIDECTOMY;  Surgeon: Bel Marina MD;  Location[facility] OR;  Service:         Current Outpatient Medications on File Prior to Visit   Medication Sig Dispense Refill   • amLODIPine (NORVASC) 5 MG tablet Take 5 mg by mouth Daily.     • aspirin 81 MG EC tablet Take 81 mg by mouth Daily.     • B Complex Vitamins (vitamin b complex) capsule capsule Take 1 capsule by mouth Daily.     • carvedilol (COREG) 12.5 MG tablet Take 1 tablet by mouth Every 12 (Twelve) Hours. 60 tablet 3   • cholecalciferol (VITAMIN D3) 1000 units tablet Take 1,000 Units by mouth Daily.     • Lactobacillus (PROBIOTIC ACIDOPHILUS PO) Take 1 tablet by mouth Daily.     • olmesartan (BENICAR) 20 MG tablet Take 1 tablet by mouth Daily.  "90 tablet 3   • pravastatin (PRAVACHOL) 40 MG tablet Take 1 tablet by mouth Daily. 90 tablet 1   • tamoxifen (NOLVADEX) 20 MG chemo tablet Take 1 tablet by mouth Daily. 90 tablet 3     No current facility-administered medications on file prior to visit.        ALLERGIES:    Allergies   Allergen Reactions   • Amoxicillin Hives   • Atorvastatin Myalgia        Social History     Socioeconomic History   • Marital status: Single   Tobacco Use   • Smoking status: Never   • Smokeless tobacco: Never   Vaping Use   • Vaping Use: Never used   Substance and Sexual Activity   • Alcohol use: No   • Drug use: No   • Sexual activity: Defer        Family History   Problem Relation Age of Onset   • Diabetes Mother    • Heart attack Mother    • Arthritis Father    • Breast cancer Sister    • Breast cancer Sister    • Breast cancer Sister    • Colon cancer Sister    • Heart disease Brother         CABG   • Other Brother         BRAIN TUMOR   • Colon cancer Brother    • Colon cancer Brother    • Colon cancer Brother    • Colon cancer Maternal Grandfather    • Cancer Other    • Malig Hyperthermia Neg Hx       Family cancer history is positive for sister with breast cancer, a broth had a brain tumor, a brother/sister/maternal grandfather had colon cancer.     Review of Systems     Objective     Vitals:    01/11/23 1441   BP: (!) 152/104   Pulse: 67   Resp: 17   Temp: 97.8 °F (36.6 °C)   TempSrc: Temporal   SpO2: 95%   Weight: 58.7 kg (129 lb 8 oz)   Height: 149.9 cm (59.02\")   PainSc: 0-No pain     Current Status 1/11/2023   ECOG score 0       Physical Exam  Constitutional:       Appearance: Normal appearance.   HENT:      Head: Normocephalic and atraumatic.      Nose: Nose normal.      Mouth/Throat:      Mouth: Mucous membranes are moist.      Pharynx: Oropharynx is clear.   Eyes:      Extraocular Movements: Extraocular movements intact.      Conjunctiva/sclera: Conjunctivae normal.      Pupils: Pupils are equal, round, and reactive to " light.   Neck:      Comments: Status post previous parathyroid surgery, well-healed  Cardiovascular:      Rate and Rhythm: Normal rate and regular rhythm.      Pulses: Normal pulses.      Heart sounds: Normal heart sounds.   Pulmonary:      Effort: Pulmonary effort is normal.      Breath sounds: Normal breath sounds.      Comments: Left breast lumpectomy site well-healing, left axillary site also without additional inflammation  Musculoskeletal:      Cervical back: Normal range of motion and neck supple.   Neurological:      Mental Status: She is alert.           RECENT LABS:  Hematology WBC   Date Value Ref Range Status   01/11/2023 9.13 3.40 - 10.80 10*3/mm3 Final   10/19/2022 5.59 3.40 - 10.80 10*3/mm3 Final     RBC   Date Value Ref Range Status   01/11/2023 4.24 3.77 - 5.28 10*6/mm3 Final   10/19/2022 4.17 3.77 - 5.28 10*6/mm3 Final     Hemoglobin   Date Value Ref Range Status   01/11/2023 12.1 12.0 - 15.9 g/dL Final     Hematocrit   Date Value Ref Range Status   01/11/2023 37.3 34.0 - 46.6 % Final     Platelets   Date Value Ref Range Status   01/11/2023 170 140 - 450 10*3/mm3 Final          Assessment & Plan          89-year-old female with a history of osteoarthritis, hyperparathyroidism treated surgically, obstructive sleep apnea, carotid artery disease, hypertrophic cardiomyopathy, osteoporosis and bilateral rotator cuff injuries right greater than left.  She has, recently, been found to have a carcinoma the upper inner quadrant of the left breast-invasive lobular carcinoma overall grade 1, 5 mm, ER 99%, WA 60%, Ki-67 4%-[id number].     The patient is here with her daughter 3/7/2022 we have discussed her treatment overall for her [facility] breast cancer with patient preference to avoid chemotherapy or adjuvant radiation therapy.      There is no evidence that chemotherapy has a role in this patient's care and we have discussed that it would not be necessary.  Radiation therapy benefit has also been discussed  and will not be pursued.      Notably the latter is supported by a previous meta-analysis that looked at women greater than 70 years of age with clinical stage I ER positive women who were treated with radiation and tamoxifen with results that indicate that baseline risk is low enough to reasonably avoid RT.    As we discussed endocrine therapy she is also felt better served with tamoxifen therapy as result of her underlying osteoporosis.  She has been on long-term Prolia without substantial effect to this point and might be better served with an alternative therapy such as Reclast.          After discussion the patient went on to have fluid aspiration from her right shoulder joint .  Her studies were unremarkable for infectious process and the erythema was thought to be related to hematoma from DJD.                                                                                                                                                    She was able to proceed to breast surgery 4/7/2022 undergoing a left needle localization lumpectomy with tracing and left axillary sentinel node biopsy.  Her findings revealed invasive lobular carcinoma measuring 5 mm x 5 mm x 4 mm [place] grade 1, negative margins sentinel lymph nodes x1 --[id number] ER/[place] positive, HER-2 negative.    We went on to proceed with Reclast, continue vitamin D and calcium supplementation 4/11/2022.  The patient began tamoxifen and is seen back 6/27/2022 without any side effect profile.  We discussed the patient's mild leukocytosis and that she should be aware of any fever or symptoms of infection.  Nothing is present today that might indicate this.  We will also inquire again as to whether she has had any steroid injections but this is not the case thus far.      Patient next evaluated 1/11/2023.  Fortunately she is doing quite well and follow-up scan shows no substantial change from previous with multifocal pneumonia and prior to exam having  resolved, mediastinal no enlargement with improvement in 5 mm right upper lobe nodule that is also present new from 10/11/2022.  She is having considerable issues with her shoulder reviewed by Dr. Cantrell and request an assessment by [facility].    Plan:  *Referral to [facility]-Dr. Maurice    *2 months MD, CBC, CMP Reclast with previous infusion given 4/11/2022.    *Patient continues tamoxifen in the interval

## 2023-01-12 PROBLEM — I35.0 AORTIC STENOSIS, MILD: Chronic | Status: ACTIVE | Noted: 2022-08-24

## 2023-01-12 NOTE — ASSESSMENT & PLAN NOTE
Lipid abnormalities are improving with treatment.  Pharmacotherapy as ordered.  Lipids will be reassessed in 6 months.    Lab Results   Component Value Date    CHLPL 101 07/05/2022    TRIG 83 07/05/2022    HDL 43 07/05/2022    LDL 41 07/05/2022

## 2023-01-23 ENCOUNTER — HOSPITAL ENCOUNTER (INPATIENT)
Facility: HOSPITAL | Age: 88
LOS: 1 days | Discharge: HOME HEALTH | DRG: 543 | End: 2023-01-25
Attending: EMERGENCY MEDICINE | Admitting: INTERNAL MEDICINE
Payer: MEDICARE

## 2023-01-23 ENCOUNTER — APPOINTMENT (OUTPATIENT)
Dept: CT IMAGING | Facility: HOSPITAL | Age: 88
DRG: 543 | End: 2023-01-23
Payer: MEDICARE

## 2023-01-23 ENCOUNTER — APPOINTMENT (OUTPATIENT)
Dept: GENERAL RADIOLOGY | Facility: HOSPITAL | Age: 88
DRG: 543 | End: 2023-01-23
Payer: MEDICARE

## 2023-01-23 DIAGNOSIS — M81.0 SENILE OSTEOPOROSIS: ICD-10-CM

## 2023-01-23 DIAGNOSIS — M85.80 OSTEOPENIA, UNSPECIFIED LOCATION: ICD-10-CM

## 2023-01-23 DIAGNOSIS — S32.592A PUBIC RAMUS FRACTURE, LEFT, CLOSED, INITIAL ENCOUNTER: ICD-10-CM

## 2023-01-23 DIAGNOSIS — M80.059A: Primary | ICD-10-CM

## 2023-01-23 DIAGNOSIS — M25.9 SHOULDER DISORDER: ICD-10-CM

## 2023-01-23 DIAGNOSIS — S09.90XA INJURY OF HEAD, INITIAL ENCOUNTER: ICD-10-CM
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
## 2023-01-23 DIAGNOSIS — M81.0 OSTEOPOROSIS, POST-MENOPAUSAL: Chronic | ICD-10-CM

## 2023-01-23 LAB
ALBUMIN SERPL-MCNC: 3.8 G/DL (ref 3.5–5.2)
ALBUMIN/GLOB SERPL: 1.4 G/DL
ALP SERPL-CCNC: 50 U/L (ref 39–117)
ALT SERPL W P-5'-P-CCNC: 13 U/L (ref 1–33)
ANION GAP SERPL CALCULATED.3IONS-SCNC: 9 MMOL/L (ref 5–15)
AST SERPL-CCNC: 19 U/L (ref 1–32)
BASOPHILS # BLD AUTO: 0.07 10*3/MM3 (ref 0–0.2)
BASOPHILS NFR BLD AUTO: 0.4 % (ref 0–1.5)
BILIRUB SERPL-MCNC: 0.4 MG/DL (ref 0–1.2)
BUN SERPL-MCNC: 23 MG/DL (ref 8–23)
BUN/CREAT SERPL: 26.1 (ref 7–25)
CALCIUM SPEC-SCNC: 9.8 MG/DL (ref 8.6–10.5)
CHLORIDE SERPL-SCNC: 103 MMOL/L (ref 98–107)
CO2 SERPL-SCNC: 26 MMOL/L (ref 22–29)
CREAT SERPL-MCNC: 0.88 MG/DL (ref 0.57–1)
DEPRECATED RDW RBC AUTO: 41.1 FL (ref 37–54)
EGFRCR SERPLBLD CKD-EPI 2021: 62.9 ML/MIN/1.73
EOSINOPHIL # BLD AUTO: 0.08 10*3/MM3 (ref 0–0.4)
EOSINOPHIL NFR BLD AUTO: 0.5 % (ref 0.3–6.2)
ERYTHROCYTE [DISTWIDTH] IN BLOOD BY AUTOMATED COUNT: 12.9 % (ref 12.3–15.4)
GLOBULIN UR ELPH-MCNC: 2.7 GM/DL
GLUCOSE SERPL-MCNC: 110 MG/DL (ref 65–99)
HCT VFR BLD AUTO: 35.6 % (ref 34–46.6)
HGB BLD-MCNC: 11.5 G/DL (ref 12–15.9)
IMM GRANULOCYTES # BLD AUTO: 0.21 10*3/MM3 (ref 0–0.05)
IMM GRANULOCYTES NFR BLD AUTO: 1.2 % (ref 0–0.5)
LYMPHOCYTES # BLD AUTO: 1.08 10*3/MM3 (ref 0.7–3.1)
LYMPHOCYTES NFR BLD AUTO: 6.4 % (ref 19.6–45.3)
MCH RBC QN AUTO: 28.3 PG (ref 26.6–33)
MCHC RBC AUTO-ENTMCNC: 32.3 G/DL (ref 31.5–35.7)
MCV RBC AUTO: 87.7 FL (ref 79–97)
MONOCYTES # BLD AUTO: 1.49 10*3/MM3 (ref 0.1–0.9)
MONOCYTES NFR BLD AUTO: 8.8 % (ref 5–12)
NEUTROPHILS NFR BLD AUTO: 14.01 10*3/MM3 (ref 1.7–7)
NEUTROPHILS NFR BLD AUTO: 82.7 % (ref 42.7–76)
NRBC BLD AUTO-RTO: 0 /100 WBC (ref 0–0.2)
PLATELET # BLD AUTO: 226 10*3/MM3 (ref 140–450)
PMV BLD AUTO: 10.7 FL (ref 6–12)
POTASSIUM SERPL-SCNC: 4.8 MMOL/L (ref 3.5–5.2)
PROT SERPL-MCNC: 6.5 G/DL (ref 6–8.5)
RBC # BLD AUTO: 4.06 10*6/MM3 (ref 3.77–5.28)
SODIUM SERPL-SCNC: 138 MMOL/L (ref 136–145)
WBC NRBC COR # BLD: 16.94 10*3/MM3 (ref 3.4–10.8)

## 2023-01-23 PROCEDURE — 80053 COMPREHEN METABOLIC PANEL: CPT | Performed by: EMERGENCY MEDICINE

## 2023-01-23 PROCEDURE — 25010000002 ONDANSETRON PER 1 MG: Performed by: EMERGENCY MEDICINE

## 2023-01-23 PROCEDURE — 73590 X-RAY EXAM OF LOWER LEG: CPT

## 2023-01-23 PROCEDURE — 85025 COMPLETE CBC W/AUTO DIFF WBC: CPT | Performed by: EMERGENCY MEDICINE

## 2023-01-23 PROCEDURE — 72125 CT NECK SPINE W/O DYE: CPT

## 2023-01-23 PROCEDURE — 73502 X-RAY EXAM HIP UNI 2-3 VIEWS: CPT

## 2023-01-23 PROCEDURE — 36415 COLL VENOUS BLD VENIPUNCTURE: CPT

## 2023-01-23 PROCEDURE — 99285 EMERGENCY DEPT VISIT HI MDM: CPT

## 2023-01-23 PROCEDURE — G0378 HOSPITAL OBSERVATION PER HR: HCPCS

## 2023-01-23 PROCEDURE — 72192 CT PELVIS W/O DYE: CPT

## 2023-01-23 PROCEDURE — 70450 CT HEAD/BRAIN W/O DYE: CPT

## 2023-01-23 PROCEDURE — 25010000002 MORPHINE PER 10 MG: Performed by: EMERGENCY MEDICINE

## 2023-01-23 PROCEDURE — 73610 X-RAY EXAM OF ANKLE: CPT

## 2023-01-23 RX ORDER — SODIUM CHLORIDE 0.9 % (FLUSH) 0.9 %
10 SYRINGE (ML) INJECTION AS NEEDED
Status: DISCONTINUED | OUTPATIENT
Start: 2023-01-23 | End: 2023-01-25 | Stop reason: HOSPADM

## 2023-01-23 RX ORDER — ONDANSETRON 2 MG/ML
4 INJECTION INTRAMUSCULAR; INTRAVENOUS ONCE
Status: COMPLETED | OUTPATIENT
Start: 2023-01-23 | End: 2023-01-23

## 2023-01-23 RX ORDER — MORPHINE SULFATE 2 MG/ML
2 INJECTION, SOLUTION INTRAMUSCULAR; INTRAVENOUS ONCE
Status: COMPLETED | OUTPATIENT
Start: 2023-01-23 | End: 2023-01-23

## 2023-01-23 RX ADMIN — MORPHINE SULFATE 2 MG: 2 INJECTION, SOLUTION INTRAMUSCULAR; INTRAVENOUS at 20:48

## 2023-01-23 RX ADMIN — ONDANSETRON 4 MG: 2 INJECTION INTRAMUSCULAR; INTRAVENOUS at 22:47

## 2023-01-23 RX ADMIN — MORPHINE SULFATE 2 MG: 2 INJECTION, SOLUTION INTRAMUSCULAR; INTRAVENOUS at 22:47

## 2023-01-24 PROBLEM — M80.0B9A: Status: ACTIVE | Noted: 2023-01-24

## 2023-01-24 PROBLEM — M80.059A: Status: ACTIVE | Noted: 2023-01-24

## 2023-01-24 LAB
ANION GAP SERPL CALCULATED.3IONS-SCNC: 7.5 MMOL/L (ref 5–15)
BASOPHILS # BLD AUTO: 0.05 10*3/MM3 (ref 0–0.2)
BASOPHILS NFR BLD AUTO: 0.4 % (ref 0–1.5)
BUN SERPL-MCNC: 22 MG/DL (ref 8–23)
BUN/CREAT SERPL: 30.6 (ref 7–25)
CALCIUM SPEC-SCNC: 8.8 MG/DL (ref 8.6–10.5)
CHLORIDE SERPL-SCNC: 102 MMOL/L (ref 98–107)
CO2 SERPL-SCNC: 26.5 MMOL/L (ref 22–29)
CREAT SERPL-MCNC: 0.72 MG/DL (ref 0.57–1)
DEPRECATED RDW RBC AUTO: 42.8 FL (ref 37–54)
EGFRCR SERPLBLD CKD-EPI 2021: 80 ML/MIN/1.73
EOSINOPHIL # BLD AUTO: 0.04 10*3/MM3 (ref 0–0.4)
EOSINOPHIL NFR BLD AUTO: 0.3 % (ref 0.3–6.2)
ERYTHROCYTE [DISTWIDTH] IN BLOOD BY AUTOMATED COUNT: 12.9 % (ref 12.3–15.4)
GLUCOSE SERPL-MCNC: 113 MG/DL (ref 65–99)
HCT VFR BLD AUTO: 32.4 % (ref 34–46.6)
HGB BLD-MCNC: 10.6 G/DL (ref 12–15.9)
IMM GRANULOCYTES # BLD AUTO: 0.09 10*3/MM3 (ref 0–0.05)
IMM GRANULOCYTES NFR BLD AUTO: 0.8 % (ref 0–0.5)
LYMPHOCYTES # BLD AUTO: 0.89 10*3/MM3 (ref 0.7–3.1)
LYMPHOCYTES NFR BLD AUTO: 7.7 % (ref 19.6–45.3)
MCH RBC QN AUTO: 29.6 PG (ref 26.6–33)
MCHC RBC AUTO-ENTMCNC: 32.7 G/DL (ref 31.5–35.7)
MCV RBC AUTO: 90.5 FL (ref 79–97)
MONOCYTES # BLD AUTO: 1.06 10*3/MM3 (ref 0.1–0.9)
MONOCYTES NFR BLD AUTO: 9.2 % (ref 5–12)
NEUTROPHILS NFR BLD AUTO: 81.6 % (ref 42.7–76)
NEUTROPHILS NFR BLD AUTO: 9.44 10*3/MM3 (ref 1.7–7)
NRBC BLD AUTO-RTO: 0 /100 WBC (ref 0–0.2)
PLATELET # BLD AUTO: 174 10*3/MM3 (ref 140–450)
PMV BLD AUTO: 11 FL (ref 6–12)
POTASSIUM SERPL-SCNC: 4.2 MMOL/L (ref 3.5–5.2)
RBC # BLD AUTO: 3.58 10*6/MM3 (ref 3.77–5.28)
SODIUM SERPL-SCNC: 136 MMOL/L (ref 136–145)
WBC NRBC COR # BLD: 11.57 10*3/MM3 (ref 3.4–10.8)

## 2023-01-24 PROCEDURE — 85025 COMPLETE CBC W/AUTO DIFF WBC: CPT | Performed by: INTERNAL MEDICINE

## 2023-01-24 PROCEDURE — 97166 OT EVAL MOD COMPLEX 45 MIN: CPT

## 2023-01-24 PROCEDURE — 97162 PT EVAL MOD COMPLEX 30 MIN: CPT

## 2023-01-24 PROCEDURE — 80048 BASIC METABOLIC PNL TOTAL CA: CPT | Performed by: INTERNAL MEDICINE

## 2023-01-24 PROCEDURE — 97535 SELF CARE MNGMENT TRAINING: CPT

## 2023-01-24 PROCEDURE — 97530 THERAPEUTIC ACTIVITIES: CPT

## 2023-01-24 RX ORDER — BISACODYL 10 MG
10 SUPPOSITORY, RECTAL RECTAL DAILY PRN
Status: DISCONTINUED | OUTPATIENT
Start: 2023-01-24 | End: 2023-01-25 | Stop reason: HOSPADM

## 2023-01-24 RX ORDER — MELATONIN
1000 DAILY
Status: DISCONTINUED | OUTPATIENT
Start: 2023-01-24 | End: 2023-01-25 | Stop reason: HOSPADM

## 2023-01-24 RX ORDER — SODIUM CHLORIDE 9 MG/ML
40 INJECTION, SOLUTION INTRAVENOUS AS NEEDED
Status: DISCONTINUED | OUTPATIENT
Start: 2023-01-24 | End: 2023-01-25 | Stop reason: HOSPADM

## 2023-01-24 RX ORDER — SODIUM CHLORIDE 0.9 % (FLUSH) 0.9 %
10 SYRINGE (ML) INJECTION AS NEEDED
Status: DISCONTINUED | OUTPATIENT
Start: 2023-01-24 | End: 2023-01-25 | Stop reason: HOSPADM

## 2023-01-24 RX ORDER — NALOXONE HCL 0.4 MG/ML
0.4 VIAL (ML) INJECTION
Status: DISCONTINUED | OUTPATIENT
Start: 2023-01-24 | End: 2023-01-25 | Stop reason: HOSPADM

## 2023-01-24 RX ORDER — SODIUM CHLORIDE 0.9 % (FLUSH) 0.9 %
10 SYRINGE (ML) INJECTION EVERY 12 HOURS SCHEDULED
Status: DISCONTINUED | OUTPATIENT
Start: 2023-01-24 | End: 2023-01-25 | Stop reason: HOSPADM

## 2023-01-24 RX ORDER — POLYETHYLENE GLYCOL 3350 17 G/17G
17 POWDER, FOR SOLUTION ORAL DAILY PRN
Status: DISCONTINUED | OUTPATIENT
Start: 2023-01-24 | End: 2023-01-25 | Stop reason: HOSPADM

## 2023-01-24 RX ORDER — L.ACID,PARA/B.BIFIDUM/S.THERM 8B CELL
1 CAPSULE ORAL DAILY
Status: DISCONTINUED | OUTPATIENT
Start: 2023-01-24 | End: 2023-01-25 | Stop reason: HOSPADM

## 2023-01-24 RX ORDER — ASPIRIN 81 MG/1
81 TABLET ORAL DAILY
Status: DISCONTINUED | OUTPATIENT
Start: 2023-01-24 | End: 2023-01-25 | Stop reason: HOSPADM

## 2023-01-24 RX ORDER — CHOLECALCIFEROL (VITAMIN D3) 125 MCG
5 CAPSULE ORAL NIGHTLY PRN
Status: DISCONTINUED | OUTPATIENT
Start: 2023-01-24 | End: 2023-01-25 | Stop reason: HOSPADM

## 2023-01-24 RX ORDER — ONDANSETRON 2 MG/ML
4 INJECTION INTRAMUSCULAR; INTRAVENOUS EVERY 6 HOURS PRN
Status: DISCONTINUED | OUTPATIENT
Start: 2023-01-24 | End: 2023-01-25 | Stop reason: HOSPADM

## 2023-01-24 RX ORDER — AMLODIPINE BESYLATE 5 MG/1
5 TABLET ORAL DAILY
Status: DISCONTINUED | OUTPATIENT
Start: 2023-01-24 | End: 2023-01-25 | Stop reason: HOSPADM

## 2023-01-24 RX ORDER — CALCIUM CARBONATE 200(500)MG
2 TABLET,CHEWABLE ORAL 3 TIMES DAILY PRN
Status: DISCONTINUED | OUTPATIENT
Start: 2023-01-24 | End: 2023-01-25 | Stop reason: HOSPADM

## 2023-01-24 RX ORDER — CARVEDILOL 12.5 MG/1
12.5 TABLET ORAL EVERY 12 HOURS SCHEDULED
Status: DISCONTINUED | OUTPATIENT
Start: 2023-01-24 | End: 2023-01-25 | Stop reason: HOSPADM

## 2023-01-24 RX ORDER — ACETAMINOPHEN 160 MG/5ML
650 SOLUTION ORAL EVERY 4 HOURS PRN
Status: DISCONTINUED | OUTPATIENT
Start: 2023-01-24 | End: 2023-01-25 | Stop reason: HOSPADM

## 2023-01-24 RX ORDER — BISACODYL 5 MG/1
5 TABLET, DELAYED RELEASE ORAL DAILY PRN
Status: DISCONTINUED | OUTPATIENT
Start: 2023-01-24 | End: 2023-01-25 | Stop reason: HOSPADM

## 2023-01-24 RX ORDER — HYDROMORPHONE HYDROCHLORIDE 1 MG/ML
0.5 INJECTION, SOLUTION INTRAMUSCULAR; INTRAVENOUS; SUBCUTANEOUS
Status: DISCONTINUED | OUTPATIENT
Start: 2023-01-24 | End: 2023-01-25 | Stop reason: HOSPADM

## 2023-01-24 RX ORDER — ONDANSETRON 4 MG/1
4 TABLET, FILM COATED ORAL EVERY 6 HOURS PRN
Status: DISCONTINUED | OUTPATIENT
Start: 2023-01-24 | End: 2023-01-25 | Stop reason: HOSPADM

## 2023-01-24 RX ORDER — LOSARTAN POTASSIUM 50 MG/1
50 TABLET ORAL
Status: DISCONTINUED | OUTPATIENT
Start: 2023-01-24 | End: 2023-01-25 | Stop reason: HOSPADM

## 2023-01-24 RX ORDER — HYDROCODONE BITARTRATE AND ACETAMINOPHEN 5; 325 MG/1; MG/1
1 TABLET ORAL EVERY 4 HOURS PRN
Status: DISCONTINUED | OUTPATIENT
Start: 2023-01-24 | End: 2023-01-25 | Stop reason: HOSPADM

## 2023-01-24 RX ORDER — ACETAMINOPHEN 650 MG/1
650 SUPPOSITORY RECTAL EVERY 4 HOURS PRN
Status: DISCONTINUED | OUTPATIENT
Start: 2023-01-24 | End: 2023-01-25 | Stop reason: HOSPADM

## 2023-01-24 RX ORDER — PRAVASTATIN SODIUM 40 MG
40 TABLET ORAL DAILY
Status: DISCONTINUED | OUTPATIENT
Start: 2023-01-24 | End: 2023-01-25 | Stop reason: HOSPADM

## 2023-01-24 RX ORDER — ACETAMINOPHEN 325 MG/1
650 TABLET ORAL EVERY 4 HOURS PRN
Status: DISCONTINUED | OUTPATIENT
Start: 2023-01-24 | End: 2023-01-25 | Stop reason: HOSPADM

## 2023-01-24 RX ORDER — TAMOXIFEN CITRATE 10 MG/1
20 TABLET ORAL DAILY
Status: DISCONTINUED | OUTPATIENT
Start: 2023-01-24 | End: 2023-01-24

## 2023-01-24 RX ORDER — AMOXICILLIN 250 MG
2 CAPSULE ORAL 2 TIMES DAILY
Status: DISCONTINUED | OUTPATIENT
Start: 2023-01-24 | End: 2023-01-25 | Stop reason: HOSPADM

## 2023-01-24 RX ADMIN — CARVEDILOL 12.5 MG: 12.5 TABLET, FILM COATED ORAL at 21:19

## 2023-01-24 RX ADMIN — TAMOXIFEN CITRATE 20 MG: 10 TABLET, FILM COATED ORAL at 08:24

## 2023-01-24 RX ADMIN — CARVEDILOL 12.5 MG: 12.5 TABLET, FILM COATED ORAL at 10:52

## 2023-01-24 RX ADMIN — Medication 1 CAPSULE: at 10:53

## 2023-01-24 RX ADMIN — LOSARTAN POTASSIUM 50 MG: 50 TABLET, FILM COATED ORAL at 10:52

## 2023-01-24 RX ADMIN — HYDROCODONE BITARTRATE AND ACETAMINOPHEN 1 TABLET: 5; 325 TABLET ORAL at 08:23

## 2023-01-24 RX ADMIN — HYDROCODONE BITARTRATE AND ACETAMINOPHEN 1 TABLET: 5; 325 TABLET ORAL at 13:25

## 2023-01-24 RX ADMIN — ASPIRIN 81 MG: 81 TABLET, COATED ORAL at 08:23

## 2023-01-24 RX ADMIN — Medication 10 ML: at 08:24

## 2023-01-24 RX ADMIN — HYDROCODONE BITARTRATE AND ACETAMINOPHEN 1 TABLET: 5; 325 TABLET ORAL at 17:49

## 2023-01-24 RX ADMIN — DOCUSATE SODIUM 50MG AND SENNOSIDES 8.6MG 2 TABLET: 8.6; 5 TABLET, FILM COATED ORAL at 08:23

## 2023-01-24 RX ADMIN — Medication 10 ML: at 21:22

## 2023-01-24 RX ADMIN — DOCUSATE SODIUM 50MG AND SENNOSIDES 8.6MG 2 TABLET: 8.6; 5 TABLET, FILM COATED ORAL at 21:21

## 2023-01-24 RX ADMIN — PRAVASTATIN SODIUM 40 MG: 40 TABLET ORAL at 08:24

## 2023-01-24 RX ADMIN — HYDROCODONE BITARTRATE AND ACETAMINOPHEN 1 TABLET: 5; 325 TABLET ORAL at 03:22

## 2023-01-24 RX ADMIN — AMLODIPINE BESYLATE 5 MG: 5 TABLET ORAL at 10:52

## 2023-01-24 RX ADMIN — Medication 1000 UNITS: at 10:52

## 2023-01-25 ENCOUNTER — TELEPHONE (OUTPATIENT)
Dept: INTERNAL MEDICINE | Facility: CLINIC | Age: 88
End: 2023-01-25
Payer: MEDICARE

## 2023-01-25 ENCOUNTER — HOME HEALTH ADMISSION (OUTPATIENT)
Dept: HOME HEALTH SERVICES | Facility: HOME HEALTHCARE | Age: 88
End: 2023-01-25
Payer: MEDICARE

## 2023-01-25 ENCOUNTER — READMISSION MANAGEMENT (OUTPATIENT)
Dept: CALL CENTER | Facility: HOSPITAL | Age: 88
End: 2023-01-25
Payer: MEDICARE

## 2023-01-25 ENCOUNTER — TRANSCRIBE ORDERS (OUTPATIENT)
Dept: HOME HEALTH SERVICES | Facility: HOME HEALTHCARE | Age: 88
End: 2023-01-25
Payer: MEDICARE

## 2023-01-25 VITALS
SYSTOLIC BLOOD PRESSURE: 163 MMHG | WEIGHT: 130 LBS | BODY MASS INDEX: 26.21 KG/M2 | HEIGHT: 59 IN | HEART RATE: 67 BPM | RESPIRATION RATE: 16 BRPM | DIASTOLIC BLOOD PRESSURE: 72 MMHG | TEMPERATURE: 97.9 F | OXYGEN SATURATION: 99 %

## 2023-01-25 DIAGNOSIS — S32.592A CLOSED FRACTURE OF MULTIPLE RAMI OF LEFT PUBIS, INITIAL ENCOUNTER: Primary | ICD-10-CM

## 2023-01-25 LAB
ANION GAP SERPL CALCULATED.3IONS-SCNC: 6.9 MMOL/L (ref 5–15)
BASOPHILS # BLD AUTO: 0.05 10*3/MM3 (ref 0–0.2)
BASOPHILS NFR BLD AUTO: 0.6 % (ref 0–1.5)
BUN SERPL-MCNC: 22 MG/DL (ref 8–23)
BUN/CREAT SERPL: 24.4 (ref 7–25)
CALCIUM SPEC-SCNC: 8.7 MG/DL (ref 8.6–10.5)
CHLORIDE SERPL-SCNC: 102 MMOL/L (ref 98–107)
CO2 SERPL-SCNC: 26.1 MMOL/L (ref 22–29)
CREAT SERPL-MCNC: 0.9 MG/DL (ref 0.57–1)
DEPRECATED RDW RBC AUTO: 39.5 FL (ref 37–54)
EGFRCR SERPLBLD CKD-EPI 2021: 61.2 ML/MIN/1.73
EOSINOPHIL # BLD AUTO: 0.18 10*3/MM3 (ref 0–0.4)
EOSINOPHIL NFR BLD AUTO: 2 % (ref 0.3–6.2)
ERYTHROCYTE [DISTWIDTH] IN BLOOD BY AUTOMATED COUNT: 12.4 % (ref 12.3–15.4)
GLUCOSE SERPL-MCNC: 90 MG/DL (ref 65–99)
HCT VFR BLD AUTO: 29.7 % (ref 34–46.6)
HGB BLD-MCNC: 9.9 G/DL (ref 12–15.9)
IMM GRANULOCYTES # BLD AUTO: 0.07 10*3/MM3 (ref 0–0.05)
IMM GRANULOCYTES NFR BLD AUTO: 0.8 % (ref 0–0.5)
LYMPHOCYTES # BLD AUTO: 0.76 10*3/MM3 (ref 0.7–3.1)
LYMPHOCYTES NFR BLD AUTO: 8.5 % (ref 19.6–45.3)
MCH RBC QN AUTO: 29 PG (ref 26.6–33)
MCHC RBC AUTO-ENTMCNC: 33.3 G/DL (ref 31.5–35.7)
MCV RBC AUTO: 87.1 FL (ref 79–97)
MONOCYTES # BLD AUTO: 1.13 10*3/MM3 (ref 0.1–0.9)
MONOCYTES NFR BLD AUTO: 12.6 % (ref 5–12)
NEUTROPHILS NFR BLD AUTO: 6.79 10*3/MM3 (ref 1.7–7)
NEUTROPHILS NFR BLD AUTO: 75.5 % (ref 42.7–76)
NRBC BLD AUTO-RTO: 0 /100 WBC (ref 0–0.2)
PLATELET # BLD AUTO: 155 10*3/MM3 (ref 140–450)
PMV BLD AUTO: 11 FL (ref 6–12)
POTASSIUM SERPL-SCNC: 4.1 MMOL/L (ref 3.5–5.2)
RBC # BLD AUTO: 3.41 10*6/MM3 (ref 3.77–5.28)
SODIUM SERPL-SCNC: 135 MMOL/L (ref 136–145)
WBC NRBC COR # BLD: 8.98 10*3/MM3 (ref 3.4–10.8)

## 2023-01-25 PROCEDURE — 97535 SELF CARE MNGMENT TRAINING: CPT | Performed by: OCCUPATIONAL THERAPIST

## 2023-01-25 PROCEDURE — 85025 COMPLETE CBC W/AUTO DIFF WBC: CPT | Performed by: INTERNAL MEDICINE

## 2023-01-25 PROCEDURE — 80048 BASIC METABOLIC PNL TOTAL CA: CPT | Performed by: INTERNAL MEDICINE

## 2023-01-25 RX ORDER — AMOXICILLIN 250 MG
2 CAPSULE ORAL 2 TIMES DAILY
Qty: 28 TABLET | Refills: 0 | Status: SHIPPED | OUTPATIENT
Start: 2023-01-25 | End: 2023-02-01

## 2023-01-25 RX ORDER — HYDROCODONE BITARTRATE AND ACETAMINOPHEN 5; 325 MG/1; MG/1
1 TABLET ORAL EVERY 4 HOURS PRN
Qty: 42 TABLET | Refills: 0 | Status: SHIPPED | OUTPATIENT
Start: 2023-01-25 | End: 2023-02-01

## 2023-01-25 RX ADMIN — PRAVASTATIN SODIUM 40 MG: 40 TABLET ORAL at 09:55

## 2023-01-25 RX ADMIN — HYDROCODONE BITARTRATE AND ACETAMINOPHEN 1 TABLET: 5; 325 TABLET ORAL at 09:57

## 2023-01-25 RX ADMIN — Medication 1000 UNITS: at 09:56

## 2023-01-25 RX ADMIN — ASPIRIN 81 MG: 81 TABLET, COATED ORAL at 09:56

## 2023-01-25 RX ADMIN — HYDROCODONE BITARTRATE AND ACETAMINOPHEN 1 TABLET: 5; 325 TABLET ORAL at 03:27

## 2023-01-25 RX ADMIN — LOSARTAN POTASSIUM 50 MG: 50 TABLET, FILM COATED ORAL at 09:57

## 2023-01-25 RX ADMIN — DOCUSATE SODIUM 50MG AND SENNOSIDES 8.6MG 2 TABLET: 8.6; 5 TABLET, FILM COATED ORAL at 09:55

## 2023-01-25 RX ADMIN — Medication 10 ML: at 10:02

## 2023-01-25 RX ADMIN — AMLODIPINE BESYLATE 5 MG: 5 TABLET ORAL at 09:55

## 2023-01-25 RX ADMIN — CARVEDILOL 12.5 MG: 12.5 TABLET, FILM COATED ORAL at 09:55

## 2023-01-25 RX ADMIN — POLYETHYLENE GLYCOL 3350 17 G: 17 POWDER, FOR SOLUTION ORAL at 09:57

## 2023-01-25 RX ADMIN — Medication 1 CAPSULE: at 09:57

## 2023-01-25 NOTE — TELEPHONE ENCOUNTER
Called [facility] and talked to Pasquale and gave verbal for physical therapy evaluation.

## 2023-01-25 NOTE — TELEPHONE ENCOUNTER
Caller: SERVANDO    Relationship: [facility]    Best call back number: [phone]    What orders are you requesting (i.e. lab or imaging):   VERBAL ORDER FOR [facility] PHYSICAL THERAPY EVALUATION     In what timeframe would the patient need to come in: ASAP    Where will you receive your lab/imaging services: [facility]     Additional notes: SERVANDO WITH [facility] CALLED AND SAID THAT SHE WOULD LIKE TO HAVE A VERBAL ORDER TO SET THE PATIENT UP WITH PHYSICAL THERAPY

## 2023-01-26 ENCOUNTER — TRANSITIONAL CARE MANAGEMENT TELEPHONE ENCOUNTER (OUTPATIENT)
Dept: CALL CENTER | Facility: HOSPITAL | Age: 88
End: 2023-01-26
Payer: MEDICARE

## 2023-01-26 ENCOUNTER — HOME CARE VISIT (OUTPATIENT)
Dept: HOME HEALTH SERVICES | Facility: HOME HEALTHCARE | Age: 88
End: 2023-01-26
Payer: MEDICARE

## 2023-01-26 PROCEDURE — G0151 HHCP-SERV OF PT,EA 15 MIN: HCPCS

## 2023-01-26 NOTE — OUTREACH NOTE
Prep Survey    Flowsheet Row Responses   [facility] patient discharged from? [facility]   Is LACE score < 7 ? No   Eligibility [facility]   Date of Admission 01/23/23   Date of Discharge 01/25/23   Discharge Disposition Home-Health Care [facility]   Discharge diagnosis Pubic ramus fracture, left, closed, initial encounter    Does the patient have one of the following disease processes/diagnoses(primary or secondary)? Other   Does the patient have Home health ordered? Yes   What is the Home health agency?  [facility]    Is there a DME ordered? Yes   What DME was ordered? and a hospital bed from [facility]   Prep survey completed? Yes          SHANNON PEREZ - Registered Nurse

## 2023-01-26 NOTE — OUTREACH NOTE
Call Center TCM Note    Flowsheet Row Responses   [facility] patient discharged from? [facility]   Does the patient have one of the following disease processes/diagnoses(primary or secondary)? Other   TCM attempt successful? Yes   Call start time [phone]   Call end time 0907   Discharge diagnosis Pubic ramus fracture, left, closed, initial encounter    Person spoke with today (if not patient) and relationship Monica bolton    Meds reviewed with patient/caregiver? Yes   Is the patient having any side effects they believe may be caused by any medication additions or changes? No   Does the patient have all medications ordered at discharge? Yes   Is the patient taking all medications as directed (includes completed medication regime)? Yes   Comments Daughter reorts she has appts scheduled with Orthopaedics on 2/1/2023. She will schedule any other appts they need.    Does the patient have an appointment with their PCP within 7 days of discharge? No   Nursing Interventions Patient declined scheduling/rescheduling appointment at this time, Patient desires to follow up with specialty only   What is the Home health agency?  [facility]    Has home health visited the patient within 72 hours of discharge? Yes   What DME was ordered? and a hospital bed from [facility]   Has all DME been delivered? No   DME interventions Other   DME comments Daughter has contact DME and bed is to be delivered today.    Psychosocial issues? No   Did the patient receive a copy of their discharge instructions? Yes   Nursing interventions Reviewed instructions with patient   What is the patient's perception of their health status since discharge? Improving   Is the patient/caregiver able to teach back signs and symptoms related to disease process for when to call PCP? Yes   Is the patient/caregiver able to teach back signs and symptoms related to disease process for when to call 911? Yes   Is the patient/caregiver able to teach back the hierarchy of who  to call/visit for symptoms/problems? PCP, Specialist, Home health nurse, Urgent Care, ED, 911 Yes   If the patient is a current smoker, are they able to teach back resources for cessation? Not a smoker   TCM call completed? Yes   Call end time 0907   Would this patient benefit from a Referral to [facility] Social Work? No   Is the patient interested in additional calls from an ambulatory ?  NOTE:  applies to high risk patients requiring additional follow-up. No          Rasheed Correia RN    1/26/2023, 09:07 EST

## 2023-01-27 ENCOUNTER — HOME CARE VISIT (OUTPATIENT)
Dept: HOME HEALTH SERVICES | Facility: HOME HEALTHCARE | Age: 88
End: 2023-01-27
Payer: MEDICARE

## 2023-01-27 VITALS
OXYGEN SATURATION: 96 % | SYSTOLIC BLOOD PRESSURE: 120 MMHG | TEMPERATURE: 99.1 F | DIASTOLIC BLOOD PRESSURE: 60 MMHG | HEART RATE: 88 BPM

## 2023-01-27 PROCEDURE — G0152 HHCP-SERV OF OT,EA 15 MIN: HCPCS

## 2023-01-27 NOTE — Clinical Note
ASSESSMENT / MEDICAL NECESSITY, PLAN / INTERVENTIONS:  Patient requires skilled O.T. for remediation of deficits due to current situation and conditionin order to improve safety and independence in her home for returning to her PLOF in ADLs, functional transfers, mobility, functional strength, activity endurance, and for T/I on pain mgmt in tasks and appropriate use of recommended AD, AE, and DME. OT will follow 1w1, 2w1, 1w1, 2w1.

## 2023-01-27 NOTE — HOME HEALTH
Patient fell going down her back deck stairs on 1/23/23, hitting her left side and sustaining a left superior and inferior pubic rami fractures.  Ortho consult suggested conservative plan and no surgery.  She will follow up with Dr Maurice, which she already has seen for her shoulder pathologies.  She was as BHE from 1/23 to 1/25.  She was discharged home with a hospital bed and has a walker and bedside commode as well.  Her family is involved with a son that lives with her, a daughter that is a nurse that is assisting and today a grandson that is providing assist.  Her grandson states he will be building a ramp out back to be able to take her to her appointments without navigating the steps.  PMH:  OA with bilateral shoulder DJD and pain (right worse than the left), left knee pain, left breast CA with lumpectomy and lymph node removal, right foot bunionectomy, CHF, carotid stenosis, pulmonary hypertension.  PLOF:  she states she was very active, driving frequently, walking mostly with no AD, using a cane occassionaly and independent with all her ADLs.      Assessment:  She is at high risk for pneumonia and functional decline with the pelvic fractures at her age.  She has good family support that will greatly reduce this.  Skilled therapist FOC will be recovery from her pelvic fractures, needing to help guide her progress in transfers and gait with a progressive home program and with pain management.  OT to eval and treat for safe ADLs with any caregiver teaching as needed.      Plan for next visit  Progress HEP as needed  Work on progression of gait while still maintaining pain mitigation.   Pain management teaching as needed.

## 2023-01-28 VITALS
DIASTOLIC BLOOD PRESSURE: 62 MMHG | HEART RATE: 67 BPM | TEMPERATURE: 97.7 F | OXYGEN SATURATION: 92 % | SYSTOLIC BLOOD PRESSURE: 132 MMHG

## 2023-01-28 NOTE — HOME HEALTH
"CURRENT SITUATION: 01-23-23, pt reports she was taking her recycling outside and fell while going down her 4 deck steps. She either fell off the bottom step or immediately after stepping off the last step. She doesn't know why she tripped, but fell onto her L-body and sustained an abrasion & knot to her left forehead and L-superior and inferior rami fxs. Conservative treatment recommended. She was in [facility] from 01-23-23 to 01-25-23 and d/c'd to home w/family support and HH PT, OT. She is to f/u with Dr. Maurice (who she already sees for her L-shoulder issue.)  MPHx: OA, DJD, breast CA s/p L-lump- and lymphectomies, R-foot bunionectomy, CHF, carotid stenosis, pulmonary HTN.    SUBJECTIVE: \"I'm doing fair I guess.\"  Agreeable to OT evaluation.  SOCIAL & ENVIRONMENTAL SITUATION: Pt lives in a  home w/4 steps to enter (@ back.) Her adult grandson Ameya has moved in w/her to help and this will be a permanent situation. Pt has involved family/children who are helping her w/any needs. She now has a hospital bed (in the living room), a BSC (used over toilet during the day, @ bedside for night), and a rolling walker.  PATIENT'S &/OR CAREGIVER'S GOAL: \"To be Independent again.\"  INTERVENTIONS: OT Eval, ADL training, Home Safety, Therapeutic Exercise/Activity, Transfer/Mobility training, Monitor vitals including SPO2 via pulse-oximeter (notifiy MD if resting O2 <90% on room air), Patient/CG education, Falls risk prevention, Recommendations on AE, DME, AD, environmental adaptations.  ASSESSMENT, MEDICAL NECESSITY, PLAN:  Patient requires skilled O.T. for remediation of deficits due to current situation and conditionin order to improve safety and independence in her home for returning to her PLOF in ADLs, functional transfers, mobility, functional strength, activity endurance, and for T/I on appropriate use of recommended AD, AE, and DME. OT will follow 1w1, 2w1, 1w1, 2w1.  PLAN FOR NEXT VISIT: LBD w/AE. Functional bathroom " transfer training.

## 2023-01-30 ENCOUNTER — HOME CARE VISIT (OUTPATIENT)
Dept: HOME HEALTH SERVICES | Facility: HOME HEALTHCARE | Age: 88
End: 2023-01-30
Payer: MEDICARE

## 2023-01-30 ENCOUNTER — TELEPHONE (OUTPATIENT)
Dept: INTERNAL MEDICINE | Facility: CLINIC | Age: 88
End: 2023-01-30
Payer: MEDICARE

## 2023-01-30 PROCEDURE — G0152 HHCP-SERV OF OT,EA 15 MIN: HCPCS

## 2023-01-30 RX ORDER — ONDANSETRON 4 MG/1
4 TABLET, FILM COATED ORAL EVERY 12 HOURS PRN
Qty: 10 TABLET | Refills: 0 | Status: SHIPPED | OUTPATIENT
Start: 2023-01-30 | End: 2023-02-14

## 2023-01-31 ENCOUNTER — HOME CARE VISIT (OUTPATIENT)
Dept: HOME HEALTH SERVICES | Facility: HOME HEALTHCARE | Age: 88
End: 2023-01-31
Payer: MEDICARE

## 2023-01-31 VITALS
SYSTOLIC BLOOD PRESSURE: 156 MMHG | HEART RATE: 67 BPM | TEMPERATURE: 98.4 F | DIASTOLIC BLOOD PRESSURE: 80 MMHG | OXYGEN SATURATION: 95 %

## 2023-01-31 VITALS
DIASTOLIC BLOOD PRESSURE: 80 MMHG | TEMPERATURE: 97.6 F | HEART RATE: 69 BPM | OXYGEN SATURATION: 93 % | SYSTOLIC BLOOD PRESSURE: 130 MMHG

## 2023-01-31 PROCEDURE — G0151 HHCP-SERV OF PT,EA 15 MIN: HCPCS

## 2023-01-31 NOTE — HOME HEALTH
"SUBJECTIVE: \"I had a rough night b/c my L-hip pain has really increased\"   FALLS: none   MEDICATION CHANGES: zofran ordered. not in home yet.  PLAN FOR NEXT VISIT: TTB transfer training."

## 2023-01-31 NOTE — HOME HEALTH
Pt reports she is doing better every day with pain only 3/10. She reports she sat too long on Sunday and then was stiff.  She is taking 1/2 a pain pill PRN (up to 1 pill per day).  Grandson present today and another grandson building ramp in back entrance today.    She has an ortho appt tomorrow.    Pt tolerated supine and seated HEP, gait training with rolling walker, and transfers.  Overall she is progressing very well.     Plan for next visit: if pain allows progress to standing HEP, continue gait training with rolling walker with progression to reciprocal pattern as tolerated.

## 2023-02-01 ENCOUNTER — OFFICE VISIT (OUTPATIENT)
Dept: ORTHOPEDIC SURGERY | Facility: CLINIC | Age: 88
End: 2023-02-01
Payer: MEDICARE

## 2023-02-01 VITALS — BODY MASS INDEX: 26.21 KG/M2 | HEIGHT: 59 IN | TEMPERATURE: 97.4 F | WEIGHT: 130 LBS

## 2023-02-01 DIAGNOSIS — R10.2 PELVIC PAIN: ICD-10-CM

## 2023-02-01 DIAGNOSIS — M25.512 LEFT SHOULDER PAIN, UNSPECIFIED CHRONICITY: Primary | ICD-10-CM

## 2023-02-01 PROCEDURE — G0180 MD CERTIFICATION HHA PATIENT: HCPCS | Performed by: NURSE PRACTITIONER

## 2023-02-01 PROCEDURE — 72190 X-RAY EXAM OF PELVIS: CPT | Performed by: ORTHOPAEDIC SURGERY

## 2023-02-01 PROCEDURE — 73030 X-RAY EXAM OF SHOULDER: CPT | Performed by: ORTHOPAEDIC SURGERY

## 2023-02-01 PROCEDURE — 99214 OFFICE O/P EST MOD 30 MIN: CPT | Performed by: ORTHOPAEDIC SURGERY

## 2023-02-01 NOTE — PROGRESS NOTES
Patient: Neha Guillory    YOB: 1933    Medical Record Number: [id number]    Chief Complaints:  Pelvic fracture, left shoulder cyst    History of Present Illness:     89 y.o. female patient who presents for evaluation of a couple of issues.  The appointment was originally scheduled for her left shoulder.  She reports a cystic mass over the top of the shoulder that has slowly extended up towards her neck.  She saw another physician and had this drained.  The cyst recurred within a period of a few days.  The cyst is uncomfortable.  Denies redness, fevers, chills or other associated symptoms.  Unfortunately, she fell approximately 1 week ago and suffered multiple pelvic fractures.  She was admitted to the hospital for several days after the injury.  She has been slowly mobilizing with a walker.  She is getting home health PT and OT which seems to help.  She was not using any assist device before the injury.  Localizes her pelvic pain to the left hip.    Allergies:   Allergies   Allergen Reactions   • Amoxicillin Hives   • Atorvastatin Myalgia       Home Medications:      Current Outpatient Medications:   •  amLODIPine (NORVASC) 5 MG tablet, Take 5 mg by mouth Daily., Disp: , Rfl:   •  aspirin 81 MG EC tablet, Take 1 tablet by mouth Daily. Indications: Disease involving Lipid Deposits in the Arteries, Disp: , Rfl:   •  B Complex Vitamins (vitamin b complex) capsule capsule, Take 1 capsule by mouth Daily., Disp: , Rfl:   •  carvedilol (COREG) 12.5 MG tablet, Take 1 tablet by mouth Every 12 (Twelve) Hours., Disp: 60 tablet, Rfl: 3  •  cholecalciferol (VITAMIN D3) 1000 units tablet, Take 1,000 Units by mouth Daily., Disp: , Rfl:   •  HYDROcodone-acetaminophen (NORCO) 5-325 MG per tablet, Take 1 tablet by mouth Every 4 (Four) Hours As Needed for Moderate Pain for up to 7 days., Disp: 42 tablet, Rfl: 0  •  Lactobacillus (PROBIOTIC ACIDOPHILUS PO), Take 1 tablet by mouth Daily., Disp: , Rfl:   •  olmesartan  (BENICAR) 20 MG tablet, Take 1 tablet by mouth Daily., Disp: 90 tablet, Rfl: 3  •  ondansetron (Zofran) 4 MG tablet, Take 1 tablet by mouth Every 12 (Twelve) Hours As Needed for Nausea., Disp: 10 tablet, Rfl: 0  •  pravastatin (PRAVACHOL) 40 MG tablet, Take 1 tablet by mouth Daily., Disp: 90 tablet, Rfl: 1  •  sennosides-docusate (PERICOLACE) 8.6-50 MG per tablet, Take 2 tablets by mouth 2 (Two) Times a Day for 7 days., Disp: 28 tablet, Rfl: 0  •  tamoxifen (NOLVADEX) 20 MG chemo tablet, Take 1 tablet by mouth Daily., Disp: 90 tablet, Rfl: 3    Past Medical History:   Diagnosis Date   • Aortic stenosis, mild 8/24/2022   • Arthritis    • Carcinoma of upper-inner quadrant of left breast in female, estrogen receptor positive (HCC) 02/24/2022   • Carotid artery stenosis    • Carotid atherosclerosis, bilateral 07/12/2019   • Cataract     BILATERAL   • Depression    • Gastroenteritis    • Generalized osteoarthritis 02/01/2016   • H/O diastolic dysfunction    • Hyperlipidemia    • Hyperparathyroidism (HCC)    • Hyperparathyroidism (HCC)    • Hypertension    • Hypertrophic cardiomyopathy (HCC)    • Iron deficiency anemia 10/13/2022   • Multifocal pneumonia 10/10/2022   • Neuromuscular disorder (HCC)    • Obstructive sleep apnea syndrome 02/01/2016   • Osteopenia    • Osteoporosis    • Primary familial hypertrophic cardiomyopathy (HCC) 02/01/2016   • Pulmonary hypertension (HCC)    • Synovial cyst of popliteal space 02/01/2016   • Vitamin D deficiency        Past Surgical History:   Procedure Laterality Date   • BREAST BIOPSY Left 02/17/2022   • BREAST LUMPECTOMY WITH SENTINEL NODE BIOPSY Left 4/7/2022    Procedure: LEFT BREAST LUMPECTOMY WITH SENTINEL NODE BIOPSY AND NEEDLE LOCALIZATION;  Surgeon: Bel Marina MD;  [place] [facility] [place] [facility] [place]  Service: General;  Laterality: Left;   • BUNIONECTOMY Right     FOOT SURGERY   • COLONOSCOPY N/A 2011    Dr. Luis   • HAND SURGERY Right     TUMOR REMOVED ON FOREFINGER   •  "THYROIDECTOMY Right 4/20/2016    Procedure: RIGHT SUPERIOR PARATHYROIDECTOMY;  Surgeon: Bel Marina MD;  Location: [facility];  Service:        Social History     Occupational History     Employer: RETIRED   Tobacco Use   • Smoking status: Never   • Smokeless tobacco: Never   Vaping Use   • Vaping Use: Never used   Substance and Sexual Activity   • Alcohol use: No   • Drug use: No   • Sexual activity: Defer      Social History     Social History Narrative   • Not on file       Family History   Problem Relation Age of Onset   • Diabetes Mother    • Heart attack Mother    • Arthritis Father    • Breast cancer Sister    • Breast cancer Sister    • Breast cancer Sister    • Colon cancer Sister    • Heart disease Brother         CABG   • Other Brother         BRAIN TUMOR   • Colon cancer Brother    • Colon cancer Brother    • Colon cancer Brother    • Colon cancer Maternal Grandfather    • Cancer Other    • Malig Hyperthermia Neg Hx        Review of Systems:      Constitutional: Denies fever, shaking or chills   Eyes: Denies change in visual acuity   HEENT: Denies nasal congestion or sore throat   Respiratory: Denies cough or shortness of breath   Cardiovascular: Denies chest pain or edema  Endocrine: Denies tremors, palpitations, intolerance of heat or cold, polyuria, polydipsia.  GI: Denies abdominal pain, nausea, vomiting, bloody stools or diarrhea  : Denies frequency, urgency, incontinence, retention, or nocturia.  Musculoskeletal: Denies numbness, tingling or loss of motor function except as above  Integument: Denies rash, lesion or ulceration   Neurologic: Denies headache or focal weakness, deficits  Heme: Denies spontaneous or excessive bleeding, epistaxis, hematuria, melena, fatigue, enlarged or tender lymph nodes.      All other pertinent positives and negatives as noted above in HPI.    Physical Exam: 89 y.o. female    Vitals:    02/01/23 1547   Weight: 59 kg (130 lb)   Height: 149.9 cm (59\") "       General:  Patient is awake and alert.  Appears in no acute distress or discomfort.    Psych:  Affect and demeanor are appropriate.    Cardiovascular:  Regular rate and rhythm.    Lungs:  Good chest expansion.  Breathing unlabored.    Lymph:  No palpable adenopathy in the left upper extremity    Pelvis: Skin is benign.  Mild discomfort with lateral compression over the iliac crest and trochanters.  No pain with logrolling of her left leg.  Active flexion of the hip is uncomfortable    Extremities: Left shoulder is examined.  Skin is benign and intact.  She has a mass over the top of the acromioclavicular joint.  There is a cord extending from this mass towards her neck and then it continues up into the sternocleidomastoid where there is another small palpable mass.  There is no redness or warmth.  The mass is nontender.  She is a little tender over the acromioclavicular joint but not exquisitely so.  She has a trace subacromial/subdeltoid effusion but again no warmth.  She moves her shoulder well.  She does have discomfort and weakness with both abduction and forward elevation.  Cross body abduction is moderately uncomfortable over the top of her shoulder.  Intact motor and sensory function in the lower arm and hand.  Brisk capillary refill.         Radiology:   AP pelvis and lateral view left hip are ordered and reviewed to evaluate her fracture.  These are compared to previous x-rays and her CT scan.  The x-rays show left superior and inferior pubic rami fractures.  No apparent sacral fracture.  She has degenerative changes in her lumbosacral spine but no obvious fracture.  AP, scapular Y views left shoulder are ordered and reviewed to evaluate her complaint.  No comparison x-rays are available.  She does have an MRI of the brachial plexus which I have reviewed along with the associated report.  The x-rays show acromioclavicular arthritis and cuff tear arthropathy.  Findings from her MRI brachial plexus are  listed below.     IMPRESSION:  1. Large synovial cyst extends above the AC joint and contains several  loculations and thin internal septations measuring 5.2 x 3.3 cm axial  dimension x 3 cm in height. There is a fingerlike projection extending  superomedially from this cyst along the superior margin of the left  trapezius muscle measuring 8 cm in length.  2. Massive chronic left rotator cuff tear with retraction and muscular  atrophy. High riding humeral head with cuff arthropathy. Long head  biceps tendon tear with retraction.  3. Degenerative disc disease in the cervical spine greatest at C5-C6  where there is a broad disc/osteophyte complex contacting the anterior  cervical cord with moderate canal narrowing. There is also facet  arthritis with multilevel foraminal narrowing.    Assessment/Plan:  1.  Left sided lateral compression type pelvic fracture  2.  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  The injury appears stable to allow weightbearing as tolerated.  She can continue to mobilize with a walker and I do recommend that she also continue the home health PT.  I want to see her back in 1 month for repeat x-rays.  I offered her pain medicine but she says that she is getting by just fine with over-the-counter medicine at this point.    The cyst is a more focal problem.  I think further aspirations are unlikely to be beneficial.  Surgical excision may be an option but I am uncomfortable with how that tracks up towards her neck.  My hope is that the cyst is emanating from the AC joint and if we could eliminate that issue then hopefully we can get the remainder of the cyst up towards her neck to involute.  I told them that I would not feel comfortable dissecting that cyst all the way up to her neck.  For now, we will put that issue on the back burner as this does not really appear to be an acute problem for her.  Once we get the pelvic  fracture healed then we can come back and address the cyst.  She is in agreement with that plan.    Riki Maurice MD    02/01/2023    CC to Yvan Ruiz III, TERI-C

## 2023-02-01 NOTE — TELEPHONE ENCOUNTER
Advised patient's emergency contact that I have the prescription for her cpap pillows.     Will come  prescription today in office.     BALDOMERO Blum

## 2023-02-02 ENCOUNTER — HOME CARE VISIT (OUTPATIENT)
Dept: HOME HEALTH SERVICES | Facility: HOME HEALTHCARE | Age: 88
End: 2023-02-02
Payer: MEDICARE

## 2023-02-02 VITALS — OXYGEN SATURATION: 93 % | HEART RATE: 70 BPM | DIASTOLIC BLOOD PRESSURE: 70 MMHG | SYSTOLIC BLOOD PRESSURE: 120 MMHG

## 2023-02-02 PROCEDURE — G0151 HHCP-SERV OF PT,EA 15 MIN: HCPCS

## 2023-02-03 ENCOUNTER — HOME CARE VISIT (OUTPATIENT)
Dept: HOME HEALTH SERVICES | Facility: HOME HEALTHCARE | Age: 88
End: 2023-02-03
Payer: MEDICARE

## 2023-02-03 PROCEDURE — G0152 HHCP-SERV OF OT,EA 15 MIN: HCPCS

## 2023-02-03 NOTE — HOME HEALTH
Pt presented sitting on the couch and grandson present.  She reports pain in L hip with amb 2/10, otherwise 0/10 at rest.    She tolerated standing HEP, transfer training, bed mobility training, and gait training.  She was able to progress to reciprocal gait with the walker.   Plan for next visit: continue to progress HEP as tolerated, gait with AAD, entering/exiting the home if pt agrees and weather appropriate.

## 2023-02-04 VITALS
OXYGEN SATURATION: 95 % | DIASTOLIC BLOOD PRESSURE: 72 MMHG | TEMPERATURE: 97.8 F | SYSTOLIC BLOOD PRESSURE: 142 MMHG | RESPIRATION RATE: 18 BRPM | HEART RATE: 67 BPM

## 2023-02-04 NOTE — HOME HEALTH
"SUBJECTIVE: \"We got out of the house just fine for my doctor appointment. Go look out back, you'll see what we did.\"  (Ramp made w/2 long boards secured to porch. 2 people assisted her down w/her in her w/c. One person in front, one person in back, she went down backwards as taught.)  FALLS: none  MEDICATION CHANGES: none  PLAN FOR NEXT VISIT: f/u on HEP, re-visit LBD w/o AE. Functional kitchen tasks at walker level. May try having her venture away from walker when near countertop."

## 2023-02-06 ENCOUNTER — HOME CARE VISIT (OUTPATIENT)
Dept: HOME HEALTH SERVICES | Facility: HOME HEALTHCARE | Age: 88
End: 2023-02-06
Payer: MEDICARE

## 2023-02-07 ENCOUNTER — HOME CARE VISIT (OUTPATIENT)
Dept: HOME HEALTH SERVICES | Facility: HOME HEALTHCARE | Age: 88
End: 2023-02-07
Payer: MEDICARE

## 2023-02-07 VITALS — OXYGEN SATURATION: 95 % | DIASTOLIC BLOOD PRESSURE: 78 MMHG | HEART RATE: 75 BPM | SYSTOLIC BLOOD PRESSURE: 130 MMHG

## 2023-02-07 PROCEDURE — G0151 HHCP-SERV OF PT,EA 15 MIN: HCPCS

## 2023-02-07 NOTE — HOME HEALTH
Pt reports she has had a couple loose stools but thinks its from Miralax she took over the weekend.  She slept well last night.  She has had some anxiety related being home so much and not in her normal routines.  She did report grandson still has to assist with getting her leg into bed.  Therefore, reintructed pt in compensatory strategies and she was able to return demon 3/3 trials.    She is progressing very well with transfers, ambulation, and HEP.  Her pain in L hip remains low <3/10, and on average 1/10.      Plan for next visit:   continued stair training, possibly trial of cane if pain remains low, review and progress HEP as tolerated.

## 2023-02-08 ENCOUNTER — TELEPHONE (OUTPATIENT)
Dept: INTERNAL MEDICINE | Facility: CLINIC | Age: 88
End: 2023-02-08
Payer: MEDICARE

## 2023-02-08 NOTE — TELEPHONE ENCOUNTER
Caller: Monica Hernández    Relationship: Emergency Contact    Best call back number: [phone]    What medication are you requesting: SOMETHING FOR ANXIETY AND PANIC ATTACKS.    What are your current symptoms: PANIC ATTACKS AND ANXIETY    How long have you been experiencing symptoms: SINCE ABOUT 01/23/23    Have you had these symptoms before:    [] Yes  [x] No    Have you been treated for these symptoms before:   [] Yes  [x] No    If a prescription is needed, what is your preferred pharmacy and phone number: [facility] [id number] - [place] [id number] AT [facility] [address]  - [phone]  - [phone] FX     Additional notes:    PATIENTS DAUGHTER SAYS PATIENT HAS NEVER HAD ISSUES LIKE THIS BUT SINCE BREAKING HER PELVIS AND BEING IN BED MOSTLY SHE HAS  BEEN HAVING THESE PANIC ATTACKS LIKE WALLS ARE CLOSING IN AND SHE IS NOT SURE WHAT SHE CAN TAKE OR IF SAM CAN GIVE HER SOMETHING.

## 2023-02-09 ENCOUNTER — HOME CARE VISIT (OUTPATIENT)
Dept: HOME HEALTH SERVICES | Facility: HOME HEALTHCARE | Age: 88
End: 2023-02-09
Payer: MEDICARE

## 2023-02-09 VITALS
HEART RATE: 73 BPM | OXYGEN SATURATION: 95 % | DIASTOLIC BLOOD PRESSURE: 70 MMHG | RESPIRATION RATE: 16 BRPM | SYSTOLIC BLOOD PRESSURE: 142 MMHG

## 2023-02-09 PROCEDURE — G0151 HHCP-SERV OF PT,EA 15 MIN: HCPCS

## 2023-02-09 NOTE — HOME HEALTH
Pt presented walking with the walker, agreeable to try back porch stairs.  She reports 1-3/10 pain in L hip.    Reviewed PT goals with pt and her grandson.  She has met all PT goals and agreeable to PT DC.  She will see OT next week.

## 2023-02-13 ENCOUNTER — HOME CARE VISIT (OUTPATIENT)
Dept: HOME HEALTH SERVICES | Facility: HOME HEALTHCARE | Age: 88
End: 2023-02-13
Payer: MEDICARE

## 2023-02-13 VITALS
SYSTOLIC BLOOD PRESSURE: 154 MMHG | HEART RATE: 67 BPM | OXYGEN SATURATION: 97 % | DIASTOLIC BLOOD PRESSURE: 66 MMHG | TEMPERATURE: 98.4 F

## 2023-02-13 PROCEDURE — G0152 HHCP-SERV OF OT,EA 15 MIN: HCPCS

## 2023-02-13 NOTE — HOME HEALTH
"SUBJECTIVE: \"I'm doing really good. Things are going well. I go see my doctor tomorrow.\"  FALLS: none reported  MEDICATION CHANGES: none reported.  PLAN FOR NEXT VISIT: OASIS D/C."

## 2023-02-14 ENCOUNTER — OFFICE VISIT (OUTPATIENT)
Dept: INTERNAL MEDICINE | Facility: CLINIC | Age: 88
End: 2023-02-14
Payer: MEDICARE

## 2023-02-14 VITALS
OXYGEN SATURATION: 96 % | WEIGHT: 126.4 LBS | HEART RATE: 65 BPM | BODY MASS INDEX: 25.48 KG/M2 | DIASTOLIC BLOOD PRESSURE: 70 MMHG | HEIGHT: 59 IN | TEMPERATURE: 97.5 F | SYSTOLIC BLOOD PRESSURE: 138 MMHG

## 2023-02-14 DIAGNOSIS — F41.9 ANXIETY: Primary | ICD-10-CM

## 2023-02-14 PROCEDURE — 99213 OFFICE O/P EST LOW 20 MIN: CPT | Performed by: NURSE PRACTITIONER

## 2023-02-14 RX ORDER — DIPHENOXYLATE HYDROCHLORIDE AND ATROPINE SULFATE 2.5; .025 MG/1; MG/1
TABLET ORAL DAILY
COMMUNITY

## 2023-02-14 RX ORDER — ONDANSETRON 4 MG/1
4 TABLET, ORALLY DISINTEGRATING ORAL EVERY 8 HOURS PRN
Qty: 40 TABLET | Refills: 1 | Status: SHIPPED | OUTPATIENT
Start: 2023-02-14

## 2023-02-14 RX ORDER — MIRTAZAPINE 15 MG/1
15 TABLET, FILM COATED ORAL NIGHTLY
Qty: 30 TABLET | Refills: 2 | Status: SHIPPED | OUTPATIENT
Start: 2023-02-14

## 2023-02-14 NOTE — PROGRESS NOTES
"        Chief Complaint  Anxiety     Subjective:      History of Present Illness {  Problem List  Visit  Diagnosis   Encounters  Notes  Medications  Labs  Result Review Imaging  Media :23}     Neha Guillory presents to [facility] for:  anxiety    Anxiety  Presents for initial visit. Onset was 1 to 4 weeks ago. The problem has been gradually worsening. Symptoms include chest pain, excessive worry, hyperventilation, nervous/anxious behavior, palpitations and panic. Episode frequency: sevearl times per day. The severity of symptoms is moderate. Nothing aggravates the symptoms. The quality of sleep is fair.     Risk factors include recent illness and a major life event (recent pelvic fracture). There is no history of anxiety/panic attacks. Past treatments include herbal remedies. The treatment provided mild relief. Compliance with prior treatments has been good.      Recent fall on 1/23/2023 which resulted in a pelvic fracture. Has been having panic attacks since being home on 1/27/2023. Prior to fall was very independent with care and caring for son who lives in the basement. Since fall, unable to care for him and grandson had to come home from the [employer] to care for him. Other grandson lives in home to help bathe, cook and help around the house. States episodes happen several times per day. During episode, unable to talk or move. Able to have relief by getting up and walking around the house. States feels that things are \"closing in around\", even talking on the phone. Has been taking Stress gummies twice daily.     Weight loss over past 2 months. Has been taking Boost shakes twice daily.     Denies HA, SOA, PND  Continues CPAP     I have reviewed patient's medical history, any new submitted information provided by patient or medical assistant and updated medical record.      Objective:      Physical Exam  Vitals reviewed.   Constitutional:       Appearance: Normal appearance. " She is well-developed.   HENT:      Head:      Comments: Wearing mask due to COVID   Neck:      Thyroid: No thyromegaly.   Cardiovascular:      Rate and Rhythm: Normal rate and regular rhythm.      Pulses: Normal pulses.      Heart sounds: Normal heart sounds.   Pulmonary:      Effort: Pulmonary effort is normal.      Breath sounds: Normal breath sounds.      Comments: E/U   Musculoskeletal:         General: Normal range of motion.      Cervical back: Normal range of motion and neck supple.   Lymphadenopathy:      Cervical: No cervical adenopathy.   Skin:     General: Skin is warm and dry.      Capillary Refill: Capillary refill takes 2 to 3 seconds.   Neurological:      Mental Status: She is alert and oriented to person, place, and time.   Psychiatric:         Attention and Perception: Attention normal.         Mood and Affect: Mood normal.         Behavior: Behavior normal. Behavior is cooperative.         Thought Content: Thought content normal.         Judgment: Judgment normal.      Comments: States she was feeling closed in upon entering room, but during exam was WDL.         Result Review  Data Reviewed:{ Labs  Result Review  Imaging  Med Tab  Media :23}     The following data was reviewed by: Yvan Ruiz III, NP-C on 02/14/2023  Common labs    Common Labs 1/23/23 1/23/23 1/24/23 1/24/23 1/25/23 1/25/23    2048 2252 0441 0441 0524 0524   Glucose 110 (A)   113 (A)  90   BUN 23   22  22   Creatinine 0.88   0.72  0.90   Sodium 138   136  135 (A)   Potassium 4.8   4.2  4.1   Chloride 103   102  102   Calcium 9.8   8.8  8.7   Albumin 3.8        Total Bilirubin 0.4        Alkaline Phosphatase 50        AST (SGOT) 19        ALT (SGPT) 13        WBC  16.94 (A) 11.57 (A)  8.98    Hemoglobin  11.5 (A) 10.6 (A)  9.9 (A)    Hematocrit  35.6 32.4 (A)  29.7 (A)    Platelets  226 174  155    (A) Abnormal value            Lab Results - Last 18 Months   Lab Units 01/25/23  0524 01/24/23  0441  01/23/23  2252 01/23/23  2048 10/12/22  0444 10/10/22  1633 08/24/22  1455 07/05/22  1045 01/13/22  0137 01/03/22  1951 01/03/22  1151 08/30/21  1341   BUN mg/dL 22 22  --  23   < > 24*  --  20   < >  --  17 19   CREATININE mg/dL 0.90 0.72  --  0.88   < > 1.15*   < > 1.08*   < >  --  0.78 0.89   EGFR IF NONAFRICN AM mL/min/1.73  --   --   --   --   --   --   --   --   --   --  70 60*   EGFR IF AFRICN AM mL/min/1.73  --   --   --   --   --   --   --   --   --   --   --  73   SODIUM mmol/L 135* 136  --  138   < > 138  --  144   < >  --  139 141   POTASSIUM mmol/L 4.1 4.2  --  4.8   < > 4.2  --  4.5   < >  --  4.3 4.6   CHLORIDE mmol/L 102 102  --  103   < > 102  --  108*   < >  --  103 103   CALCIUM mg/dL 8.7 8.8  --  9.8   < > 9.0  --  9.5   < >  --  10.1 9.8   ALBUMIN g/dL  --   --   --  3.8  --  3.70  --  3.8   < >  --  4.70 4.50   BILIRUBIN mg/dL  --   --   --  0.4  --  0.4  --  0.5   < >  --  0.6 0.5   ALK PHOS U/L  --   --   --  50  --  58  --  49   < >  --  74 54   AST (SGOT) U/L  --   --   --  19  --  17  --  19   < >  --  18 23   ALT (SGPT) U/L  --   --   --  13  --  15  --  20   < >  --  17 19   CHOLESTEROL mg/dL  --   --   --   --   --   --   --  101  --   --   --  126   TRIGLYCERIDES mg/dL  --   --   --   --   --   --   --  83  --   --   --  85   HDL CHOL mg/dL  --   --   --   --   --   --   --  43  --   --   --  51   VLDL CHOLESTEROL [place] mg/dL  --   --   --   --   --   --   --  17  --   --   --  17   LDL CHOL mg/dL  --   --   --   --   --   --   --  41  --   --   --  58   HEMOGLOBIN A1C %  --   --   --   --   --   --   --  5.9*  --   --   --   --    WBC 10*3/mm3 8.98 11.57* 16.94*  --    < > 16.74*   < > 9.0   < >  --  11.88* 8.95   RBC 10*6/mm3 3.41* 3.58* 4.06  --    < > 4.13   < > 4.28   < >  --  4.51 4.48   HEMATOCRIT % 29.7* 32.4* 35.6  --    < > 37.1   < > 38.8   < >  --  40.7 40.2   MCV fL 87.1 90.5 87.7  --    < > 89.8   < > 91   < >  --  90.2 89.7   MCH pg 29.0 29.6 28.3  --    < > 28.6   < >  "29.2   < >  --  29.9 29.7   TSH uIU/mL  --   --   --   --   --   --   --  0.858  --   --   --  1.020   INR   --   --   --   --   --   --   --   --   --  1.05  --   --     < > = values in this interval not displayed.     Recent hospitalization notes from fall on 1/23/2023     Vital Signs:   /70 (BP Location: Left arm, Patient Position: Sitting, Cuff Size: Adult)   Pulse 65   Temp 97.5 °F (36.4 °C) (Temporal)   Ht 149.9 cm (59\")   Wt 57.3 kg (126 lb 6.4 oz)   SpO2 96%   BMI 25.53 kg/m²         Requested Prescriptions     Signed Prescriptions Disp Refills   • ondansetron ODT (ZOFRAN-ODT) 4 MG disintegrating tablet 40 tablet 1     Sig: Place 1 tablet on the tongue Every 8 (Eight) Hours As Needed for Nausea or Vomiting.   • mirtazapine (Remeron) 15 MG tablet 30 tablet 2     Sig: Take 1 tablet by mouth Every Night.       Routine medications provided by this office will also be refilled via pharmacy request.       Current Outpatient Medications:   •  amLODIPine (NORVASC) 5 MG tablet, Take 5 mg by mouth Daily., Disp: , Rfl:   •  aspirin 81 MG EC tablet, Take 1 tablet by mouth Daily. Indications: Disease involving Lipid Deposits in the Arteries, Disp: , Rfl:   •  B Complex Vitamins (vitamin b complex) capsule capsule, Take 1 capsule by mouth Daily., Disp: , Rfl:   •  carvedilol (COREG) 12.5 MG tablet, Take 1 tablet by mouth Every 12 (Twelve) Hours., Disp: 60 tablet, Rfl: 3  •  cholecalciferol (VITAMIN D3) 1000 units tablet, Take 1,000 Units by mouth Daily., Disp: , Rfl:   •  Lactobacillus (PROBIOTIC ACIDOPHILUS PO), Take 1 tablet by mouth Daily., Disp: , Rfl:   •  multivitamin (STRESSTABS PO), Take  by mouth Daily. Gummies, Disp: , Rfl:   •  olmesartan (BENICAR) 20 MG tablet, Take 1 tablet by mouth Daily., Disp: 90 tablet, Rfl: 3  •  pravastatin (PRAVACHOL) 40 MG tablet, Take 1 tablet by mouth Daily., Disp: 90 tablet, Rfl: 1  •  tamoxifen (NOLVADEX) 20 MG chemo tablet, Take 1 tablet by mouth Daily., Disp: 90 " tablet, Rfl: 3  •  mirtazapine (Remeron) 15 MG tablet, Take 1 tablet by mouth Every Night., Disp: 30 tablet, Rfl: 2  •  ondansetron ODT (ZOFRAN-ODT) 4 MG disintegrating tablet, Place 1 tablet on the tongue Every 8 (Eight) Hours As Needed for Nausea or Vomiting., Disp: 40 tablet, Rfl: 1     Assessment and Plan:      Assessment and Plan {CC Problem List  Visit Diagnosis  ROS  Review (Popup)  Health Maintenance  Quality  BestPractice  Medications  SmartSets  SnapShot Encounters  Media :23}     Problem List Items Addressed This Visit    None  Visit Diagnoses     Anxiety    -  Primary    Will trial on remeron.  1/2 dose for 3 days then work up to 15 mg. Daughter will let me know if sx doesn't improve.  Should also help with appetite.     Relevant Medications    mirtazapine (Remeron) 15 MG tablet          Follow Up {Instructions Charge Capture  Follow-up Communications :23}     Return if symptoms worsen or fail to improve.      Patient was given instructions and counseling regarding her condition or for health maintenance advice. Please see specific information pulled into the AVS if appropriate.    Dragon disclaimer:   Much of this encounter note is an electronic transcription/translation of spoken language to printed text. The electronic translation of spoken language may permit erroneous, or at times, nonsensical words or phrases to be inadvertently transcribed; Although I have reviewed the note for such errors, some may still exist.     Additional Patient Counseling:       There are no Patient Instructions on file for this visit.

## 2023-02-16 ENCOUNTER — HOME CARE VISIT (OUTPATIENT)
Dept: HOME HEALTH SERVICES | Facility: HOME HEALTHCARE | Age: 88
End: 2023-02-16
Payer: MEDICARE

## 2023-02-16 VITALS
OXYGEN SATURATION: 95 % | TEMPERATURE: 97.8 F | SYSTOLIC BLOOD PRESSURE: 140 MMHG | HEART RATE: 69 BPM | RESPIRATION RATE: 17 BRPM | DIASTOLIC BLOOD PRESSURE: 64 MMHG

## 2023-02-16 PROCEDURE — G0152 HHCP-SERV OF OT,EA 15 MIN: HCPCS

## 2023-02-16 NOTE — HOME HEALTH
SUBJECTIVE: agreeable to agency d/c today.  FALLS: none reported.  MEDICATION CHANGES: APRN added Remeron to her regimen. She has taken 1/2 tablet 2x thus far.

## 2023-03-01 ENCOUNTER — OFFICE VISIT (OUTPATIENT)
Dept: ORTHOPEDIC SURGERY | Facility: CLINIC | Age: 88
End: 2023-03-01
Payer: MEDICARE

## 2023-03-01 VITALS — HEIGHT: 59 IN | BODY MASS INDEX: 26.23 KG/M2 | TEMPERATURE: 98.7 F | WEIGHT: 130.1 LBS

## 2023-03-01 DIAGNOSIS — R10.2 PELVIC PAIN: Primary | ICD-10-CM

## 2023-03-01 DIAGNOSIS — M17.10 ARTHRITIS OF KNEE: ICD-10-CM

## 2023-03-01 DIAGNOSIS — S32.592A PUBIC RAMUS FRACTURE, LEFT, CLOSED, INITIAL ENCOUNTER: ICD-10-CM

## 2023-03-01 PROCEDURE — 99213 OFFICE O/P EST LOW 20 MIN: CPT | Performed by: ORTHOPAEDIC SURGERY

## 2023-03-01 PROCEDURE — 72190 X-RAY EXAM OF PELVIS: CPT | Performed by: ORTHOPAEDIC SURGERY

## 2023-03-01 PROCEDURE — 20610 DRAIN/INJ JOINT/BURSA W/O US: CPT | Performed by: ORTHOPAEDIC SURGERY

## 2023-03-01 RX ORDER — LIDOCAINE HYDROCHLORIDE 10 MG/ML
2 INJECTION, SOLUTION EPIDURAL; INFILTRATION; INTRACAUDAL; PERINEURAL
Status: COMPLETED | OUTPATIENT
Start: 2023-03-01 | End: 2023-03-01

## 2023-03-01 RX ORDER — METHYLPREDNISOLONE ACETATE 80 MG/ML
80 INJECTION, SUSPENSION INTRA-ARTICULAR; INTRALESIONAL; INTRAMUSCULAR; SOFT TISSUE
Status: COMPLETED | OUTPATIENT
Start: 2023-03-01 | End: 2023-03-01

## 2023-03-01 RX ADMIN — LIDOCAINE HYDROCHLORIDE 2 ML: 10 INJECTION, SOLUTION EPIDURAL; INFILTRATION; INTRACAUDAL; PERINEURAL at 16:36

## 2023-03-01 RX ADMIN — METHYLPREDNISOLONE ACETATE 80 MG: 80 INJECTION, SUSPENSION INTRA-ARTICULAR; INTRALESIONAL; INTRAMUSCULAR; SOFT TISSUE at 16:36

## 2023-03-01 NOTE — PROGRESS NOTES
"CC:  Pelvic fracture, new complaint of left knee pain    Ms. Guillory follows up for her left-sided pelvic fracture.  She says it is feeling much better.  Her daughter has accompanied her and she confirms the same.  She says her mother seems to be \"doing fantastic\".  They both tell me she is basically back to doing all of her routine daily activities.  The only difference from preinjury is that she is using a walker on occasion.  They do mention a new complaint of left knee pain.  This has been bothering her more over the past month or so.  She describes a moderate medial sided ache.    Pelvis is nontender.  No pain with compression along the iliac crests.  No pain with left hip range of motion.  Skin at the left knee is examined.  Moderate medial joint line tenderness.  Trace effusion.  She has good left knee motion.  No instability.    AP, inlet and outlet views of the pelvis are ordered and reviewed to evaluate healing of her fractures.  These are compared to previous x-rays.  The left-sided rami fractures are in unchanged alignment.  There has been some interval callus formation compared to previous x-rays.    I went back and reviewed her initial injury films from January.  They did get x-rays of her left tibia and fibula.  Those x-rays show what appears to be advanced medial compartment osteoarthritis at the knee with joint space narrowing and subchondral sclerosis.    Assessment: 1.  Healing left-sided pelvic fractures 2.  Left knee osteoarthritis    Plan: Her pelvis seems to be doing great.  She can progress her activity as tolerated.  Her fractures are healing although they are not yet healed.  I would like to see her back in another 6 weeks to reevaluate.    We discussed options for her knee.  She elected to try a cortisone injection today.  The risk, benefits alternatives were discussed.  She consented and injection was performed as described below.    Large Joint Arthrocentesis: R knee  Date/Time: 3/1/2023 " 4:36 PM  Consent given by: patient  Site marked: site marked  Timeout: Immediately prior to procedure a time out was called to verify the correct patient, procedure, equipment, support staff and site/side marked as required   Supporting Documentation  Indications: pain and joint swelling   Procedure Details  Location: knee - R knee  Preparation: Patient was prepped and draped in the usual sterile fashion  Needle size: 25 G (21 G)  Approach: anterolateral  Medications administered: 80 mg methylPREDNISolone acetate 80 MG/ML; 2 mL lidocaine PF 1% 1 %  Patient tolerance: patient tolerated the procedure well with no immediate complications

## 2023-04-12 ENCOUNTER — OFFICE VISIT (OUTPATIENT)
Dept: ORTHOPEDIC SURGERY | Facility: CLINIC | Age: 88
End: 2023-04-12
Payer: MEDICARE

## 2023-04-12 VITALS — WEIGHT: 127.8 LBS | TEMPERATURE: 98.4 F | BODY MASS INDEX: 25.76 KG/M2 | HEIGHT: 59 IN

## 2023-04-12 DIAGNOSIS — M54.50 LUMBAR PAIN: ICD-10-CM

## 2023-04-12 DIAGNOSIS — R10.2 PELVIC PAIN: Primary | ICD-10-CM

## 2023-04-12 DIAGNOSIS — S32.592A PUBIC RAMUS FRACTURE, LEFT, CLOSED, INITIAL ENCOUNTER: ICD-10-CM

## 2023-04-12 NOTE — PROGRESS NOTES
CC: Follow-up pelvic fracture, new complaint of right hip pain    Ms. Guillory says her pelvic fracture seems to be doing great.  She is no longer having any left-sided hip or pelvic pain.  She has been having pain in her low back and right hip/buttock.  She reports that she has noticed that she now has count of a waddling gait.  She says that she feels unsteady on her feet at times.  She has been using a cane and it seems to help.  Denies any bowel or bladder dysfunction.  Denies any weakness.  Denies any numbness or tingling.    No pain on palpation of the left hip.  No pain with axial compression.  Negative Stinchfield on the left.  Good left hip motion.  She has a positive straight leg raise maneuver on the right which reproduces hip and buttock pain.    AP inlet and outlet views of the pelvis are ordered and reviewed evaluate her fracture.  Alignment looks about the same as previous x-rays.  There has been interval callus formation although the fractures do not appear completely healed.  Incidentally, it appears that she has advanced low lumbar degenerative disc disease.    AP and lateral views lumbar spine are ordered and reviewed to evaluate her back as a potential source of her ongoing hip pain and gait dysfunction.  No comparison films are available.  She has degenerative scoliosis and advanced lower lumbar degenerative disc disease with some collapse towards the right side at L5/S1.    Assessment: 1.  Healing pelvic fracture 2.  Lumbar degenerative disc disease with suspected right-sided radiculopathy    Plan: I agree that her pelvic fracture seems to be healing.  It needs more time but I think she is doing fine.  I suspect that her hip pain may be coming from the back.  We talked about further work-up with an MRI.  She is not really interested in pursuing that at this time.  She would prefer to try PT.  I think that is reasonable.  I gave her a referral as requested.  If the symptoms are cyst or worsen, I  told her to notify me and I will be happy to go ahead and refer her for the MRI.  Otherwise, my plan is to have her come back and see me in about 2 months to evaluate both issues.    Riki Maurice MD

## 2023-04-25 DIAGNOSIS — M81.8 OTHER OSTEOPOROSIS WITHOUT CURRENT PATHOLOGICAL FRACTURE: ICD-10-CM

## 2023-04-25 DIAGNOSIS — Z79.899 LONG-TERM USE OF HIGH-RISK MEDICATION: Primary | ICD-10-CM

## 2023-04-25 DIAGNOSIS — M81.0 OSTEOPOROSIS, POST-MENOPAUSAL: ICD-10-CM

## 2023-04-28 ENCOUNTER — INFUSION (OUTPATIENT)
Dept: ONCOLOGY | Facility: HOSPITAL | Age: 88
End: 2023-04-28
Payer: MEDICARE

## 2023-04-28 ENCOUNTER — OFFICE VISIT (OUTPATIENT)
Dept: ONCOLOGY | Facility: CLINIC | Age: 88
End: 2023-04-28
Payer: MEDICARE

## 2023-04-28 VITALS
SYSTOLIC BLOOD PRESSURE: 154 MMHG | TEMPERATURE: 98 F | HEIGHT: 59 IN | WEIGHT: 131 LBS | HEART RATE: 63 BPM | BODY MASS INDEX: 26.41 KG/M2 | OXYGEN SATURATION: 97 % | RESPIRATION RATE: 17 BRPM | DIASTOLIC BLOOD PRESSURE: 79 MMHG

## 2023-04-28 DIAGNOSIS — M81.0 OSTEOPOROSIS, POST-MENOPAUSAL: ICD-10-CM

## 2023-04-28 DIAGNOSIS — Z79.899 LONG-TERM USE OF HIGH-RISK MEDICATION: ICD-10-CM

## 2023-04-28 DIAGNOSIS — M81.8 OTHER OSTEOPOROSIS WITHOUT CURRENT PATHOLOGICAL FRACTURE: ICD-10-CM

## 2023-04-28 DIAGNOSIS — C50.212 CARCINOMA OF UPPER-INNER QUADRANT OF LEFT BREAST IN FEMALE, ESTROGEN RECEPTOR POSITIVE: Primary | Chronic | ICD-10-CM

## 2023-04-28 DIAGNOSIS — Z79.899 LONG-TERM USE OF HIGH-RISK MEDICATION: Primary | ICD-10-CM

## 2023-04-28 DIAGNOSIS — Z17.0 CARCINOMA OF UPPER-INNER QUADRANT OF LEFT BREAST IN FEMALE, ESTROGEN RECEPTOR POSITIVE: Primary | Chronic | ICD-10-CM

## 2023-04-28 LAB
ALBUMIN SERPL-MCNC: 4.1 G/DL (ref 3.5–5.2)
ALBUMIN/GLOB SERPL: 1.6 G/DL (ref 1.1–2.4)
ALP SERPL-CCNC: 57 U/L (ref 38–116)
ALT SERPL W P-5'-P-CCNC: 13 U/L (ref 0–33)
ANION GAP SERPL CALCULATED.3IONS-SCNC: 12 MMOL/L (ref 5–15)
AST SERPL-CCNC: 19 U/L (ref 0–32)
BASOPHILS # BLD AUTO: 0.04 10*3/MM3 (ref 0–0.2)
BASOPHILS NFR BLD AUTO: 0.5 % (ref 0–1.5)
BILIRUB SERPL-MCNC: 0.4 MG/DL (ref 0.2–1.2)
BUN SERPL-MCNC: 22 MG/DL (ref 6–20)
BUN/CREAT SERPL: 23.7 (ref 7.3–30)
CALCIUM SPEC-SCNC: 9.5 MG/DL (ref 8.5–10.2)
CHLORIDE SERPL-SCNC: 104 MMOL/L (ref 98–107)
CO2 SERPL-SCNC: 24 MMOL/L (ref 22–29)
CREAT SERPL-MCNC: 0.93 MG/DL (ref 0.6–1.1)
DEPRECATED RDW RBC AUTO: 49.1 FL (ref 37–54)
EGFRCR SERPLBLD CKD-EPI 2021: 58.5 ML/MIN/1.73
EOSINOPHIL # BLD AUTO: 0.17 10*3/MM3 (ref 0–0.4)
EOSINOPHIL NFR BLD AUTO: 2.2 % (ref 0.3–6.2)
ERYTHROCYTE [DISTWIDTH] IN BLOOD BY AUTOMATED COUNT: 14.7 % (ref 12.3–15.4)
GLOBULIN UR ELPH-MCNC: 2.5 GM/DL (ref 1.8–3.5)
GLUCOSE SERPL-MCNC: 127 MG/DL (ref 74–124)
HCT VFR BLD AUTO: 37.2 % (ref 34–46.6)
HGB BLD-MCNC: 11.6 G/DL (ref 12–15.9)
IMM GRANULOCYTES # BLD AUTO: 0.04 10*3/MM3 (ref 0–0.05)
IMM GRANULOCYTES NFR BLD AUTO: 0.5 % (ref 0–0.5)
LYMPHOCYTES # BLD AUTO: 1.12 10*3/MM3 (ref 0.7–3.1)
LYMPHOCYTES NFR BLD AUTO: 14.7 % (ref 19.6–45.3)
MAGNESIUM SERPL-MCNC: 2 MG/DL (ref 1.8–2.5)
MCH RBC QN AUTO: 28.4 PG (ref 26.6–33)
MCHC RBC AUTO-ENTMCNC: 31.2 G/DL (ref 31.5–35.7)
MCV RBC AUTO: 91.2 FL (ref 79–97)
MONOCYTES # BLD AUTO: 0.79 10*3/MM3 (ref 0.1–0.9)
MONOCYTES NFR BLD AUTO: 10.4 % (ref 5–12)
NEUTROPHILS NFR BLD AUTO: 5.45 10*3/MM3 (ref 1.7–7)
NEUTROPHILS NFR BLD AUTO: 71.7 % (ref 42.7–76)
NRBC BLD AUTO-RTO: 0 /100 WBC (ref 0–0.2)
PHOSPHATE SERPL-MCNC: 3.1 MG/DL (ref 2.5–4.5)
PLATELET # BLD AUTO: 222 10*3/MM3 (ref 140–450)
PMV BLD AUTO: 10 FL (ref 6–12)
POTASSIUM SERPL-SCNC: 4.4 MMOL/L (ref 3.5–4.7)
PROT SERPL-MCNC: 6.6 G/DL (ref 6.3–8)
RBC # BLD AUTO: 4.08 10*6/MM3 (ref 3.77–5.28)
SODIUM SERPL-SCNC: 140 MMOL/L (ref 134–145)
WBC NRBC COR # BLD: 7.61 10*3/MM3 (ref 3.4–10.8)

## 2023-04-28 PROCEDURE — 84100 ASSAY OF PHOSPHORUS: CPT

## 2023-04-28 PROCEDURE — 99214 OFFICE O/P EST MOD 30 MIN: CPT | Performed by: INTERNAL MEDICINE

## 2023-04-28 PROCEDURE — 83735 ASSAY OF MAGNESIUM: CPT

## 2023-04-28 PROCEDURE — 25010000002 ZOLEDRONIC ACID 5 MG/100ML SOLUTION: Performed by: INTERNAL MEDICINE

## 2023-04-28 PROCEDURE — 85025 COMPLETE CBC W/AUTO DIFF WBC: CPT

## 2023-04-28 PROCEDURE — 96374 THER/PROPH/DIAG INJ IV PUSH: CPT

## 2023-04-28 PROCEDURE — 80053 COMPREHEN METABOLIC PANEL: CPT

## 2023-04-28 PROCEDURE — 1126F AMNT PAIN NOTED NONE PRSNT: CPT | Performed by: INTERNAL MEDICINE

## 2023-04-28 RX ORDER — ZOLEDRONIC ACID 5 MG/100ML
5 INJECTION, SOLUTION INTRAVENOUS ONCE
Status: COMPLETED | OUTPATIENT
Start: 2023-04-28 | End: 2023-04-28

## 2023-04-28 RX ORDER — SODIUM CHLORIDE 9 MG/ML
250 INJECTION, SOLUTION INTRAVENOUS ONCE
Status: COMPLETED | OUTPATIENT
Start: 2023-04-28 | End: 2023-04-28

## 2023-04-28 RX ORDER — ZOLEDRONIC ACID 5 MG/100ML
5 INJECTION, SOLUTION INTRAVENOUS ONCE
Status: CANCELLED | OUTPATIENT
Start: 2023-04-28

## 2023-04-28 RX ORDER — SODIUM CHLORIDE 9 MG/ML
250 INJECTION, SOLUTION INTRAVENOUS ONCE
Status: CANCELLED | OUTPATIENT
Start: 2023-04-28

## 2023-04-28 RX ADMIN — SODIUM CHLORIDE 250 ML: 9 INJECTION, SOLUTION INTRAVENOUS at 15:48

## 2023-04-28 RX ADMIN — ZOLEDRONIC ACID 5 MG: 0.05 INJECTION, SOLUTION INTRAVENOUS at 15:48

## 2023-04-28 NOTE — PROGRESS NOTES
Subjective Patient feeling well    REASON FOR FOLLOW-UP left sided early stage breast cancer                           History of Present Illness   The patient is an 89-year-old female with a previously surgically treated hyperparathyroidism.  She had recently developed spontaneous bleeding led additional in her right neck extending to the shoulder and across the shoulder and breast with subsequent certain about inflammatory breast cancer.  She underwent a screening mammogram 1/26/2022 and then diagnostic mammogram and ultrasound right breast.  There was an area of focal asymmetry in the posterior one third upper inner quadrant left breast not the affected breast, persist on spot compression negative findings on ultrasound though there was a 5 mm hypoechoic mass left breast that was noted.  Ultrasound-guided biopsy 2/17 revealed invasive lobular carcinoma grade 1 with no lymphatic or perineural invasion, ER 99%, AZ 60%, HER-2 negative and Ki-67 of 4%.     Her chest wall erythema led to an ER visit with CT scan demonstrating chronic right shoulder disease, complete rotator cuff tear.     The patient was seen 2/24/2022 by Dr. Marina general surgery with the surgery requesting not to have radiation therapy at home mastectomy but agreeable to lumpectomy and sentinel lymph node.     Patient now referred to medical oncology for consideration of endocrine therapy as appropriate.      At the time of this dictation she has been seen by radiation therapy.  There are plans to proceed with lumpectomy and sentinel lymph node biopsy and the fact that women of 70 years or older with ER positive tumors do not gain substantially with the addition of radiation therapy.  This has to be balanced with the fact that the patient is known to have osteoporosis involving both hips with left hip T score -3.1 and right hip T score of -2.8, lumbar T score is -0.8.    These findings are discussed with the patient and her daughter in some  detail when they are seen 3/7/2022 particularly recognizing the patient is quite active, lives on her own and continues to manage all her daily activities.  Notably she continues to have issues with bilateral rotator cuff injury right greater than left and is to be seen for aspiration of her right shoulder prior to decision made for her breast surgery.    Patient proceeded to right shoulder aspiration that was negative for infectious concerns and then to to surgery 4/7/2022 undergoing a left needle localization lumpectomy with tracing and left axillary sentinel node biopsy.  Her findings revealed invasive lobular carcinoma measuring 5 mm x 5 mm x 4 mm [place] grade 1, negative margins sentinel lymph nodes x1 --[id number] ER/[place] positive, HER-2 negative.    The patient is seen with her daughter 4/11/2022 fortunately having recovered quickly post surgery.  We discussed the findings of her pathology and her current status with plans to initiate tamoxifen (previously discussed) and proceed with Reclast.    The patient went on to be treated as described with Reclast and fortunately had no additional side effects.  She is next seen 6/27/2022 and  2 months also without side effect profile.  Both she and her daughter indicate that she is doing quite well.    Patient next evaluated 1/11/2023.  Fortunately she is doing quite well and follow-up scan shows no substantial change from previous with multifocal pneumonia and prior to exam having resolved, mediastinal no enlargement with improvement in 5 mm right upper lobe nodule that is also present new from 10/11/2022.  She is having considerable issues with her shoulder reviewed by Dr. Cantrell and request an assessment by [facility].    Patient next seen 4/28/2023 status post orthopedic assessment- healing pelvic fracture, lumbar degenerative disc disease with suspected right-sided radiculopathy.  Patient tolerating tamoxifen without additional complication.  We  discussed ongoing treatment with Reclast which is given yearly .      Past Medical History:   Diagnosis Date   • Aortic stenosis, mild 8/24/2022   • Arthritis    • Carcinoma of upper-inner quadrant of left breast in female, estrogen receptor positive 02/24/2022   • Carotid artery stenosis    • Carotid atherosclerosis, bilateral 07/12/2019   • Cataract     BILATERAL   • Depression    • Gastroenteritis    • Generalized osteoarthritis 02/01/2016   • H/O diastolic dysfunction    • Hyperlipidemia    • Hyperparathyroidism    • Hyperparathyroidism    • Hypertension    • Hypertrophic cardiomyopathy    • Iron deficiency anemia 10/13/2022   • Multifocal pneumonia 10/10/2022   • Neuromuscular disorder    • Obstructive sleep apnea syndrome 02/01/2016   • Osteopenia    • Osteoporosis    • Primary familial hypertrophic cardiomyopathy 02/01/2016   • Pulmonary hypertension    • Synovial cyst of popliteal space 02/01/2016   • Vitamin D deficiency         Past Surgical History:   Procedure Laterality Date   • BREAST BIOPSY Left 02/17/2022   • BREAST LUMPECTOMY WITH SENTINEL NODE BIOPSY Left 4/7/2022    Procedure: LEFT BREAST LUMPECTOMY WITH SENTINEL NODE BIOPSY AND NEEDLE LOCALIZATION;  Surgeon: Bel Marina MD;  Location: [facility];  Service: General;  Laterality: Left;   • BUNIONECTOMY Right     FOOT SURGERY   • COLONOSCOPY N/A 2011    Dr. Luis   • HAND SURGERY Right     TUMOR REMOVED ON FOREFINGER   • THYROIDECTOMY Right 4/20/2016    Procedure: RIGHT SUPERIOR PARATHYROIDECTOMY;  Surgeon: Bel Marina MD;  Location: [facility];  Service:         Current Outpatient Medications on File Prior to Visit   Medication Sig Dispense Refill   • amLODIPine (NORVASC) 5 MG tablet Take 1 tablet by mouth Daily.     • aspirin 81 MG EC tablet Take 1 tablet by mouth Daily. Indications: Disease involving Lipid Deposits in the Arteries     • B Complex Vitamins (vitamin b complex) capsule capsule Take 1 capsule by mouth Daily.     •  carvedilol (COREG) 12.5 MG tablet Take 1 tablet by mouth Every 12 (Twelve) Hours. 60 tablet 3   • cholecalciferol (VITAMIN D3) 1000 units tablet Take 1 tablet by mouth Daily.     • Lactobacillus (PROBIOTIC ACIDOPHILUS PO) Take 1 tablet by mouth Daily.     • mirtazapine (Remeron) 15 MG tablet Take 1 tablet by mouth Every Night. 30 tablet 2   • multivitamin (THERAGRAN) tablet tablet Take  by mouth Daily. Gummies     • olmesartan (BENICAR) 20 MG tablet Take 1 tablet by mouth Daily. 90 tablet 3   • ondansetron ODT (ZOFRAN-ODT) 4 MG disintegrating tablet Place 1 tablet on the tongue Every 8 (Eight) Hours As Needed for Nausea or Vomiting. 40 tablet 1   • pravastatin (PRAVACHOL) 40 MG tablet Take 1 tablet by mouth Daily. 90 tablet 1   • tamoxifen (NOLVADEX) 20 MG chemo tablet Take 1 tablet by mouth Daily. 90 tablet 3     No current facility-administered medications on file prior to visit.        ALLERGIES:    Allergies   Allergen Reactions   • Amoxicillin Hives   • Atorvastatin Myalgia        Social History     Socioeconomic History   • Marital status: Single   Tobacco Use   • Smoking status: Never   • Smokeless tobacco: Never   Vaping Use   • Vaping Use: Never used   Substance and Sexual Activity   • Alcohol use: No   • Drug use: No   • Sexual activity: Defer        Family History   Problem Relation Age of Onset   • Diabetes Mother    • Heart attack Mother    • Arthritis Father    • Breast cancer Sister    • Breast cancer Sister    • Breast cancer Sister    • Colon cancer Sister    • Heart disease Brother         CABG   • Other Brother         BRAIN TUMOR   • Colon cancer Brother    • Colon cancer Brother    • Colon cancer Brother    • Colon cancer Maternal Grandfather    • Cancer Other    • Malig Hyperthermia Neg Hx       Family cancer history is positive for sister with breast cancer, a broth had a brain tumor, a brother/sister/maternal grandfather had colon cancer.     Review of Systems     Objective     Vitals:     "04/28/23 1441   BP: 154/79   Pulse: 63   Resp: 17   Temp: 98 °F (36.7 °C)   TempSrc: Temporal   SpO2: 97%   Weight: 59.4 kg (131 lb)   Height: 149.9 cm (59.02\")   PainSc: 0-No pain         4/28/2023     2:42 PM   Current Status   ECOG score 0       Physical Exam  Constitutional:       Appearance: Normal appearance.   HENT:      Head: Normocephalic and atraumatic.      Nose: Nose normal.      Mouth/Throat:      Mouth: Mucous membranes are moist.      Pharynx: Oropharynx is clear.   Eyes:      Extraocular Movements: Extraocular movements intact.      Conjunctiva/sclera: Conjunctivae normal.      Pupils: Pupils are equal, round, and reactive to light.   Neck:      Comments: Status post previous parathyroid surgery, well-healed  Cardiovascular:      Rate and Rhythm: Normal rate and regular rhythm.      Pulses: Normal pulses.      Heart sounds: Normal heart sounds.   Pulmonary:      Effort: Pulmonary effort is normal.      Breath sounds: Normal breath sounds.      Comments: Left breast lumpectomy site well-healing, left axillary site also without additional inflammation  Musculoskeletal:      Cervical back: Normal range of motion and neck supple.   Neurological:      Mental Status: She is alert.           RECENT LABS:  Hematology WBC   Date Value Ref Range Status   04/28/2023 7.61 3.40 - 10.80 10*3/mm3 Final   10/19/2022 5.59 3.40 - 10.80 10*3/mm3 Final     RBC   Date Value Ref Range Status   04/28/2023 4.08 3.77 - 5.28 10*6/mm3 Final   10/19/2022 4.17 3.77 - 5.28 10*6/mm3 Final     Hemoglobin   Date Value Ref Range Status   04/28/2023 11.6 (L) 12.0 - 15.9 g/dL Final     Hematocrit   Date Value Ref Range Status   04/28/2023 37.2 34.0 - 46.6 % Final     Platelets   Date Value Ref Range Status   04/28/2023 222 140 - 450 10*3/mm3 Final          Assessment & Plan          89-year-old female with a history of osteoarthritis, hyperparathyroidism treated surgically, obstructive sleep apnea, carotid artery disease, hypertrophic " cardiomyopathy, osteoporosis and bilateral rotator cuff injuries right greater than left.  She has, recently, been found to have a carcinoma the upper inner quadrant of the left breast-invasive lobular carcinoma overall grade 1, 5 mm, ER 99%, NC 60%, Ki-67 4%-[id number].     The patient is here with her daughter 3/7/2022 we have discussed her treatment overall for her newfound breast cancer with patient preference to avoid chemotherapy or adjuvant radiation therapy.      There is no evidence that chemotherapy has a role in this patient's care and we have discussed that it would not be necessary.  Radiation therapy benefit has also been discussed and will not be pursued.      Notably the latter is supported by a previous meta-analysis that looked at women greater than 70 years of age with clinical stage I ER positive women who were treated with radiation and tamoxifen with results that indicate that baseline risk is low enough to reasonably avoid RT.    As we discussed endocrine therapy she is also felt better served with tamoxifen therapy as result of her underlying osteoporosis.  She has been on long-term Prolia without substantial effect to this point and might be better served with an alternative therapy such as Reclast.          After discussion the patient went on to have fluid aspiration from her right shoulder joint .  Her studies were unremarkable for infectious process and the erythema was thought to be related to hematoma from DJD.                                                                                                                                                    She was able to proceed to breast surgery 4/7/2022 undergoing a left needle localization lumpectomy with tracing and left axillary sentinel node biopsy.  Her findings revealed invasive lobular carcinoma measuring 5 mm x 5 mm x 4 mm [place] grade 1, negative margins sentinel lymph nodes x1 --[id number] ER/[place] positive, HER-2  negative.    We went on to proceed with Reclast, continue vitamin D and calcium supplementation 4/11/2022.  The patient began tamoxifen and is seen back 6/27/2022 without any side effect profile.  We discussed the patient's mild leukocytosis and that she should be aware of any fever or symptoms of infection.  Nothing is present today that might indicate this.  We will also inquire again as to whether she has had any steroid injections but this is not the case thus far.      Patient next evaluated 1/11/2023.  Fortunately she is doing quite well and follow-up scan shows no substantial change from previous with multifocal pneumonia and prior to exam having resolved, mediastinal no enlargement with improvement in 5 mm right upper lobe nodule that is also present new from 10/11/2022.  She is having considerable issues with her shoulder reviewed by Dr. Cantrell and request an assessment by [facility].    Patient next seen 4/28/2023 status post orthopedic assessment- healing pelvic fracture, lumbar degenerative disc disease with suspected right-sided radiculopathy.  Patient tolerating tamoxifen without additional complication.  We discussed ongoing treatment with Reclast which is given yearly    Plan:    *Reclast today-given yearly for osteoporosis    *Patient continues tamoxifen initiated approximately March 2022.    *6 months follow-up NP

## 2023-05-11 ENCOUNTER — HOSPITAL ENCOUNTER (OUTPATIENT)
Dept: PHYSICAL THERAPY | Facility: HOSPITAL | Age: 88
Setting detail: THERAPIES SERIES
Discharge: HOME | End: 2023-05-11
Payer: MEDICARE

## 2023-05-11 DIAGNOSIS — M25.551 RIGHT HIP PAIN: Primary | ICD-10-CM

## 2023-05-11 DIAGNOSIS — R26.2 DIFFICULTY WALKING: ICD-10-CM

## 2023-05-11 PROCEDURE — 97110 THERAPEUTIC EXERCISES: CPT

## 2023-05-11 PROCEDURE — 97161 PT EVAL LOW COMPLEX 20 MIN: CPT

## 2023-05-11 NOTE — THERAPY EVALUATION
Outpatient Physical Therapy Ortho Initial Evaluation  [facility]     Patient Name: Neha Guillory  : 1933  MRN: [id number]  Today's Date: 2023      Visit Date: 2023    Patient Active Problem List   Diagnosis   • Hyperlipidemia   • Essential hypertension   • Hypertrophic cardiomyopathy (HCC)   • Generalized osteoarthritis   • RICH on CPAP   • Osteoporosis   • Vitamin D deficiency   • Colon polyps   • Family hx of colon cancer   • H/O parathyroidectomy   • Pulmonary hypertension (HCC)   • Echocardiogram abnormal   • Carotid artery stenosis   • Ischemic rest pain of lower extremity   • Bilateral carpal tunnel syndrome   • Visit for screening mammogram   • Osteopenia   • Hyperuricemia   • Senile osteoporosis   • Diastolic dysfunction   • Hip pain, left   • Hammer toe of second toe of right foot   • Gastroenteritis   • Starvation ketoacidosis   • Stage 3a chronic kidney disease   • Dehydration   • Tophus   • Hospital discharge follow-up   • Osteoporosis, post-menopausal   • Hyperglycemia   • Bilateral carotid artery stenosis   • Prediabetes   • Resistant hypertension   • FH: breast cancer   • Carcinoma of upper-inner quadrant of left breast in female, estrogen receptor positive   • Long-term use of high-risk medication   • Aortic stenosis, mild   • Multifocal pneumonia   • DNR (do not resuscitate)   • Iron deficiency anemia   • Shoulder disorder   • Pubic ramus fracture, left, closed, initial encounter   • Pathological fracture of pelvis due to osteoporosis        Past Medical History:   Diagnosis Date   • Aortic stenosis, mild 2022   • Arthritis    • Carcinoma of upper-inner quadrant of left breast in female, estrogen receptor positive 2022   • Carotid artery stenosis    • Carotid atherosclerosis, bilateral 2019   • Cataract     BILATERAL   • Depression    • Gastroenteritis    • Generalized osteoarthritis 2016   • H/O diastolic dysfunction    • Hyperlipidemia    •  Hyperparathyroidism    • Hyperparathyroidism    • Hypertension    • Hypertrophic cardiomyopathy    • Iron deficiency anemia 10/13/2022   • Multifocal pneumonia 10/10/2022   • Neuromuscular disorder    • Obstructive sleep apnea syndrome 02/01/2016   • Osteopenia    • Osteoporosis    • Primary familial hypertrophic cardiomyopathy 02/01/2016   • Pulmonary hypertension    • Synovial cyst of popliteal space 02/01/2016   • Vitamin D deficiency         Past Surgical History:   Procedure Laterality Date   • BREAST BIOPSY Left 02/17/2022   • BREAST LUMPECTOMY WITH SENTINEL NODE BIOPSY Left 4/7/2022    Procedure: LEFT BREAST LUMPECTOMY WITH SENTINEL NODE BIOPSY AND NEEDLE LOCALIZATION;  Surgeon: Bel Marina MD;  Location: [facility] [place];  Service: General;  Laterality: Left;   • BUNIONECTOMY Right     FOOT SURGERY   • COLONOSCOPY N/A 2011    Dr. Luis   • HAND SURGERY Right     TUMOR REMOVED ON FOREFINGER   • THYROIDECTOMY Right 4/20/2016    Procedure: RIGHT SUPERIOR PARATHYROIDECTOMY;  Surgeon: Bel Marina MD;  Location: [facility];  Service:        Visit Dx:     ICD-10-CM ICD-9-CM   1. Right hip pain  M25.551 719.45   2. Difficulty walking  R26.2 719.7          Patient History     Row Name 05/11/23 1400             History    Brief Description of Current Complaint Pt presents to therapy with LB/hip pain following fall on 1/23 resulting in fractured pelvis. Pt with osteoporosis and fracture is not fully healed at this time. SPC when out of the house at times. Pain began following fracture and has been worsening overtime. C/o R hip pain which is worse with standing walking and sitting extended periods of time. Pt lives with disabled son and cooks, performs laundry tasks. 4 steps in from outside and laundry in basement. Daughter checks in several days/week and reports decline in gait pattern since fall. Pt with PMHx of R frozen shoulder, L shoulder pain, L knee OA, R bunionectomy, breast CA.  -CN       Patient/Caregiver Goals Relieve pain;Return to prior level of function;Know what to do to help the symptoms;Improve mobility  -CN         Pain     Pain Location Hip  -CN      Pain at Present 2  -CN      Pain at Best 2  -CN      Pain at Worst 4  -CN      Pain Frequency Constant/continuous  -CN      Pain Description Aching  -CN      What Performance Factors Make the Current Problem(s) WORSE? Walking, sitting for extended period of time, standing  -CN      What Performance Factors Make the Current Problem(s) BETTER? Tylenol  -CN      Is your sleep disturbed? No  -CN      Difficulties with ADL's? Stairs  -CN         Fall Risk Assessment    Any falls in the past year: Yes  -CN      Number of falls reported in the last 12 months 1  -CN      Factors that contributed to the fall: Tripped  -CN         Services    Are you currently receiving Home Health services No  -CN         Daily Activities    Primary Language English  -CN      Are you able to read Yes  -CN      Are you able to write Yes  -CN      Barriers to learning None  -CN      Pt Participated in POC and Goals Yes  -CN         Safety    Are you being hurt, hit, or frightened by anyone at home or in your life? No  -CN      Are you being neglected by a caregiver No  -CN            User Key  (r) = Recorded By, (t) = Taken By, (c) = Cosigned By    Initials Name Provider Type    CN Whitley Cool, PT Physical Therapist                 PT Ortho     Row Name 05/11/23 1400       Myotomal Screen- Lower Quarter Clearing    Hip flexion (L2) Bilateral:;4 (Good)  -CN    Knee extension (L3) Bilateral:;4 (Good)  -CN    Ankle DF (L4) Bilateral:;4 (Good)  -CN    Ankle PF (S1) Bilateral:;4 (Good)  -CN    Knee flexion (S2) Bilateral:;4 (Good)  -CN       Lumbar ROM Screen- Lower Quarter Clearing    Lumbar Flexion Impaired  Full, pain with return to neutral  -CN    Lumbar Extension Impaired  25% with pain  -CN    Lumbar Lateral Flexion Impaired  R=50% with pain, L=50%  -CN     Lumbar Rotation Impaired  R=50% with pain, L=75%  -CN       Lumbosacral Accessory Motions    PA Glide- L2 Hypomobile  -CN    PA Glide- L3 Hypomobile  -CN    PA Glide- L4 Hypomobile  -CN    PA Glide- L5 Hypomobile  -CN       Lumbosacral Palpation    Piriformis Right:;Tender;Guarded/taut  -CN    Quadratus Lumborum Right:;Tender;Guarded/taut  -CN       Hip/Thigh Palpation    Gluteus Medius Right:;Tender;Guarded/taut  -CN       MMT (Manual Muscle Testing)    Rt Lower Ext Rt Hip ABduction  -CN    Lt Lower Ext Lt Hip ABduction  -CN       MMT Right Lower Ext    Rt Hip ABduction MMT, Gross Movement (3-/5) fair minus  -CN       MMT Left Lower Ext    Lt Hip ABduction MMT, Gross Movement (2+/5) poor plus  -CN       Lower Extremity Flexibility    Hamstrings Bilateral:;Moderately limited;Mildly limited  -CN    Hip External Rotators Bilateral:;Mildly limited  -CN    Hip Internal Rotators Bilateral:;Moderately limited  -CN       Gait/Stairs (Locomotion)    Ascending Technique (Stairs) step-to-step  R lead  -CN    Descending Technique (Stairs) step-to-step  -CN    Comment, (Gait/Stairs) L trendellenburg, dec thoracolumbar dissociation, dec hip ext, dec step length  -CN          User Key  (r) = Recorded By, (t) = Taken By, (c) = Cosigned By    Initials Name Provider Type    Whitley Castro, PT Physical Therapist                            Therapy Education  Education Details: Access Code [email or web address], anatomy, goals of PT, eval findings, prognosis, safety on stairs, waiting for daughter to carry laundry to/from basement  Given: HEP, Symptoms/condition management, Pain management, Posture/body mechanics, Fall prevention and home safety, Mobility training  Program: New  How Provided: Verbal, Demonstration, Written  Provided to: Patient, Caregiver  Level of Understanding: Teach back education performed, Verbalized, Demonstrated      PT OP Goals     Row Name 05/11/23 1500          PT Short Term Goals    STG Date to Achieve  06/11/23  -CN     STG 1 Pt will report pain rated 2/10 at worst in order to demonstrate ability to return to normalized ADLs and functional activities.  -CN     STG 1 Progress New  -CN     STG 2 Pt will be independent with initial HEP for symptom management.  -CN     STG 2 Progress New  -CN        Long Term Goals    LTG Date to Achieve 07/11/23  -CN     LTG 1 Pt will be independent and compliant with advanced HEP for long term management of symptoms and prevention of future occurrence.  -CN     LTG 1 Progress New  -CN     LTG 2 Pt will reduce level of perceived disability as measured by the Modified oswestry  to 40% in order to improve QOL.  -CN     LTG 2 Progress New  -CN     LTG 3 Pt will demonstrate B hip abd to 3+/5 in order to improve lumbar stability and dec pain.  -CN     LTG 3 Progress New  -CN     LTG 4 Pt will perform gait pattern without L trendellenburg in order to decrease stress on affected tissues.  -CN     LTG 4 Progress New  -CN        Time Calculation    PT Goal Re-Cert Due Date 06/11/23  -CN           User Key  (r) = Recorded By, (t) = Taken By, (c) = Cosigned By    Initials Name Provider Type    Whitley Castro, PT Physical Therapist                 PT Assessment/Plan     Row Name 05/11/23 1503          PT Assessment    Functional Limitations Decreased safety during functional activities;Impaired gait;Impaired locomotion;Limitation in home management;Limitations in community activities;Limitations in functional capacity and performance;Performance in leisure activities;Performance in self-care ADL  -CN     Impairments Balance;Endurance;Gait;Impaired flexibility;Joint mobility;Muscle strength;Pain;Poor body mechanics;Posture;Range of motion  -CN     Assessment Comments 89 y.o. female referred to outpatient physical therapy for evaluation and treatment of right posterior hip pain.  Patient presents with dec lumbar ROM, impaired B LE strength, dec B hip strength L>R, hypomobility through  lumbar spine, tenderness in glute med, piriformis and QL. Pt's signs and symptoms are consistent with referring diagnosis.  Pertinent comorbidities and personal factors that may affect progress include, but are not limited to, pelvic fracture, osteoporosis, degenerative disc disease, caring for son in home.  Pt scored 60% on the Modified oswestry where 0% is no disability and is in stable clinical condition. Pt would benefit from skilled PT to address functional deficits and return to PLOF.  -CN     Please refer to paper survey for additional self-reported information Yes  -CN     Rehab Potential Good  -CN     Patient/caregiver participated in establishment of treatment plan and goals Yes  -CN     Patient would benefit from skilled therapy intervention Yes  -CN        PT Plan    PT Frequency 2x/week  -CN     Predicted Duration of Therapy Intervention (PT) 8-12 visits  -CN     Planned CPT's? PT EVAL LOW COMPLEXITY: 62642;PT RE-EVAL: 88281;PT THER PROC EA 15 MIN: 05679;PT THER ACT EA 15 MIN: 18049;PT MANUAL THERAPY EA 15 MIN: 99708;PT NEUROMUSC RE-EDUCATION EA 15 MIN: 26812;PT SELF CARE/HOME MGMT/TRAIN EA 15: 00483;PT GAIT TRAINING EA 15 MIN: 72075;PT HOT OR COLD PACK TREAT MCARE  -CN     PT Plan Comments Asses response to evaluation and consider Nustep warm up, HL hip add, work on form with TA/PPT, seated swiss ball roll out. Work on gait pattern in // bars, may bring SPC and require education to minimize trendellenburg and improve stability. Pt would also benefit from work on dynamic balance and safety in home.  -CN           User Key  (r) = Recorded By, (t) = Taken By, (c) = Cosigned By    Initials Name Provider Type    Whitley Castro, PT Physical Therapist                   OP Exercises     Row Name 05/11/23 1400             Total Minutes    76449 - PT Therapeutic Exercise Minutes 10  -CN         Exercise 1    Exercise Name 1 LTR with pillow between knees  -CN      Cueing 1 Verbal;Demo  -CN      Reps 1  10  -CN      Time 1 5 sec  -CN         Exercise 2    Exercise Name 2 PPT  -CN      Cueing 2 Verbal;Demo  -CN      Reps 2 10  -CN      Time 2 5 sec  -CN      Additional Comments cues for correct form, many cues for TA engagement  -CN         Exercise 3    Exercise Name 3 Piriformis stretch  -CN      Cueing 3 Verbal;Demo  -CN      Reps 3 3  -CN      Time 3 20 sec  -CN      Additional Comments cues for no pain, stretching only  -CN         Exercise 4    Exercise Name 4 HL hip abd, RTB  -CN      Cueing 4 Verbal;Demo  -CN      Reps 4 10  -CN            User Key  (r) = Recorded By, (t) = Taken By, (c) = Cosigned By    Initials Name Provider Type    CN Whitley Cool, PT Physical Therapist                              Outcome Measure Options: Modified Oswestry  Modified Oswestry  Modified Oswestry Score/Comments: 60% where 0% is no disability      Time Calculation:     Start Time: 1401  Stop Time: 1445  Time Calculation (min): 44 min  Timed Charges  86866 - PT Therapeutic Exercise Minutes: 10  Total Minutes  Timed Charges Total Minutes: 10   Total Minutes: 10     Therapy Charges for Today     Code Description Service Date Service Provider Modifiers Qty    [id number]  PT THER PROC EA 15 MIN 5/11/2023 Whitley Cool, PT GP 1    [id number]  PT EVAL LOW COMPLEXITY 2 5/11/2023 Whitley Cool, PT GP 1          PT G-Codes  Outcome Measure Options: Modified Oswestry  Modified Oswestry Score/Comments: 60% where 0% is no disability         Whitley Cool PT  5/11/2023

## 2023-05-22 ENCOUNTER — OFFICE VISIT (OUTPATIENT)
Dept: ORTHOPEDIC SURGERY | Facility: CLINIC | Age: 88
End: 2023-05-22
Payer: MEDICARE

## 2023-05-22 VITALS — BODY MASS INDEX: 26.21 KG/M2 | WEIGHT: 130 LBS | TEMPERATURE: 97.7 F | HEIGHT: 59 IN

## 2023-05-22 DIAGNOSIS — M54.16 LUMBAR RADICULOPATHY: ICD-10-CM

## 2023-05-22 DIAGNOSIS — S32.592A PUBIC RAMUS FRACTURE, LEFT, CLOSED, INITIAL ENCOUNTER: Primary | ICD-10-CM

## 2023-05-22 RX ORDER — TRAMADOL HYDROCHLORIDE 50 MG/1
50 TABLET ORAL EVERY 4 HOURS PRN
Qty: 60 TABLET | Refills: 0 | Status: SHIPPED | OUTPATIENT
Start: 2023-05-22

## 2023-05-22 NOTE — PROGRESS NOTES
Chief complaint: Follow-up right hip pain    Ms. Guillory is seen in follow-up for her right hip.  I last saw her for this issue about 6 weeks ago.  At that time, I suspected she was having leg pain coming from her back.  I referred her to PT.  She tells me she went to 1 session but simply could not tolerate the therapy.  She says that her pain seems to be getting worse.  The pain is centered in her buttock and radiates down her hip into the lower leg.  Denies any bowel or bladder dysfunction.  Denies any numbness, tingling or paresthesias.  The pain is a little better when she is seated and worse whenever she tries to stand or walk.    Her right hip and leg are just briefly examined.  She is a little tender over the trochanteric bursa but there is no swelling or mass.  No tenderness anywhere else about the hip.  Straight leg raise maneuver once again does reproduce the buttock and shooting leg pain.    AP inlet and outlet views of the pelvis are ordered and reviewed to evaluate her healing pelvic fracture.  These are compared to previous x-rays.  There has been a lot of callus around the left-sided rami fractures.  Those appear to be healing.  I do not see any acute abnormalities or other concerning findings on the right hemipelvis.  She does have advanced low lumbar degenerative disc disease and degenerative scoliosis which is just barely visualized on these new x-rays today.  I did go back and look at her last x-rays of the lumbar spine from April.  Those confirm the degenerative scoliosis and lumbar degenerative disc disease.    Assessment: Persistent right hip and leg pain, suspected lumbar etiology    Plan: I am going to refer her for an MRI of the lumbar spine to better evaluate this as a source of her ongoing hip and leg pain.  I told him I will call him as soon as I see the results of that test.  We will come up with a plan pending review of that study.  In the meantime, for her ongoing pain, I agree to give  her a prescription for Ultram to take as needed.  Risks were discussed.    Riki Maurice MD

## 2023-05-26 ENCOUNTER — TELEPHONE (OUTPATIENT)
Dept: ORTHOPEDIC SURGERY | Facility: CLINIC | Age: 88
End: 2023-05-26
Payer: MEDICARE

## 2023-05-26 NOTE — TELEPHONE ENCOUNTER
I spoke to patient's daughter Monica.  I provided her with the lumbar spine MRI results as reviewed by Dr. Maurice.  She has severe spinal stenosis which is likely causing some pinched nerves and contributing to her back pain.  Dr. Maurice recommend she consider trying epidural injections and/or a referral to a neuro spine specialist.  She said she will talk to her mother about the findings and the treatment options recommended.  She will call back to let us know how she would like to proceed.

## 2023-05-31 ENCOUNTER — TELEPHONE (OUTPATIENT)
Dept: ORTHOPEDIC SURGERY | Facility: CLINIC | Age: 88
End: 2023-05-31

## 2023-05-31 DIAGNOSIS — M48.061 SPINAL STENOSIS OF LUMBAR REGION, UNSPECIFIED WHETHER NEUROGENIC CLAUDICATION PRESENT: Primary | ICD-10-CM

## 2023-05-31 DIAGNOSIS — M54.16 LUMBAR RADICULOPATHY: Primary | ICD-10-CM

## 2023-05-31 DIAGNOSIS — M54.50 LUMBAR PAIN: ICD-10-CM

## 2023-05-31 NOTE — TELEPHONE ENCOUNTER
----- Message from Arti Palomares MA sent at 5/31/2023  2:35 PM EDT -----  Regarding: Request for Epidural Injection  Contact: [phone]  Please review       ----- Message -----  From: Neha Guillory  Sent: 5/31/2023   2:33 PM EDT  To: [facility]  Subject: Request for Epidural Injection                   Haley Turner spoke to my daughter about the option of an epidural injection for my back. Due to continuing pain and increased loss of ability from the pain, I would like to proceed with an epidural. Please let me know when a referral can be made to an anesthesiology group for this.    Thank you so much.    Neha Guillory

## 2023-06-05 ENCOUNTER — PATIENT MESSAGE (OUTPATIENT)
Dept: ORTHOPEDIC SURGERY | Facility: CLINIC | Age: 88
End: 2023-06-05
Payer: MEDICARE

## 2023-06-05 RX ORDER — HYDROCODONE BITARTRATE AND ACETAMINOPHEN 5; 325 MG/1; MG/1
1 TABLET ORAL EVERY 6 HOURS PRN
Qty: 30 TABLET | Refills: 0 | Status: SHIPPED | OUTPATIENT
Start: 2023-06-05

## 2023-06-08 ENCOUNTER — ANESTHESIA EVENT (OUTPATIENT)
Dept: PAIN MEDICINE | Facility: HOSPITAL | Age: 88
End: 2023-06-08
Payer: MEDICARE

## 2023-06-08 ENCOUNTER — HOSPITAL ENCOUNTER (OUTPATIENT)
Dept: PAIN MEDICINE | Facility: HOSPITAL | Age: 88
Discharge: HOME | End: 2023-06-08
Payer: MEDICARE

## 2023-06-08 ENCOUNTER — ANESTHESIA (OUTPATIENT)
Dept: PAIN MEDICINE | Facility: HOSPITAL | Age: 88
End: 2023-06-08
Payer: MEDICARE

## 2023-06-08 ENCOUNTER — HOSPITAL ENCOUNTER (OUTPATIENT)
Dept: GENERAL RADIOLOGY | Facility: HOSPITAL | Age: 88
Discharge: HOME | End: 2023-06-08
Payer: MEDICARE

## 2023-06-08 VITALS
RESPIRATION RATE: 16 BRPM | HEART RATE: 75 BPM | TEMPERATURE: 99.3 F | HEIGHT: 59 IN | WEIGHT: 130 LBS | BODY MASS INDEX: 26.21 KG/M2 | OXYGEN SATURATION: 95 % | SYSTOLIC BLOOD PRESSURE: 190 MMHG | DIASTOLIC BLOOD PRESSURE: 86 MMHG

## 2023-06-08 DIAGNOSIS — M54.16 LUMBAR RADICULOPATHY: ICD-10-CM

## 2023-06-08 DIAGNOSIS — M54.50 LUMBAR PAIN: ICD-10-CM

## 2023-06-08 DIAGNOSIS — R52 PAIN: ICD-10-CM

## 2023-06-08 PROCEDURE — 25010000002 METHYLPREDNISOLONE PER 80 MG: Performed by: ANESTHESIOLOGY

## 2023-06-08 PROCEDURE — 77003 FLUOROGUIDE FOR SPINE INJECT: CPT

## 2023-06-08 PROCEDURE — 25010000002 FENTANYL CITRATE (PF) 50 MCG/ML SOLUTION: Performed by: ANESTHESIOLOGY

## 2023-06-08 RX ORDER — MIDAZOLAM HYDROCHLORIDE 1 MG/ML
1 INJECTION INTRAMUSCULAR; INTRAVENOUS ONCE AS NEEDED
Status: DISCONTINUED | OUTPATIENT
Start: 2023-06-08 | End: 2023-06-09 | Stop reason: HOSPADM

## 2023-06-08 RX ORDER — METHYLPREDNISOLONE ACETATE 80 MG/ML
80 INJECTION, SUSPENSION INTRA-ARTICULAR; INTRALESIONAL; INTRAMUSCULAR; SOFT TISSUE ONCE
Status: COMPLETED | OUTPATIENT
Start: 2023-06-08 | End: 2023-06-08

## 2023-06-08 RX ORDER — SODIUM CHLORIDE 0.9 % (FLUSH) 0.9 %
3 SYRINGE (ML) INJECTION EVERY 12 HOURS SCHEDULED
Status: DISCONTINUED | OUTPATIENT
Start: 2023-06-08 | End: 2023-06-09 | Stop reason: HOSPADM

## 2023-06-08 RX ORDER — FENTANYL CITRATE 50 UG/ML
50 INJECTION, SOLUTION INTRAMUSCULAR; INTRAVENOUS ONCE
Status: COMPLETED | OUTPATIENT
Start: 2023-06-08 | End: 2023-06-08

## 2023-06-08 RX ORDER — SODIUM CHLORIDE 0.9 % (FLUSH) 0.9 %
10 SYRINGE (ML) INJECTION AS NEEDED
Status: DISCONTINUED | OUTPATIENT
Start: 2023-06-08 | End: 2023-06-09 | Stop reason: HOSPADM

## 2023-06-08 RX ORDER — LIDOCAINE HYDROCHLORIDE 10 MG/ML
1 INJECTION, SOLUTION INFILTRATION; PERINEURAL ONCE
Status: DISCONTINUED | OUTPATIENT
Start: 2023-06-08 | End: 2023-06-09 | Stop reason: HOSPADM

## 2023-06-08 RX ADMIN — FENTANYL CITRATE 25 MCG: 50 INJECTION, SOLUTION INTRAMUSCULAR; INTRAVENOUS at 12:37

## 2023-06-08 RX ADMIN — METHYLPREDNISOLONE ACETATE 80 MG: 80 INJECTION, SUSPENSION INTRA-ARTICULAR; INTRALESIONAL; INTRAMUSCULAR; SOFT TISSUE at 12:38

## 2023-06-08 NOTE — H&P
[facility]    History and Physical    Patient Name: Neha Guillory  :  1933  MRN:  [id number]  Date of Admission: 2023    Subjective     Patient is a 89 y.o. female presents with chief complaint of chronic low back, hips: right, and buttock pain.  Onset of symptoms was gradual starting several months ago.  Symptoms are associated/aggravated by activity or standing. Symptoms improve with nothing    The following portions of the patients history were reviewed and updated as appropriate: current medications, allergies, past medical history, past surgical history, past family history, past social history, and problem list      She has been seen by orthopedics in follow-up of a pubic rami fracture which is apparently healing adequately.  She has pain that shoots down her buttock and radiates into her hip and into her lower leg.  Her MRI shows severe degeneration and spinal stenosis.          Objective     Past Medical History:   Past Medical History:   Diagnosis Date   • Aortic stenosis, mild 2022   • Arthritis    • Carcinoma of upper-inner quadrant of left breast in female, estrogen receptor positive 2022   • Carotid artery stenosis    • Carotid atherosclerosis, bilateral 2019   • Cataract     BILATERAL   • Depression    • Gastroenteritis    • Generalized osteoarthritis 2016   • H/O diastolic dysfunction    • Hyperlipidemia    • Hyperparathyroidism    • Hyperparathyroidism    • Hypertension    • Hypertrophic cardiomyopathy    • Iron deficiency anemia 10/13/2022   • Multifocal pneumonia 10/10/2022   • Neuromuscular disorder    • Obstructive sleep apnea syndrome 2016   • Osteopenia    • Osteoporosis    • Primary familial hypertrophic cardiomyopathy 2016   • Pulmonary hypertension    • Synovial cyst of popliteal space 2016   • Vitamin D deficiency      Past Surgical History:   Past Surgical History:   Procedure Laterality Date   • BREAST BIOPSY Left 2022   •  BREAST LUMPECTOMY WITH SENTINEL NODE BIOPSY Left 4/7/2022    Procedure: LEFT BREAST LUMPECTOMY WITH SENTINEL NODE BIOPSY AND NEEDLE LOCALIZATION;  Surgeon: Bel Marina MD;  Location: [facility];  Service: General;  Laterality: Left;   • BUNIONECTOMY Right     FOOT SURGERY   • COLONOSCOPY N/A 2011    Dr. Luis   • HAND SURGERY Right     TUMOR REMOVED ON FOREFINGER   • THYROIDECTOMY Right 4/20/2016    Procedure: RIGHT SUPERIOR PARATHYROIDECTOMY;  Surgeon: Bel Marina MD;  Location: [facility] OR;  Service:      Family History:   Family History   Problem Relation Age of Onset   • Diabetes Mother    • Heart attack Mother    • Arthritis Father    • Breast cancer Sister    • Breast cancer Sister    • Breast cancer Sister    • Colon cancer Sister    • Heart disease Brother         CABG   • Other Brother         BRAIN TUMOR   • Colon cancer Brother    • Colon cancer Brother    • Colon cancer Brother    • Colon cancer Maternal Grandfather    • Cancer Other    • Malig Hyperthermia Neg Hx      Social History:   Social History     Socioeconomic History   • Marital status: Single   Tobacco Use   • Smoking status: Never   • Smokeless tobacco: Never   Vaping Use   • Vaping Use: Never used   Substance and Sexual Activity   • Alcohol use: No   • Drug use: No   • Sexual activity: Defer       Vital Signs Range for the last 24 hours  Temperature:     Temp Source:     BP:     Pulse:     Respirations:     SPO2:     O2 Amount (l/min):     O2 Devices     Weight:           --------------------------------------------------------------------------------    Current Outpatient Medications   Medication Sig Dispense Refill   • amLODIPine (NORVASC) 5 MG tablet Take 1 tablet by mouth Daily.     • aspirin 81 MG EC tablet Take 1 tablet by mouth Daily. Indications: Disease involving Lipid Deposits in the Arteries     • B Complex Vitamins (vitamin b complex) capsule capsule Take 1 capsule by mouth Daily.     • carvedilol (COREG) 12.5 MG  tablet Take 1 tablet by mouth Every 12 (Twelve) Hours. 60 tablet 3   • cholecalciferol (VITAMIN D3) 1000 units tablet Take 1 tablet by mouth Daily.     • HYDROcodone-acetaminophen (NORCO) 5-325 MG per tablet Take 1 tablet by mouth Every 6 (Six) Hours As Needed for Mild Pain or Moderate Pain. 30 tablet 0   • Lactobacillus (PROBIOTIC ACIDOPHILUS PO) Take 1 tablet by mouth Daily.     • mirtazapine (Remeron) 15 MG tablet Take 1 tablet by mouth Every Night. 30 tablet 2   • multivitamin (THERAGRAN) tablet tablet Take  by mouth Daily. Gummies     • olmesartan (BENICAR) 20 MG tablet Take 1 tablet by mouth Daily. 90 tablet 3   • ondansetron ODT (ZOFRAN-ODT) 4 MG disintegrating tablet Place 1 tablet on the tongue Every 8 (Eight) Hours As Needed for Nausea or Vomiting. 40 tablet 1   • pravastatin (PRAVACHOL) 40 MG tablet Take 1 tablet by mouth Daily. 90 tablet 1   • tamoxifen (NOLVADEX) 20 MG chemo tablet Take 1 tablet by mouth Daily. 90 tablet 3   • traMADol (ULTRAM) 50 MG tablet Take 1 tablet by mouth Every 4 (Four) Hours As Needed for Moderate Pain. 60 tablet 0     Current Facility-Administered Medications   Medication Dose Route Frequency Provider Last Rate Last Admin   • fentaNYL citrate (PF) (SUBLIMAZE) injection 50 mcg  50 mcg Intravenous Once Render, Jonnie Watson MD       • iopamidol (ISOVUE-M 200) injection 41%  10 mL Epidural Once in imaging Render, Jonnie Watson MD       • lidocaine (XYLOCAINE) 1 % injection 1 mL  1 mL Intradermal Once Render, Jonnie Watson MD       • methylPREDNISolone acetate (DEPO-medrol) injection 80 mg  80 mg Epidural Once Render, Jonnie Watson MD       • midazolam (VERSED) injection 1 mg  1 mg Intravenous Once PRN Render, Jonnie Watson MD       • sodium chloride 0.9 % flush 3 mL  3 mL Intravenous Q12H Render, Jonnie Watson MD        And   • sodium chloride 0.9 % flush 10 mL  10 mL Intravenous PRN Madeleine, Jonnie Watson MD      "      --------------------------------------------------------------------------------  Assessment & Plan      Anesthesia Evaluation           Pain impairs ability to perform ADLs: Ambulation       Airway   Mallampati: II  TM distance: >3 FB  Neck ROM: full  No difficulty expected  Dental      Pulmonary    (-) wheezes  Cardiovascular     Rhythm: regular      PE comment: Pedal pulses are palpable bilaterally    Neuro/Psych  (+)   left straight leg raise test  GI/Hepatic/Renal/Endo      Musculoskeletal     Abdominal    Substance History      OB/GYN          Other                   Diagnosis and Plan    Treatment Plan  ASA 3      Procedures: Lumbar Epidural Steroid Injection(LESI), With fluoroscopy,      Anesthetic plan and risks discussed with patient.      Discussed with patient risk and benefits of NGUYEN including but not limited to : Bleeding, infection, PDPH, inadvertant spinal anesthetic, worsening pain, hyperglycemia, hypertension, CHF, nerve damage, steroid \"toxicity\" and AVN of hips.  Pt agrees to proceed.  Diagnosis     * Connective tissue and disc stenosis of intervertebral foramina of lumbar region [M99.73]     * Lumbar radiculopathy [M54.16]                      "

## 2023-06-08 NOTE — ANESTHESIA PROCEDURE NOTES
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 [M99.73]     * Lumbar radiculopathy [M54.16]    Prep:  Pt Position:prone  Sterile Tech:sterile barrier, mask and gloves  Prep:chlorhexidine gluconate and isopropyl alcohol  Monitoring:blood pressure monitoring, continuous pulse oximetry and EKG  Procedure:Sedation: yes     Approach:right paramedian  Guidance: fluoroscopy  Location:lumbar  Level:4-5  Needle Type:Tuohy  Needle Gauge:20 G  Aspiration:negative  Test Dose:negative  Medications:  Isovue:2mL  Comments:Sedation time was 1237 until 1242Depomedrol:80mg  Post Assessment:  Pt Tolerance:patient tolerated the procedure well with no apparent complications  Complications:no

## 2023-06-08 NOTE — DISCHARGE INSTRUCTIONS

## 2023-06-14 ENCOUNTER — OFFICE VISIT (OUTPATIENT)
Dept: ORTHOPEDIC SURGERY | Facility: CLINIC | Age: 88
End: 2023-06-14
Payer: MEDICARE

## 2023-06-14 VITALS — WEIGHT: 130.7 LBS | TEMPERATURE: 98.2 F | HEIGHT: 59 IN | BODY MASS INDEX: 26.35 KG/M2

## 2023-06-14 DIAGNOSIS — S32.592A PUBIC RAMUS FRACTURE, LEFT, CLOSED, INITIAL ENCOUNTER: Primary | ICD-10-CM

## 2023-06-14 DIAGNOSIS — M48.061 SPINAL STENOSIS OF LUMBAR REGION WITHOUT NEUROGENIC CLAUDICATION: ICD-10-CM

## 2023-06-14 NOTE — PROGRESS NOTES
CC:  Follow up regarding left pelvic fracture and spinal stenosis    Ms. Guillory follows up today for her left-sided pelvic fracture and spinal stenosis.  She says the left side is doing great.  No problems there whatsoever.  Unfortunately she continues to have the back, buttock and shooting right leg pain.  She recently had an epidural.  It has not helped.  They are going to set up another epidural in the near future.  She and her daughter are both very frustrated by her persistent symptoms.  This is very much affecting her quality of life.  She tried some PT but it actually seemed to make things worse.    Left hip is just briefly examined.  No tenderness.  Good hip motion.  Negative Stinchfield.    AP, inlet and outlet views of the pelvis are ordered and reviewed to evaluate healing of her fractures.  These are compared to previous x-rays.  The left-sided rami fractures appear to be healing very well.  I do not see any acute abnormalities or concerning findings with respect to the right hip.  As previously noted, she has advanced lumbar degenerative disc disease.    Assessment: 1.  Healing left-sided lateral compression type pelvic fracture 2.  Lumbar spinal stenosis with right-sided radicular symptoms    Plan: Her pelvis seems to be doing fine.  She can follow-up as needed for that issue.  Unfortunately all attempts at conservative treatment for her back and right-sided pain have been unsuccessful.  I do recommend that she follow through with the second epidural.  I am also going to go ahead and refer her to neurosurgery to discuss other options.    Riki Maurice MD

## 2023-06-16 ENCOUNTER — OFFICE VISIT (OUTPATIENT)
Dept: INTERNAL MEDICINE | Facility: CLINIC | Age: 88
End: 2023-06-16
Payer: MEDICARE

## 2023-06-16 VITALS
OXYGEN SATURATION: 97 % | BODY MASS INDEX: 25.28 KG/M2 | DIASTOLIC BLOOD PRESSURE: 72 MMHG | HEART RATE: 75 BPM | HEIGHT: 59 IN | WEIGHT: 125.4 LBS | SYSTOLIC BLOOD PRESSURE: 128 MMHG

## 2023-06-16 DIAGNOSIS — M54.41 CHRONIC BILATERAL LOW BACK PAIN WITH RIGHT-SIDED SCIATICA: Primary | ICD-10-CM

## 2023-06-16 DIAGNOSIS — G89.29 CHRONIC BILATERAL LOW BACK PAIN WITH RIGHT-SIDED SCIATICA: Primary | ICD-10-CM

## 2023-06-16 PROCEDURE — 1160F RVW MEDS BY RX/DR IN RCRD: CPT | Performed by: NURSE PRACTITIONER

## 2023-06-16 PROCEDURE — 1159F MED LIST DOCD IN RCRD: CPT | Performed by: NURSE PRACTITIONER

## 2023-06-16 PROCEDURE — 99213 OFFICE O/P EST LOW 20 MIN: CPT | Performed by: NURSE PRACTITIONER

## 2023-06-16 RX ORDER — HYDROCODONE BITARTRATE AND ACETAMINOPHEN 5; 325 MG/1; MG/1
1 TABLET ORAL EVERY 6 HOURS PRN
Qty: 30 TABLET | Refills: 0 | Status: SHIPPED | OUTPATIENT
Start: 2023-06-16

## 2023-06-16 RX ORDER — METHYLPREDNISOLONE 4 MG/1
TABLET ORAL
Qty: 21 EACH | Refills: 0 | Status: SHIPPED | OUTPATIENT
Start: 2023-06-16

## 2023-06-16 NOTE — PROGRESS NOTES
"        Chief Complaint  Back Pain and Hip Pain     Subjective:      History of Present Illness {CC  Problem List  Visit  Diagnosis   Encounters  Notes  Medications  Labs  Result Review Imaging  Media :23}     Neha Guillory presents to [facility] for:      Hx left pelvic fracture and spinal stenosis.   She had follow up with Dr Maurice on 6/14: patient and daughter here today.  States she had already had bad pain to back and now limiting her activity due to pain (PT did not help) - when she got xray at ortho, made it worse.  Plan is for repeat epidural and Dr Maurice did refer her to neurosurgery.  She states she would not want surgery.  He had prescribed norco and helps her to do things around the house.      No change in bowel or bladder.   Note reviewed:   Progress Notes by Riki Maurice MD (06/14/2023 2:00 PM)         I have reviewed patient's medical history, any new submitted information provided by patient or medical assistant and updated medical record.      Objective:      Physical Exam  Constitutional:       Appearance: Normal appearance.      Comments: In WC    Cardiovascular:      Heart sounds: Normal heart sounds.   Musculoskeletal:      Right lower leg: No edema.      Left lower leg: No edema.      Comments: Can extend both lower extremities    Neurological:      Mental Status: She is oriented to person, place, and time.      Result Review  Data Reviewed:{ Labs  Result Review  Imaging  Med Tab  Media :23}                Vital Signs:   /72 (BP Location: Left arm, Patient Position: Sitting, Cuff Size: Adult)   Pulse 75   Ht 149.9 cm (59\")   Wt 56.9 kg (125 lb 6.4 oz)   SpO2 97%   BMI 25.33 kg/m²         Requested Prescriptions     Signed Prescriptions Disp Refills    HYDROcodone-acetaminophen (NORCO) 5-325 MG per tablet 30 tablet 0     Sig: Take 1 tablet by mouth Every 6 (Six) Hours As Needed for Mild Pain or Moderate Pain.    methylPREDNISolone " (MEDROL) 4 MG dose pack 21 each 0     Sig: Take as directed on package instructions.       Routine medications provided by this office will also be refilled via pharmacy request.       Current Outpatient Medications:     amLODIPine (NORVASC) 5 MG tablet, Take 1 tablet by mouth Daily., Disp: , Rfl:     aspirin 81 MG EC tablet, Take 1 tablet by mouth Daily. Indications: Disease involving Lipid Deposits in the Arteries, Disp: , Rfl:     B Complex Vitamins (vitamin b complex) capsule capsule, Take 1 capsule by mouth Daily., Disp: , Rfl:     carvedilol (COREG) 12.5 MG tablet, Take 1 tablet by mouth Every 12 (Twelve) Hours., Disp: 60 tablet, Rfl: 3    cholecalciferol (VITAMIN D3) 1000 units tablet, Take 1 tablet by mouth Daily., Disp: , Rfl:     HYDROcodone-acetaminophen (NORCO) 5-325 MG per tablet, Take 1 tablet by mouth Every 6 (Six) Hours As Needed for Mild Pain or Moderate Pain., Disp: 30 tablet, Rfl: 0    Lactobacillus (PROBIOTIC ACIDOPHILUS PO), Take 1 tablet by mouth Daily., Disp: , Rfl:     multivitamin (THERAGRAN) tablet tablet, Take  by mouth Daily. Gummies, Disp: , Rfl:     olmesartan (BENICAR) 20 MG tablet, Take 1 tablet by mouth Daily., Disp: 90 tablet, Rfl: 3    ondansetron ODT (ZOFRAN-ODT) 4 MG disintegrating tablet, Place 1 tablet on the tongue Every 8 (Eight) Hours As Needed for Nausea or Vomiting., Disp: 40 tablet, Rfl: 1    tamoxifen (NOLVADEX) 20 MG chemo tablet, Take 1 tablet by mouth Daily., Disp: 90 tablet, Rfl: 3    methylPREDNISolone (MEDROL) 4 MG dose pack, Take as directed on package instructions., Disp: 21 each, Rfl: 0    pravastatin (PRAVACHOL) 40 MG tablet, Take 1 tablet by mouth Daily. (Patient not taking: Reported on 6/16/2023), Disp: 90 tablet, Rfl: 1    traMADol (ULTRAM) 50 MG tablet, Take 1 tablet by mouth Every 4 (Four) Hours As Needed for Moderate Pain. (Patient not taking: Reported on 6/16/2023), Disp: 60 tablet, Rfl: 0     Assessment and Plan:      Assessment and Plan {CC Problem  List  Visit Diagnosis  ROS  Review (Popup)  Health Maintenance  Quality  BestPractice  Medications  SmartSets  SnapShot Encounters  Media :23}     Problem List Items Addressed This Visit    None  Visit Diagnoses       Chronic bilateral low back pain with right-sided sciatica    -  Primary    Relevant Medications    HYDROcodone-acetaminophen (NORCO) 5-325 MG per tablet    methylPREDNISolone (MEDROL) 4 MG dose pack    Other Relevant Orders    Ambulatory Referral to Pain Management          Will give short supply of pain medication and start medrol dose pack.   Discussed ROM exercises.     Refer to pain mgt as she would not be interested in surgical options.   No red flags.     E-anibal report is reviewed:    I reviewed the document in the electronic form in the Epic EMR.    On review, prescriptions filled under controlled contract with this office are consistent with what is prescribed.   The patient has been using the same pharmacy.   There is not concern for aberrant behavior based on this ekasper review.     Follow Up {Instructions Charge Capture  Follow-up Communications :23}     Return if symptoms worsen or fail to improve.      Patient was given instructions and counseling regarding her condition or for health maintenance advice. Please see specific information pulled into the AVS if appropriate.    Dragon disclaimer:   Much of this encounter note is an electronic transcription/translation of spoken language to printed text. The electronic translation of spoken language may permit erroneous, or at times, nonsensical words or phrases to be inadvertently transcribed; Although I have reviewed the note for such errors, some may still exist.     Additional Patient Counseling:       There are no Patient Instructions on file for this visit.

## 2023-07-06 PROBLEM — J18.9 MULTIFOCAL PNEUMONIA: Status: RESOLVED | Noted: 2022-10-10 | Resolved: 2023-07-06

## 2023-08-09 NOTE — PROGRESS NOTES
Patient ID: Neha Guillory is a 89 y.o. female is being seen for consultation today at the request of Riki Maurice MD    Subjective     The patient is here in regards to   Chief Complaint   Patient presents with    Back Pain     Spine is curved. Has a cyst between L3 and L4. Has had epidurals for pain in the back. Wants to see what can give relief. Pain starts in hip area and radiates down right leg causing numbness.        History of Present Illness  Neha Guillen with 3-month history of crushing back pain and difficulty going from sitting to standing and walking.  She walks with a significant limp.  She did have an injury to her lower extremities which caused her to walk asymmetrically at the development of this issue.  She denies any significant lower extremity pain aside from very occasional numbness on her right side.  She localizes the pain to her right hip lower back area.    While in the room and during my examination of the patient I wore a mask and eye protection.  I washed my hands before and after this patient encounter.  The patient was also wearing a mask.    The following portions of the patient's history were reviewed and updated as appropriate: allergies, current medications, past family history, past medical history, past social history, past surgical history and problem list.    Review of Systems   Constitutional:  Positive for activity change.   Eyes:  Negative for visual disturbance.   Cardiovascular:  Positive for leg swelling.   Musculoskeletal:  Positive for back pain and gait problem. Negative for neck pain and neck stiffness.   Neurological:  Negative for dizziness, light-headedness and headaches.   Psychiatric/Behavioral:  Negative for sleep disturbance.       Past Medical History:   Diagnosis Date    Aortic stenosis, mild 08/24/2022    Arthritis     Carcinoma of upper-inner quadrant of left breast in female, estrogen receptor positive 02/24/2022    Carotid artery stenosis     Carotid  atherosclerosis, bilateral 07/12/2019    Cataract     BILATERAL    Depression     Gastroenteritis     Generalized osteoarthritis 02/01/2016    H/O diastolic dysfunction     Hyperlipidemia     Hyperparathyroidism     Hyperparathyroidism     Hypertension     Hypertrophic cardiomyopathy     Iron deficiency anemia 10/13/2022    Low back pain May, 2023    Lower, right    Lumbosacral disc disease May 2023    Multifocal pneumonia 10/10/2022    Multifocal pneumonia 10/10/2022    Neuromuscular disorder     Obstructive sleep apnea syndrome 02/01/2016    Osteopenia     Osteoporosis     Primary familial hypertrophic cardiomyopathy 02/01/2016    Pulmonary hypertension     Synovial cyst of popliteal space 02/01/2016    Vitamin D deficiency        Allergies   Allergen Reactions    Amoxicillin Hives    Griseofulvin Ultramicrosize [Griseofulvin] Confusion    Tramadol Delirium    Atorvastatin Myalgia       Family History   Problem Relation Age of Onset    Diabetes Mother     Heart attack Mother     Arthritis Father     Breast cancer Sister     Breast cancer Sister     Breast cancer Sister     Colon cancer Sister     Heart disease Brother         CABG    Other Brother         BRAIN TUMOR    Colon cancer Brother     Colon cancer Brother     Colon cancer Brother     Colon cancer Maternal Grandfather     Cancer Other     Malig Hyperthermia Neg Hx        Social History     Socioeconomic History    Marital status:    Tobacco Use    Smoking status: Never    Smokeless tobacco: Never   Vaping Use    Vaping Use: Never used   Substance and Sexual Activity    Alcohol use: Never    Drug use: Never    Sexual activity: Not Currently       Past Surgical History:   Procedure Laterality Date    BREAST BIOPSY Left 02/17/2022    BREAST LUMPECTOMY Left     BREAST LUMPECTOMY WITH SENTINEL NODE BIOPSY Left 04/07/2022    Procedure: LEFT BREAST LUMPECTOMY WITH SENTINEL NODE BIOPSY AND NEEDLE LOCALIZATION;  Surgeon: Bel Marina MD;  Location:   [facility];  Service: General;  Laterality: Left;    BUNIONECTOMY Right     FOOT SURGERY    COLONOSCOPY N/A 2011    Dr. Luis    EPIDURAL BLOCK  July 2023    Epidural x2    HAND SURGERY Right     TUMOR REMOVED ON FOREFINGER    PITUITARY SURGERY      THYROIDECTOMY Right 04/20/2016    Procedure: RIGHT SUPERIOR PARATHYROIDECTOMY;  Surgeon: Bel Marina MD;  Location: [facility];  Service:          Objective     Vitals:    08/10/23 0841   BP: 116/60   Pulse: 73   SpO2: 92%     Body mass index is 25.04 kg/mý.    Physical Exam  Constitutional:       Appearance: Normal appearance.   HENT:      Head: Normocephalic and atraumatic.   Eyes:      Extraocular Movements: Extraocular movements intact.      Conjunctiva/sclera: Conjunctivae normal.      Pupils: Pupils are equal, round, and reactive to light.   Cardiovascular:      Rate and Rhythm: Normal rate and regular rhythm.      Pulses: Normal pulses.   Pulmonary:      Breath sounds: Normal breath sounds.   Abdominal:      Palpations: Abdomen is soft.   Musculoskeletal:         General: Normal range of motion.      Cervical back: Normal range of motion and neck supple.      Comments: Right SI joint inflammation characterized by:    -Pelvic Distraction test   -Lateral Iliac compression test   -FABERS (William's test)   -Ganslen's Test     Skin:     General: Skin is warm and dry.   Neurological:      Mental Status: She is alert and oriented to person, place, and time.      Cranial Nerves: Cranial nerves 2-12 are intact.      Motor: Motor function is intact. No weakness or atrophy.      Coordination: Coordination is intact. Romberg sign negative. Romberg Test normal.      Gait: Gait is intact. Gait normal.      Deep Tendon Reflexes: Reflexes are normal and symmetric.      Reflex Scores:       Tricep reflexes are 2+ on the right side and 2+ on the left side.       Bicep reflexes are 2+ on the right side and 2+ on the left side.       Brachioradialis reflexes are 2+ on the  right side and 2+ on the left side.       Patellar reflexes are 2+ on the right side and 2+ on the left side.       Achilles reflexes are 2+ on the right side and 2+ on the left side.  Psychiatric:         Speech: Speech normal.       Neurologic Exam     Mental Status   Oriented to person, place, and time.   Attention: normal. Concentration: normal.   Speech: speech is normal   Level of consciousness: alert    Cranial Nerves   Cranial nerves II through XII intact.     CN III, IV, VI   Pupils are equal, round, and reactive to light.    Motor Exam   Muscle bulk: normal  Overall muscle tone: normal    Strength   Strength 5/5 except as noted.     Sensory Exam   Light touch normal.     Gait, Coordination, and Reflexes     Gait  Gait: normal    Coordination   Romberg: negative    Reflexes   Reflexes 2+ except as noted.   Right brachioradialis: 2+  Left brachioradialis: 2+  Right biceps: 2+  Left biceps: 2+  Right triceps: 2+  Left triceps: 2+  Right patellar: 2+  Left patellar: 2+  Right achilles: 2+  Left achilles: 2+    Assessment & Plan   Independent Review of Radiographic Studies:      I personally reviewed the images from the following studies.    MR: MRI of the lumbar spine wo contrast was reviewed and shows multilevel degenerative disease due to at adult degenerative scoliosis with significant coronal deformity and foraminal stenosis at multiple levels.    Assessment/Plan: Although her MRI of the lumbar spine does not look perfect, I do not think that she is symptomatic from spinal disease.  The acute and sudden onset of her issues in association with her physical exam which is indicative of florid SI joint inflammation indicates that she might benefit from SI joint injections and anti-inflammatory therapy.  She is also not a candidate for any sort of surgery involving general anesthesia given her age.    Medical Decision Making:      SI joint injections, follow-up as needed         Diagnoses and all orders for  this visit:    1. SI (sacroiliac) joint inflammation (Primary)  -     SI Joint Injection             Patient Instructions/Recommendations:    After your SI joint injection:  -Please schedule your second SI joint injection at the time of your first SI joint injection for 2 weeks later  -Alternate 1 tablet of extra strength Tylenol and 2 tablets of Aleve (or any other NSAID) in a scheduled manner every 4 hours for at least 2 weeks after the injection    -Take an ice bath by submerging the buttocks area in 50 degree water for 30 minutes/day for 5 days consecutively after your injection.   -Try adding ice gradually to decrease the shock of the ice bath   -Try putting a robe in the dryer for 35 minutes so that the patient can have a warm rope to wear after the ice bath    -Continue to use ice packs as much as tolerable when you are not taking an ice bath        Dante Reilly MD  08/10/23  09:00 EDT

## 2023-08-10 ENCOUNTER — OFFICE VISIT (OUTPATIENT)
Dept: NEUROSURGERY | Facility: CLINIC | Age: 88
End: 2023-08-10
Payer: MEDICARE

## 2023-08-10 VITALS
OXYGEN SATURATION: 92 % | HEART RATE: 73 BPM | HEIGHT: 59 IN | WEIGHT: 124 LBS | BODY MASS INDEX: 25 KG/M2 | SYSTOLIC BLOOD PRESSURE: 116 MMHG | DIASTOLIC BLOOD PRESSURE: 60 MMHG

## 2023-08-10 DIAGNOSIS — M46.1 SI (SACROILIAC) JOINT INFLAMMATION: Primary | ICD-10-CM

## 2023-08-22 RX ORDER — MIRTAZAPINE 15 MG/1
TABLET, FILM COATED ORAL
Qty: 30 TABLET | Refills: 0 | Status: SHIPPED | OUTPATIENT
Start: 2023-08-22 | End: 2023-08-28

## 2023-08-28 ENCOUNTER — OFFICE VISIT (OUTPATIENT)
Dept: CARDIOLOGY | Facility: CLINIC | Age: 88
End: 2023-08-28
Payer: MEDICARE

## 2023-08-28 VITALS
HEART RATE: 65 BPM | OXYGEN SATURATION: 92 % | SYSTOLIC BLOOD PRESSURE: 130 MMHG | DIASTOLIC BLOOD PRESSURE: 82 MMHG | HEIGHT: 59 IN | WEIGHT: 125 LBS | BODY MASS INDEX: 25.2 KG/M2

## 2023-08-28 DIAGNOSIS — I42.2 HYPERTROPHIC CARDIOMYOPATHY: Primary | ICD-10-CM

## 2023-08-28 DIAGNOSIS — Z66 DNR (DO NOT RESUSCITATE): ICD-10-CM

## 2023-08-28 DIAGNOSIS — I35.0 AORTIC STENOSIS, MILD: ICD-10-CM

## 2023-08-28 DIAGNOSIS — I10 ESSENTIAL HYPERTENSION: ICD-10-CM

## 2023-08-28 PROCEDURE — 1160F RVW MEDS BY RX/DR IN RCRD: CPT | Performed by: NURSE PRACTITIONER

## 2023-08-28 PROCEDURE — 99214 OFFICE O/P EST MOD 30 MIN: CPT | Performed by: NURSE PRACTITIONER

## 2023-08-28 PROCEDURE — 1159F MED LIST DOCD IN RCRD: CPT | Performed by: NURSE PRACTITIONER

## 2023-08-28 PROCEDURE — 93000 ELECTROCARDIOGRAM COMPLETE: CPT | Performed by: NURSE PRACTITIONER

## 2023-08-28 RX ORDER — POTASSIUM CHLORIDE 750 MG/1
20 TABLET, FILM COATED, EXTENDED RELEASE ORAL DAILY PRN
Qty: 60 TABLET | Refills: 11 | Status: SHIPPED | OUTPATIENT
Start: 2023-08-28

## 2023-08-28 RX ORDER — FUROSEMIDE 20 MG/1
20 TABLET ORAL DAILY PRN
Qty: 30 TABLET | Refills: 11 | Status: SHIPPED | OUTPATIENT
Start: 2023-08-28

## 2023-08-28 NOTE — PROGRESS NOTES
"    CARDIOLOGY        Patient Name: Neha Guillory  :1933  Age: 89 y.o.  Primary Cardiologist: Zack Mohr MD  Encounter Provider:  EZNO Sloan    Date of Service: 23      CHIEF COMPLAINT / REASON FOR OFFICE VISIT     Cardiomyopathy      HISTORY OF PRESENT ILLNESS       HPI  Neha Guillory is a 89 y.o. female who presents today for annual evaluation.     Pt has a  history significant for hypertrophic cardiomyopathy, hypertension, aortic valve stenosis.    Patient presents today with her daughter.  Reports that she has done very well over the past year.  She has noted lower extremity edema bilaterally in the past several days.  She denies any chest discomfort, dyspnea at rest or with exertion, palpitations, lightheadedness, increased fatigue.    The following portions of the patient's history were reviewed and updated as appropriate: allergies, current medications, past family history, past medical history, past social history, past surgical history and problem list.      VITAL SIGNS     Visit Vitals  /82   Pulse 65   Ht 149.9 cm (59\")   Wt 56.7 kg (125 lb)   SpO2 92%   BMI 25.25 kg/mý         Wt Readings from Last 3 Encounters:   23 56.7 kg (125 lb)   08/10/23 56.2 kg (124 lb)   23 56.3 kg (124 lb 3.2 oz)     Body mass index is 25.25 kg/mý.      REVIEW OF SYSTEMS   Review of Systems   Constitutional: Negative for chills, fever, weight gain and weight loss.   Cardiovascular:  Positive for leg swelling.   Respiratory:  Negative for cough, snoring and wheezing.    Hematologic/Lymphatic: Negative for bleeding problem. Does not bruise/bleed easily.   Skin:  Negative for color change.   Musculoskeletal:  Negative for falls, joint pain and myalgias.   Gastrointestinal:  Negative for melena.   Genitourinary:  Negative for hematuria.   Neurological:  Negative for excessive daytime sleepiness.   Psychiatric/Behavioral:  Negative for depression. The patient is not nervous/anxious.  "         PHYSICAL EXAMINATION     Vitals and nursing note reviewed.   Constitutional:       Appearance: Normal appearance. Well-developed.   Eyes:      Conjunctiva/sclera: Conjunctivae normal.   Neck:      Vascular: No carotid bruit.   Pulmonary:      Breath sounds: Normal breath sounds.   Cardiovascular:      Normal rate. Regular rhythm. Normal S1 with normal intensity. Normal S2 with normal intensity.       Murmurs: There is a grade 2/6 systolic murmur at the URSB and ULSB.      No gallop.  No click. No rub.   Edema:     Peripheral edema absent.   Musculoskeletal: Normal range of motion. Skin:     General: Skin is warm and dry.   Neurological:      Mental Status: Alert and oriented to person, place, and time.      GCS: GCS eye subscore is 4. GCS verbal subscore is 5. GCS motor subscore is 6.   Psychiatric:         Speech: Speech normal.         Behavior: Behavior normal.         Thought Content: Thought content normal.         Judgment: Judgment normal.         REVIEWED DATA       ECG 12 Lead    Date/Time: 8/28/2023 1:20 PM  Performed by: Saundra Nichols APRN  Authorized by: Saundra Nichols APRN   Comparison: compared with previous ECG from 1/3/2022  Rhythm: sinus rhythm  Rate: normal  BPM: 65  Conduction: conduction normal  ST Segments: ST segments normal  T Waves: T waves normal  QRS axis: normal    Clinical impression: normal ECG        Cardiac Procedures:  Echocardiogram 7/22/2022: LVEF 64%.  Moderate LVH.  No evidence of LVOT obstruction.  Moderately dilated left atrium.  Mild tricuspid valve regurgitation.  Mild aortic valve stenosis.  No significant change from prior study 1 year ago.    Lipid Panel          7/5/2023    12:09   Lipid Panel   Total Cholesterol 139    Triglycerides 126    HDL Cholesterol 54    VLDL Cholesterol 22    LDL Cholesterol  63    LDL/HDL Ratio 1.11        Lab Results   Component Value Date     07/05/2023     04/28/2023    K 4.6 07/05/2023    K 4.4 04/28/2023    CL  109 (H) 07/05/2023     04/28/2023    CO2 24.0 07/05/2023    CO2 24.0 04/28/2023    BUN 18 07/05/2023    BUN 22 (H) 04/28/2023    CREATININE 0.85 07/05/2023    CREATININE 0.93 04/28/2023    EGFRIFNONA 70 01/03/2022    EGFRIFNONA 60 (L) 08/30/2021    EGFRIFAFRI 73 08/30/2021    EGFRIFAFRI 69 02/24/2021    GLUCOSE 92 07/05/2023    GLUCOSE 127 (H) 04/28/2023    CALCIUM 9.7 (H) 07/05/2023    CALCIUM 9.5 04/28/2023    PROTENTOTREF 6.5 07/05/2023    PROTENTOTREF 6.0 07/05/2022    ALBUMIN 4.0 07/05/2023    ALBUMIN 4.1 04/28/2023    BILITOT 0.4 07/05/2023    BILITOT 0.4 04/28/2023    AST 16 07/05/2023    AST 19 04/28/2023    ALT 16 07/05/2023    ALT 13 04/28/2023     Lab Results   Component Value Date    WBC 10.60 07/05/2023    WBC 7.61 04/28/2023    HGB 12.4 07/05/2023    HGB 11.6 (L) 04/28/2023    HCT 38.8 07/05/2023    HCT 37.2 04/28/2023    MCV 90.2 07/05/2023    MCV 91.2 04/28/2023     07/05/2023     04/28/2023     Lab Results   Component Value Date    .0 (H) 05/07/2019     Lab Results   Component Value Date    CKTOTAL 134 12/12/2014    TROPONINT <0.010 01/03/2022     Lab Results   Component Value Date    TSH 0.858 07/05/2022    TSH 1.020 08/30/2021             ASSESSMENT & PLAN     Diagnoses and all orders for this visit:    1. Hypertrophic cardiomyopathy (Primary)  No LVOT obstruction on most recent echocardiogram  Patient has lower extremity edema intermittently, recommend adding furosemide 20 mg/day on an as-needed basis  Continue olmesartan, carvedilol    2. Essential hypertension  Blood pressure controlled in clinic today at 130/82  Continue olmesartan, carvedilol, furosemide, amlodipine    3. Aortic stenosis, mild  Patient currently asymptomatic  Gradient stable on most recent echocardiogram    4. DNR (do not resuscitate)          Return in about 1 year (around 8/28/2024) for ENZO Oliveros.    Future Appointments         Provider Department Center    10/13/2023 11:40 AM Julio  ENZO Dumont [facility] PAIN MANAGEMENT [place]    10/27/2023 2:00 PM LAB CHAIR 1 [facility] ONCOLOGY CBC LAB [facility]    10/27/2023 2:20 PM Effie Huang APRN [facility]                  MEDICATIONS         Discharge Medications            Accurate as of August 28, 2023  1:32 PM. If you have any questions, ask your nurse or doctor.                Continue These Medications        Instructions Start Date   amLODIPine 5 MG tablet  Commonly known as: NORVASC   5 mg, Oral, Daily      aspirin 81 MG EC tablet   1 tablet, Oral, Daily      carvedilol 12.5 MG tablet  Commonly known as: COREG   12.5 mg, Oral, Every 12 Hours Scheduled      cholecalciferol 25 MCG (1000 UT) tablet  Commonly known as: VITAMIN D3   1,000 Units, Oral, Daily      gabapentin 100 MG capsule  Commonly known as: NEURONTIN   300 mg, Oral, 2 Times Daily PRN      multivitamin tablet tablet   Oral, Daily, Gummies      olmesartan 20 MG tablet  Commonly known as: BENICAR   20 mg, Oral, Daily      PROBIOTIC ACIDOPHILUS PO   1 tablet, Oral, Daily      tamoxifen 20 MG chemo tablet  Commonly known as: NOLVADEX   TAKE ONE TABLET BY MOUTH DAILY      vitamin b complex capsule capsule   1 capsule, Oral, Daily                   **Dragon Disclaimer:   Much of this encounter note is an electronic transcription/translation of spoken language to printed text. The electronic translation of spoken language may permit erroneous, or at times, nonsensical words or phrases to be inadvertently transcribed. Although I have reviewed the note for such errors, some may still exist.

## 2023-10-03 RX ORDER — OLMESARTAN MEDOXOMIL 20 MG/1
TABLET ORAL
Qty: 90 TABLET | Refills: 3 | Status: SHIPPED | OUTPATIENT
Start: 2023-10-03

## 2023-10-06 RX ORDER — FUROSEMIDE 20 MG/1
20 TABLET ORAL DAILY PRN
Qty: 90 TABLET | Refills: 2 | Status: SHIPPED | OUTPATIENT
Start: 2023-10-06

## 2023-10-06 RX ORDER — AMLODIPINE BESYLATE 5 MG/1
5 TABLET ORAL DAILY
Qty: 90 TABLET | Refills: 1 | Status: SHIPPED | OUTPATIENT
Start: 2023-10-06

## 2023-10-06 RX ORDER — CARVEDILOL 12.5 MG/1
12.5 TABLET ORAL EVERY 12 HOURS SCHEDULED
Qty: 60 TABLET | Refills: 3 | Status: SHIPPED | OUTPATIENT
Start: 2023-10-06

## 2023-10-09 RX ORDER — MIRTAZAPINE 15 MG/1
15 TABLET, FILM COATED ORAL NIGHTLY
Qty: 30 TABLET | Refills: 2 | Status: SHIPPED | OUTPATIENT
Start: 2023-10-09

## 2023-10-30 RX ORDER — CARVEDILOL 12.5 MG/1
12.5 TABLET ORAL EVERY 12 HOURS SCHEDULED
Qty: 180 TABLET | Refills: 3 | Status: SHIPPED | OUTPATIENT
Start: 2023-10-30 | End: 2023-11-01 | Stop reason: SDUPTHER

## 2023-11-01 RX ORDER — CARVEDILOL 12.5 MG/1
12.5 TABLET ORAL EVERY 12 HOURS SCHEDULED
Qty: 180 TABLET | Refills: 3 | Status: SHIPPED | OUTPATIENT
Start: 2023-11-01

## 2023-12-26 RX ORDER — MIRTAZAPINE 15 MG/1
15 TABLET, FILM COATED ORAL NIGHTLY
Qty: 30 TABLET | Refills: 0 | Status: SHIPPED | OUTPATIENT
Start: 2023-12-26

## 2023-12-26 NOTE — TELEPHONE ENCOUNTER
Rx Refill Note  Requested Prescriptions     Pending Prescriptions Disp Refills    mirtazapine (REMERON) 15 MG tablet [Pharmacy Med Name: MIRTAZAPINE 15 MG TABLET] 30 tablet 2     Sig: TAKE ONE TABLET BY MOUTH ONCE NIGHTLY      Last office visit with prescribing clinician: 7/5/2023   Last telemedicine visit with prescribing clinician: Visit date not found   Next office visit with prescribing clinician: Visit date not found         Viktoria Burk MA  12/26/23, 08:16 EST    Patient doesn't have an follow up appointment scheduled?    Pt needs to follow up in 3 months for gabapentin ?

## 2024-01-10 ENCOUNTER — OFFICE VISIT (OUTPATIENT)
Dept: INTERNAL MEDICINE | Facility: CLINIC | Age: 89
End: 2024-01-10
Payer: MEDICARE

## 2024-01-10 VITALS
SYSTOLIC BLOOD PRESSURE: 130 MMHG | OXYGEN SATURATION: 97 % | HEART RATE: 63 BPM | DIASTOLIC BLOOD PRESSURE: 60 MMHG | BODY MASS INDEX: 25.6 KG/M2 | HEIGHT: 59 IN | WEIGHT: 127 LBS

## 2024-01-10 DIAGNOSIS — R91.1 LUNG NODULE: Primary | ICD-10-CM

## 2024-01-10 DIAGNOSIS — E78.49 OTHER HYPERLIPIDEMIA: Chronic | ICD-10-CM

## 2024-01-10 DIAGNOSIS — I10 ESSENTIAL HYPERTENSION: Chronic | ICD-10-CM

## 2024-01-10 LAB
ALBUMIN SERPL-MCNC: 4.4 G/DL (ref 3.5–5.2)
ALBUMIN/GLOB SERPL: 1.9 G/DL
ALP SERPL-CCNC: 50 U/L (ref 39–117)
ALT SERPL-CCNC: 14 U/L (ref 1–33)
AST SERPL-CCNC: 15 U/L (ref 1–32)
BILIRUB SERPL-MCNC: 0.4 MG/DL (ref 0–1.2)
BUN SERPL-MCNC: 24 MG/DL (ref 8–23)
BUN/CREAT SERPL: 22.4 (ref 7–25)
CALCIUM SERPL-MCNC: 10.4 MG/DL (ref 8.2–9.6)
CHLORIDE SERPL-SCNC: 105 MMOL/L (ref 98–107)
CHOLEST SERPL-MCNC: 142 MG/DL (ref 0–200)
CO2 SERPL-SCNC: 25.7 MMOL/L (ref 22–29)
CREAT SERPL-MCNC: 1.07 MG/DL (ref 0.57–1)
EGFRCR SERPLBLD CKD-EPI 2021: 49.4 ML/MIN/1.73
ERYTHROCYTE [DISTWIDTH] IN BLOOD BY AUTOMATED COUNT: 13 % (ref 12.3–15.4)
GLOBULIN SER CALC-MCNC: 2.3 GM/DL
GLUCOSE SERPL-MCNC: 92 MG/DL (ref 65–99)
HCT VFR BLD AUTO: 39.3 % (ref 34–46.6)
HDLC SERPL-MCNC: 53 MG/DL (ref 40–60)
HGB BLD-MCNC: 12.5 G/DL (ref 12–15.9)
LDLC SERPL CALC-MCNC: 69 MG/DL (ref 0–100)
LDLC/HDLC SERPL: 1.27 {RATIO}
MCH RBC QN AUTO: 27.3 PG (ref 26.6–33)
MCHC RBC AUTO-ENTMCNC: 31.8 G/DL (ref 31.5–35.7)
MCV RBC AUTO: 85.8 FL (ref 79–97)
PLATELET # BLD AUTO: 245 10*3/MM3 (ref 140–450)
POTASSIUM SERPL-SCNC: 4.8 MMOL/L (ref 3.5–5.2)
PROT SERPL-MCNC: 6.7 G/DL (ref 6–8.5)
RBC # BLD AUTO: 4.58 10*6/MM3 (ref 3.77–5.28)
SODIUM SERPL-SCNC: 140 MMOL/L (ref 136–145)
TRIGL SERPL-MCNC: 108 MG/DL (ref 0–150)
VLDLC SERPL CALC-MCNC: 20 MG/DL (ref 5–40)
WBC # BLD AUTO: 8.14 10*3/MM3 (ref 3.4–10.8)

## 2024-01-10 PROCEDURE — 1160F RVW MEDS BY RX/DR IN RCRD: CPT | Performed by: NURSE PRACTITIONER

## 2024-01-10 PROCEDURE — 1159F MED LIST DOCD IN RCRD: CPT | Performed by: NURSE PRACTITIONER

## 2024-01-10 PROCEDURE — 99214 OFFICE O/P EST MOD 30 MIN: CPT | Performed by: NURSE PRACTITIONER

## 2024-01-10 NOTE — PROGRESS NOTES
Chief Complaint  Lung Nodule (F/u) and Hypertension     Subjective:      History of Present Illness {  Problem List  Visit  Diagnosis   Encounters  Notes  Medications  Labs  Result Review Imaging  Media :23}     Neha Guillory presents to [facility] for:  hypertension, hyperlipidemia, pulm hypertension, carotid artery stenosis, hypertrophic obstructive cardiomyopathy, mild aortic stenosis, breast ca (ER/SD positive, HER-2 negative), hyperparathyroid.      Hypertension: chronic and continues coreg, norvasc, benicar  Lasix: has not taken for a while.    States being more active: swelling improved.  Mild in right but improves after she sleeps.      Hyperlipidemia: continues pravastatin     R hip: pain improved.     She has a cane but states she has not been using.   Advised to use as a precaution.       Lung CT: 10/2022  CT Chest Without Contrast Diagnostic (10/11/2022 11:42 AM)   IMPRESSION:  1. Multifocal pneumonia at the right middle lobe, left upper and lower lobes.  2. Slight increase in size likely reactive mediastinal nodes.  Reevaluation is recommended with a chest CT in 3 months.     This report was finalized on 10/12/2022 4:35 PM by Dr. Abbei Armas M.D.    Her daughter is here with her today: no new concerns.     I have reviewed patient's medical history, any new submitted information provided by patient or medical assistant and updated medical record.      Objective:      Physical Exam  Vitals reviewed.   Constitutional:       Appearance: Normal appearance. She is well-developed.   Neck:      Thyroid: No thyromegaly.   Cardiovascular:      Rate and Rhythm: Normal rate and regular rhythm.      Pulses: Normal pulses.      Heart sounds: Normal heart sounds.   Pulmonary:      Effort: Pulmonary effort is normal.      Breath sounds: Normal breath sounds.      Comments: E/U   Neurological:      Mental Status: She is alert and oriented to person, place, and time.  "  Psychiatric:         Behavior: Behavior is cooperative.        Result Review  Data Reviewed:{ Labs  Result Review  Imaging  Med Tab  Media :23}     The following data was reviewed by: Yvan Ruiz III, NP-C on 01/10/2024  Common labs          4/28/2023    14:32 7/5/2023    12:09 1/10/2024    11:58   Common Labs   Glucose 127  92  92    BUN 22  18  24    Creatinine 0.93  0.85  1.07    Sodium 140  143  140    Potassium 4.4  4.6  4.8    Chloride 104  109  105    Calcium 9.5  9.7  10.4    Total Protein  6.5  6.7    Albumin 4.1  4.0  4.4    Total Bilirubin 0.4  0.4  0.4    Alkaline Phosphatase 57  36  50    AST (SGOT) 19  16  15    ALT (SGPT) 13  16  14    WBC 7.61  10.60  8.14    Hemoglobin 11.6  12.4  12.5    Hematocrit 37.2  38.8  39.3    Platelets 222  203  245    Total Cholesterol  139  142    Triglycerides  126  108    HDL Cholesterol  54  53    LDL Cholesterol   63  69             Vital Signs:   /60 (BP Location: Left arm, Patient Position: Sitting, Cuff Size: Adult)   Pulse 63   Ht 149.9 cm (59\")   Wt 57.6 kg (127 lb)   SpO2 97%   BMI 25.65 kg/m²         Requested Prescriptions      No prescriptions requested or ordered in this encounter       Routine medications provided by this office will also be refilled via pharmacy request.       Current Outpatient Medications:     amLODIPine (NORVASC) 5 MG tablet, Take 1 tablet by mouth Daily., Disp: 90 tablet, Rfl: 1    aspirin 81 MG EC tablet, Take 1 tablet by mouth Daily. Indications: Disease involving Lipid Deposits in the Arteries, Disp: , Rfl:     B Complex Vitamins (vitamin b complex) capsule capsule, Take 1 capsule by mouth Daily., Disp: , Rfl:     carvedilol (COREG) 12.5 MG tablet, Take 1 tablet by mouth Every 12 (Twelve) Hours., Disp: 180 tablet, Rfl: 3    cholecalciferol (VITAMIN D3) 1000 units tablet, Take 1 tablet by mouth Daily., Disp: , Rfl:     Lactobacillus (PROBIOTIC ACIDOPHILUS PO), Take 1 tablet by mouth Daily., Disp: , " Rfl:     mirtazapine (REMERON) 15 MG tablet, TAKE ONE TABLET BY MOUTH ONCE NIGHTLY, Disp: 30 tablet, Rfl: 0    multivitamin (THERAGRAN) tablet tablet, Take  by mouth Daily. Gummies, Disp: , Rfl:     olmesartan (BENICAR) 20 MG tablet, TAKE ONE TABLET BY MOUTH DAILY, Disp: 90 tablet, Rfl: 3    tamoxifen (NOLVADEX) 20 MG chemo tablet, TAKE ONE TABLET BY MOUTH DAILY, Disp: 90 tablet, Rfl: 1    furosemide (LASIX) 20 MG tablet, Take 1 tablet by mouth Daily As Needed (swelling). (Patient not taking: Reported on 1/10/2024), Disp: 90 tablet, Rfl: 2    gabapentin (NEURONTIN) 100 MG capsule, Take 3 capsules by mouth 2 (Two) Times a Day As Needed (PAIN). (Patient not taking: Reported on 1/10/2024), Disp: 180 capsule, Rfl: 1    potassium chloride 10 MEQ CR tablet, Take 2 tablets by mouth Daily As Needed (when taking lasix (furosemide)). (Patient not taking: Reported on 1/10/2024), Disp: 60 tablet, Rfl: 11     Assessment and Plan:      Assessment and Plan {CC Problem List  Visit Diagnosis  ROS  Review (Popup)  Health Maintenance  Quality  BestPractice  Medications  SmartSets  SnapShot Encounters  Media :23}     Problem List Items Addressed This Visit          Cardiac and Vasculature    Essential hypertension (Chronic)    Current Assessment & Plan     Hypertension is improving with treatment.  Continue current treatment regimen.  Blood pressure will be reassessed at the next regular appointment.         Relevant Orders    Comprehensive Metabolic Panel (Completed)    CBC (No Diff) (Completed)    Hyperlipidemia (Chronic)    Current Assessment & Plan     No longer on statin         Relevant Orders    Comprehensive Metabolic Panel (Completed)    Lipid Panel With LDL / HDL Ratio (Completed)    CBC (No Diff) (Completed)     Other Visit Diagnoses       Lung nodule    -  Primary    Relevant Orders    CT Chest Without Contrast            Follow Up {Instructions Charge Capture  Follow-up Communications :23}     Return in  about 6 months (around 7/10/2024) for Medicare Wellness.      Patient was given instructions and counseling regarding her condition or for health maintenance advice. Please see specific information pulled into the AVS if appropriate.    Semaj disclaimer:   Much of this encounter note is an electronic transcription/translation of spoken language to printed text. The electronic translation of spoken language may permit erroneous, or at times, nonsensical words or phrases to be inadvertently transcribed; Although I have reviewed the note for such errors, some may still exist.     Additional Patient Counseling:       There are no Patient Instructions on file for this visit.

## 2024-01-15 RX ORDER — TAMOXIFEN CITRATE 20 MG/1
20 TABLET ORAL DAILY
Refills: 1 | OUTPATIENT
Start: 2024-01-15

## 2024-01-30 ENCOUNTER — HOSPITAL ENCOUNTER (OUTPATIENT)
Facility: HOSPITAL | Age: 89
Discharge: HOME | End: 2024-01-30
Admitting: NURSE PRACTITIONER
Payer: MEDICARE

## 2024-01-30 DIAGNOSIS — R91.1 LUNG NODULE: ICD-10-CM

## 2024-01-30 PROCEDURE — 71250 CT THORAX DX C-: CPT

## 2024-02-01 DIAGNOSIS — R91.1 LUNG NODULE: Primary | ICD-10-CM

## 2024-02-12 ENCOUNTER — LAB (OUTPATIENT)
Dept: LAB | Facility: HOSPITAL | Age: 89
End: 2024-02-12
Payer: MEDICARE

## 2024-02-12 ENCOUNTER — OFFICE VISIT (OUTPATIENT)
Dept: ONCOLOGY | Facility: CLINIC | Age: 89
End: 2024-02-12
Payer: MEDICARE

## 2024-02-12 VITALS
WEIGHT: 128 LBS | HEART RATE: 70 BPM | DIASTOLIC BLOOD PRESSURE: 82 MMHG | RESPIRATION RATE: 18 BRPM | SYSTOLIC BLOOD PRESSURE: 174 MMHG | HEIGHT: 59 IN | TEMPERATURE: 98.2 F | OXYGEN SATURATION: 95 % | BODY MASS INDEX: 25.8 KG/M2

## 2024-02-12 DIAGNOSIS — M81.0 OSTEOPOROSIS, POST-MENOPAUSAL: ICD-10-CM

## 2024-02-12 DIAGNOSIS — C50.212 CARCINOMA OF UPPER-INNER QUADRANT OF LEFT BREAST IN FEMALE, ESTROGEN RECEPTOR POSITIVE: Primary | ICD-10-CM

## 2024-02-12 DIAGNOSIS — Z79.899 LONG-TERM USE OF HIGH-RISK MEDICATION: ICD-10-CM

## 2024-02-12 DIAGNOSIS — Z17.0 CARCINOMA OF UPPER-INNER QUADRANT OF LEFT BREAST IN FEMALE, ESTROGEN RECEPTOR POSITIVE: ICD-10-CM

## 2024-02-12 DIAGNOSIS — C50.212 CARCINOMA OF UPPER-INNER QUADRANT OF LEFT BREAST IN FEMALE, ESTROGEN RECEPTOR POSITIVE: ICD-10-CM

## 2024-02-12 DIAGNOSIS — Z17.0 CARCINOMA OF UPPER-INNER QUADRANT OF LEFT BREAST IN FEMALE, ESTROGEN RECEPTOR POSITIVE: Primary | ICD-10-CM

## 2024-02-12 LAB
BASOPHILS # BLD AUTO: 0.06 10*3/MM3 (ref 0–0.2)
BASOPHILS NFR BLD AUTO: 0.6 % (ref 0–1.5)
DEPRECATED RDW RBC AUTO: 45.7 FL (ref 37–54)
EOSINOPHIL # BLD AUTO: 0.27 10*3/MM3 (ref 0–0.4)
EOSINOPHIL NFR BLD AUTO: 2.8 % (ref 0.3–6.2)
ERYTHROCYTE [DISTWIDTH] IN BLOOD BY AUTOMATED COUNT: 14.1 % (ref 12.3–15.4)
HCT VFR BLD AUTO: 38 % (ref 34–46.6)
HGB BLD-MCNC: 13 G/DL (ref 12–15.9)
IMM GRANULOCYTES # BLD AUTO: 0.06 10*3/MM3 (ref 0–0.05)
IMM GRANULOCYTES NFR BLD AUTO: 0.6 % (ref 0–0.5)
LYMPHOCYTES # BLD AUTO: 1.57 10*3/MM3 (ref 0.7–3.1)
LYMPHOCYTES NFR BLD AUTO: 16.2 % (ref 19.6–45.3)
MCH RBC QN AUTO: 30.1 PG (ref 26.6–33)
MCHC RBC AUTO-ENTMCNC: 34.2 G/DL (ref 31.5–35.7)
MCV RBC AUTO: 88 FL (ref 79–97)
MONOCYTES # BLD AUTO: 0.94 10*3/MM3 (ref 0.1–0.9)
MONOCYTES NFR BLD AUTO: 9.7 % (ref 5–12)
NEUTROPHILS NFR BLD AUTO: 6.77 10*3/MM3 (ref 1.7–7)
NEUTROPHILS NFR BLD AUTO: 70.1 % (ref 42.7–76)
NRBC BLD AUTO-RTO: 0 /100 WBC (ref 0–0.2)
PLATELET # BLD AUTO: 213 10*3/MM3 (ref 140–450)
PMV BLD AUTO: 11.2 FL (ref 6–12)
RBC # BLD AUTO: 4.32 10*6/MM3 (ref 3.77–5.28)
WBC NRBC COR # BLD AUTO: 9.67 10*3/MM3 (ref 3.4–10.8)

## 2024-02-12 PROCEDURE — 1159F MED LIST DOCD IN RCRD: CPT | Performed by: NURSE PRACTITIONER

## 2024-02-12 PROCEDURE — 99214 OFFICE O/P EST MOD 30 MIN: CPT | Performed by: NURSE PRACTITIONER

## 2024-02-12 PROCEDURE — 1126F AMNT PAIN NOTED NONE PRSNT: CPT | Performed by: NURSE PRACTITIONER

## 2024-02-12 PROCEDURE — 1160F RVW MEDS BY RX/DR IN RCRD: CPT | Performed by: NURSE PRACTITIONER

## 2024-02-12 PROCEDURE — 85025 COMPLETE CBC W/AUTO DIFF WBC: CPT

## 2024-02-12 PROCEDURE — 36415 COLL VENOUS BLD VENIPUNCTURE: CPT

## 2024-02-12 RX ORDER — TAMOXIFEN CITRATE 20 MG/1
20 TABLET ORAL DAILY
Qty: 90 TABLET | Refills: 1 | Status: SHIPPED | OUTPATIENT
Start: 2024-02-12

## 2024-02-19 ENCOUNTER — PATIENT MESSAGE (OUTPATIENT)
Dept: INTERNAL MEDICINE | Facility: CLINIC | Age: 89
End: 2024-02-19
Payer: MEDICARE

## 2024-02-19 RX ORDER — MIRTAZAPINE 15 MG/1
15 TABLET, FILM COATED ORAL NIGHTLY
Qty: 30 TABLET | Refills: 4 | Status: SHIPPED | OUTPATIENT
Start: 2024-02-19 | End: 2024-02-19 | Stop reason: SDUPTHER

## 2024-02-19 RX ORDER — MIRTAZAPINE 15 MG/1
15 TABLET, FILM COATED ORAL NIGHTLY
Qty: 90 TABLET | Refills: 1 | Status: SHIPPED | OUTPATIENT
Start: 2024-02-19

## 2024-04-08 RX ORDER — AMLODIPINE BESYLATE 5 MG/1
5 TABLET ORAL DAILY
Qty: 90 TABLET | Refills: 0 | Status: SHIPPED | OUTPATIENT
Start: 2024-04-08

## 2024-04-25 ENCOUNTER — TELEPHONE (OUTPATIENT)
Dept: CARDIOLOGY | Facility: CLINIC | Age: 89
End: 2024-04-25
Payer: MEDICARE

## 2024-04-25 NOTE — TELEPHONE ENCOUNTER
Caller: Monica Hernández    Relationship to patient: Emergency Contact    Best call back number: [phone]    Chief complaint:     Type of visit: FOLLOW UP     Requested date: AS SOON AS POSSIBLE     If rescheduling, when is the original appointment: N/A     Additional notes:PATIENT'S DAUGHTER MONICA STATED THAT THE PATIENT IS HAVING HIGH BLOOD PRESSURE AND SHE IS GETTING DIZZY. MONICA STATED THAT SHE FEEL PATIENT NEEDS TO SEE DR. MATHEWS AS SOON AS POSSIBLE. THANK YOU.

## 2024-04-25 NOTE — TELEPHONE ENCOUNTER
Spoke to Monica and added to  schedule tomorrow morning.    Pt has been having systolic BP ranging 140-190 for several days, positional dizzy spells which have become so extreme that pt feels like she'll pass out, SOA at rest and exertion, and leg edema which she has had for @ 1 yr off/on but seems worse.

## 2024-04-26 ENCOUNTER — OFFICE VISIT (OUTPATIENT)
Dept: CARDIOLOGY | Facility: CLINIC | Age: 89
End: 2024-04-26
Payer: MEDICARE

## 2024-04-26 VITALS
HEIGHT: 59 IN | SYSTOLIC BLOOD PRESSURE: 130 MMHG | DIASTOLIC BLOOD PRESSURE: 80 MMHG | BODY MASS INDEX: 25.4 KG/M2 | WEIGHT: 126 LBS | HEART RATE: 68 BPM

## 2024-04-26 DIAGNOSIS — I51.89 DIASTOLIC DYSFUNCTION: ICD-10-CM

## 2024-04-26 DIAGNOSIS — I1A.0 RESISTANT HYPERTENSION: Chronic | ICD-10-CM

## 2024-04-26 DIAGNOSIS — I42.2 HYPERTROPHIC CARDIOMYOPATHY: Primary | Chronic | ICD-10-CM

## 2024-04-26 DIAGNOSIS — Z66 DNR (DO NOT RESUSCITATE): ICD-10-CM

## 2024-04-26 NOTE — PROGRESS NOTES
"      CARDIOLOGY    Zack Mohr MD    ENCOUNTER DATE:  04/26/2024    Neha Guillory / 89 y.o. / female        CHIEF COMPLAINT / REASON FOR OFFICE VISIT     Dizziness, Shortness of Breath, and Edema      HISTORY OF PRESENT ILLNESS       Dizziness    Shortness of Breath      Neha Guillory is a 89 y.o. female who presents today for evaluation.  Patient has been having some issues that she just recently told her daughter.  She complains of some dizzy spells.  She says it started about a month ago.  She says it occurs when she is laying in bed and rolls over.  She also has been noticing to be more short of breath.  She been taking any Lasix and she has not needed some.  She usually does her laundry in the basement and walks a flight of stairs.  Her daughter noticed yesterday that was about 4 or 5 steps and she seemed more short of breath.      The following portions of the patient's history were reviewed and updated as appropriate: allergies, current medications, past family history, past medical history, past social history, past surgical history and problem list.      VITAL SIGNS     Visit Vitals  /80 (BP Location: Right arm)   Pulse 68   Ht 149.9 cm (59\")   Wt 57.2 kg (126 lb)   BMI 25.45 kg/m²         Wt Readings from Last 3 Encounters:   04/26/24 57.2 kg (126 lb)   02/12/24 58.1 kg (128 lb)   01/10/24 57.6 kg (127 lb)     Body mass index is 25.45 kg/m².      REVIEW OF SYSTEMS   Review of Systems   Respiratory:  Positive for shortness of breath.    Neurological:  Positive for dizziness.           PHYSICAL EXAMINATION     Vitals reviewed.   Cardiovascular:      Normal rate. Regular rhythm. Normal S1. Normal S2.       Murmurs: There is a grade 1/6 harsh midsystolic murmur at the URSB.      No gallop.  No click. No rub.   Pulses:     Intact distal pulses.   Edema:     Peripheral edema absent.           REVIEWED DATA       ECG 12 Lead    Date/Time: 4/26/2024 10:56 AM  Performed by: Zack Mohr, " MD    Authorized by: Zack Mohr MD  Comparison: compared with previous ECG from 8/28/2023  Similar to previous ECG  Rhythm: sinus rhythm  Other findings: left ventricular hypertrophy    Clinical impression: abnormal EKG          Cardiac Procedures:      Lipid Panel          7/5/2023    12:09 1/10/2024    11:58   Lipid Panel   Total Cholesterol 139  142    Triglycerides 126  108    HDL Cholesterol 54  53    VLDL Cholesterol 22  20    LDL Cholesterol  63  69    LDL/HDL Ratio 1.11  1.27          ASSESSMENT & PLAN      Diagnosis Plan   1. Hypertrophic cardiomyopathy  Adult Transthoracic Echo Complete W/ Cont if Necessary Per Protocol      2. Resistant hypertension        3. Diastolic dysfunction        4. DNR (do not resuscitate)              SUMMARY/DISCUSSION  Hypertrophic obstructive cardiomyopathy.  I am going to repeat her echo since her symptoms have changed.  I also told her to take it Lasix to see if it helps her breathing.  On physical exam today she did not appear to be fluid overloaded by her description is consistent with that.  Diastolic dysfunction.  DNR  Hypertension blood pressure is good today.  But see how she responds to Lasix we will recheck her echo and go from there.        MEDICATIONS         Discharge Medications            Accurate as of April 26, 2024 10:54 AM. If you have any questions, ask your nurse or doctor.                Continue These Medications        Instructions Start Date   amLODIPine 5 MG tablet  Commonly known as: NORVASC   5 mg, Oral, Daily      aspirin 81 MG EC tablet   1 tablet, Oral, Daily      carvedilol 12.5 MG tablet  Commonly known as: COREG   12.5 mg, Oral, Every 12 Hours Scheduled      cholecalciferol 25 MCG (1000 UT) tablet  Commonly known as: VITAMIN D3   1,000 Units, Oral, Daily      furosemide 20 MG tablet  Commonly known as: LASIX   20 mg, Oral, Daily PRN      gabapentin 100 MG capsule  Commonly known as: NEURONTIN   300 mg, Oral, 2 Times Daily PRN       mirtazapine 15 MG tablet  Commonly known as: REMERON   15 mg, Oral, Nightly      multivitamin tablet tablet   Oral, Daily, Gummies      olmesartan 20 MG tablet  Commonly known as: BENICAR   TAKE ONE TABLET BY MOUTH DAILY      potassium chloride 10 MEQ CR tablet   20 mEq, Oral, Daily PRN      PROBIOTIC ACIDOPHILUS PO   1 tablet, Oral, Daily      tamoxifen 20 MG chemo tablet  Commonly known as: NOLVADEX   20 mg, Oral, Daily      vitamin b complex capsule capsule   1 capsule, Oral, Daily                   **Dragon Disclaimer:   Much of this encounter note is an electronic transcription/translation of spoken language to printed text. The electronic translation of spoken language may permit erroneous, or at times, nonsensical words or phrases to be inadvertently transcribed. Although I have reviewed the note for such errors, some may still exist.

## 2024-05-08 ENCOUNTER — HOSPITAL ENCOUNTER (OUTPATIENT)
Dept: CARDIOLOGY | Facility: HOSPITAL | Age: 89
Discharge: HOME | End: 2024-05-08
Payer: MEDICARE

## 2024-05-08 VITALS
HEIGHT: 59 IN | WEIGHT: 126 LBS | OXYGEN SATURATION: 94 % | BODY MASS INDEX: 25.4 KG/M2 | SYSTOLIC BLOOD PRESSURE: 140 MMHG | HEART RATE: 83 BPM | DIASTOLIC BLOOD PRESSURE: 80 MMHG

## 2024-05-08 DIAGNOSIS — I42.2 HYPERTROPHIC CARDIOMYOPATHY: Chronic | ICD-10-CM

## 2024-05-08 LAB
AORTIC DIMENSIONLESS INDEX: 0.7 (DI)
ASCENDING AORTA: 3.4 CM
BH CV ECHO LEFT VENTRICLE GLOBAL LONGITUDINAL STRAIN: -20.9 %
BH CV ECHO MEAS - ACS: 1.32 CM
BH CV ECHO MEAS - AO MAX PG: 14.2 MMHG
BH CV ECHO MEAS - AO MEAN PG: 7 MMHG
BH CV ECHO MEAS - AO ROOT DIAM: 3.5 CM
BH CV ECHO MEAS - AO V2 MAX: 188.6 CM/SEC
BH CV ECHO MEAS - AO V2 VTI: 31.5 CM
BH CV ECHO MEAS - AVA(I,D): 2.48 CM2
BH CV ECHO MEAS - EDV(CUBED): 91.1 ML
BH CV ECHO MEAS - EDV(MOD-SP2): 40 ML
BH CV ECHO MEAS - EDV(MOD-SP4): 53 ML
BH CV ECHO MEAS - EF(MOD-BP): 64.1 %
BH CV ECHO MEAS - EF(MOD-SP2): 62.5 %
BH CV ECHO MEAS - EF(MOD-SP4): 64.2 %
BH CV ECHO MEAS - ESV(CUBED): 19 ML
BH CV ECHO MEAS - ESV(MOD-SP2): 15 ML
BH CV ECHO MEAS - ESV(MOD-SP4): 19 ML
BH CV ECHO MEAS - FS: 40.7 %
BH CV ECHO MEAS - IVS/LVPW: 0.91 CM
BH CV ECHO MEAS - IVSD: 1 CM
BH CV ECHO MEAS - LAT PEAK E' VEL: 3 CM/SEC
BH CV ECHO MEAS - LV DIASTOLIC VOL/BSA (35-75): 35 CM2
BH CV ECHO MEAS - LV MASS(C)D: 164 GRAMS
BH CV ECHO MEAS - LV MAX PG: 6.7 MMHG
BH CV ECHO MEAS - LV MEAN PG: 3 MMHG
BH CV ECHO MEAS - LV SYSTOLIC VOL/BSA (12-30): 12.5 CM2
BH CV ECHO MEAS - LV V1 MAX: 129 CM/SEC
BH CV ECHO MEAS - LV V1 VTI: 22 CM
BH CV ECHO MEAS - LVIDD: 4.5 CM
BH CV ECHO MEAS - LVIDS: 2.7 CM
BH CV ECHO MEAS - LVOT AREA: 3.6 CM2
BH CV ECHO MEAS - LVOT DIAM: 2.13 CM
BH CV ECHO MEAS - LVPWD: 1.1 CM
BH CV ECHO MEAS - MED PEAK E' VEL: 3 CM/SEC
BH CV ECHO MEAS - MV A DUR: 0.14 SEC
BH CV ECHO MEAS - MV A MAX VEL: 168 CM/SEC
BH CV ECHO MEAS - MV DEC SLOPE: 668.2 CM/SEC2
BH CV ECHO MEAS - MV DEC TIME: 0.19 SEC
BH CV ECHO MEAS - MV E MAX VEL: 52.9 CM/SEC
BH CV ECHO MEAS - MV E/A: 0.31
BH CV ECHO MEAS - MV MAX PG: 9.4 MMHG
BH CV ECHO MEAS - MV MEAN PG: 4.1 MMHG
BH CV ECHO MEAS - MV P1/2T: 29.2 MSEC
BH CV ECHO MEAS - MV V2 VTI: 26.7 CM
BH CV ECHO MEAS - MVA(P1/2T): 7.5 CM2
BH CV ECHO MEAS - MVA(VTI): 2.9 CM2
BH CV ECHO MEAS - PA ACC TIME: 0.08 SEC
BH CV ECHO MEAS - PA V2 MAX: 123.5 CM/SEC
BH CV ECHO MEAS - PULM A REVS DUR: 0.11 SEC
BH CV ECHO MEAS - PULM A REVS VEL: 31.6 CM/SEC
BH CV ECHO MEAS - PULM DIAS VEL: 27.2 CM/SEC
BH CV ECHO MEAS - PULM S/D: 1.38
BH CV ECHO MEAS - PULM SYS VEL: 37.6 CM/SEC
BH CV ECHO MEAS - QP/QS: 0.5
BH CV ECHO MEAS - RAP SYSTOLE: 3 MMHG
BH CV ECHO MEAS - RV MAX PG: 3 MMHG
BH CV ECHO MEAS - RV V1 MAX: 86.5 CM/SEC
BH CV ECHO MEAS - RV V1 VTI: 15 CM
BH CV ECHO MEAS - RVOT DIAM: 1.83 CM
BH CV ECHO MEAS - RVSP: 25.3 MMHG
BH CV ECHO MEAS - SUP REN AO DIAM: 1.8 CM
BH CV ECHO MEAS - SV(LVOT): 78.2 ML
BH CV ECHO MEAS - SV(MOD-SP2): 25 ML
BH CV ECHO MEAS - SV(MOD-SP4): 34 ML
BH CV ECHO MEAS - SV(RVOT): 39.3 ML
BH CV ECHO MEAS - SVI(LVOT): 51.6 ML/M2
BH CV ECHO MEAS - SVI(MOD-SP2): 16.5 ML/M2
BH CV ECHO MEAS - SVI(MOD-SP4): 22.4 ML/M2
BH CV ECHO MEAS - TAPSE (>1.6): 2.3 CM
BH CV ECHO MEAS - TR MAX PG: 22.3 MMHG
BH CV ECHO MEAS - TR MAX VEL: 236.2 CM/SEC
BH CV ECHO MEASUREMENTS AVERAGE E/E' RATIO: 17.63
BH CV XLRA - RV BASE: 2.6 CM
BH CV XLRA - RV LENGTH: 5.9 CM
BH CV XLRA - RV MID: 2.7 CM
BH CV XLRA - TDI S': 15.1 CM/SEC
LEFT ATRIUM VOLUME INDEX: 34.9 ML/M2
SINUS: 3.4 CM
STJ: 3.2 CM

## 2024-05-08 PROCEDURE — 93356 MYOCRD STRAIN IMG SPCKL TRCK: CPT

## 2024-05-08 PROCEDURE — 93306 TTE W/DOPPLER COMPLETE: CPT

## 2024-06-27 DIAGNOSIS — M81.0 OSTEOPOROSIS, POST-MENOPAUSAL: Primary | ICD-10-CM

## 2024-06-27 DIAGNOSIS — Z79.899 LONG-TERM USE OF HIGH-RISK MEDICATION: ICD-10-CM

## 2024-06-27 DIAGNOSIS — M81.8 OTHER OSTEOPOROSIS WITHOUT CURRENT PATHOLOGICAL FRACTURE: ICD-10-CM

## 2024-06-27 NOTE — PROGRESS NOTES
Subjective Patient feeling well    REASON FOR FOLLOW-UP left sided early stage breast cancer                           History of Present Illness       The patient is an 89-year-old female with a previously surgically treated hyperparathyroidism.  She had recently developed spontaneous bleeding led additional in her right neck extending to the shoulder and across the shoulder and breast with subsequent certain about inflammatory breast cancer.  She underwent a screening mammogram 1/26/2022 and then diagnostic mammogram and ultrasound right breast.  There was an area of focal asymmetry in the posterior one third upper inner quadrant left breast not the affected breast, persist on spot compression negative findings on ultrasound though there was a 5 mm hypoechoic mass left breast that was noted.  Ultrasound-guided biopsy 2/17 revealed invasive lobular carcinoma grade 1 with no lymphatic or perineural invasion, ER 99%, VA 60%, HER-2 negative and Ki-67 of 4%.     Her chest wall erythema led to an ER visit with CT scan demonstrating chronic right shoulder disease, complete rotator cuff tear.     The patient was seen 2/24/2022 by Dr. Marina general surgery with the surgery requesting not to have radiation therapy at home mastectomy but agreeable to lumpectomy and sentinel lymph node.     Patient now referred to medical oncology for consideration of endocrine therapy as appropriate.      At the time of this dictation she has been seen by radiation therapy.  There are plans to proceed with lumpectomy and sentinel lymph node biopsy and the fact that women of 70 years or older with ER positive tumors do not gain substantially with the addition of radiation therapy.  This has to be balanced with the fact that the patient is known to have osteoporosis involving both hips with left hip T score -3.1 and right hip T score of -2.8, lumbar T score is -0.8.    These findings are discussed with the patient and her daughter in some  detail when they are seen 3/7/2022 particularly recognizing the patient is quite active, lives on her own and continues to manage all her daily activities.  Notably she continues to have issues with bilateral rotator cuff injury right greater than left and is to be seen for aspiration of her right shoulder prior to decision made for her breast surgery.    Patient proceeded to right shoulder aspiration that was negative for infectious concerns and then to to surgery 4/7/2022 undergoing a left needle localization lumpectomy with tracing and left axillary sentinel node biopsy.  Her findings revealed invasive lobular carcinoma measuring 5 mm x 5 mm x 4 mm Inman grade 1, negative margins sentinel lymph nodes x1 --[id number] ER/MO positive, HER-2 negative.    The patient is seen with her daughter 4/11/2022 fortunately having recovered quickly post surgery.  We discussed the findings of her pathology and her current status with plans to initiate tamoxifen (previously discussed) and proceed with Reclast.    The patient went on to be treated as described with Reclast and fortunately had no additional side effects.  She is next seen 6/27/2022 and  2 months also without side effect profile.  Both she and her daughter indicate that she is doing quite well.    Patient next evaluated 1/11/2023.  Fortunately she is doing quite well and follow-up scan shows no substantial change from previous with multifocal pneumonia and prior to exam having resolved, mediastinal no enlargement with improvement in 5 mm right upper lobe nodule that is also present new from 10/11/2022.  She is having considerable issues with her shoulder reviewed by Dr. Cantrell and request an assessment by [facility].    Patient next seen 4/28/2023 status post orthopedic assessment- healing pelvic fracture, lumbar degenerative disc disease with suspected right-sided radiculopathy.  Patient tolerating tamoxifen without additional complication.  We  discussed ongoing treatment with Reclast which is given yearly .    Patient next evaluated 6/20/2024.  Her general performance status remains excellent.  She is having no trouble with current medications including tamoxifen.  We have discussed a follow-up bone density prior to her next visit.      Past Medical History:   Diagnosis Date    Aortic stenosis, mild 08/24/2022    Arthritis     Carcinoma of upper-inner quadrant of left breast in female, estrogen receptor positive 02/24/2022    Carotid artery stenosis     Carotid atherosclerosis, bilateral 07/12/2019    Cataract     BILATERAL    Depression     Gastroenteritis     Generalized osteoarthritis 02/01/2016    H/O diastolic dysfunction     Hyperlipidemia     Hyperparathyroidism     Hyperparathyroidism     Hypertension     Hypertrophic cardiomyopathy     Iron deficiency anemia 10/13/2022    Low back pain May, 2023    Lower, right    Lumbosacral disc disease May 2023    Multifocal pneumonia 10/10/2022    Multifocal pneumonia 10/10/2022    Neuromuscular disorder     Obstructive sleep apnea syndrome 02/01/2016    Osteopenia     Osteoporosis     Primary familial hypertrophic cardiomyopathy 02/01/2016    Pulmonary hypertension     Synovial cyst of popliteal space 02/01/2016    Vitamin D deficiency         Past Surgical History:   Procedure Laterality Date    BREAST BIOPSY Left 02/17/2022    BREAST LUMPECTOMY Left     BREAST LUMPECTOMY WITH SENTINEL NODE BIOPSY Left 04/07/2022    Procedure: LEFT BREAST LUMPECTOMY WITH SENTINEL NODE BIOPSY AND NEEDLE LOCALIZATION;  Surgeon: Bel Marina MD;  Location: [facility] OR [facility];  Service: General;  Laterality: Left;    BUNIONECTOMY Right     FOOT SURGERY    COLONOSCOPY N/A 2011    Dr. Luis    EPIDURAL BLOCK  July 2023    Epidural x2    HAND SURGERY Right     TUMOR REMOVED ON FOREFINGER    PITUITARY SURGERY      THYROIDECTOMY Right 04/20/2016    Procedure: RIGHT SUPERIOR PARATHYROIDECTOMY;  Surgeon: Bel Marina MD;  Location:   [facility];  Service:         Current Outpatient Medications on File Prior to Visit   Medication Sig Dispense Refill    amLODIPine (NORVASC) 5 MG tablet TAKE 1 TABLET BY MOUTH DAILY 90 tablet 0    aspirin 81 MG EC tablet Take 1 tablet by mouth Daily. Indications: Disease involving Lipid Deposits in the Arteries      B Complex Vitamins (vitamin b complex) capsule capsule Take 1 capsule by mouth Daily.      carvedilol (COREG) 12.5 MG tablet Take 1 tablet by mouth Every 12 (Twelve) Hours. 180 tablet 3    cholecalciferol (VITAMIN D3) 1000 units tablet Take 1 tablet by mouth Daily.      Lactobacillus (PROBIOTIC ACIDOPHILUS PO) Take 1 tablet by mouth Daily.      mirtazapine (REMERON) 15 MG tablet Take 1 tablet by mouth Every Night. 90 tablet 1    multivitamin (THERAGRAN) tablet tablet Take  by mouth Daily. Gummies      olmesartan (BENICAR) 20 MG tablet TAKE ONE TABLET BY MOUTH DAILY 90 tablet 3    tamoxifen (NOLVADEX) 20 MG chemo tablet Take 1 tablet by mouth Daily. 90 tablet 1    furosemide (LASIX) 20 MG tablet Take 1 tablet by mouth Daily As Needed (swelling). (Patient not taking: Reported on 6/28/2024) 90 tablet 2    gabapentin (NEURONTIN) 100 MG capsule Take 3 capsules by mouth 2 (Two) Times a Day As Needed (PAIN). (Patient not taking: Reported on 6/28/2024) 180 capsule 1    potassium chloride 10 MEQ CR tablet Take 2 tablets by mouth Daily As Needed (when taking lasix (furosemide)). (Patient not taking: Reported on 6/28/2024) 60 tablet 11     No current facility-administered medications on file prior to visit.        ALLERGIES:    Allergies   Allergen Reactions    Amoxicillin Hives    Griseofulvin Ultramicrosize [Griseofulvin] Confusion    Tramadol Delirium    Atorvastatin Myalgia        Social History     Socioeconomic History    Marital status:    Tobacco Use    Smoking status: Never    Smokeless tobacco: Never   Vaping Use    Vaping status: Never Used   Substance and Sexual Activity    Alcohol use: Never  "   Drug use: Never    Sexual activity: Not Currently        Family History   Problem Relation Age of Onset    Diabetes Mother     Heart attack Mother     Arthritis Father     Breast cancer Sister     Breast cancer Sister     Breast cancer Sister     Colon cancer Sister     Heart disease Brother         CABG    Other Brother         BRAIN TUMOR    Colon cancer Brother     Colon cancer Brother     Colon cancer Brother     Colon cancer Maternal Grandfather     Cancer Other     Malig Hyperthermia Neg Hx       Family cancer history is positive for sister with breast cancer, a broth had a brain tumor, a brother/sister/maternal grandfather had colon cancer.     Review of Systems     Objective     Vitals:    06/28/24 1310   BP: 158/77   Pulse: 72   Resp: 16   Temp: 98.3 °F (36.8 °C)   TempSrc: Oral   SpO2: 94%   Weight: 56.5 kg (124 lb 8 oz)   Height: 149.9 cm (59.02\")   PainSc: 0-No pain           6/28/2024     1:15 PM   Current Status   ECOG score 0       Physical Exam  Constitutional:       Appearance: Normal appearance.   HENT:      Head: Normocephalic and atraumatic.      Nose: Nose normal.      Mouth/Throat:      Mouth: Mucous membranes are moist.      Pharynx: Oropharynx is clear.   Eyes:      Extraocular Movements: Extraocular movements intact.      Conjunctiva/sclera: Conjunctivae normal.      Pupils: Pupils are equal, round, and reactive to light.   Neck:      Comments: Status post previous parathyroid surgery, well-healed  Cardiovascular:      Rate and Rhythm: Normal rate and regular rhythm.      Pulses: Normal pulses.      Heart sounds: Normal heart sounds.   Pulmonary:      Effort: Pulmonary effort is normal.      Breath sounds: Normal breath sounds.      Comments: Left breast lumpectomy site well-healing, left axillary site also without additional inflammation  Musculoskeletal:      Cervical back: Normal range of motion and neck supple.   Neurological:      Mental Status: She is alert.           RECENT " LABS:  Hematology WBC   Date Value Ref Range Status   06/28/2024 8.90 3.40 - 10.80 10*3/mm3 Final   01/10/2024 8.14 3.40 - 10.80 10*3/mm3 Final     RBC   Date Value Ref Range Status   06/28/2024 4.30 3.77 - 5.28 10*6/mm3 Final   01/10/2024 4.58 3.77 - 5.28 10*6/mm3 Final     Hemoglobin   Date Value Ref Range Status   06/28/2024 12.5 12.0 - 15.9 g/dL Final     Hematocrit   Date Value Ref Range Status   06/28/2024 38.8 34.0 - 46.6 % Final     Platelets   Date Value Ref Range Status   06/28/2024 239 140 - 450 10*3/mm3 Final          Assessment & Plan          89-year-old female with a history of osteoarthritis, hyperparathyroidism treated surgically, obstructive sleep apnea, carotid artery disease, hypertrophic cardiomyopathy, osteoporosis and bilateral rotator cuff injuries right greater than left.  She has, recently, been found to have a carcinoma the upper inner quadrant of the left breast-invasive lobular carcinoma overall grade 1, 5 mm, ER 99%, NV 60%, Ki-67 4%-[id number].     The patient is here with her daughter 3/7/2022 we have discussed her treatment overall for her [facility] breast cancer with patient preference to avoid chemotherapy or adjuvant radiation therapy.      There is no evidence that chemotherapy has a role in this patient's care and we have discussed that it would not be necessary.  Radiation therapy benefit has also been discussed and will not be pursued.      Notably the latter is supported by a previous meta-analysis that looked at women greater than 70 years of age with clinical stage I ER positive women who were treated with radiation and tamoxifen with results that indicate that baseline risk is low enough to reasonably avoid RT.    As we discussed endocrine therapy she is also felt better served with tamoxifen therapy as result of her underlying osteoporosis.  She has been on long-term Prolia without substantial effect to this point and might be better served with an alternative therapy such  as Reclast.          After discussion the patient went on to have fluid aspiration from her right shoulder joint .  Her studies were unremarkable for infectious process and the erythema was thought to be related to hematoma from DJD.                                                                                                                                                    She was able to proceed to breast surgery 4/7/2022 undergoing a left needle localization lumpectomy with tracing and left axillary sentinel node biopsy.  Her findings revealed invasive lobular carcinoma measuring 5 mm x 5 mm x 4 mm Olivia grade 1, negative margins sentinel lymph nodes x1 --[id number] ER/OR positive, HER-2 negative.    We went on to proceed with Reclast, continue vitamin D and calcium supplementation 4/11/2022.  The patient began tamoxifen and is seen back 6/27/2022 without any side effect profile.  We discussed the patient's mild leukocytosis and that she should be aware of any fever or symptoms of infection.  Nothing is present today that might indicate this.  We will also inquire again as to whether she has had any steroid injections but this is not the case thus far.      Patient next evaluated 1/11/2023.  Fortunately she is doing quite well and follow-up scan shows no substantial change from previous with multifocal pneumonia and prior to exam having resolved, mediastinal no enlargement with improvement in 5 mm right upper lobe nodule that is also present new from 10/11/2022.  She is having considerable issues with her shoulder reviewed by Dr. Cantrell and request an assessment by [facility].    Patient next seen 4/28/2023 status post orthopedic assessment- healing pelvic fracture, lumbar degenerative disc disease with suspected right-sided radiculopathy.  Patient tolerating tamoxifen without additional complication.  We discussed ongoing treatment with Reclast which is given yearly.    Patient reassessed  6/20/2028 stable per her general physical status and tolerance to tamoxifen.  She will need a follow-up bone density as she continues treatment with Reclast.    Plan:    *Reclast today-given yearly for osteoporosis    *Patient continues tamoxifen initiated approximately March 2022.    *6 months follow-up NP, repeat bone density just prior to visit

## 2024-06-28 ENCOUNTER — INFUSION (OUTPATIENT)
Dept: ONCOLOGY | Facility: HOSPITAL | Age: 89
End: 2024-06-28
Payer: MEDICARE

## 2024-06-28 ENCOUNTER — OFFICE VISIT (OUTPATIENT)
Dept: ONCOLOGY | Facility: CLINIC | Age: 89
End: 2024-06-28
Payer: MEDICARE

## 2024-06-28 VITALS
OXYGEN SATURATION: 94 % | RESPIRATION RATE: 16 BRPM | HEIGHT: 59 IN | BODY MASS INDEX: 25.1 KG/M2 | WEIGHT: 124.5 LBS | TEMPERATURE: 98.3 F | SYSTOLIC BLOOD PRESSURE: 158 MMHG | HEART RATE: 72 BPM | DIASTOLIC BLOOD PRESSURE: 77 MMHG

## 2024-06-28 DIAGNOSIS — Z79.899 LONG-TERM USE OF HIGH-RISK MEDICATION: ICD-10-CM

## 2024-06-28 DIAGNOSIS — M81.8 OTHER OSTEOPOROSIS WITHOUT CURRENT PATHOLOGICAL FRACTURE: ICD-10-CM

## 2024-06-28 DIAGNOSIS — M81.0 OSTEOPOROSIS, POST-MENOPAUSAL: Primary | ICD-10-CM

## 2024-06-28 DIAGNOSIS — C50.212 CARCINOMA OF UPPER-INNER QUADRANT OF LEFT BREAST IN FEMALE, ESTROGEN RECEPTOR POSITIVE: Primary | Chronic | ICD-10-CM

## 2024-06-28 DIAGNOSIS — M81.0 OSTEOPOROSIS, POST-MENOPAUSAL: Chronic | ICD-10-CM

## 2024-06-28 DIAGNOSIS — Z17.0 CARCINOMA OF UPPER-INNER QUADRANT OF LEFT BREAST IN FEMALE, ESTROGEN RECEPTOR POSITIVE: Primary | Chronic | ICD-10-CM

## 2024-06-28 DIAGNOSIS — M81.0 OSTEOPOROSIS, POST-MENOPAUSAL: ICD-10-CM

## 2024-06-28 LAB
ALBUMIN SERPL-MCNC: 4 G/DL (ref 3.5–5.2)
ALBUMIN/GLOB SERPL: 1.4 G/DL
ALP SERPL-CCNC: 50 U/L (ref 39–117)
ALT SERPL W P-5'-P-CCNC: 8 U/L (ref 1–33)
ANION GAP SERPL CALCULATED.3IONS-SCNC: 11.3 MMOL/L (ref 5–15)
AST SERPL-CCNC: 22 U/L (ref 1–32)
BASOPHILS # BLD AUTO: 0.06 10*3/MM3 (ref 0–0.2)
BASOPHILS NFR BLD AUTO: 0.7 % (ref 0–1.5)
BILIRUB SERPL-MCNC: 0.3 MG/DL (ref 0–1.2)
BUN SERPL-MCNC: 27 MG/DL (ref 8–23)
BUN/CREAT SERPL: 23.1 (ref 7–25)
CALCIUM SPEC-SCNC: 9.3 MG/DL (ref 8.2–9.6)
CHLORIDE SERPL-SCNC: 108 MMOL/L (ref 98–107)
CO2 SERPL-SCNC: 22.7 MMOL/L (ref 22–29)
CREAT SERPL-MCNC: 1.17 MG/DL (ref 0.57–1)
DEPRECATED RDW RBC AUTO: 46 FL (ref 37–54)
EGFRCR SERPLBLD CKD-EPI 2021: 44.1 ML/MIN/1.73
EOSINOPHIL # BLD AUTO: 0.23 10*3/MM3 (ref 0–0.4)
EOSINOPHIL NFR BLD AUTO: 2.6 % (ref 0.3–6.2)
ERYTHROCYTE [DISTWIDTH] IN BLOOD BY AUTOMATED COUNT: 14 % (ref 12.3–15.4)
GLOBULIN UR ELPH-MCNC: 2.9 GM/DL
GLUCOSE SERPL-MCNC: 96 MG/DL (ref 65–99)
HCT VFR BLD AUTO: 38.8 % (ref 34–46.6)
HGB BLD-MCNC: 12.5 G/DL (ref 12–15.9)
IMM GRANULOCYTES # BLD AUTO: 0.05 10*3/MM3 (ref 0–0.05)
IMM GRANULOCYTES NFR BLD AUTO: 0.6 % (ref 0–0.5)
LYMPHOCYTES # BLD AUTO: 1.26 10*3/MM3 (ref 0.7–3.1)
LYMPHOCYTES NFR BLD AUTO: 14.2 % (ref 19.6–45.3)
MAGNESIUM SERPL-MCNC: 2.3 MG/DL (ref 1.7–2.3)
MCH RBC QN AUTO: 29.1 PG (ref 26.6–33)
MCHC RBC AUTO-ENTMCNC: 32.2 G/DL (ref 31.5–35.7)
MCV RBC AUTO: 90.2 FL (ref 79–97)
MONOCYTES # BLD AUTO: 0.76 10*3/MM3 (ref 0.1–0.9)
MONOCYTES NFR BLD AUTO: 8.5 % (ref 5–12)
NEUTROPHILS NFR BLD AUTO: 6.54 10*3/MM3 (ref 1.7–7)
NEUTROPHILS NFR BLD AUTO: 73.4 % (ref 42.7–76)
NRBC BLD AUTO-RTO: 0 /100 WBC (ref 0–0.2)
PHOSPHATE SERPL-MCNC: 3.6 MG/DL (ref 2.5–4.5)
PLATELET # BLD AUTO: 239 10*3/MM3 (ref 140–450)
PMV BLD AUTO: 10.9 FL (ref 6–12)
POTASSIUM SERPL-SCNC: 4.7 MMOL/L (ref 3.5–5.2)
PROT SERPL-MCNC: 6.9 G/DL (ref 6–8.5)
RBC # BLD AUTO: 4.3 10*6/MM3 (ref 3.77–5.28)
SODIUM SERPL-SCNC: 142 MMOL/L (ref 136–145)
WBC NRBC COR # BLD AUTO: 8.9 10*3/MM3 (ref 3.4–10.8)

## 2024-06-28 PROCEDURE — 85025 COMPLETE CBC W/AUTO DIFF WBC: CPT

## 2024-06-28 PROCEDURE — 25010000002 ZOLEDRONIC ACID 5 MG/100ML SOLUTION: Performed by: INTERNAL MEDICINE

## 2024-06-28 PROCEDURE — 25810000003 SODIUM CHLORIDE 0.9 % SOLUTION: Performed by: INTERNAL MEDICINE

## 2024-06-28 PROCEDURE — 83735 ASSAY OF MAGNESIUM: CPT

## 2024-06-28 PROCEDURE — 84100 ASSAY OF PHOSPHORUS: CPT

## 2024-06-28 PROCEDURE — 99213 OFFICE O/P EST LOW 20 MIN: CPT | Performed by: INTERNAL MEDICINE

## 2024-06-28 PROCEDURE — 96365 THER/PROPH/DIAG IV INF INIT: CPT

## 2024-06-28 PROCEDURE — 1126F AMNT PAIN NOTED NONE PRSNT: CPT | Performed by: INTERNAL MEDICINE

## 2024-06-28 PROCEDURE — 80053 COMPREHEN METABOLIC PANEL: CPT

## 2024-06-28 RX ORDER — ZOLEDRONIC ACID 5 MG/100ML
5 INJECTION, SOLUTION INTRAVENOUS ONCE
Status: COMPLETED | OUTPATIENT
Start: 2024-06-28 | End: 2024-06-28

## 2024-06-28 RX ORDER — SODIUM CHLORIDE 9 MG/ML
250 INJECTION, SOLUTION INTRAVENOUS ONCE
Status: COMPLETED | OUTPATIENT
Start: 2024-06-28 | End: 2024-06-28

## 2024-06-28 RX ADMIN — ZOLEDRONIC ACID 5 MG: 5 INJECTION INTRAVENOUS at 13:50

## 2024-06-28 RX ADMIN — SODIUM CHLORIDE 250 ML: 9 INJECTION, SOLUTION INTRAVENOUS at 13:41

## 2024-07-25 RX ORDER — TAMOXIFEN CITRATE 20 MG/1
20 TABLET ORAL DAILY
Qty: 90 TABLET | Refills: 1 | Status: SHIPPED | OUTPATIENT
Start: 2024-07-25

## 2024-07-26 RX ORDER — AMLODIPINE BESYLATE 5 MG/1
5 TABLET ORAL DAILY
Qty: 90 TABLET | Refills: 0 | Status: SHIPPED | OUTPATIENT
Start: 2024-07-26

## 2024-07-31 ENCOUNTER — OFFICE VISIT (OUTPATIENT)
Dept: CARDIOLOGY | Facility: CLINIC | Age: 89
End: 2024-07-31
Payer: MEDICARE

## 2024-07-31 VITALS
WEIGHT: 124 LBS | SYSTOLIC BLOOD PRESSURE: 138 MMHG | BODY MASS INDEX: 25 KG/M2 | HEART RATE: 70 BPM | OXYGEN SATURATION: 96 % | HEIGHT: 59 IN | DIASTOLIC BLOOD PRESSURE: 70 MMHG

## 2024-07-31 DIAGNOSIS — I10 ESSENTIAL HYPERTENSION: Chronic | ICD-10-CM

## 2024-07-31 DIAGNOSIS — I35.0 AORTIC STENOSIS, MILD: Chronic | ICD-10-CM

## 2024-07-31 DIAGNOSIS — I42.2 HYPERTROPHIC CARDIOMYOPATHY: Primary | Chronic | ICD-10-CM

## 2024-07-31 DIAGNOSIS — I1A.0 RESISTANT HYPERTENSION: Chronic | ICD-10-CM

## 2024-07-31 NOTE — PROGRESS NOTES
"      CARDIOLOGY    Zack Mohr MD    ENCOUNTER DATE:  07/31/2024    Neha Guillory / 89 y.o. / female        CHIEF COMPLAINT / REASON FOR OFFICE VISIT       Hypertrophic cardiomyopathy  Mild aortic stenosis  Hypertension    HISTORY OF PRESENT ILLNESS       Cardiomyopathy      Neha Guillory is a 89 y.o. female who presents today for reevaluation.  Patient is doing well she brought in a list of her blood pressures and is really running nicely.  She said when her blood pressure gets down to about 130/80 however she will delay taking her medicines until rises a little bit more.  She however is not symptomatic when she has this type of blood pressure.  She denies any types of shortness of breath and overall looks great.      The following portions of the patient's history were reviewed and updated as appropriate: allergies, current medications, past family history, past medical history, past social history, past surgical history and problem list.      VITAL SIGNS     Visit Vitals  /70 (BP Location: Left arm)   Pulse 70   Ht 149.9 cm (59\")   Wt 56.2 kg (124 lb)   SpO2 96%   BMI 25.04 kg/m²         Wt Readings from Last 3 Encounters:   07/31/24 56.2 kg (124 lb)   06/28/24 56.5 kg (124 lb 8 oz)   05/08/24 57.2 kg (126 lb)     Body mass index is 25.04 kg/m².      REVIEW OF SYSTEMS   ROS        PHYSICAL EXAMINATION     Vitals reviewed.   Constitutional:       Appearance: Healthy appearance.   Pulmonary:      Effort: Pulmonary effort is normal.   Cardiovascular:      Normal rate. Regular rhythm. Normal S1. Normal S2.       Murmurs: There is a grade 1/6 harsh midsystolic murmur at the URSB.      No gallop.  No click. No rub.   Pulses:     Intact distal pulses.   Edema:     Peripheral edema absent.   Neurological:      Mental Status: Alert.           REVIEWED DATA     Procedures    Cardiac Procedures:      Lipid Panel          1/10/2024    11:58   Lipid Panel   Total Cholesterol 142    Triglycerides 108    HDL " Cholesterol 53    VLDL Cholesterol 20    LDL Cholesterol  69    LDL/HDL Ratio 1.27          ASSESSMENT & PLAN      Diagnosis Plan   1. Hypertrophic cardiomyopathy        2. Resistant hypertension        3. Aortic stenosis, mild        4. Essential hypertension              SUMMARY/DISCUSSION  Hypertrophic obstructive cardiomyopathy.  Patient is having no symptoms overall doing well.  Mild aortic stenosis and asymptomatic.  Hypertension we finally got a good medical regimen.  Blood pressures are running in excellent range and clinically she is doing well.  Follow-up 1 year continue to provide longitudinal care if any issues arise.        MEDICATIONS         Discharge Medications            Accurate as of July 31, 2024  3:04 PM. If you have any questions, ask your nurse or doctor.                Continue These Medications        Instructions Start Date   amLODIPine 5 MG tablet  Commonly known as: NORVASC   5 mg, Oral, Daily      aspirin 81 MG EC tablet   1 tablet, Oral, Daily      carvedilol 12.5 MG tablet  Commonly known as: COREG   12.5 mg, Oral, Every 12 Hours Scheduled      cholecalciferol 25 MCG (1000 UT) tablet  Commonly known as: VITAMIN D3   1,000 Units, Oral, Daily      furosemide 20 MG tablet  Commonly known as: LASIX   20 mg, Oral, Daily PRN      gabapentin 100 MG capsule  Commonly known as: NEURONTIN   300 mg, Oral, 2 Times Daily PRN      mirtazapine 15 MG tablet  Commonly known as: REMERON   15 mg, Oral, Nightly      multivitamin tablet tablet   Oral, Daily, Gummies      olmesartan 20 MG tablet  Commonly known as: BENICAR   TAKE ONE TABLET BY MOUTH DAILY      potassium chloride 10 MEQ CR tablet   20 mEq, Oral, Daily PRN      PROBIOTIC ACIDOPHILUS PO   1 tablet, Oral, Daily      tamoxifen 20 MG chemo tablet  Commonly known as: NOLVADEX   20 mg, Oral, Daily      vitamin b complex capsule capsule   1 capsule, Oral, Daily                   **Dragon Disclaimer:   Much of this encounter note is an electronic  transcription/translation of spoken language to printed text. The electronic translation of spoken language may permit erroneous, or at times, nonsensical words or phrases to be inadvertently transcribed. Although I have reviewed the note for such errors, some may still exist.

## 2024-08-01 ENCOUNTER — HOSPITAL ENCOUNTER (OUTPATIENT)
Dept: CT IMAGING | Facility: HOSPITAL | Age: 89
Discharge: HOME | End: 2024-08-01
Admitting: NURSE PRACTITIONER
Payer: MEDICARE

## 2024-08-01 DIAGNOSIS — R91.1 LUNG NODULE: ICD-10-CM

## 2024-08-01 PROCEDURE — 71250 CT THORAX DX C-: CPT

## 2024-08-12 DIAGNOSIS — R91.1 NODULE OF LOWER LOBE OF LEFT LUNG: Primary | ICD-10-CM

## 2024-08-12 NOTE — PROGRESS NOTES
Please call her:     Recent CT shows an area that they would like to recheck in 2 months.   I have placed repeat CT.   Radiology felt it was more related to infection but wants to recheck to ensure resolved/ improves.     Also - she missed appt in July - needs to schedule AWV with me.     DMITRIY

## 2024-09-17 ENCOUNTER — OFFICE VISIT (OUTPATIENT)
Dept: INTERNAL MEDICINE | Facility: CLINIC | Age: 89
End: 2024-09-17
Payer: MEDICARE

## 2024-09-17 VITALS
OXYGEN SATURATION: 94 % | SYSTOLIC BLOOD PRESSURE: 132 MMHG | HEIGHT: 59 IN | WEIGHT: 122.4 LBS | BODY MASS INDEX: 24.68 KG/M2 | HEART RATE: 68 BPM | DIASTOLIC BLOOD PRESSURE: 80 MMHG

## 2024-09-17 DIAGNOSIS — R55 SYNCOPE AND COLLAPSE: Primary | ICD-10-CM

## 2024-09-17 PROCEDURE — 1159F MED LIST DOCD IN RCRD: CPT | Performed by: NURSE PRACTITIONER

## 2024-09-17 PROCEDURE — 1126F AMNT PAIN NOTED NONE PRSNT: CPT | Performed by: NURSE PRACTITIONER

## 2024-09-17 PROCEDURE — 99214 OFFICE O/P EST MOD 30 MIN: CPT | Performed by: NURSE PRACTITIONER

## 2024-09-17 PROCEDURE — 1160F RVW MEDS BY RX/DR IN RCRD: CPT | Performed by: NURSE PRACTITIONER

## 2024-09-18 LAB
ALBUMIN SERPL-MCNC: 4 G/DL (ref 3.5–5.2)
ALBUMIN/GLOB SERPL: 1.6 G/DL
ALP SERPL-CCNC: 44 U/L (ref 39–117)
ALT SERPL-CCNC: 9 U/L (ref 1–33)
AST SERPL-CCNC: 17 U/L (ref 1–32)
BILIRUB SERPL-MCNC: 0.4 MG/DL (ref 0–1.2)
BUN SERPL-MCNC: 20 MG/DL (ref 8–23)
BUN/CREAT SERPL: 19.2 (ref 7–25)
CALCIUM SERPL-MCNC: 9.6 MG/DL (ref 8.2–9.6)
CHLORIDE SERPL-SCNC: 106 MMOL/L (ref 98–107)
CO2 SERPL-SCNC: 23.3 MMOL/L (ref 22–29)
CREAT SERPL-MCNC: 1.04 MG/DL (ref 0.57–1)
EGFRCR SERPLBLD CKD-EPI 2021: 50.8 ML/MIN/1.73
ERYTHROCYTE [DISTWIDTH] IN BLOOD BY AUTOMATED COUNT: 13 % (ref 12.3–15.4)
GLOBULIN SER CALC-MCNC: 2.5 GM/DL
GLUCOSE SERPL-MCNC: 90 MG/DL (ref 65–99)
HCT VFR BLD AUTO: 39.1 % (ref 34–46.6)
HGB BLD-MCNC: 12.6 G/DL (ref 12–15.9)
MCH RBC QN AUTO: 28.4 PG (ref 26.6–33)
MCHC RBC AUTO-ENTMCNC: 32.2 G/DL (ref 31.5–35.7)
MCV RBC AUTO: 88.3 FL (ref 79–97)
PLATELET # BLD AUTO: 236 10*3/MM3 (ref 140–450)
POTASSIUM SERPL-SCNC: 5 MMOL/L (ref 3.5–5.2)
PROT SERPL-MCNC: 6.5 G/DL (ref 6–8.5)
RBC # BLD AUTO: 4.43 10*6/MM3 (ref 3.77–5.28)
SODIUM SERPL-SCNC: 139 MMOL/L (ref 136–145)
WBC # BLD AUTO: 8.37 10*3/MM3 (ref 3.4–10.8)

## 2024-09-19 ENCOUNTER — TELEPHONE (OUTPATIENT)
Dept: INTERNAL MEDICINE | Facility: CLINIC | Age: 89
End: 2024-09-19
Payer: MEDICARE

## 2024-10-22 ENCOUNTER — HOSPITAL ENCOUNTER (OUTPATIENT)
Dept: CT IMAGING | Facility: HOSPITAL | Age: 89
Discharge: HOME | End: 2024-10-22
Admitting: NURSE PRACTITIONER
Payer: MEDICARE

## 2024-10-22 DIAGNOSIS — R91.1 NODULE OF LOWER LOBE OF LEFT LUNG: ICD-10-CM

## 2024-10-22 PROCEDURE — 71250 CT THORAX DX C-: CPT

## 2024-11-11 RX ORDER — AMLODIPINE BESYLATE 5 MG/1
5 TABLET ORAL DAILY
Qty: 90 TABLET | Refills: 3 | Status: SHIPPED | OUTPATIENT
Start: 2024-11-11

## 2024-12-02 RX ORDER — CARVEDILOL 12.5 MG/1
12.5 TABLET ORAL EVERY 12 HOURS
Qty: 180 TABLET | Refills: 3 | Status: SHIPPED | OUTPATIENT
Start: 2024-12-02

## 2024-12-04 RX ORDER — TAMOXIFEN CITRATE 20 MG/1
20 TABLET ORAL DAILY
Qty: 90 TABLET | Refills: 1 | Status: SHIPPED | OUTPATIENT
Start: 2024-12-04

## 2024-12-07 NOTE — PATIENT INSTRUCTIONS
Denosumab injection  What is this medicine?  DENOSUMAB (den oh olegario mab) slows bone breakdown. Prolia is used to treat osteoporosis in women after menopause and in men. Xgeva is used to prevent bone fractures and other bone problems caused by cancer bone metastases. Xgeva is also used to treat giant cell tumor of the bone.  This medicine may be used for other purposes; ask your health care provider or pharmacist if you have questions.  COMMON BRAND NAME(S): Prolia, XGEVA  What should I tell my health care provider before I take this medicine?  They need to know if you have any of these conditions:  -dental disease  -eczema  -infection or history of infections  -kidney disease or on dialysis  -low blood calcium or vitamin D  -malabsorption syndrome  -scheduled to have surgery or tooth extraction  -taking medicine that contains denosumab  -thyroid or parathyroid disease  -an unusual reaction to denosumab, other medicines, foods, dyes, or preservatives  -pregnant or trying to get pregnant  -breast-feeding  How should I use this medicine?  This medicine is for injection under the skin. It is given by a health care professional in a hospital or clinic setting.  If you are getting Prolia, a special MedGuide will be given to you by the pharmacist with each prescription and refill. Be sure to read this information carefully each time.  For Prolia, talk to your pediatrician regarding the use of this medicine in children. Special care may be needed. For Xgeva, talk to your pediatrician regarding the use of this medicine in children. While this drug may be prescribed for children as young as 13 years for selected conditions, precautions do apply.  Overdosage: If you think you have taken too much of this medicine contact a poison control center or emergency room at once.  NOTE: This medicine is only for you. Do not share this medicine with others.  What if I miss a dose?  It is important not to miss your dose. Call your doctor  or health care professional if you are unable to keep an appointment.  What may interact with this medicine?  Do not take this medicine with any of the following medications:  -other medicines containing denosumab  This medicine may also interact with the following medications:  -medicines that suppress the immune system  -medicines that treat cancer  -steroid medicines like prednisone or cortisone  This list may not describe all possible interactions. Give your health care provider a list of all the medicines, herbs, non-prescription drugs, or dietary supplements you use. Also tell them if you smoke, drink alcohol, or use illegal drugs. Some items may interact with your medicine.  What should I watch for while using this medicine?  Visit your doctor or health care professional for regular checks on your progress. Your doctor or health care professional may order blood tests and other tests to see how you are doing.  Call your doctor or health care professional if you get a cold or other infection while receiving this medicine. Do not treat yourself. This medicine may decrease your body's ability to fight infection.  You should make sure you get enough calcium and vitamin D while you are taking this medicine, unless your doctor tells you not to. Discuss the foods you eat and the vitamins you take with your health care professional.  See your dentist regularly. Brush and floss your teeth as directed. Before you have any dental work done, tell your dentist you are receiving this medicine.  Do not become pregnant while taking this medicine or for 5 months after stopping it. Women should inform their doctor if they wish to become pregnant or think they might be pregnant. There is a potential for serious side effects to an unborn child. Talk to your health care professional or pharmacist for more information.  What side effects may I notice from receiving this medicine?  Side effects that you should report to your doctor  or health care professional as soon as possible:  -allergic reactions like skin rash, itching or hives, swelling of the face, lips, or tongue  -breathing problems  -chest pain  -fast, irregular heartbeat  -feeling faint or lightheaded, falls  -fever, chills, or any other sign of infection  -muscle spasms, tightening, or twitches  -numbness or tingling  -skin blisters or bumps, or is dry, peels, or red  -slow healing or unexplained pain in the mouth or jaw  -unusual bleeding or bruising  Side effects that usually do not require medical attention (report to your doctor or health care professional if they continue or are bothersome):  -muscle pain  -stomach upset, gas  This list may not describe all possible side effects. Call your doctor for medical advice about side effects. You may report side effects to FDA at 3-308-FDA-0211.  Where should I keep my medicine?  This medicine is only given in a clinic, doctor's office, or other health care setting and will not be stored at home.  NOTE: This sheet is a summary. It may not cover all possible information. If you have questions about this medicine, talk to your doctor, pharmacist, or health care provider.     © 2017, [employer]. (2017-01-19 10:06:55)     Private car

## 2024-12-15 ENCOUNTER — APPOINTMENT (OUTPATIENT)
Dept: MRI IMAGING | Facility: HOSPITAL | Age: 89
End: 2024-12-15
Payer: MEDICARE

## 2024-12-15 ENCOUNTER — HOSPITAL ENCOUNTER (OUTPATIENT)
Facility: HOSPITAL | Age: 89
Setting detail: OBSERVATION
Discharge: HOME | End: 2024-12-16
Attending: EMERGENCY MEDICINE | Admitting: EMERGENCY MEDICINE
Payer: MEDICARE

## 2024-12-15 ENCOUNTER — APPOINTMENT (OUTPATIENT)
Dept: GENERAL RADIOLOGY | Facility: HOSPITAL | Age: 89
End: 2024-12-15
Payer: MEDICARE

## 2024-12-15 DIAGNOSIS — Z74.09 IMMOBILITY: ICD-10-CM

## 2024-12-15 DIAGNOSIS — S83.91XA SPRAIN OF RIGHT KNEE, UNSPECIFIED LIGAMENT, INITIAL ENCOUNTER: Primary | ICD-10-CM

## 2024-12-15 DIAGNOSIS — S83.206A TEAR OF MENISCUS OF RIGHT KNEE AS CURRENT INJURY, UNSPECIFIED MENISCUS, UNSPECIFIED TEAR TYPE, INITIAL ENCOUNTER: ICD-10-CM

## 2024-12-15 PROBLEM — M25.561 RIGHT KNEE PAIN: Status: ACTIVE | Noted: 2024-12-15

## 2024-12-15 LAB
ALBUMIN SERPL-MCNC: 3.9 G/DL (ref 3.5–5.2)
ALBUMIN/GLOB SERPL: 1.6 G/DL
ALP SERPL-CCNC: 43 U/L (ref 39–117)
ALT SERPL W P-5'-P-CCNC: 12 U/L (ref 1–33)
ANION GAP SERPL CALCULATED.3IONS-SCNC: 7.9 MMOL/L (ref 5–15)
AST SERPL-CCNC: 19 U/L (ref 1–32)
BASOPHILS # BLD AUTO: 0.05 10*3/MM3 (ref 0–0.2)
BASOPHILS NFR BLD AUTO: 0.6 % (ref 0–1.5)
BILIRUB SERPL-MCNC: 0.3 MG/DL (ref 0–1.2)
BUN SERPL-MCNC: 18 MG/DL (ref 8–23)
BUN/CREAT SERPL: 18.2 (ref 7–25)
CALCIUM SPEC-SCNC: 9.2 MG/DL (ref 8.2–9.6)
CHLORIDE SERPL-SCNC: 103 MMOL/L (ref 98–107)
CO2 SERPL-SCNC: 26.1 MMOL/L (ref 22–29)
CREAT SERPL-MCNC: 0.99 MG/DL (ref 0.57–1)
CRP SERPL-MCNC: <0.3 MG/DL (ref 0–0.5)
DEPRECATED RDW RBC AUTO: 46 FL (ref 37–54)
EGFRCR SERPLBLD CKD-EPI 2021: 53.9 ML/MIN/1.73
EOSINOPHIL # BLD AUTO: 0.15 10*3/MM3 (ref 0–0.4)
EOSINOPHIL NFR BLD AUTO: 1.7 % (ref 0.3–6.2)
ERYTHROCYTE [DISTWIDTH] IN BLOOD BY AUTOMATED COUNT: 13.5 % (ref 12.3–15.4)
ERYTHROCYTE [SEDIMENTATION RATE] IN BLOOD: 8 MM/HR (ref 0–30)
GLOBULIN UR ELPH-MCNC: 2.5 GM/DL
GLUCOSE SERPL-MCNC: 96 MG/DL (ref 65–99)
HCT VFR BLD AUTO: 37.3 % (ref 34–46.6)
HGB BLD-MCNC: 11.5 G/DL (ref 12–15.9)
IMM GRANULOCYTES # BLD AUTO: 0.04 10*3/MM3 (ref 0–0.05)
IMM GRANULOCYTES NFR BLD AUTO: 0.4 % (ref 0–0.5)
LYMPHOCYTES # BLD AUTO: 1.05 10*3/MM3 (ref 0.7–3.1)
LYMPHOCYTES NFR BLD AUTO: 11.7 % (ref 19.6–45.3)
MCH RBC QN AUTO: 28.4 PG (ref 26.6–33)
MCHC RBC AUTO-ENTMCNC: 30.8 G/DL (ref 31.5–35.7)
MCV RBC AUTO: 92.1 FL (ref 79–97)
MONOCYTES # BLD AUTO: 0.68 10*3/MM3 (ref 0.1–0.9)
MONOCYTES NFR BLD AUTO: 7.6 % (ref 5–12)
NEUTROPHILS NFR BLD AUTO: 7 10*3/MM3 (ref 1.7–7)
NEUTROPHILS NFR BLD AUTO: 78 % (ref 42.7–76)
NRBC BLD AUTO-RTO: 0 /100 WBC (ref 0–0.2)
PLATELET # BLD AUTO: 202 10*3/MM3 (ref 140–450)
PMV BLD AUTO: 10.9 FL (ref 6–12)
POTASSIUM SERPL-SCNC: 4.5 MMOL/L (ref 3.5–5.2)
PROT SERPL-MCNC: 6.4 G/DL (ref 6–8.5)
RBC # BLD AUTO: 4.05 10*6/MM3 (ref 3.77–5.28)
SODIUM SERPL-SCNC: 137 MMOL/L (ref 136–145)
WBC NRBC COR # BLD AUTO: 8.97 10*3/MM3 (ref 3.4–10.8)

## 2024-12-15 PROCEDURE — 73502 X-RAY EXAM HIP UNI 2-3 VIEWS: CPT

## 2024-12-15 PROCEDURE — G0378 HOSPITAL OBSERVATION PER HR: HCPCS

## 2024-12-15 PROCEDURE — 96374 THER/PROPH/DIAG INJ IV PUSH: CPT

## 2024-12-15 PROCEDURE — 80053 COMPREHEN METABOLIC PANEL: CPT | Performed by: EMERGENCY MEDICINE

## 2024-12-15 PROCEDURE — 73721 MRI JNT OF LWR EXTRE W/O DYE: CPT

## 2024-12-15 PROCEDURE — 85652 RBC SED RATE AUTOMATED: CPT | Performed by: EMERGENCY MEDICINE

## 2024-12-15 PROCEDURE — 25010000002 FENTANYL CITRATE (PF) 50 MCG/ML SOLUTION: Performed by: EMERGENCY MEDICINE

## 2024-12-15 PROCEDURE — 86140 C-REACTIVE PROTEIN: CPT | Performed by: EMERGENCY MEDICINE

## 2024-12-15 PROCEDURE — 85025 COMPLETE CBC W/AUTO DIFF WBC: CPT | Performed by: EMERGENCY MEDICINE

## 2024-12-15 PROCEDURE — 73560 X-RAY EXAM OF KNEE 1 OR 2: CPT

## 2024-12-15 PROCEDURE — 99285 EMERGENCY DEPT VISIT HI MDM: CPT

## 2024-12-15 RX ORDER — ASPIRIN 81 MG/1
81 TABLET ORAL DAILY
Status: DISCONTINUED | OUTPATIENT
Start: 2024-12-16 | End: 2024-12-16 | Stop reason: HOSPADM

## 2024-12-15 RX ORDER — CARVEDILOL 12.5 MG/1
12.5 TABLET ORAL EVERY 12 HOURS
Status: DISCONTINUED | OUTPATIENT
Start: 2024-12-15 | End: 2024-12-16 | Stop reason: HOSPADM

## 2024-12-15 RX ORDER — SODIUM CHLORIDE 9 MG/ML
40 INJECTION, SOLUTION INTRAVENOUS AS NEEDED
Status: DISCONTINUED | OUTPATIENT
Start: 2024-12-15 | End: 2024-12-16 | Stop reason: HOSPADM

## 2024-12-15 RX ORDER — MIRTAZAPINE 30 MG/1
15 TABLET, FILM COATED ORAL NIGHTLY
Status: DISCONTINUED | OUTPATIENT
Start: 2024-12-15 | End: 2024-12-16 | Stop reason: HOSPADM

## 2024-12-15 RX ORDER — OXYCODONE AND ACETAMINOPHEN 5; 325 MG/1; MG/1
1 TABLET ORAL ONCE
Status: COMPLETED | OUTPATIENT
Start: 2024-12-15 | End: 2024-12-15

## 2024-12-15 RX ORDER — TAMOXIFEN CITRATE 10 MG/1
20 TABLET ORAL DAILY
Status: DISCONTINUED | OUTPATIENT
Start: 2024-12-16 | End: 2024-12-16 | Stop reason: HOSPADM

## 2024-12-15 RX ORDER — SODIUM CHLORIDE 0.9 % (FLUSH) 0.9 %
10 SYRINGE (ML) INJECTION AS NEEDED
Status: DISCONTINUED | OUTPATIENT
Start: 2024-12-15 | End: 2024-12-16 | Stop reason: HOSPADM

## 2024-12-15 RX ORDER — FUROSEMIDE 20 MG/1
20 TABLET ORAL DAILY PRN
Status: DISCONTINUED | OUTPATIENT
Start: 2024-12-15 | End: 2024-12-16 | Stop reason: HOSPADM

## 2024-12-15 RX ORDER — FENTANYL CITRATE 50 UG/ML
25 INJECTION, SOLUTION INTRAMUSCULAR; INTRAVENOUS ONCE
Status: COMPLETED | OUTPATIENT
Start: 2024-12-15 | End: 2024-12-15

## 2024-12-15 RX ORDER — AMLODIPINE BESYLATE 5 MG/1
5 TABLET ORAL DAILY
Status: DISCONTINUED | OUTPATIENT
Start: 2024-12-16 | End: 2024-12-16 | Stop reason: HOSPADM

## 2024-12-15 RX ORDER — LOSARTAN POTASSIUM 50 MG/1
50 TABLET ORAL
Status: DISCONTINUED | OUTPATIENT
Start: 2024-12-15 | End: 2024-12-16 | Stop reason: HOSPADM

## 2024-12-15 RX ORDER — SODIUM CHLORIDE 0.9 % (FLUSH) 0.9 %
10 SYRINGE (ML) INJECTION EVERY 12 HOURS SCHEDULED
Status: DISCONTINUED | OUTPATIENT
Start: 2024-12-15 | End: 2024-12-16 | Stop reason: HOSPADM

## 2024-12-15 RX ORDER — L.ACID,PARA/B.BIFIDUM/S.THERM 8B CELL
1 CAPSULE ORAL DAILY
Status: DISCONTINUED | OUTPATIENT
Start: 2024-12-16 | End: 2024-12-16 | Stop reason: HOSPADM

## 2024-12-15 RX ORDER — POTASSIUM CHLORIDE 750 MG/1
20 TABLET, EXTENDED RELEASE ORAL DAILY PRN
Status: DISCONTINUED | OUTPATIENT
Start: 2024-12-15 | End: 2024-12-16 | Stop reason: HOSPADM

## 2024-12-15 RX ADMIN — LOSARTAN POTASSIUM 50 MG: 50 TABLET, FILM COATED ORAL at 21:09

## 2024-12-15 RX ADMIN — FENTANYL CITRATE 25 MCG: 50 INJECTION, SOLUTION INTRAMUSCULAR; INTRAVENOUS at 14:06

## 2024-12-15 RX ADMIN — OXYCODONE AND ACETAMINOPHEN 1 TABLET: 5; 325 TABLET ORAL at 12:17

## 2024-12-15 RX ADMIN — Medication 10 ML: at 21:12

## 2024-12-15 RX ADMIN — MIRTAZAPINE 15 MG: 30 TABLET, FILM COATED ORAL at 21:09

## 2024-12-15 RX ADMIN — CARVEDILOL 12.5 MG: 12.5 TABLET, FILM COATED ORAL at 21:09

## 2024-12-15 NOTE — PROGRESS NOTES
Patient Care Team:  Yvan Ruiz III, NP-C as PCP - General (Internal Medicine)  Bel Marina MD as Referring Physician (General Surgery)  Hunter Acosta MD as Consulting Physician (Radiation Oncology)  Ricky Dill MD as Consulting Physician (Hematology and Oncology)  Jose Martin Liu APRN as Nurse Practitioner (Family Medicine)  Zack Mohr MD as Consulting Physician (Cardiology)     MOHAN ASCENCIO H&P Note    I supervised care provided by the midlevel provider. We have discussed this patient's history, physical exam, and treatment plan. I have reviewed the midlevel provider's note and I agree with the midlevel provider's findings and plan of care.   SHARED VISIT: This visit was performed by BOTH a physician and an APC. The substantive portion of the medical decision making was performed by this attesting physician who made or approved the management plan and takes responsibility for patient management.   I have personally had a face to face encounter with the patient.   My personal findings are documented below:    History:  89-year-old female presents with right hip and knee pain.  No trauma, symptoms today after doing exercises, unable to bear weight, no signs of infection, x-ray imaging shows tricompartmental osteoarthritis of the right knee, x-ray imaging of the knee and hip negative for fracture.    Physical Exam:  General: No acute distress.  HENT: NCAT, PERRL, Nares patent.  Eyes: no scleral icterus.  Neck: trachea midline, no ROM limitations.  CV: regular rhythm, regular rate.  Respiratory: normal effort, CTAB.  Abdomen: soft, nondistended, NTTP, no rebound tenderness, no guarding or rigidity.  Musculoskeletal: no deformity.  Right knee: No significant swelling or effusion, no erythema or warmness, knee has full extension, slightly limited flexion, negative anterior/posterior drawer, no medial or lateral ligament ligamentous laxity.  Neurovascular intact distally.  Neuro: alert,  moves all extremities, follows commands.  Skin: warm, dry.    Assessment and Plan:  Patient admitted to the observation unit, orthopedics consulted, obtaining MRI imaging of the knee, multimodal pain control, PT consulted.

## 2024-12-15 NOTE — ED PROVIDER NOTES
EMERGENCY DEPARTMENT ENCOUNTER    Room Number:  40/40  PCP: Yvan Ruiz III NPJoseyC  Independent Historians: Patient    HPI:  Chief Complaint: had concerns including Leg Pain.      A complete HPI/ROS/PMH/PSH/SH/FH are unobtainable due to: None    Chronic or social conditions impacting patient care (Social Determinants of Health): None      Context: Neha Guillory is a 89 y.o. female with a medical history of HTN, HLD, RICH who presents to the ED c/o acute right knee and hip pain. Pt with chronic right hip pain but lived independently and usually able to continue all her activities at home.  Yesterday patient was doing some gentle exercises in the kitchen with her lower extremities.  Patient gave example of doing some toe lifts in the kitchen.  She had some right knee pain when she went to bed last night.  When she woke up this morning she was unable to bear weight on the right lower extremity due to pain.  Family says that patient has a very high pain tolerance so they are very concerned about patient's willingness to be here and complaints of pain this morning.  Patient denies any fall or true injury.  Patient also denies fever, redness, rash or open skin lesions        Review of prior external notes (non-ED) -and- Review of prior external test results outside of this encounter: Patient seen by primary provider September 17 for a near syncopal episode.  Patient had some labs drawn that day that were unremarkable.    Prescription drug monitoring program review:     N/A    PAST MEDICAL HISTORY  Active Ambulatory Problems     Diagnosis Date Noted    Hyperlipidemia 02/01/2016    Essential hypertension 02/01/2016    Hypertrophic cardiomyopathy 02/01/2016    Generalized osteoarthritis 02/01/2016    RICH on CPAP 02/01/2016    Osteoporosis 02/01/2016    Vitamin D deficiency 02/01/2016    Colon polyps 02/24/2016    Family hx of colon cancer 02/24/2016    H/O parathyroidectomy 04/20/2016    Pulmonary hypertension  04/25/2016    Echocardiogram abnormal 03/16/2015    Carotid artery stenosis 04/25/2016    Ischemic rest pain of lower extremity 07/07/2016    Bilateral carpal tunnel syndrome 07/07/2016    Visit for screening mammogram 07/07/2016    Osteopenia 08/05/2016    Hyperuricemia 09/09/2016    Senile osteoporosis 02/07/2017    Diastolic dysfunction 03/17/2017    Hip pain, left 07/26/2017    Hammer toe of second toe of right foot 07/26/2017    Gastroenteritis 12/26/2017    Starvation ketoacidosis 12/26/2017    Stage 3a chronic kidney disease 12/26/2017    Dehydration 12/26/2017    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    Osteoporosis, post-menopausal 02/12/2018    Hyperglycemia 10/22/2018    Bilateral carotid artery stenosis 07/12/2019    Prediabetes 08/01/2019    Resistant hypertension 02/24/2021    FH: breast cancer 07/02/2021    Carcinoma of upper-inner quadrant of left breast in female, estrogen receptor positive 02/24/2022    Long-term use of high-risk medication     Aortic stenosis, mild 08/24/2022    DNR (do not resuscitate) 10/11/2022    Iron deficiency anemia 10/13/2022    Shoulder disorder 01/11/2023    Pubic ramus fracture, left, closed, initial encounter 01/23/2023    Pathological fracture of pelvis due to osteoporosis 01/24/2023    SI (sacroiliac) joint inflammation 08/10/2023     Resolved Ambulatory Problems     Diagnosis Date Noted    Hypercalcemia 02/01/2016    Secondary hyperparathyroidism, non-renal 02/01/2016    Depression 02/01/2016    Synovial cyst of popliteal space 02/01/2016    Thrombophlebitis of deep veins of lower extremity 02/01/2016    Rash 12/28/2016    Allergic reaction caused by a drug 12/28/2016    Upper respiratory infection 01/26/2017    Deep vein thrombosis     Multifocal pneumonia 10/10/2022    Acute kidney failure 10/11/2022    Hyperglycemia 10/11/2022     Past Medical History:   Diagnosis Date    Arthritis     Carotid atherosclerosis, bilateral 07/12/2019    Cataract     H/O diastolic dysfunction      Hyperparathyroidism     Hyperparathyroidism     Hypertension     Low back pain May, 2023    Lumbosacral disc disease May 2023    Neuromuscular disorder     Obstructive sleep apnea syndrome 02/01/2016    Primary familial hypertrophic cardiomyopathy 02/01/2016         PAST SURGICAL HISTORY  Past Surgical History:   Procedure Laterality Date    BREAST BIOPSY Left 02/17/2022    BREAST LUMPECTOMY Left     BREAST LUMPECTOMY WITH SENTINEL NODE BIOPSY Left 04/07/2022    Procedure: LEFT BREAST LUMPECTOMY WITH SENTINEL NODE BIOPSY AND NEEDLE LOCALIZATION;  Surgeon: Bel Marina MD;  Location: [facility];  Service: General;  Laterality: Left;    BUNIONECTOMY Right     FOOT SURGERY    COLONOSCOPY N/A 2011    Dr. Luis    EPIDURAL BLOCK  July 2023    Epidural x2    HAND SURGERY Right     TUMOR REMOVED ON FOREFINGER    PITUITARY SURGERY      THYROIDECTOMY Right 04/20/2016    Procedure: RIGHT SUPERIOR PARATHYROIDECTOMY;  Surgeon: Bel Marina MD;  Location: [facility] MAIN OR;  Service:          FAMILY HISTORY  Family History   Problem Relation Age of Onset    Diabetes Mother     Heart attack Mother     Arthritis Father     Breast cancer Sister     Breast cancer Sister     Breast cancer Sister     Colon cancer Sister     Heart disease Brother         CABG    Other Brother         BRAIN TUMOR    Colon cancer Brother     Colon cancer Brother     Colon cancer Brother     Colon cancer Maternal Grandfather     Cancer Other     Malig Hyperthermia Neg Hx          SOCIAL HISTORY  Social History     Socioeconomic History    Marital status:    Tobacco Use    Smoking status: Never    Smokeless tobacco: Never   Vaping Use    Vaping status: Never Used   Substance and Sexual Activity    Alcohol use: Never    Drug use: Never    Sexual activity: Not Currently         ALLERGIES  Amoxicillin, Griseofulvin ultramicrosize [griseofulvin], Tramadol, and Atorvastatin        REVIEW OF SYSTEMS  Review of Systems  Included in HPI  All systems  reviewed and negative except for those discussed in HPI.      PHYSICAL EXAM    I have reviewed the triage vital signs and nursing notes.    ED Triage Vitals   Temp Heart Rate Resp BP SpO2   12/15/24 1125 12/15/24 1125 12/15/24 1125 12/15/24 1131 12/15/24 1125   97.6 °F (36.4 °C) 90 16 (!) 193/90 95 %      Temp src Heart Rate Source Patient Position BP Location FiO2 (%)   12/15/24 1125 12/15/24 1125 -- -- --   Tympanic Monitor          Physical Exam  GENERAL: Pleasant cooperative and conversant female, hard of hearing, alert, no acute distress  SKIN: Warm, dry  HENT: Normocephalic, atraumatic  EYES: no scleral icterus  RESPIRATORY: Relaxed breathing  ABDOMEN: soft, nontender, nondistended  MUSCULOSKELETAL: no deformity, limited range of motion at right knee due to pain, 2+ PT pulses bilateral lower extremities, normal abduction and adduction at right hip with no pain, normal rotation at right hip with no pain as patient only complains of right knee pain with any manipulation  NEURO: alert,  follows commands                                                                   LAB RESULTS  Recent Results (from the past 24 hours)   Comprehensive Metabolic Panel    Collection Time: 12/15/24  1:58 PM    Specimen: Blood   Result Value Ref Range    Glucose 96 65 - 99 mg/dL    BUN 18 8 - 23 mg/dL    Creatinine 0.99 0.57 - 1.00 mg/dL    Sodium 137 136 - 145 mmol/L    Potassium 4.5 3.5 - 5.2 mmol/L    Chloride 103 98 - 107 mmol/L    CO2 26.1 22.0 - 29.0 mmol/L    Calcium 9.2 8.2 - 9.6 mg/dL    Total Protein 6.4 6.0 - 8.5 g/dL    Albumin 3.9 3.5 - 5.2 g/dL    ALT (SGPT) 12 1 - 33 U/L    AST (SGOT) 19 1 - 32 U/L    Alkaline Phosphatase 43 39 - 117 U/L    Total Bilirubin 0.3 0.0 - 1.2 mg/dL    Globulin 2.5 gm/dL    A/G Ratio 1.6 g/dL    BUN/Creatinine Ratio 18.2 7.0 - 25.0    Anion Gap 7.9 5.0 - 15.0 mmol/L    eGFR 53.9 (L) >60.0 mL/min/1.73   C-reactive Protein    Collection Time: 12/15/24  1:58 PM    Specimen: Blood   Result Value  Ref Range    C-Reactive Protein <0.30 0.00 - 0.50 mg/dL   Sedimentation Rate    Collection Time: 12/15/24  1:58 PM    Specimen: Blood   Result Value Ref Range    Sed Rate 8 0 - 30 mm/hr   CBC Auto Differential    Collection Time: 12/15/24  1:58 PM    Specimen: Blood   Result Value Ref Range    WBC 8.97 3.40 - 10.80 10*3/mm3    RBC 4.05 3.77 - 5.28 10*6/mm3    Hemoglobin 11.5 (L) 12.0 - 15.9 g/dL    Hematocrit 37.3 34.0 - 46.6 %    MCV 92.1 79.0 - 97.0 fL    MCH 28.4 26.6 - 33.0 pg    MCHC 30.8 (L) 31.5 - 35.7 g/dL    RDW 13.5 12.3 - 15.4 %    RDW-SD 46.0 37.0 - 54.0 fl    MPV 10.9 6.0 - 12.0 fL    Platelets 202 140 - 450 10*3/mm3    Neutrophil % 78.0 (H) 42.7 - 76.0 %    Lymphocyte % 11.7 (L) 19.6 - 45.3 %    Monocyte % 7.6 5.0 - 12.0 %    Eosinophil % 1.7 0.3 - 6.2 %    Basophil % 0.6 0.0 - 1.5 %    Immature Grans % 0.4 0.0 - 0.5 %    Neutrophils, Absolute 7.00 1.70 - 7.00 10*3/mm3    Lymphocytes, Absolute 1.05 0.70 - 3.10 10*3/mm3    Monocytes, Absolute 0.68 0.10 - 0.90 10*3/mm3    Eosinophils, Absolute 0.15 0.00 - 0.40 10*3/mm3    Basophils, Absolute 0.05 0.00 - 0.20 10*3/mm3    Immature Grans, Absolute 0.04 0.00 - 0.05 10*3/mm3    nRBC 0.0 0.0 - 0.2 /100 WBC         RADIOLOGY  XR Hip With or Without Pelvis 2 - 3 View Right    Result Date: 12/15/2024  XR HIP W OR WO PELVIS 2-3 VIEW RIGHT-  CLINICAL HISTORY:  pain and inability to bear weight since last night with no injury  FINDINGS:  Frontal projection of the pelvis and 2 radiographic views of the right hip demonstrate no evidence for acute fracture or bony malalignment. Incidental note is made of chronic fractures involving the left superior and inferior pubic rami.    This report was finalized on 12/15/2024 1:29 PM by Dr. Janak Steiner M.D on Workstation: [id number]      XR Knee 1 or 2 View Right    Result Date: 12/15/2024  XR KNEE 1 OR 2 VW RIGHT-  CLINICAL HISTORY:  pain and inability to bear weight since last night, no injury  FINDINGS:  Tricompartmental  osteoarthritic changes are noted within the right knee with joint space narrowing, subchondral sclerosis, and marginal osteophyte formation. The joint space narrowing is most prominently seen within the patellofemoral compartment and the medial compartment of the right knee. However, no acute abnormality is identified.    This report was finalized on 12/15/2024 1:29 PM by Dr. Janak Steiner M.D on Workstation: [id number]         MEDICATIONS GIVEN IN ER  Medications   sodium chloride 0.9 % flush 10 mL (has no administration in time range)   sodium chloride 0.9 % flush 10 mL (has no administration in time range)   sodium chloride 0.9 % flush 10 mL (has no administration in time range)   sodium chloride 0.9 % infusion 40 mL (has no administration in time range)   oxyCODONE-acetaminophen (PERCOCET) 5-325 MG per tablet 1 tablet (1 tablet Oral Given 12/15/24 1217)   fentaNYL citrate (PF) (SUBLIMAZE) injection 25 mcg (25 mcg Intravenous Given 12/15/24 1406)         ORDERS PLACED DURING THIS VISIT:  Orders Placed This Encounter   Procedures    [facility] 05.  Knee Immobilizer    XR Hip With or Without Pelvis 2 - 3 View Right    XR Knee 1 or 2 View Right    CT Lower Extremity Right Without Contrast    Comprehensive Metabolic Panel    C-reactive Protein    Sedimentation Rate    CBC Auto Differential    Diet: Regular/House; Fluid Consistency: Thin (IDDSI 0)    Knee immobilizer right knee  Misc Nursing Order (Specify)    Vital Signs    Intake & Output    Weigh Patient    Oral Care    Saline Lock & Maintain IV Access    Place Sequential Compression Device    Maintain Sequential Compression Device    Code Status and Medical Interventions: CPR (Attempt to Resuscitate); Full Support    PT Consult: Eval & Treat Functional Mobility Below Baseline    Insert Peripheral IV    Insert Peripheral IV    Initiate ED Observation Status    CBC & Differential         OUTPATIENT MEDICATION MANAGEMENT:  Current Facility-Administered  Medications Ordered in Epic   Medication Dose Route Frequency Provider Last Rate Last Admin    sodium chloride 0.9 % flush 10 mL  10 mL Intravenous PRN Leonor Rangel MD        sodium chloride 0.9 % flush 10 mL  10 mL Intravenous Q12H Leona Martinez APRN        sodium chloride 0.9 % flush 10 mL  10 mL Intravenous PRN Qadah, Leona, APRWILMER        sodium chloride 0.9 % infusion 40 mL  40 mL Intravenous PRN Leona Martinez, ENZO         Current Outpatient Medications Ordered in Epic   Medication Sig Dispense Refill    amLODIPine (NORVASC) 5 MG tablet Take 1 tablet by mouth Daily. 90 tablet 3    aspirin 81 MG EC tablet Take 1 tablet by mouth Daily. Indications: Disease involving Lipid Deposits in the Arteries      B Complex Vitamins (vitamin b complex) capsule capsule Take 1 capsule by mouth Daily.      carvedilol (COREG) 12.5 MG tablet TAKE ONE TABLET BY MOUTH EVERY 12 HOURS 180 tablet 3    cholecalciferol (VITAMIN D3) 1000 units tablet Take 1 tablet by mouth Daily.      furosemide (LASIX) 20 MG tablet Take 1 tablet by mouth Daily As Needed (swelling). 90 tablet 2    gabapentin (NEURONTIN) 100 MG capsule Take 3 capsules by mouth 2 (Two) Times a Day As Needed (PAIN). 180 capsule 1    Lactobacillus (PROBIOTIC ACIDOPHILUS PO) Take 1 tablet by mouth Daily.      mirtazapine (REMERON) 15 MG tablet Take 1 tablet by mouth Every Night. 90 tablet 1    multivitamin (THERAGRAN) tablet tablet Take  by mouth Daily. Gummies      olmesartan (BENICAR) 20 MG tablet TAKE ONE TABLET BY MOUTH DAILY 90 tablet 3    potassium chloride 10 MEQ CR tablet Take 2 tablets by mouth Daily As Needed (when taking lasix (furosemide)). 60 tablet 11    tamoxifen (NOLVADEX) 20 MG chemo tablet Take 1 tablet by mouth Daily. 90 tablet 1         PROCEDURES  Procedures            PROGRESS, DATA ANALYSIS, CONSULTS, AND MEDICAL DECISION MAKING  All labs have been independently interpreted by me.  All radiology studies have been reviewed by me. All  EKG's have been independently viewed and interpreted by me.  Discussion below represents my analysis of pertinent findings related to patient's condition, differential diagnosis, treatment plan and final disposition.    Differential diagnosis includes but is not limited to knee fracture, malignancy, knee effusion, meniscal or ligamentous injury, radicular pain, among other possibilities.  Plan for oral pain medication and x-rays of right knee and right hip.  Will reassess after imaging.          ED Course as of 12/15/24 1505   Sun Dec 15, 2024   1345 Tolerated being placed in knee immobilizer but unable to bear weight in it.  Patient did stand briefly but grimacing in pain unable to take a few steps in knee immobilizer [AR]   1445 I discussed patient's case with our provider in the observation unit who is amenable to admitting the patient for further evaluation and care. [AR]      ED Course User Index  [AR] Leonor Rangel MD             AS OF 15:05 EST VITALS:    BP - 175/82  HR - 64  TEMP - 97.6 °F (36.4 °C) (Tympanic)  O2 SATS - 94%      COMPLEXITY OF CARE  The patient requires admission.    DIAGNOSIS  Final diagnoses:   Sprain of right knee, unspecified ligament, initial encounter   Immobility         DISPOSITION  ED Disposition       ED Disposition   Decision to Admit    Condition   --    Comment   ED obs unit                Please note that portions of this document were completed with a voice recognition program.    Note Disclaimer: At [facility], we believe that sharing information builds trust and better relationships. You are receiving this note because you recently visited [facility]. It is possible you will see health information before a provider has talked with you about it. This kind of information can be easy to misunderstand. To help you fully understand what it means for your health, we urge you to discuss this note with your provider.         Leonor Rangel MD  12/15/24  [id number]

## 2024-12-15 NOTE — PLAN OF CARE
Goal Outcome Evaluation:  Plan of Care Reviewed With: patient, family        Progress: improving  Outcome Evaluation: pt admitted for right knee pain. MRI pending, Ortho and PT to see in AM. continue immobilizer use         Problem: Adult Inpatient Plan of Care  Goal: Plan of Care Review  Outcome: Progressing  Flowsheets (Taken 12/15/2024 1841)  Progress: improving  Outcome Evaluation: pt admitted for right knee pain. MRI pending, Ortho and PT to see in AM. continue immobilizer  Plan of Care Reviewed With:   patient   family  Goal: Patient-Specific Goal (Individualized)  Outcome: Progressing  Goal: Absence of Hospital-Acquired Illness or Injury  Outcome: Progressing  Intervention: Identify and Manage Fall Risk  Recent Flowsheet Documentation  Taken 12/15/2024 1841 by Sang Bentley RN  Safety Promotion/Fall Prevention: patient off unit  Taken 12/15/2024 1609 by Sang Bentley RN  Safety Promotion/Fall Prevention:   safety round/check completed   room organization consistent   lighting adjusted   nonskid shoes/slippers when out of bed   activity supervised   clutter free environment maintained  Intervention: Prevent Skin Injury  Recent Flowsheet Documentation  Taken 12/15/2024 1609 by Sang Bentley RN  Body Position: position changed independently  Intervention: Prevent Infection  Recent Flowsheet Documentation  Taken 12/15/2024 1609 by Sang Bentley RN  Infection Prevention:   rest/sleep promoted   single patient room provided  Goal: Optimal Comfort and Wellbeing  Outcome: Progressing  Intervention: Provide Person-Centered Care  Recent Flowsheet Documentation  Taken 12/15/2024 1609 by Sang Bentley RN  Trust Relationship/Rapport:   care explained   questions answered   empathic listening provided   thoughts/feelings acknowledged  Goal: Readiness for Transition of Care  Outcome: Progressing  Intervention: Mutually Develop Transition Plan  Recent Flowsheet Documentation  Taken 12/15/2024 1620 by Sang Bentley  RN  Transportation Anticipated: family or friend will provide  Patient/Family Anticipated Services at Transition: none  Patient/Family Anticipates Transition to: home  Taken 12/15/2024 1617 by Sang Bentley, RN  Equipment Currently Used at Home:   cpap   cane, straight   rollator   bp cuff     Problem: Comorbidity Management  Goal: Blood Pressure in Desired Range  Outcome: Progressing  Intervention: Maintain Blood Pressure Management  Recent Flowsheet Documentation  Taken 12/15/2024 1609 by Sang Bentley, RN  Medication Review/Management: medications reviewed

## 2024-12-15 NOTE — ED NOTES
Patient's daughter states that the patient has had chronic right hip pain that has not negatively impacted her ADL's. Daughter states that yesterday she started to have pain radiating down her right leg into her right knee and today she is unable to bear weight on the right leg.

## 2024-12-15 NOTE — H&P
[facility]   HISTORY AND PHYSICAL    Patient Name: Neha Guillory  : 1933  MRN: [id number]  Primary Care Physician:  Yvan Ruiz III, NP-C  Date of admission: 12/15/2024    Subjective   Subjective     Chief Complaint: Knee pain    HPI:    Neha Guillory is a 89 y.o. female with PMH of hypertension, hyperlipidemia, hyperparathyroidism, MARISELA, and RICH presents [facility] with right knee and hip pain.  Patient reports that she has a chronic right hip pain that she has tolerated for several years and does not interfere with her activities.  States she was doing exercise of lower extremities and her kitchen and when she woke up this morning she was unable to bear weight on the right RLE secondary to pain.  Denies recent fall or injury denies fever or chills.    Laboratory evaluation shows creatinine 0.99, CRP<0.30, WBC 8.97, hemoglobin 11.5 and platelet 202.     X-ray of right knee shows tricompartmental osteoarthritis changes within the right knee with joint space narrowing, subchondral sclerosis and marginal osteophyte formation.  Otherwise no evidence of acute fracture.  X-ray of right hip shows no evidence of acute fracture or bony malalignment    Review of Systems   All systems were reviewed and negative except for: That mentioned above in HPI    Personal History     Past Medical History:   Diagnosis Date    Aortic stenosis, mild 2022    Arthritis     Carcinoma of upper-inner quadrant of left breast in female, estrogen receptor positive 2022    Carotid artery stenosis     Carotid atherosclerosis, bilateral 2019    Cataract     BILATERAL    Depression     Gastroenteritis     Generalized osteoarthritis 2016    H/O diastolic dysfunction     Hyperlipidemia     Hyperparathyroidism     Hyperparathyroidism     Hypertension     Hypertrophic cardiomyopathy     Iron deficiency anemia 10/13/2022    Low back pain May, 2023    Lower, right    Lumbosacral disc disease  May 2023    Multifocal pneumonia 10/10/2022    Multifocal pneumonia 10/10/2022    Neuromuscular disorder     Obstructive sleep apnea syndrome 02/01/2016    Osteopenia     Osteoporosis     Primary familial hypertrophic cardiomyopathy 02/01/2016    Pulmonary hypertension     Synovial cyst of popliteal space 02/01/2016    Vitamin D deficiency        Past Surgical History:   Procedure Laterality Date    BREAST BIOPSY Left 02/17/2022    BREAST LUMPECTOMY Left     BREAST LUMPECTOMY WITH SENTINEL NODE BIOPSY Left 04/07/2022    Procedure: LEFT BREAST LUMPECTOMY WITH SENTINEL NODE BIOPSY AND NEEDLE LOCALIZATION;  Surgeon: Bel Marina MD;  Location: [facility];  Service: General;  Laterality: Left;    BUNIONECTOMY Right     FOOT SURGERY    COLONOSCOPY N/A 2011    Dr. Luis    EPIDURAL BLOCK  July 2023    Epidural x2    HAND SURGERY Right     TUMOR REMOVED ON FOREFINGER    PITUITARY SURGERY      THYROIDECTOMY Right 04/20/2016    Procedure: RIGHT SUPERIOR PARATHYROIDECTOMY;  Surgeon: Bel Marina MD;  Location: [facility];  Service:        Family History: family history includes Arthritis in her father; Breast cancer in her sister, sister, and sister; Cancer in an other family member; Colon cancer in her brother, brother, brother, maternal grandfather, and sister; Diabetes in her mother; Heart attack in her mother; Heart disease in her brother; Other in her brother. Otherwise pertinent FHx was reviewed and not pertinent to current issue.    Social History:  reports that she has never smoked. She has never used smokeless tobacco. She reports that she does not drink alcohol and does not use drugs.    Home Medications:  Lactobacillus, amLODIPine, aspirin, carvedilol, cholecalciferol, furosemide, gabapentin, mirtazapine, multivitamin, olmesartan, potassium chloride, tamoxifen, and vitamin b complex    Allergies:  Allergies   Allergen Reactions    Amoxicillin Hives    Griseofulvin Ultramicrosize [Griseofulvin]  Confusion    Tramadol Delirium    Atorvastatin Myalgia       Objective   Objective     Vitals:   Temp:  [97.6 °F (36.4 °C)] 97.6 °F (36.4 °C)  Heart Rate:  [64-90] 64  Resp:  [16] 16  BP: (162-193)/(74-90) 175/82  Flow (L/min) (Oxygen Therapy):  [1] 1  Physical Exam    Constitutional: Awake, alert   Eyes: PERRLA, sclerae anicteric, no conjunctival injection   HENT: NCAT, mucous membranes moist   Neck: Supple, no thyromegaly, no lymphadenopathy, trachea midline   Respiratory: Clear to auscultation bilaterally, nonlabored respirations    Cardiovascular: RRR, no murmurs, rubs, or gallops, palpable pedal pulses bilaterally   Gastrointestinal: Positive bowel sounds, soft, nontender, nondistended   Musculoskeletal: No bilateral ankle edema, no clubbing or cyanosis to extremities   Psychiatric: Appropriate affect, cooperative   Neurologic: Oriented x 3, strength symmetric in all extremities, Cranial Nerves grossly intact to confrontation, speech clear   Skin: No rashes     Result Review    Result Review:  I have personally reviewed the results from the time of this admission to 12/15/2024 15:04 EST and agree with these findings:  [x]  Laboratory list / accordion  []  Microbiology  [x]  Radiology  []  EKG/Telemetry   []  Cardiology/Vascular   []  Pathology  []  Old records  []  Other:    Assessment & Plan   Assessment / Plan     Brief Patient Summary:  Neha Guillory is a 89 y.o. female who is being admitted to the observation unit secondary to atraumatic knee pain    Active Hospital Problems:  Active Hospital Problems    Diagnosis     **Right knee pain      Plan:   Right knee pain  -X-ray no evidence of acute fracture  -MRI right knee pending  -Ortho consult  -Analgesic as needed  -PT eval    Hypertension  -Continue home regimen  -Vital signs per nursing      VTE Prophylaxis:  Mechanical VTE prophylaxis orders are present.      CODE STATUS:    DNR    Admission Status:  I believe this patient meets observation  status.    During patient visit, I utilized appropriate personal protective equipment including gloves. Appropriate PPE was worn during the entire visit.  Hand hygiene was completed before and after    65 minutes has been spent by [facility] providers in the care of this patient while under observation status    Electronically signed by ENZO Fallon, 12/15/24, 3:04 PM EST.

## 2024-12-16 ENCOUNTER — READMISSION MANAGEMENT (OUTPATIENT)
Dept: CALL CENTER | Facility: HOSPITAL | Age: 89
End: 2024-12-16
Payer: MEDICARE

## 2024-12-16 VITALS
BODY MASS INDEX: 26.43 KG/M2 | RESPIRATION RATE: 16 BRPM | TEMPERATURE: 98.4 F | HEART RATE: 77 BPM | HEIGHT: 57 IN | OXYGEN SATURATION: 91 % | DIASTOLIC BLOOD PRESSURE: 89 MMHG | WEIGHT: 122.5 LBS | SYSTOLIC BLOOD PRESSURE: 169 MMHG

## 2024-12-16 PROBLEM — S83.206A RIGHT KNEE MENISCAL TEAR: Status: ACTIVE | Noted: 2024-12-16

## 2024-12-16 PROCEDURE — 97161 PT EVAL LOW COMPLEX 20 MIN: CPT

## 2024-12-16 PROCEDURE — G0378 HOSPITAL OBSERVATION PER HR: HCPCS

## 2024-12-16 RX ORDER — HYDROCODONE BITARTRATE AND ACETAMINOPHEN 5; 325 MG/1; MG/1
.5-1 TABLET ORAL EVERY 6 HOURS PRN
Qty: 18 TABLET | Refills: 0 | Status: SHIPPED | OUTPATIENT
Start: 2024-12-16

## 2024-12-16 RX ORDER — ACETAMINOPHEN 325 MG/1
650 TABLET ORAL EVERY 8 HOURS
Qty: 60 TABLET | Refills: 0 | Status: SHIPPED | OUTPATIENT
Start: 2024-12-16 | End: 2024-12-26

## 2024-12-16 NOTE — DISCHARGE PLACEMENT REQUEST
"Neha Guillory (89 y.o. Female)       Date of Birth   02/04/1933    Social Security Number       [address]   [address] [address]    Home Phone   [phone]    MRN   [id number]       Tenriism   Synagogue    Marital Status                               Admission Date   12/15/24    Admission Type   Emergency    Admitting Provider   Daljit Keyes MD    Attending Provider   Aaron Day MD    Department, Room/Bed   [facility] OBSERVATION, 112/1       Discharge Date       Discharge Disposition   Home or Self Care    Discharge Destination                                 Attending Provider: Aaron Day MD    Allergies: Amoxicillin, Griseofulvin Ultramicrosize [Griseofulvin], Tramadol, Atorvastatin    Isolation: None   Infection: None   Code Status: No CPR    Ht: 144.8 cm (57\")   Wt: 55.6 kg (122 lb 8 oz)    Admission Cmt: None   Principal Problem: Right knee pain [M25.561]                   Active Insurance as of 12/15/2024       Primary Coverage       Payor Plan Insurance Group Employer/Plan Group    MEDICARE MEDICARE A & B        Payor Plan Address Payor Plan Phone Number Payor Plan Fax Number Effective Dates    [address] [phone]  2/1/1998 - None Entered    [facility] [id number]         Subscriber Name Subscriber Birth Date Member ID       NEHA GUILLORY 2/4/1933 [id number]               Secondary Coverage       Payor Plan Insurance Group Employer/Plan Group    [employer] SUP AAR HEALTH CARE OPTIONS        Payor Plan Address Payor Plan Phone Number Payor Plan Fax Number Effective Dates    [facility] [phone]  9/1/2021 - None Entered    [address]       [facility] [address]         Subscriber Name Subscriber Birth Date Member ID       NEHA GUILLORY 2/4/1933 [id number]                     Emergency Contacts        (Rel.) Home Phone Work Phone Mobile Phone    Monica Hernández (Daughter) [phone] -- --                "

## 2024-12-16 NOTE — CASE MANAGEMENT/SOCIAL WORK
Case Management Discharge Note      Final Note: Home with [facility] ARIELLA GIBSON         Selected Continued Care - Discharged on 12/16/2024 Admission date: 12/15/2024 - Discharge disposition: Home or Self Care      Destination    No services have been selected for the patient.                Durable Medical Equipment    No services have been selected for the patient.                Dialysis/Infusion    No services have been selected for the patient.                Home Medical Care Coordination complete.      Service Provider Services Address Phone Fax Patient Preferred    [facility] [address], [address], [facility] [address] [phone] --              Therapy    No services have been selected for the patient.                Community Resources    No services have been selected for the patient.                Community & DME    No services have been selected for the patient.                    Selected Continued Care - Episodes Includes continued care and service providers with selected services from the active episodes listed below      High Risk Care Management Episode start date: 12/16/2024   There are no active outsourced providers for this episode.                      Final Discharge Disposition Code: 06 - home with home health care

## 2024-12-16 NOTE — CASE MANAGEMENT/SOCIAL WORK
Discharge Planning Assessment  [facility]     Patient Name: Neha Guillory  MRN: [id number]  Today's Date: 12/16/2024    Admit Date: 12/15/2024    Plan: Home with daughter and Laury    Discharge Needs Assessment       Row Name 12/16/24 0937       Living Environment    People in Home child(alex), adult    Current Living Arrangements home    Potentially Unsafe Housing Conditions none    Primary Care Provided by self    Provides Primary Care For no one    Family Caregiver if Needed child(alex), adult    Quality of Family Relationships helpful;involved;supportive    Able to Return to Prior Arrangements yes       Resource/Environmental Concerns    Resource/Environmental Concerns none       Transition Planning    Patient/Family Anticipates Transition to home    Patient/Family Anticipated Services at Transition home health care    Transportation Anticipated family or friend will provide       Discharge Needs Assessment    Readmission Within the Last 30 Days no previous admission in last 30 days    Current Outpatient/Agency/Support Group homecare agency    Equipment Currently Used at Home walker, rolling;cane, straight;shower chair;commode    Concerns to be Addressed no discharge needs identified;denies needs/concerns at this time    Outpatient/Agency/Support Group Needs homecare agency    Discharge Facility/Level of Care Needs home with home health                   Discharge Plan       Row Name 12/16/24 0938       Plan    Plan Home with daughter and [facility]     Plan Comments CCP met with patient and patient's daughterMonica at bedside. CCP role explained and discharge planning discussed. Face sheet verified and MARMOLEJO noted. Patient's PCP is Dr. Ruiz. Patient lives with her disabled son. Patient has three steps to the entrance of her home. Patient has removable ramps to the entrance if needed. Patient has grab bars present in the bathroom. Patient's bedroom and bathroom are on the main level. Patient has  used [facility] in the past and denies any SNF. Patient plans to return home at discharge and is agreeable to home health. Discussed highly rated home health agencies; patient's daughter would like [facility] . Referral placed in [facility]. Patient's daughter to transport home. Ely GIBSON                  Continued Care and Services - Admitted Since 12/15/2024       Home Medical Care       Service Provider Request Status Services Address Phone Fax Patient Preferred    [facility] [facility] - Request Sent -- [address] , [address], [facility] [address] [phone] --                  Selected Continued Care - Episodes Includes continued care and service providers with selected services from the active episodes listed below      High Risk Care Management Episode start date: 12/16/2024   There are no active outsourced providers for this episode.                 Expected Discharge Date and Time       Expected Discharge Date Expected Discharge Time    Dec 16, 2024            Demographic Summary       Row Name 12/16/24 0937       General Information    Admission Type observation    Arrived From emergency department    Required Notices Provided Observation Status Notice    Referral Source admission list    Reason for Consult discharge planning    Preferred Language English                   Functional Status       Row Name 12/16/24 0937       Functional Status    Usual Activity Tolerance good    Current Activity Tolerance good       Functional Status, IADL    Medications independent    Meal Preparation independent    Housekeeping independent    Laundry independent    Shopping independent       Mental Status    General Appearance WDL WDL       Mental Status Summary    Recent Changes in Mental Status/Cognitive Functioning no changes                   Psychosocial    No documentation.                  Abuse/Neglect    No documentation.                  Legal    No documentation.                   Substance Abuse    No documentation.                  Patient Forms    No documentation.                     PAIGE Crystal

## 2024-12-16 NOTE — DISCHARGE SUMMARY
ED OBSERVATION PROGRESS/DISCHARGE SUMMARY    Date of Admission: 12/15/2024   LOS: 0 days   PCP: Yvan Ruiz III, NP-C    Final Diagnosis right knee pain, meniscal tear      Subjective     Hospital Outcome:   89-year-old female admitted to the observation unit with an acute onset  of right knee pain.  Initial laboratory workup in the emergency department is unremarkable.  X-ray of the right knee shows tricompartmental osteoarthritic changes are noted within the right knee with joint space narrowing, subchondral sclerosis, and marginal osteophyte formation.  X-ray of the right hip shows frontal projection of the pelvis and 2 radiographic views of the right hip demonstrates no evidence for acute fracture or bony malalignment.  Incidental note is made of chronic fractures involving the left superior and inferior pubic rami.  MRI of the right knee without contrast shows no acute bony abnormality.  Moderately severe degenerative arthrosis most pronounced in the medial compartment where there is a complex tear maceration of the medial meniscus with extrusion.  Osteochondral  injury osteochondral insufficiency fracture versus osteochondral dissecans of the posterior lateral aspect of the medial femoral condyle.  Small joint effusion.  Orthopedics have been consulted to see the patient.    12/16: Patient was seen and evaluated by Dr. Spears with orthopedic surgery team who recommended against any surgical intervention.  She was evaluated by PT, this morning patient reports that her knee pain seems to be improving with rest, accompanied by her daughter who both agree that they would prefer discharge home instead of nursing facility placement at this point.    ROS:  General: no fevers, chills  Respiratory: no cough, dyspnea  Cardiovascular: no chest pain, palpitations  Abdomen: No abdominal pain, nausea, vomiting, or diarrhea  Neurologic: No focal weakness    Objective   Physical Exam:  I have reviewed the vital  signs.  Temp:  [97.6 °F (36.4 °C)-98.3 °F (36.8 °C)] 98.2 °F (36.8 °C)  Heart Rate:  [64-90] 77  Resp:  [16] 16  BP: (135-193)/() 135/72  General Appearance:    Alert, cooperative, no distress appears well for stated age  Head:    Normocephalic, atraumatic  Eyes:    Sclerae anicteric  Neck:   Supple, no mass  Lungs: Clear to auscultation bilaterally, respirations unlabored  Heart: Regular rate and rhythm, S1 and S2 normal, no murmur, rub or gallop  Abdomen:  Soft, nontender, bowel sounds active, nondistended  Extremities: No clubbing, cyanosis, or edema to lower extremities, mild soft tissue swelling to diffuse right anterior knee without any erythema crepitus or skin breakdown appreciated  Pulses:  2+ and symmetric in distal lower extremities  Skin: No rashes   Neurologic: Oriented x3, Normal strength to extremities    Results Review:    I have reviewed the labs, radiology results and diagnostic studies.    Results from last 7 days   Lab Units 12/15/24  1358   WBC 10*3/mm3 8.97   HEMOGLOBIN g/dL 11.5*   HEMATOCRIT % 37.3   PLATELETS 10*3/mm3 202     Results from last 7 days   Lab Units 12/15/24  1358   SODIUM mmol/L 137   POTASSIUM mmol/L 4.5   CHLORIDE mmol/L 103   CO2 mmol/L 26.1   BUN mg/dL 18   CREATININE mg/dL 0.99   CALCIUM mg/dL 9.2   BILIRUBIN mg/dL 0.3   ALK PHOS U/L 43   ALT (SGPT) U/L 12   AST (SGOT) U/L 19   GLUCOSE mg/dL 96     Imaging Results (Last 24 Hours)       Procedure Component Value Units Date/Time    MRI Knee Right Without Contrast [[id number]] Collected: 12/15/24 1857     Updated: 12/15/24 1857    Narrative:        Patient: PER ABDI  Time Out: 18:56  Exam(s): MRI RIGHT KNEE Without Contrast     EXAM:    MR Right Lower Extremity Without Intravenous Contrast, Knee    CLINICAL HISTORY:     Reason for exam: Atraumatic knee pain.    TECHNIQUE:    Multiplanar magnetic resonance images of the right knee without   intravenous contrast.    COMPARISON:    No relevant prior studies  available.    FINDINGS:    Patellofemoral bones joint cartilage: There is moderate joint space   narrowing but normal marrow signal and intact cartilage..    Femorotibial bones joints cartilage: There is significant joint space   narrowing with large osteophytes without fracture.  There is a cartilage   defect with associated marrow edema in the posterior lateral aspect of   the medial femoral condyle.    Extensor mechanism:  Unremarkable.    Medial meniscus: There is complex tearing of the anterior and posterior   horn of the medial meniscus with medial extrusion.    Lateral meniscus: Probable degenerative signal without discrete tear.    Medial capsule supporting structures:  Unremarkable.  Normal medial   collateral ligament.    Lateral capsule supporting structures:  Unremarkable.  Normal lateral   collateral ligament.    Anterior cruciate ligament:  Unremarkable.    Posterior cruciate ligament:  Unremarkable.    Musculature:  Unremarkable.    Soft tissues: There is a small joint effusion with medial plica and   mild soft tissue edema.    IMPRESSION:       1.  No acute bony abnormality.  2.  Moderately severe degenerative arthrosis most pronounced in the   medial compartment where there is complex tear maceration of the medial   meniscus with extrusion.  3.  Osteochondral injury osteochondral insufficiency fracture versus   osteochondritis dissecans of the posterior lateral aspect of the medial   femoral condyle.  4.  Small joint effusion.    Impression:          Electronically signed by Nelson Heart MD on 12-15-24 at 1856    XR Hip With or Without Pelvis 2 - 3 View Right [[id number]] Collected: 12/15/24 1327     Updated: 12/15/24 1333    Narrative:      XR HIP W OR WO PELVIS 2-3 VIEW RIGHT-     CLINICAL HISTORY:  pain and inability to bear weight since last night  with no injury     FINDINGS:     Frontal projection of the pelvis and 2 radiographic views of the right  hip demonstrate no evidence for acute  fracture or bony malalignment.  Incidental note is made of chronic fractures involving the left superior  and inferior pubic rami.           This report was finalized on 12/15/2024 1:29 PM by Dr. Janak Steiner M.D  on Workstation: [id number]       XR Knee 1 or 2 View Right [[id number]] Collected: 12/15/24 1319     Updated: 12/15/24 1333    Narrative:      XR KNEE 1 OR 2 VW RIGHT-     CLINICAL HISTORY:  pain and inability to bear weight since last night,  no injury     FINDINGS:     Tricompartmental osteoarthritic changes are noted within the right knee  with joint space narrowing, subchondral sclerosis, and marginal  osteophyte formation. The joint space narrowing is most prominently seen  within the patellofemoral compartment and the medial compartment of the  right knee. However, no acute abnormality is identified.           This report was finalized on 12/15/2024 1:29 PM by Dr. Janak Steiner M.D  on Workstation: [id number]               I have reviewed the medications.     Discharge Medications        ASK your doctor about these medications        Instructions Start Date   amLODIPine 5 MG tablet  Commonly known as: NORVASC   5 mg, Oral, Daily      aspirin 81 MG EC tablet   1 tablet, Daily      carvedilol 12.5 MG tablet  Commonly known as: COREG   12.5 mg, Oral, Every 12 Hours      cholecalciferol 25 MCG (1000 UT) tablet  Commonly known as: VITAMIN D3   1,000 Units, Daily      furosemide 20 MG tablet  Commonly known as: LASIX   20 mg, Oral, Daily PRN      gabapentin 100 MG capsule  Commonly known as: NEURONTIN   300 mg, Oral, 2 Times Daily PRN      mirtazapine 15 MG tablet  Commonly known as: REMERON   15 mg, Oral, Nightly      multivitamin tablet tablet   Daily      olmesartan 20 MG tablet  Commonly known as: BENICAR   TAKE ONE TABLET BY MOUTH DAILY      potassium chloride 10 MEQ CR tablet   20 mEq, Oral, Daily PRN      PROBIOTIC ACIDOPHILUS PO   1 tablet, Daily      tamoxifen 20 MG chemo tablet  Commonly  known as: NOLVADEX   20 mg, Oral, Daily      vitamin b complex capsule capsule   1 capsule, Daily              ---------------------------------------------------------------------------------------------  Assessment & Plan   Assessment/Problem List    Right knee pain      Plan:    Right meniscal tear  Right knee pain  -X-ray no evidence of acute fracture  -MRI right knee shows meniscal tear  -Ortho consult, no surgical intervention warranted at this time  -Ambulatory referral to home health PT     Hypertension  -Home medications at discharge    Disposition: Home    Follow-up after Discharge: PCP, PT, orthopedic surgery    This note will serve as a discharge summary    Natasha James, APRN 12/16/24 03:41 EST    I have worn appropriate PPE during this patient encounter, sanitized my hands both with entering and exiting patient's room.      36 minutes has been spent by [facility] providers in the care of this patient while under observation status

## 2024-12-16 NOTE — PLAN OF CARE
Goal Outcome Evaluation:      Pt. C/o of some pain in right knee with movement. Ortho consult ordered. PT to evaluate.

## 2024-12-16 NOTE — PROGRESS NOTES
MD Attestation Note    SHARED VISIT: This visit was performed by BOTH a physician and an APC. The substantive portion of the medical decision making was performed by this attesting physician who made or approved the management plan and takes responsibility for patient management. All studies in the APC note (if performed) were independently interpreted by me.       My personal findings are:        Subjective:  Patient admitted for atraumatic right knee pain.  She does have a history of arthritis.  She ambulates with a walker at baseline.  She lives at home.  She denies fever or chills.  She reports that her pain is better controlled today.  She has been ambulating with physical therapy this morning.  She wishes to go home.  She does not want to go to rehab or skilled nursing.  Her daughter who is at the bedside states that family will be able to stay with her.        Objective:  GENERAL: Awake, alert, in no acute distress.   HENT:  Normocephalic, atraumatic. Nares patent.   EYES: Pupils equal, reactive. Extra-ocular movements normal.   RESPIRATORY: normal effort.   EXTREMITIES: Mild right knee tenderness, no erythema or warmth, no appreciable effusion.  SKIN:  no rash, normal to inspection.          Assessment/ Plan:  Acute right knee pain  MRI demonstrates insufficiency fracture and also medial meniscus tear  Orthopedics evaluated, recommended conservative treatment  Patient is ambulating with a walker and wishes to go home  I recommended scheduled Tylenol and then also Norco as needed for pain  RICE therapy discussed  Follow-up with orthopedics as outpatient

## 2024-12-16 NOTE — PLAN OF CARE
Goal Outcome Evaluation:  Plan of Care Reviewed With: patient, child           Outcome Evaluation: Patient is a 89 y.o female who presented to [facility] with R LE pain. Ortho consult - okay for WBAT. Patient lives at home alone with a full flight of steps to navigate. Patient is independent at baseline and uses a rwx or cane PRN. Patient completed all bed mobility independently. Patient stood from EOB with SBA. Patient ambulated 40ft with rwx and SBA. Gait slow and antalgic with decreased weightshift onto R LE due to pain. Patient planning to return home with assist of her daughter at d/c. Recommends HHPT. Acute PT will continue to monitor while in this setting for mobility and strength needs.    Anticipated Discharge Disposition (PT): home with assist, home with home health

## 2024-12-16 NOTE — CONSULTS
Orthopaedic Surgery  Consult Note  Dr. WENDY Spears II  [phone]    HPI:  Patient is a 89 y.o. Not  or  female who presents with complaints of severe right knee pain.  She was in her normal state of health until yesterday when all of a sudden she started having a lot of right knee pain.  She has had problems with the knee before but not to this extent.  She was unable to bear much weight and presented to hospital where x-rays were taken that were negative for fracture.  She had an MRI performed which demonstrated advanced osteoarthritis with insufficiency fracturing of the medial femoral condyle secondary to her osteoarthritis.    MEDICAL HISTORY  Past Medical History:   Diagnosis Date    Aortic stenosis, mild 08/24/2022    Arthritis     Carcinoma of upper-inner quadrant of left breast in female, estrogen receptor positive 02/24/2022    Carotid artery stenosis     Carotid atherosclerosis, bilateral 07/12/2019    Cataract     BILATERAL    Depression     Gastroenteritis     Generalized osteoarthritis 02/01/2016    H/O diastolic dysfunction     Hyperlipidemia     Hyperparathyroidism     Hyperparathyroidism     Hypertension     Hypertrophic cardiomyopathy     Iron deficiency anemia 10/13/2022    Low back pain May, 2023    Lower, right    Lumbosacral disc disease May 2023    Multifocal pneumonia 10/10/2022    Multifocal pneumonia 10/10/2022    Neuromuscular disorder     Obstructive sleep apnea syndrome 02/01/2016    Osteopenia     Osteoporosis     Primary familial hypertrophic cardiomyopathy 02/01/2016    Pulmonary hypertension     Synovial cyst of popliteal space 02/01/2016    Vitamin D deficiency      Past Surgical History:   Procedure Laterality Date    BREAST BIOPSY Left 02/17/2022    BREAST LUMPECTOMY Left     BREAST LUMPECTOMY WITH SENTINEL NODE BIOPSY Left 04/07/2022    Procedure: LEFT BREAST LUMPECTOMY WITH SENTINEL NODE BIOPSY AND NEEDLE LOCALIZATION;  Surgeon: Bel Marina MD;   Location:  [facility];  Service: General;  Laterality: Left;    BUNIONECTOMY Right     FOOT SURGERY    COLONOSCOPY N/A 2011    Dr. Luis    EPIDURAL BLOCK  July 2023    Epidural x2    HAND SURGERY Right     TUMOR REMOVED ON FOREFINGER    PITUITARY SURGERY      THYROIDECTOMY Right 04/20/2016    Procedure: RIGHT SUPERIOR PARATHYROIDECTOMY;  Surgeon: Bel Marina MD;  Location: [facility] MAIN OR;  Service:      Prior to Admission medications    Medication Sig Start Date End Date Taking? Authorizing Provider   amLODIPine (NORVASC) 5 MG tablet Take 1 tablet by mouth Daily. 11/11/24  Yes Zack Mohr MD   aspirin 81 MG EC tablet Take 1 tablet by mouth Daily. Indications: Disease involving Lipid Deposits in the Arteries   Yes Lori Barillas MD   B Complex Vitamins (vitamin b complex) capsule capsule Take 1 capsule by mouth Daily.   Yes Lori Barillas MD   carvedilol (COREG) 12.5 MG tablet TAKE ONE TABLET BY MOUTH EVERY 12 HOURS 12/2/24  Yes Zack Mohr MD   cholecalciferol (VITAMIN D3) 1000 units tablet Take 1 tablet by mouth Daily.   Yes Lori Barillas MD   Lactobacillus (PROBIOTIC ACIDOPHILUS PO) Take 1 tablet by mouth Daily.   Yes ProviderLori MD   mirtazapine (REMERON) 15 MG tablet Take 1 tablet by mouth Every Night. 2/19/24  Yes Yvan Ruiz III, NP-C   multivitamin (THERAGRAN) tablet tablet Take  by mouth Daily. Gummies   Yes ProviderLori MD   olmesartan (BENICAR) 20 MG tablet TAKE ONE TABLET BY MOUTH DAILY 10/3/23  Yes Zack Mohr MD   tamoxifen (NOLVADEX) 20 MG chemo tablet Take 1 tablet by mouth Daily. 12/4/24  Yes Ricky Dill MD   furosemide (LASIX) 20 MG tablet Take 1 tablet by mouth Daily As Needed (swelling). 10/6/23   Saundra Nichols APRN   gabapentin (NEURONTIN) 100 MG capsule Take 3 capsules by mouth 2 (Two) Times a Day As Needed (PAIN).  Patient not taking: Reported on 12/15/2024 7/5/23   Yvan Ruiz  "III, NP-C   potassium chloride 10 MEQ CR tablet Take 2 tablets by mouth Daily As Needed (when taking lasix (furosemide)). 8/28/23   Saundra Nichols APRN     Allergies   Allergen Reactions    Amoxicillin Hives    Griseofulvin Ultramicrosize [Griseofulvin] Confusion    Tramadol Delirium    Atorvastatin Myalgia       There is no immunization history on file for this patient.  Social History     Tobacco Use    Smoking status: Never    Smokeless tobacco: Never   Substance Use Topics    Alcohol use: Never      Social History     Substance and Sexual Activity   Drug Use Never       VITALS: /67 (BP Location: Right arm, Patient Position: Lying)   Pulse 69   Temp 98.6 °F (37 °C) (Oral)   Resp 16   Ht 144.8 cm (57\")   Wt 55.6 kg (122 lb 8 oz)   SpO2 96%   BMI 26.51 kg/m²  Body mass index is 26.51 kg/m².    PHYSICAL EXAM:   CONSTITUTIONAL: No acute distress  LUNGS: Equal chest rise, no shortness of air  CARDIOVASCULAR: palpable peripheral pulses  SKIN: no skin lesions in the area examined  LYMPH: no lymphadenopathy in the area examined  Musculoskeletal: Right knee with minimal to trace effusion.  Normal neurovascular exam.  No erythema or warmth.  Limited range of motion secondary to pain.  Normal neurovascular exam    RADIOLOGY REVIEW:   MRI Knee Right Without Contrast    Result Date: 12/15/2024  Electronically signed by Nelson Heart MD on 12-15-24 at 1856     LABS:   Results for the past 24 hours:   Recent Results (from the past 24 hours)   Comprehensive Metabolic Panel    Collection Time: 12/15/24  1:58 PM    Specimen: Blood   Result Value Ref Range    Glucose 96 65 - 99 mg/dL    BUN 18 8 - 23 mg/dL    Creatinine 0.99 0.57 - 1.00 mg/dL    Sodium 137 136 - 145 mmol/L    Potassium 4.5 3.5 - 5.2 mmol/L    Chloride 103 98 - 107 mmol/L    CO2 26.1 22.0 - 29.0 mmol/L    Calcium 9.2 8.2 - 9.6 mg/dL    Total Protein 6.4 6.0 - 8.5 g/dL    Albumin 3.9 3.5 - 5.2 g/dL    ALT (SGPT) 12 1 - 33 U/L    AST (SGOT) 19 1 - 32 " "U/L    Alkaline Phosphatase 43 39 - 117 U/L    Total Bilirubin 0.3 0.0 - 1.2 mg/dL    Globulin 2.5 gm/dL    A/G Ratio 1.6 g/dL    BUN/Creatinine Ratio 18.2 7.0 - 25.0    Anion Gap 7.9 5.0 - 15.0 mmol/L    eGFR 53.9 (L) >60.0 mL/min/1.73   C-reactive Protein    Collection Time: 12/15/24  1:58 PM    Specimen: Blood   Result Value Ref Range    C-Reactive Protein <0.30 0.00 - 0.50 mg/dL   Sedimentation Rate    Collection Time: 12/15/24  1:58 PM    Specimen: Blood   Result Value Ref Range    Sed Rate 8 0 - 30 mm/hr   CBC Auto Differential    Collection Time: 12/15/24  1:58 PM    Specimen: Blood   Result Value Ref Range    WBC 8.97 3.40 - 10.80 10*3/mm3    RBC 4.05 3.77 - 5.28 10*6/mm3    Hemoglobin 11.5 (L) 12.0 - 15.9 g/dL    Hematocrit 37.3 34.0 - 46.6 %    MCV 92.1 79.0 - 97.0 fL    MCH 28.4 26.6 - 33.0 pg    MCHC 30.8 (L) 31.5 - 35.7 g/dL    RDW 13.5 12.3 - 15.4 %    RDW-SD 46.0 37.0 - 54.0 fl    MPV 10.9 6.0 - 12.0 fL    Platelets 202 140 - 450 10*3/mm3    Neutrophil % 78.0 (H) 42.7 - 76.0 %    Lymphocyte % 11.7 (L) 19.6 - 45.3 %    Monocyte % 7.6 5.0 - 12.0 %    Eosinophil % 1.7 0.3 - 6.2 %    Basophil % 0.6 0.0 - 1.5 %    Immature Grans % 0.4 0.0 - 0.5 %    Neutrophils, Absolute 7.00 1.70 - 7.00 10*3/mm3    Lymphocytes, Absolute 1.05 0.70 - 3.10 10*3/mm3    Monocytes, Absolute 0.68 0.10 - 0.90 10*3/mm3    Eosinophils, Absolute 0.15 0.00 - 0.40 10*3/mm3    Basophils, Absolute 0.05 0.00 - 0.20 10*3/mm3    Immature Grans, Absolute 0.04 0.00 - 0.05 10*3/mm3    nRBC 0.0 0.0 - 0.2 /100 WBC       IMPRESSION:  Patient is a 89 y.o. Not  or  female with right knee arthritis flareup    PLAN:   Admited to: Daljit Keyes MD  Recommend conservative treatment for this injury at this time.  Okay for ice and physical therapy.  Recommend anti-inflammatory medication versus oral corticosteroid.  If this does not improve, she may follow-up in the office for discussion of further treatment options.    R \"Julio\" Sweet " II MD  Orthopaedic Surgery  [facility]  [phone] - [place] [facility]  [phone] - [facility]

## 2024-12-16 NOTE — THERAPY EVALUATION
Patient Name: Neha Guillory  : 1933    MRN: [id number]                              Today's Date: 2024       Admit Date: 12/15/2024    Visit Dx:     ICD-10-CM ICD-9-CM   1. Sprain of right knee, unspecified ligament, initial encounter  S83.91XA 844.9   2. Immobility  Z74.09 799.89     Patient Active Problem List   Diagnosis    Hyperlipidemia    Essential hypertension    Hypertrophic cardiomyopathy    Generalized osteoarthritis    RICH on CPAP    Osteoporosis    Vitamin D deficiency    Colon polyps    Family hx of colon cancer    H/O parathyroidectomy    Pulmonary hypertension    Echocardiogram abnormal    Carotid artery stenosis    Ischemic rest pain of lower extremity    Bilateral carpal tunnel syndrome    Visit for screening mammogram    Osteopenia    Hyperuricemia    Senile osteoporosis    Diastolic dysfunction    Hip pain, left    Hammer toe of second toe of right foot    Gastroenteritis    Starvation ketoacidosis    Stage 3a chronic kidney disease    Dehydration    Tophus    Osteoporosis, post-menopausal    Hyperglycemia    Bilateral carotid artery stenosis    Prediabetes    Resistant hypertension    FH: breast cancer    Carcinoma of upper-inner quadrant of left breast in female, estrogen receptor positive    Long-term use of high-risk medication    Aortic stenosis, mild    DNR (do not resuscitate)    Iron deficiency anemia    Shoulder disorder    Pubic ramus fracture, left, closed, initial encounter    Pathological fracture of pelvis due to osteoporosis    SI (sacroiliac) joint inflammation    Right knee pain    Right knee meniscal tear     Past Medical History:   Diagnosis Date    Aortic stenosis, mild 2022    Arthritis     Carcinoma of upper-inner quadrant of left breast in female, estrogen receptor positive 2022    Carotid artery stenosis     Carotid atherosclerosis, bilateral 2019    Cataract     BILATERAL    Depression     Gastroenteritis     Generalized osteoarthritis  02/01/2016    H/O diastolic dysfunction     Hyperlipidemia     Hyperparathyroidism     Hyperparathyroidism     Hypertension     Hypertrophic cardiomyopathy     Iron deficiency anemia 10/13/2022    Low back pain May, 2023    Lower, right    Lumbosacral disc disease May 2023    Multifocal pneumonia 10/10/2022    Multifocal pneumonia 10/10/2022    Neuromuscular disorder     Obstructive sleep apnea syndrome 02/01/2016    Osteopenia     Osteoporosis     Primary familial hypertrophic cardiomyopathy 02/01/2016    Pulmonary hypertension     Synovial cyst of popliteal space 02/01/2016    Vitamin D deficiency      Past Surgical History:   Procedure Laterality Date    BREAST BIOPSY Left 02/17/2022    BREAST LUMPECTOMY Left     BREAST LUMPECTOMY WITH SENTINEL NODE BIOPSY Left 04/07/2022    Procedure: LEFT BREAST LUMPECTOMY WITH SENTINEL NODE BIOPSY AND NEEDLE LOCALIZATION;  Surgeon: Bel Marina MD;  Location: [facility];  Service: General;  Laterality: Left;    BUNIONECTOMY Right     FOOT SURGERY    COLONOSCOPY N/A 2011    Dr. Luis    EPIDURAL BLOCK  July 2023    Epidural x2    HAND SURGERY Right     TUMOR REMOVED ON FOREFINGER    PITUITARY SURGERY      THYROIDECTOMY Right 04/20/2016    Procedure: RIGHT SUPERIOR PARATHYROIDECTOMY;  Surgeon: Bel Marina MD;  Location: [facility];  Service:       General Information       Row Name 12/16/24 0909          Physical Therapy Time and Intention    Document Type evaluation  -SM     Mode of Treatment individual therapy;physical therapy  -       Row Name 12/16/24 0909          General Information    Patient Profile Reviewed yes  -SM     Prior Level of Function independent:  -SM     Existing Precautions/Restrictions fall  -SM       Row Name 12/16/24 0909          Living Environment    People in Home alone  -       Row Name 12/16/24 0909          Cognition    Orientation Status (Cognition) oriented x 4  -SM       Row Name 12/16/24 0909          Safety  Issues/Impairments Affecting Functional Mobility    Impairments Affecting Function (Mobility) endurance/activity tolerance;strength;pain;range of motion (ROM)  -               User Key  (r) = Recorded By, (t) = Taken By, (c) = Cosigned By      Initials Name Provider Type    Elsy Jones PT Physical Therapist                   Mobility       Row Name 12/16/24 0910          Bed Mobility    Bed Mobility supine-sit;sit-supine  -     Supine-Sit [place] (Bed Mobility) independent  -SM     Sit-Supine [place] (Bed Mobility) independent  -     Assistive Device (Bed Mobility) head of bed elevated  -       Row Name 12/16/24 0910          Sit-Stand Transfer    Sit-Stand [place] (Transfers) stand assist  -     Assistive Device (Sit-Stand Transfers) walker, front-wheeled  -       Row Name 12/16/24 0910          Gait/Stairs (Locomotion)    [place] Level (Gait) stand assist  -     Assistive Device (Gait) walker, front-wheeled  -     Distance in Feet (Gait) 40  -SM     Deviations/Abnormal Patterns (Gait) antalgic;gait speed decreased  -     Comment, (Gait/Stairs) Gait slow and antalgic with decreased weightshift onto R LE due to pain.  -               User Key  (r) = Recorded By, (t) = Taken By, (c) = Cosigned By      Initials Name Provider Type    Elsy Jones PT Physical Therapist                   Obj/Interventions       Row Name 12/16/24 0910          Range of Motion Comprehensive    General Range of Motion bilateral lower extremity ROM WFL  -       Row Name 12/16/24 0910          Strength Comprehensive (MMT)    Comment, General Manual Muscle Testing (MMT) Assessment Limited by pain  -       Row Name 12/16/24 0910          Balance    Balance Assessment sitting static balance;sitting dynamic balance;standing static balance;standing dynamic balance  -     Static Sitting Balance independent  -     Dynamic Sitting Balance modified independence  -     Position,  Sitting Balance sitting edge of bed  -SM     Static Standing Balance supervision  -SM     Dynamic Standing Balance standby assist  -SM     Position/Device Used, Standing Balance supported;walker, front-wheeled  -SM     Balance Interventions sitting;standing;sit to stand;supported;static;dynamic  -SM               User Key  (r) = Recorded By, (t) = Taken By, (c) = Cosigned By      Initials Name Provider Type     Elsy Irizarry PT Physical Therapist                   Goals/Plan       Row Name 12/16/24 0914          Transfer Goal 1 (PT)    Activity/Assistive Device (Transfer Goal 1, PT) sit-to-stand/stand-to-sit;bed-to-chair/chair-to-bed  -SM     [place] Level/Cues Needed (Transfer Goal 1, PT) modified independence  -SM     Time Frame (Transfer Goal 1, PT) 3 days  -SM       Row Name 12/16/24 0914          Gait Training Goal 1 (PT)    Activity/Assistive Device (Gait Training Goal 1, PT) gait (walking locomotion);walker, rolling  -SM     [place] Level (Gait Training Goal 1, PT) modified independence  -SM     Distance (Gait Training Goal 1, PT) 100ft  -SM     Time Frame (Gait Training Goal 1, PT) 1 week  -SM               User Key  (r) = Recorded By, (t) = Taken By, (c) = Cosigned By      Initials Name Provider Type     Elsy Irizarry PT Physical Therapist                   Clinical Impression       Row Name 12/16/24 0911          Pain    Pretreatment Pain Rating 0/10 - no pain  -SM     Posttreatment Pain Rating 5/10  -SM     Pain Location knee  -SM     Pain Side/Orientation right  -SM     Pain Management Interventions exercise or physical activity utilized  -     Response to Pain Interventions activity participation with tolerable pain  -SM       Row Name 12/16/24 0911          Plan of Care Review    Plan of Care Reviewed With patient;child  -     Outcome Evaluation Patient is a 89 y.o female who presented to [facility] with R LE pain. Ortho consult - okay for WBAT. Patient lives at home alone with a  full flight of steps to navigate. Patient is independent at baseline and uses a rwx or cane PRN. Patient completed all bed mobility independently. Patient stood from EOB with SBA. Patient ambulated 40ft with rwx and SBA. Gait slow and antalgic with decreased weightshift onto R LE due to pain. Patient planning to return home with assist of her daughter at d/c. Recommends HHPT. Acute PT will continue to monitor while in this setting for mobility and strength needs.  -       Row Name 12/16/24 0911          Therapy Assessment/Plan (PT)    Criteria for Skilled Interventions Met (PT) yes;skilled treatment is necessary  -     Therapy Frequency (PT) 3 times/wk  -       Row Name 12/16/24 0911          Vital Signs    Pre Patient Position Supine  -     Intra Patient Position Standing  -SM     Post Patient Position Sitting  -       Row Name 12/16/24 0911          Positioning and Restraints    Pre-Treatment Position in bed  -SM     Post Treatment Position bed  -SM     In Bed sitting EOB;encouraged to call for assist;call light within reach;notified nsg;with family/caregiver;with other staff  no alarm on arrival  -               User Key  (r) = Recorded By, (t) = Taken By, (c) = Cosigned By      Initials Name Provider Type     Elsy Irizarry, PT Physical Therapist                   Outcome Measures       Row Name 12/16/24 0914 12/16/24 0012       How much help from another person do you currently need...    Turning from your back to your side while in flat bed without using bedrails? 4  -SM 4  -MC    Moving from lying on back to sitting on the side of a flat bed without bedrails? 4  -SM 4  -MC    Moving to and from a bed to a chair (including a wheelchair)? 4  -SM 3  -MC    Standing up from a chair using your arms (e.g., wheelchair, bedside chair)? 4  -SM 3  -MC    Climbing 3-5 steps with a railing? 3  -SM 2  -MC    To walk in hospital room? 3  -SM 2  -MC    AM-PAC 6 Clicks Score (PT) 22  -SM 18  -MC    Highest  Level of Mobility Goal 7 --> Walk 25 feet or more  - 6 --> Walk 10 steps or more  -      Row Name 12/16/24 0914          Functional Assessment    Outcome Measure Options AM-PAC 6 Clicks Basic Mobility (PT)  -               User Key  (r) = Recorded By, (t) = Taken By, (c) = Cosigned By      Initials Name Provider Type     Elsy Irizarry, PT Physical Therapist    Yoshi Small, RN Registered Nurse                                 Physical Therapy Education       Title: PT OT SLP Therapies (In Progress)       Topic: Physical Therapy (Done)       Point: Mobility training (Done)       Learning Progress Summary            Patient Acceptance, E, VU by  at 12/16/2024 0915                      Point: Home exercise program (Done)       Learning Progress Summary            Patient Acceptance, E, VU by  at 12/16/2024 0915                      Point: Body mechanics (Done)       Learning Progress Summary            Patient Acceptance, E, VU by  at 12/16/2024 0915                      Point: Precautions (Done)       Learning Progress Summary            Patient Acceptance, E, VU by  at 12/16/2024 0915                                      User Key       Initials Effective Dates Name Provider Type Discipline     05/02/22 -  Elsy Irizarry PT Physical Therapist PT                  PT Recommendation and Plan     Outcome Evaluation: Patient is a 89 y.o female who presented to [facility] with R LE pain. Ortho consult - okay for WBAT. Patient lives at home alone with a full flight of steps to navigate. Patient is independent at baseline and uses a rwx or cane PRN. Patient completed all bed mobility independently. Patient stood from EOB with SBA. Patient ambulated 40ft with rwx and SBA. Gait slow and antalgic with decreased weightshift onto R LE due to pain. Patient planning to return home with assist of her daughter at d/c. Recommends HHPT. Acute PT will continue to monitor while in this setting for mobility and  strength needs.     Time Calculation:         PT Charges       Row Name 12/16/24 0915             Time Calculation    Start Time 0840  -      Stop Time 0849  -      Time Calculation (min) 9 min  -      PT Received On 12/16/24  -      PT - Next Appointment 12/18/24  -      PT Goal Re-Cert Due Date 12/23/24  -                User Key  (r) = Recorded By, (t) = Taken By, (c) = Cosigned By      Initials Name Provider Type     Elsy Irizarry, SINDY Physical Therapist                  Therapy Charges for Today       Code Description Service Date Service Provider Modifiers Qty    [id number] HC PT EVAL LOW COMPLEXITY 3 12/16/2024 Elsy Irizarry, SINDY GP 1            PT G-Codes  Outcome Measure Options: AM-PAC 6 Clicks Basic Mobility (PT)  AM-PAC 6 Clicks Score (PT): 22  PT Discharge Summary  Anticipated Discharge Disposition (PT): home with assist, home with home health    Elsy Irizarry PT  12/16/2024

## 2024-12-17 ENCOUNTER — TRANSITIONAL CARE MANAGEMENT TELEPHONE ENCOUNTER (OUTPATIENT)
Dept: CALL CENTER | Facility: HOSPITAL | Age: 89
End: 2024-12-17
Payer: MEDICARE

## 2024-12-17 NOTE — OUTREACH NOTE
Prep Survey      Flowsheet Row Responses   [facility] facility patient discharged from? [facility]   Is LACE score < 7 ? No   Eligibility [facility]   Date of Admission 12/15/24   Date of Discharge 12/16/24   Discharge Disposition Home or Self Care   Discharge diagnosis Right knee pain   Does the patient have one of the following disease processes/diagnoses(primary or secondary)? Other   Does the patient have Home health ordered? Yes   What is the Home health agency?  [facility]   Is there a DME ordered? No   Prep survey completed? Yes            MICHELLE JIMENEZ - Registered Nurse

## 2024-12-17 NOTE — OUTREACH NOTE
Call Center TCM Note      Flowsheet Row Responses   [facility] patient discharged from? [place]   Does the patient have one of the following disease processes/diagnoses(primary or secondary)? Other   TCM attempt successful? Yes   Call start time 0839   Call end time 0842   Discharge diagnosis Right knee pain   Person spoke with today (if not patient) and relationship Monica(daughter)   Meds reviewed with patient/caregiver? Yes   Is the patient having any side effects they believe may be caused by any medication additions or changes? No   Does the patient have all medications ordered at discharge? Yes   Is the patient taking all medications as directed (includes completed medication regime)? Yes   Does the patient have an appointment with their PCP within 7-14 days of discharge? No   Nursing Interventions Patient declined scheduling/rescheduling appointment at this time   What is the Home health agency?  [facility]   Psychosocial issues? No   Did the patient receive a copy of their discharge instructions? Yes   Nursing interventions Reviewed instructions with patient   What is the patient's perception of their health status since discharge? Improving   Is the patient/caregiver able to teach back signs and symptoms related to disease process for when to call PCP? Yes   Is the patient/caregiver able to teach back signs and symptoms related to disease process for when to call 911? Yes   Is the patient/caregiver able to teach back the hierarchy of who to call/visit for symptoms/problems? PCP, Specialist, Home health nurse, Urgent Care, ED, 911 Yes   If the patient is a current smoker, are they able to teach back resources for cessation? Not a smoker   TCM call completed? Yes   Call end time 0842   Would this patient benefit from a Referral to Amb Social Work? No   Is the patient interested in additional calls from an ambulatory ? No            Radhika Polk RN    12/17/2024, 08:43 EST

## 2024-12-18 ENCOUNTER — PATIENT OUTREACH (OUTPATIENT)
Dept: CASE MANAGEMENT | Facility: OTHER | Age: 89
End: 2024-12-18
Payer: MEDICARE

## 2024-12-18 ENCOUNTER — TELEPHONE (OUTPATIENT)
Dept: INTERNAL MEDICINE | Facility: CLINIC | Age: 89
End: 2024-12-18
Payer: MEDICARE

## 2024-12-18 ENCOUNTER — TELEPHONE (OUTPATIENT)
Dept: CASE MANAGEMENT | Facility: OTHER | Age: 89
End: 2024-12-18
Payer: MEDICARE

## 2024-12-18 DIAGNOSIS — E78.49 OTHER HYPERLIPIDEMIA: Primary | Chronic | ICD-10-CM

## 2024-12-18 DIAGNOSIS — I10 ESSENTIAL HYPERTENSION: Chronic | ICD-10-CM

## 2024-12-18 NOTE — TELEPHONE ENCOUNTER
RELAY    Okay to give verbal orders.     PROVIDER IS NEEDING VERBAL ORDERS FOR PHYSICAL THERAPY TO BE CONDUCTED ONE MORE TIME THIS WEEK, AND THEN 2 TIMES A WEEK FOR 3 WEEKS

## 2024-12-18 NOTE — TELEPHONE ENCOUNTER
Name: JANET    Relationship: Provider    Best Callback Number: [phone]     HUB PROVIDED THE RELAY MESSAGE FROM THE OFFICE   PATIENT VOICED UNDERSTANDING AND HAS NO FURTHER QUESTIONS AT THIS TIME

## 2024-12-18 NOTE — TELEPHONE ENCOUNTER
Caller: JANET    Relationship: Provider    Best call back number: [phone]     What orders are you requesting VERBAL ORDERS     Additional notes: PROVIDER IS NEEDING VERBAL ORDERS FOR PHYSICAL THERAPY TO BE CONDUCTED ONE MORE TIME THIS WEEK, AND THEN 2 TIMES A WEEK FOR 3 WEEKS

## 2024-12-18 NOTE — OUTREACH NOTE
AMBULATORY CASE MANAGEMENT NOTE    Names and Relationships of Patient/Support Persons: Contact: EdgarMonica; Relationship: Emergency Contact -     Patient Outreach    Patient's daughter Monica who is listed on patient's verbal release called back. She states patient has a torn Meniscus and damage to the cartilage in her knee. She states that patient has an appointment with her orthopedist tomorrow, but she would like patient to see PCP as well. Scheduled an appointment with PCP for next week. She states patient twisted her knee walking out of the bathroom. Instructed Monica to call PCP office if she has questions or concerns.     Tatyana UMANA  Ambulatory Case Management    12/18/2024, 15:48 EST

## 2024-12-19 ENCOUNTER — OFFICE VISIT (OUTPATIENT)
Age: 89
End: 2024-12-19
Payer: MEDICARE

## 2024-12-19 ENCOUNTER — TELEPHONE (OUTPATIENT)
Dept: ONCOLOGY | Facility: CLINIC | Age: 89
End: 2024-12-19

## 2024-12-19 VITALS — WEIGHT: 122 LBS | HEIGHT: 57 IN | BODY MASS INDEX: 26.32 KG/M2 | TEMPERATURE: 98.6 F

## 2024-12-19 DIAGNOSIS — S83.231A COMPLEX TEAR OF MEDIAL MENISCUS OF RIGHT KNEE, UNSPECIFIED WHETHER OLD OR CURRENT TEAR, INITIAL ENCOUNTER: ICD-10-CM

## 2024-12-19 DIAGNOSIS — M25.561 ACUTE PAIN OF RIGHT KNEE: Primary | ICD-10-CM

## 2024-12-19 DIAGNOSIS — M17.11 ARTHRITIS OF RIGHT KNEE: ICD-10-CM

## 2024-12-19 NOTE — TELEPHONE ENCOUNTER
Caller: Monica Hernández    Relationship to patient: Emergency Contact    Best call back number: [phone]    Chief complaint: PATIENT HAD TO RESCHEDULE BONE DENSITY TEST TO 1-24-25, DO THEY NEED TO RESCHEDULE FOLLOW UP AS WELL    Type of visit: LAB AND FOLLOW UP

## 2024-12-19 NOTE — PROGRESS NOTES
Patient: Neha Guillory    YOB: 1933    Medical Record Number: [id number]    Chief Complaints:  Right knee pain    History of Present Illness:     89 y.o. female patient who presents for evaluation of right knee pain.  Her daughter has accompanied her today.  Reports Saturday night, while using her rollator, she experienced sudden severe pain as she turned to go into her bedroom.  Reports her family came to help her get into bed as she was unable to bear weight.  The next day she continued to have severe pain and was evaluated with x-rays and a MRI at [facility] ED.  Dr. Spears was consulted.  Patient has a history with Dr. Maurice and comes in today for further evaluation. Reports she typically ambulates without assistive devices, however, she does use a rollator for stability when getting out of bed during the night.  Reports she has had some intermittent pain in the knee over the years, but never to this degree.  She says her pain has resolved, but felt it best to come in for evaluation and detailed discussion of treatment options.  Reports the pain was predominantly along the medial side of the knee.  She is currently receiving home health physical therapy.  Denies any clicking, popping or catching.  Denies any shooting pain down the legs, weakness, numbness or paresthesias.    Allergies:   Allergies   Allergen Reactions    Amoxicillin Hives    Griseofulvin Ultramicrosize [Griseofulvin] Confusion    Tramadol Delirium    Atorvastatin Myalgia       Home Medications    Current Outpatient Medications:     acetaminophen (Tylenol) 325 MG tablet, Take 2 tablets by mouth Every 8 (Eight) Hours for 10 days., Disp: 60 tablet, Rfl: 0    amLODIPine (NORVASC) 5 MG tablet, Take 1 tablet by mouth Daily., Disp: 90 tablet, Rfl: 3    aspirin 81 MG EC tablet, Take 1 tablet by mouth Daily. Indications: Disease involving Lipid Deposits in the Arteries, Disp: , Rfl:     B Complex Vitamins (vitamin b complex)  capsule capsule, Take 1 capsule by mouth Daily., Disp: , Rfl:     carvedilol (COREG) 12.5 MG tablet, TAKE ONE TABLET BY MOUTH EVERY 12 HOURS, Disp: 180 tablet, Rfl: 3    cholecalciferol (VITAMIN D3) 1000 units tablet, Take 1 tablet by mouth Daily., Disp: , Rfl:     furosemide (LASIX) 20 MG tablet, Take 1 tablet by mouth Daily As Needed (swelling)., Disp: 90 tablet, Rfl: 2    Lactobacillus (PROBIOTIC ACIDOPHILUS PO), Take 1 tablet by mouth Daily., Disp: , Rfl:     mirtazapine (REMERON) 15 MG tablet, Take 1 tablet by mouth Every Night., Disp: 90 tablet, Rfl: 1    multivitamin (THERAGRAN) tablet tablet, Take  by mouth Daily. Gummies, Disp: , Rfl:     olmesartan (BENICAR) 20 MG tablet, TAKE ONE TABLET BY MOUTH DAILY, Disp: 90 tablet, Rfl: 3    potassium chloride 10 MEQ CR tablet, Take 2 tablets by mouth Daily As Needed (when taking lasix (furosemide))., Disp: 60 tablet, Rfl: 11    tamoxifen (NOLVADEX) 20 MG chemo tablet, Take 1 tablet by mouth Daily., Disp: 90 tablet, Rfl: 1    gabapentin (NEURONTIN) 100 MG capsule, Take 3 capsules by mouth 2 (Two) Times a Day As Needed (PAIN). (Patient not taking: Reported on 12/19/2024), Disp: 180 capsule, Rfl: 1    HYDROcodone-acetaminophen (NORCO) 5-325 MG per tablet, Take 0.5-1 tablets by mouth Every 6 (Six) Hours As Needed for Moderate Pain. (Patient not taking: Reported on 12/19/2024), Disp: 18 tablet, Rfl: 0    Past Medical History:   Diagnosis Date    Aortic stenosis, mild 08/24/2022    Arthritis     Carcinoma of upper-inner quadrant of left breast in female, estrogen receptor positive 02/24/2022    Carotid artery stenosis     Carotid atherosclerosis, bilateral 07/12/2019    Cataract     BILATERAL    Depression     Gastroenteritis     Generalized osteoarthritis 02/01/2016    H/O diastolic dysfunction     Hyperlipidemia     Hyperparathyroidism     Hyperparathyroidism     Hypertension     Hypertrophic cardiomyopathy     Iron deficiency anemia 10/13/2022    Low back pain May, 2023     Lower, right    Lumbosacral disc disease May 2023    Multifocal pneumonia 10/10/2022    Multifocal pneumonia 10/10/2022    Neuromuscular disorder     Obstructive sleep apnea syndrome 02/01/2016    Osteopenia     Osteoporosis     Primary familial hypertrophic cardiomyopathy 02/01/2016    Pulmonary hypertension     Synovial cyst of popliteal space 02/01/2016    Vitamin D deficiency        Past Surgical History:   Procedure Laterality Date    BREAST BIOPSY Left 02/17/2022    BREAST LUMPECTOMY Left     BREAST LUMPECTOMY WITH SENTINEL NODE BIOPSY Left 04/07/2022    Procedure: LEFT BREAST LUMPECTOMY WITH SENTINEL NODE BIOPSY AND NEEDLE LOCALIZATION;  Surgeon: Bel Marina MD;  Location: [facility];  Service: General;  Laterality: Left;    BUNIONECTOMY Right     FOOT SURGERY    COLONOSCOPY N/A 2011    Dr. Luis    EPIDURAL BLOCK  July 2023    Epidural x2    HAND SURGERY Right     TUMOR REMOVED ON FOREFINGER    PITUITARY SURGERY      THYROIDECTOMY Right 04/20/2016    Procedure: RIGHT SUPERIOR PARATHYROIDECTOMY;  Surgeon: Ble Marina MD;  Location: [facility];  Service:        Social History     Occupational History     Employer: RETIRED   Tobacco Use    Smoking status: Never    Smokeless tobacco: Never   Vaping Use    Vaping status: Never Used   Substance and Sexual Activity    Alcohol use: Never    Drug use: Never    Sexual activity: Not Currently      Social History     Social History Narrative    Not on file       Family History   Problem Relation Age of Onset    Diabetes Mother     Heart attack Mother     Arthritis Father     Breast cancer Sister     Breast cancer Sister     Breast cancer Sister     Colon cancer Sister     Heart disease Brother         CABG    Other Brother         BRAIN TUMOR    Colon cancer Brother     Colon cancer Brother     Colon cancer Brother     Colon cancer Maternal Grandfather     Cancer Other     Malig Hyperthermia Neg Hx        Review of Systems:      Constitutional:  "Denies fever, shaking or chills   Eyes: Denies change in visual acuity   HEENT: Denies nasal congestion or sore throat   Respiratory: Denies cough or shortness of breath   Cardiovascular: Denies chest pain or edema  Endocrine: Denies tremors, palpitations, intolerance of heat or cold, polyuria, polydipsia.  GI: Denies abdominal pain, nausea, vomiting, bloody stools or diarrhea  : Denies frequency, urgency, incontinence, retention, or nocturia.  Musculoskeletal: Denies numbness, tingling or loss of motor function except as above  Integument: Denies rash, lesion or ulceration   Neurologic: Denies headache or focal weakness, deficits  Heme: Denies spontaneous or excessive bleeding, epistaxis, hematuria, melena, fatigue, enlarged or tender lymph nodes.      All other pertinent positives and negatives as noted above in HPI.    Physical Exam:   89 y.o. female  Vitals:    12/19/24 1407   Temp: 98.6 °F (37 °C)   Weight: 55.3 kg (122 lb)   Height: 144.8 cm (57.01\")     General:  Patient is awake and alert.  Appears in no acute distress or discomfort.    Psych:  Affect and demeanor are appropriate.    Eyes:  Conjunctiva and sclera appear grossly normal.  Eyes track well and EOM seem to be intact.    Ears:  No gross abnormalities.  Hearing adequate for the exam.    Cardiovascular:  Regular rate and rhythm.    Lungs:  Good chest expansion.  Breathing unlabored.    Spine:  Back appears grossly normal.  No palpable masses or adenopathy.  Good motion.  Straight leg raise and crossed straight leg raise maneuver are both negative for lower leg and/or knee pain.    Extremities:  Right knee is examined.  Skin is benign.  No obvious gross abnormalities.  No palpable masses or adenopathy.  Moderate tenderness noted over medial joint line.  Motion is: 0-120°.  No instability.  Positive medial Angle's.  Strength is well preserved including hip flexion, knee extension, ankle and toe plantarflexion, ankle inversion and eversion.  Good " sensory function throughout the leg and foot.  Palpable pulses.  Brisk capillary refill.  Good skin turgor.         Radiology:  Outside nonweightbearing x-rays of the right knee are reviewed on the [employer] system and independently interpreted by me.  The x-rays show moderate tricompartmental osteoarthritic changes with joint space narrowing, subchondral sclerosis, and osteophyte formation.  No acute findings noted.      The right knee MRI report from 12/15/2024 is reviewed.  The findings are as follows:    IMPRESSION:       1.  No acute bony abnormality.  2.  Moderately severe degenerative arthrosis most pronounced in the medial compartment where there is complex tear maceration of the medial meniscus with extrusion.  3.  Osteochondral injury osteochondral insufficiency fracture versus osteochondritis dissecans of the posterior lateral aspect of the medial  femoral condyle.  4.  Small joint effusion.    Assessment/Plan:  1.  Acute on chronic right knee pain  2.  Right knee complex medial meniscus tear  3.  Right knee osteoarthritis    We discussed treatment options in detail including the risks, benefits, and alternatives of conservative treatment versus surgical options.  Regarding conservative treatment, we discussed appropriate activity modifications, Tylenol, injections (including both corticosteroids and visco supplementation), and physical therapy.  We also discussed the option of an arthroplasty and all that would entail.  I have recommended that we start with a conservative approach and the patient agrees.      I have given her a prescription for a topical pain/anti-inflammatory cream through [facility].  The risk and dosing instructions for this medication were discussed.  If her pain recurs, she may consider getting a cortisone injection.  Additionally, she mentions the left knee is beginning to bother her some.  I explained I am happy to see her back for complete evaluation if her symptoms  persist.  Meanwhile, I recommend she continue home health physical therapy as previously ordered.  I recommend she do the exercises for the left knee as well.  Going forward she may follow-up with me as needed.    Haley Aparicio, APRN    12/19/2024    CC to Yvan Ruiz III, NP-C    Much of this encounter note is an electronic transcription/translation of spoken language to printed text. The electronic translation of spoken language may permit erroneous, or at times, nonsensical words or phrases to be inadvertently transcribed.  Although I have reviewed the note for such errors, some may still exist.

## 2025-01-04 ENCOUNTER — APPOINTMENT (OUTPATIENT)
Dept: CT IMAGING | Facility: HOSPITAL | Age: OVER 89
End: 2025-01-04
Payer: MEDICARE

## 2025-01-04 ENCOUNTER — HOSPITAL ENCOUNTER (INPATIENT)
Facility: HOSPITAL | Age: OVER 89
LOS: 1 days | Discharge: HOME | End: 2025-01-07
Attending: EMERGENCY MEDICINE | Admitting: INTERNAL MEDICINE
Payer: MEDICARE

## 2025-01-04 ENCOUNTER — APPOINTMENT (OUTPATIENT)
Dept: GENERAL RADIOLOGY | Facility: HOSPITAL | Age: OVER 89
End: 2025-01-04
Payer: MEDICARE

## 2025-01-04 ENCOUNTER — DOCUMENTATION (OUTPATIENT)
Dept: INTERNAL MEDICINE | Facility: CLINIC | Age: OVER 89
End: 2025-01-04
Payer: MEDICARE

## 2025-01-04 ENCOUNTER — TELEPHONE (OUTPATIENT)
Dept: INTERNAL MEDICINE | Facility: CLINIC | Age: OVER 89
End: 2025-01-04
Payer: MEDICARE

## 2025-01-04 DIAGNOSIS — N17.9 ACUTE KIDNEY INJURY: ICD-10-CM

## 2025-01-04 DIAGNOSIS — R55 SYNCOPE AND COLLAPSE: ICD-10-CM

## 2025-01-04 DIAGNOSIS — I44.2 AV BLOCK, COMPLETE: Primary | ICD-10-CM

## 2025-01-04 LAB
ALBUMIN SERPL-MCNC: 4 G/DL (ref 3.5–5.2)
ALBUMIN/GLOB SERPL: 1.5 G/DL
ALP SERPL-CCNC: 48 U/L (ref 39–117)
ALT SERPL W P-5'-P-CCNC: 10 U/L (ref 1–33)
ANION GAP SERPL CALCULATED.3IONS-SCNC: 10.2 MMOL/L (ref 5–15)
AST SERPL-CCNC: 20 U/L (ref 1–32)
BASOPHILS # BLD AUTO: 0.05 10*3/MM3 (ref 0–0.2)
BASOPHILS NFR BLD AUTO: 0.5 % (ref 0–1.5)
BILIRUB SERPL-MCNC: 0.3 MG/DL (ref 0–1.2)
BUN SERPL-MCNC: 25 MG/DL (ref 8–23)
BUN/CREAT SERPL: 17.9 (ref 7–25)
CALCIUM SPEC-SCNC: 9.1 MG/DL (ref 8.2–9.6)
CHLORIDE SERPL-SCNC: 104 MMOL/L (ref 98–107)
CO2 SERPL-SCNC: 23.8 MMOL/L (ref 22–29)
CREAT SERPL-MCNC: 1.4 MG/DL (ref 0.57–1)
D DIMER PPP FEU-MCNC: 0.94 MCGFEU/ML (ref 0–0.91)
DEPRECATED RDW RBC AUTO: 44.1 FL (ref 37–54)
EGFRCR SERPLBLD CKD-EPI 2021: 35.6 ML/MIN/1.73
EOSINOPHIL # BLD AUTO: 0.16 10*3/MM3 (ref 0–0.4)
EOSINOPHIL NFR BLD AUTO: 1.7 % (ref 0.3–6.2)
ERYTHROCYTE [DISTWIDTH] IN BLOOD BY AUTOMATED COUNT: 13 % (ref 12.3–15.4)
GEN 5 1HR TROPONIN T REFLEX: 21 NG/L
GLOBULIN UR ELPH-MCNC: 2.6 GM/DL
GLUCOSE BLDC GLUCOMTR-MCNC: 125 MG/DL (ref 70–130)
GLUCOSE SERPL-MCNC: 113 MG/DL (ref 65–99)
HCT VFR BLD AUTO: 36.6 % (ref 34–46.6)
HGB BLD-MCNC: 11.5 G/DL (ref 12–15.9)
HOLD SPECIMEN: NORMAL
HOLD SPECIMEN: NORMAL
IMM GRANULOCYTES # BLD AUTO: 0.05 10*3/MM3 (ref 0–0.05)
IMM GRANULOCYTES NFR BLD AUTO: 0.5 % (ref 0–0.5)
INR PPP: 1.19 (ref 0.9–1.1)
LYMPHOCYTES # BLD AUTO: 1.19 10*3/MM3 (ref 0.7–3.1)
LYMPHOCYTES NFR BLD AUTO: 12.5 % (ref 19.6–45.3)
MAGNESIUM SERPL-MCNC: 2.1 MG/DL (ref 1.7–2.3)
MCH RBC QN AUTO: 28.8 PG (ref 26.6–33)
MCHC RBC AUTO-ENTMCNC: 31.4 G/DL (ref 31.5–35.7)
MCV RBC AUTO: 91.7 FL (ref 79–97)
MONOCYTES # BLD AUTO: 1.02 10*3/MM3 (ref 0.1–0.9)
MONOCYTES NFR BLD AUTO: 10.7 % (ref 5–12)
NEUTROPHILS NFR BLD AUTO: 7.03 10*3/MM3 (ref 1.7–7)
NEUTROPHILS NFR BLD AUTO: 74.1 % (ref 42.7–76)
NRBC BLD AUTO-RTO: 0 /100 WBC (ref 0–0.2)
PLATELET # BLD AUTO: 230 10*3/MM3 (ref 140–450)
PMV BLD AUTO: 10.1 FL (ref 6–12)
POTASSIUM SERPL-SCNC: 4.4 MMOL/L (ref 3.5–5.2)
PROT SERPL-MCNC: 6.6 G/DL (ref 6–8.5)
PROTHROMBIN TIME: 15.3 SECONDS (ref 11.7–14.2)
QT INTERVAL: 376 MS
QTC INTERVAL: 412 MS
RBC # BLD AUTO: 3.99 10*6/MM3 (ref 3.77–5.28)
SODIUM SERPL-SCNC: 138 MMOL/L (ref 136–145)
TROPONIN T % DELTA: -9 %
TROPONIN T NUMERIC DELTA: -2 NG/L
TROPONIN T SERPL HS-MCNC: 23 NG/L
WBC NRBC COR # BLD AUTO: 9.5 10*3/MM3 (ref 3.4–10.8)
WHOLE BLOOD HOLD COAG: NORMAL
WHOLE BLOOD HOLD SPECIMEN: NORMAL

## 2025-01-04 PROCEDURE — 80053 COMPREHEN METABOLIC PANEL: CPT | Performed by: EMERGENCY MEDICINE

## 2025-01-04 PROCEDURE — 71045 X-RAY EXAM CHEST 1 VIEW: CPT

## 2025-01-04 PROCEDURE — G0378 HOSPITAL OBSERVATION PER HR: HCPCS

## 2025-01-04 PROCEDURE — 25810000003 SODIUM CHLORIDE 0.9 % SOLUTION: Performed by: PHYSICIAN ASSISTANT

## 2025-01-04 PROCEDURE — 93010 ELECTROCARDIOGRAM REPORT: CPT | Performed by: INTERNAL MEDICINE

## 2025-01-04 PROCEDURE — 36415 COLL VENOUS BLD VENIPUNCTURE: CPT

## 2025-01-04 PROCEDURE — 85025 COMPLETE CBC W/AUTO DIFF WBC: CPT

## 2025-01-04 PROCEDURE — 84484 ASSAY OF TROPONIN QUANT: CPT | Performed by: EMERGENCY MEDICINE

## 2025-01-04 PROCEDURE — 70450 CT HEAD/BRAIN W/O DYE: CPT

## 2025-01-04 PROCEDURE — 93005 ELECTROCARDIOGRAM TRACING: CPT

## 2025-01-04 PROCEDURE — 83735 ASSAY OF MAGNESIUM: CPT | Performed by: EMERGENCY MEDICINE

## 2025-01-04 PROCEDURE — 85379 FIBRIN DEGRADATION QUANT: CPT | Performed by: EMERGENCY MEDICINE

## 2025-01-04 PROCEDURE — 25810000003 SODIUM CHLORIDE 0.9 % SOLUTION: Performed by: EMERGENCY MEDICINE

## 2025-01-04 PROCEDURE — 85610 PROTHROMBIN TIME: CPT | Performed by: EMERGENCY MEDICINE

## 2025-01-04 PROCEDURE — 82948 REAGENT STRIP/BLOOD GLUCOSE: CPT

## 2025-01-04 PROCEDURE — 99285 EMERGENCY DEPT VISIT HI MDM: CPT

## 2025-01-04 PROCEDURE — 93005 ELECTROCARDIOGRAM TRACING: CPT | Performed by: EMERGENCY MEDICINE

## 2025-01-04 RX ORDER — SODIUM CHLORIDE 0.9 % (FLUSH) 0.9 %
10 SYRINGE (ML) INJECTION AS NEEDED
Status: DISCONTINUED | OUTPATIENT
Start: 2025-01-04 | End: 2025-01-07 | Stop reason: HOSPADM

## 2025-01-04 RX ORDER — SODIUM CHLORIDE 0.9 % (FLUSH) 0.9 %
10 SYRINGE (ML) INJECTION EVERY 12 HOURS SCHEDULED
Status: DISCONTINUED | OUTPATIENT
Start: 2025-01-04 | End: 2025-01-07 | Stop reason: HOSPADM

## 2025-01-04 RX ORDER — TAMOXIFEN CITRATE 10 MG/1
20 TABLET ORAL DAILY
Status: DISCONTINUED | OUTPATIENT
Start: 2025-01-05 | End: 2025-01-07 | Stop reason: HOSPADM

## 2025-01-04 RX ORDER — AMOXICILLIN 250 MG
2 CAPSULE ORAL 2 TIMES DAILY PRN
Status: DISCONTINUED | OUTPATIENT
Start: 2025-01-04 | End: 2025-01-07 | Stop reason: HOSPADM

## 2025-01-04 RX ORDER — GINSENG 100 MG
50 CAPSULE ORAL NIGHTLY
COMMUNITY

## 2025-01-04 RX ORDER — ASPIRIN 81 MG/1
81 TABLET ORAL DAILY
Status: DISCONTINUED | OUTPATIENT
Start: 2025-01-05 | End: 2025-01-07 | Stop reason: HOSPADM

## 2025-01-04 RX ORDER — MULTIVIT WITH MINERALS/LUTEIN
1000 TABLET ORAL NIGHTLY
COMMUNITY

## 2025-01-04 RX ORDER — BISACODYL 10 MG
10 SUPPOSITORY, RECTAL RECTAL DAILY PRN
Status: DISCONTINUED | OUTPATIENT
Start: 2025-01-04 | End: 2025-01-07 | Stop reason: HOSPADM

## 2025-01-04 RX ORDER — LOSARTAN POTASSIUM 50 MG/1
50 TABLET ORAL
Status: DISCONTINUED | OUTPATIENT
Start: 2025-01-05 | End: 2025-01-05

## 2025-01-04 RX ORDER — ONDANSETRON 2 MG/ML
4 INJECTION INTRAMUSCULAR; INTRAVENOUS EVERY 6 HOURS PRN
Status: DISCONTINUED | OUTPATIENT
Start: 2025-01-04 | End: 2025-01-07 | Stop reason: HOSPADM

## 2025-01-04 RX ORDER — CARVEDILOL 12.5 MG/1
12.5 TABLET ORAL EVERY 12 HOURS
Status: DISCONTINUED | OUTPATIENT
Start: 2025-01-04 | End: 2025-01-06

## 2025-01-04 RX ORDER — AMLODIPINE BESYLATE 5 MG/1
5 TABLET ORAL DAILY
Status: DISCONTINUED | OUTPATIENT
Start: 2025-01-05 | End: 2025-01-06

## 2025-01-04 RX ORDER — NITROGLYCERIN 0.4 MG/1
0.4 TABLET SUBLINGUAL
Status: DISCONTINUED | OUTPATIENT
Start: 2025-01-04 | End: 2025-01-07 | Stop reason: HOSPADM

## 2025-01-04 RX ORDER — ACETAMINOPHEN 325 MG/1
650 TABLET ORAL EVERY 4 HOURS PRN
Status: DISCONTINUED | OUTPATIENT
Start: 2025-01-04 | End: 2025-01-07 | Stop reason: HOSPADM

## 2025-01-04 RX ORDER — SODIUM CHLORIDE 9 MG/ML
40 INJECTION, SOLUTION INTRAVENOUS AS NEEDED
Status: DISCONTINUED | OUTPATIENT
Start: 2025-01-04 | End: 2025-01-07 | Stop reason: HOSPADM

## 2025-01-04 RX ORDER — ONDANSETRON 4 MG/1
4 TABLET, ORALLY DISINTEGRATING ORAL EVERY 6 HOURS PRN
Status: DISCONTINUED | OUTPATIENT
Start: 2025-01-04 | End: 2025-01-07 | Stop reason: HOSPADM

## 2025-01-04 RX ORDER — POLYETHYLENE GLYCOL 3350 17 G/17G
17 POWDER, FOR SOLUTION ORAL DAILY PRN
Status: DISCONTINUED | OUTPATIENT
Start: 2025-01-04 | End: 2025-01-07 | Stop reason: HOSPADM

## 2025-01-04 RX ORDER — BISACODYL 5 MG/1
5 TABLET, DELAYED RELEASE ORAL DAILY PRN
Status: DISCONTINUED | OUTPATIENT
Start: 2025-01-04 | End: 2025-01-07 | Stop reason: HOSPADM

## 2025-01-04 RX ADMIN — SODIUM CHLORIDE 500 ML: 9 INJECTION, SOLUTION INTRAVENOUS at 23:54

## 2025-01-04 RX ADMIN — CARVEDILOL 12.5 MG: 12.5 TABLET, FILM COATED ORAL at 23:53

## 2025-01-04 RX ADMIN — Medication 10 ML: at 23:56

## 2025-01-04 RX ADMIN — SODIUM CHLORIDE 500 ML: 9 INJECTION, SOLUTION INTRAVENOUS at 19:22

## 2025-01-04 NOTE — Clinical Note
A sheath was successfully inserted using micropuncture technique with ultrasound guidance into the left axillary vein. One stick, retain wire method.

## 2025-01-04 NOTE — TELEPHONE ENCOUNTER
Responded to on-call [employer] notification at approximately 12:00PM on 1/4/25. Spoke with patient's daughter, Monica, who is patient's POA. Monica states that she is worried about patient's condition at this time. She notes that patient has been undergoing work up for syncopal episodes in the outpatient setting, one of which resulted in a recent knee injury. Today Monica states that patient admitted to her that these episodes have been more frequent recently, with patient reportedly passing out or near passing out multiple times daily. Most recent episode was this morning. 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 She went in to check on her mother recently and noted SpO2 on home monitor to be in the 70s and SBP to be as high as 200+. Denies confusion, CP, or shortness of breath symptoms. Did note new onset left leg swelling today.     Overall, Monica very worried about patient's condition, but she states that her mother is understandably hesitant to go to the hospital for further evaluation given her advanced age. I let Monica know that based on patient's acute clinic change, increased syncopal episodes, and reported vital signs today, I would ultimately recommend reporting to the nearest emergency room for further evaluation. She expressed understanding.

## 2025-01-04 NOTE — ED PROVIDER NOTES
EMERGENCY DEPARTMENT ENCOUNTER    Room Number:  38/38  Date of encounter:  1/4/2025  PCP: Yvan Ruiz III, TERI-C  Historian: Patient and daughter  Relevant information and history provided by sources other than the patient will be included below and in the ED Course.  Review of pertinent past medical records may also be included in record below and ED Course.    HPI:  Chief Complaint: Passing out multiple times and almost passing out  A complete HPI/ROS/PMH/PSH/SH/FH are unobtainable due to: Not applicable  Context: Neha Guillory is a 89 y.o. female who presents to the ED c/o patient's first episode of passing out happened in September.  She is admitted to the observation unit because of the injury to her right knee she had torn her meniscus.  Then she had another episode of passing out 3 weeks ago.  And then has had multiple episodes between that time where she is almost passed out.  She then had an episode of passing out yesterday which ultimately after her daughter found out that these episodes are occurring more frequently and persisting to come to the ER for evaluation.  The episode that she had 3 weeks ago occurred when she was standing at the kitchen counter.  And she passed out.  She believes she was only out briefly.  She lives alone.  This was unwitnessed.  The when she had yesterday she states that she had just got up to close the blinds in her house.  She felt like things were going black and passed out.  She did hit the back of her head.  She denied any chest pain or shortness of breath beforehand.  She has had multiple episodes of almost passing out where things go black but has not passed out completely.  These will occur when she is sitting down.  They do not always occur when she is standing up.  But occur more frequently when she is standing up.  She is not on any blood thinning medicine.  Currently right now she is asymptomatic denies chest pain or shortness of breath.  She has  been eating well she has had no vomiting or diarrhea.  She has had no fevers chills coughs or colds.  No vision change, speech change or any focal weakness to arms or legs.  No history of seizure disorder.  Has never bit her tongue or lip.  Never had urinary or fecal incontinence or retention.  A lot of these episodes are unwitnessed.  Patient feels that these episodes when she passes out are very brief in duration.      Previous Episodes: Yes occurring more frequently over the past several weeks.  Current Symptoms: See above    MEDICAL HISTORY REVIEWED  I see this patient has a history of SI joint pain in the past and pelvic fracture history of hypertension history of aortic stenosis that was diagnosed as mild in this past.  Can also see that there is mention of hyperlipidemia mention of a DNR.  I did review her medicine list.  I reviewed the echo from 5/8/2024.  This echo showed an ejection fraction of 64% with global longitudinal LV strain of 20.9%.  Grade 1A diastolic dysfunction related to relaxation.  Moderate calcification of the aortic valve there is moderate thickening over the arctic valve Doppler interrogation did not suggest aortic stenosis with the valve appears mildly stenotic.  Mild mitral valve regurg      PAST MEDICAL HISTORY  Active Ambulatory Problems     Diagnosis Date Noted    Hyperlipidemia 02/01/2016    Essential hypertension 02/01/2016    Hypertrophic cardiomyopathy 02/01/2016    Generalized osteoarthritis 02/01/2016    RICH on CPAP 02/01/2016    Osteoporosis 02/01/2016    Vitamin D deficiency 02/01/2016    Colon polyps 02/24/2016    Family hx of colon cancer 02/24/2016    H/O parathyroidectomy 04/20/2016    Pulmonary hypertension 04/25/2016    Echocardiogram abnormal 03/16/2015    Carotid artery stenosis 04/25/2016    Ischemic rest pain of lower extremity 07/07/2016    Bilateral carpal tunnel syndrome 07/07/2016    Visit for screening mammogram 07/07/2016    Osteopenia 08/05/2016     Hyperuricemia 09/09/2016    Senile osteoporosis 02/07/2017    Diastolic dysfunction 03/17/2017    Hip pain, left 07/26/2017    Hammer toe of second toe of right foot 07/26/2017    Gastroenteritis 12/26/2017    Starvation ketoacidosis 12/26/2017    Stage 3a chronic kidney disease 12/26/2017    Dehydration 12/26/2017    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    Osteoporosis, post-menopausal 02/12/2018    Hyperglycemia 10/22/2018    Bilateral carotid artery stenosis 07/12/2019    Prediabetes 08/01/2019    Resistant hypertension 02/24/2021    FH: breast cancer 07/02/2021    Carcinoma of upper-inner quadrant of left breast in female, estrogen receptor positive 02/24/2022    Long-term use of high-risk medication     Aortic stenosis, mild 08/24/2022    DNR (do not resuscitate) 10/11/2022    Iron deficiency anemia 10/13/2022    Shoulder disorder 01/11/2023    Pubic ramus fracture, left, closed, initial encounter 01/23/2023    Pathological fracture of pelvis due to osteoporosis 01/24/2023    SI (sacroiliac) joint inflammation 08/10/2023    Right knee pain 12/15/2024    Right knee meniscal tear 12/16/2024     Resolved Ambulatory Problems     Diagnosis Date Noted    Hypercalcemia 02/01/2016    Secondary hyperparathyroidism, non-renal 02/01/2016    Depression 02/01/2016    Synovial cyst of popliteal space 02/01/2016    Thrombophlebitis of deep veins of lower extremity 02/01/2016    Rash 12/28/2016    Allergic reaction caused by a drug 12/28/2016    Upper respiratory infection 01/26/2017    Deep vein thrombosis     Multifocal pneumonia 10/10/2022    Acute kidney failure 10/11/2022    Hyperglycemia 10/11/2022     Past Medical History:   Diagnosis Date    Arthritis     Carotid atherosclerosis, bilateral 07/12/2019    Cataract     H/O diastolic dysfunction     Hyperparathyroidism     Hyperparathyroidism     Hypertension     Low back pain May, 2023    Lumbosacral disc disease May 2023    Neuromuscular disorder     Obstructive sleep apnea syndrome  02/01/2016    Primary familial hypertrophic cardiomyopathy 02/01/2016         PAST SURGICAL HISTORY  Past Surgical History:   Procedure Laterality Date    BREAST BIOPSY Left 02/17/2022    BREAST LUMPECTOMY Left     BREAST LUMPECTOMY WITH SENTINEL NODE BIOPSY Left 04/07/2022    Procedure: LEFT BREAST LUMPECTOMY WITH SENTINEL NODE BIOPSY AND NEEDLE LOCALIZATION;  Surgeon: Bel Marina MD;  [place]  [facility] [place]  Service: General;  Laterality: Left;    BUNIONECTOMY Right     FOOT SURGERY    COLONOSCOPY N/A 2011    Dr. Luis    EPIDURAL BLOCK  July 2023    Epidural x2    HAND SURGERY Right     TUMOR REMOVED ON FOREFINGER    PITUITARY SURGERY      THYROIDECTOMY Right 04/20/2016    Procedure: RIGHT SUPERIOR PARATHYROIDECTOMY;  Surgeon: Bel Marina MD;  Location: [facility] MAIN OR;  Service:          FAMILY HISTORY  Family History   Problem Relation Age of Onset    Diabetes Mother     Heart attack Mother     Arthritis Father     Breast cancer Sister     Breast cancer Sister     Breast cancer Sister     Colon cancer Sister     Heart disease Brother         CABG    Other Brother         BRAIN TUMOR    Colon cancer Brother     Colon cancer Brother     Colon cancer Brother     Colon cancer Maternal Grandfather     Cancer Other     Malig Hyperthermia Neg Hx          SOCIAL HISTORY  Social History     Socioeconomic History    Marital status:    Tobacco Use    Smoking status: Never    Smokeless tobacco: Never   Vaping Use    Vaping status: Never Used   Substance and Sexual Activity    Alcohol use: Never    Drug use: Never    Sexual activity: Not Currently         ALLERGIES  Amoxicillin, Griseofulvin ultramicrosize [griseofulvin], Tramadol, and Atorvastatin        REVIEW OF SYSTEMS  Review of Systems     All systems reviewed and negative except for those discussed in HPI.       PHYSICAL EXAM    I have reviewed the triage vital signs and nursing notes.    ED Triage Vitals   Temp Heart Rate Resp BP SpO2   01/04/25  1601 01/04/25 1601 01/04/25 1601 01/04/25 1608 01/04/25 1601   98.9 °F (37.2 °C) 79 16 131/66 95 %      Temp src Heart Rate Source Patient Position BP Location FiO2 (%)   -- -- -- -- --              GENERAL: Elderly female.  No acute distress.Vital signs on my initial evaluation unremarkable.  HENT: nares patent  Head/neck/ face are symmetric without gross deformity, signs of trauma, or swelling  EYES: no scleral icterus, no conjunctival pallor.  NECK: Supple, no meningismus  CV: regular rhythm, regular rate with intact distal pulses.  Heart murmur 2 out of 6 systolic  RESPIRATORY: normal effort and no respiratory distress.  Clear to auscultation bilaterally  ABDOMEN: soft and nontender.  MUSCULOSKELETAL: no deformity.  Intact distal pulses to upper and lower extremities that are equal strong and symmetric.  No pain with passive or active range of motion to all extremities.  NEURO: alert and appropriate, moves all extremities, follows commands.  No focal motor or sensory changes  SKIN: warm, dry    Vital signs and nursing notes reviewed.        LAB RESULTS  Recent Results (from the past 24 hours)   ECG 12 Lead ED Triage Standing Order; Syncope    Collection Time: 01/04/25  4:08 PM   Result Value Ref Range    QT Interval 376 ms    QTC Interval 412 ms   Comprehensive Metabolic Panel    Collection Time: 01/04/25  4:14 PM    Specimen: Arm, Left; Blood   Result Value Ref Range    Glucose 113 (H) 65 - 99 mg/dL    BUN 25 (H) 8 - 23 mg/dL    Creatinine 1.40 (H) 0.57 - 1.00 mg/dL    Sodium 138 136 - 145 mmol/L    Potassium 4.4 3.5 - 5.2 mmol/L    Chloride 104 98 - 107 mmol/L    CO2 23.8 22.0 - 29.0 mmol/L    Calcium 9.1 8.2 - 9.6 mg/dL    Total Protein 6.6 6.0 - 8.5 g/dL    Albumin 4.0 3.5 - 5.2 g/dL    ALT (SGPT) 10 1 - 33 U/L    AST (SGOT) 20 1 - 32 U/L    Alkaline Phosphatase 48 39 - 117 U/L    Total Bilirubin 0.3 0.0 - 1.2 mg/dL    Globulin 2.6 gm/dL    A/G Ratio 1.5 g/dL    BUN/Creatinine Ratio 17.9 7.0 - 25.0    Anion  Gap 10.2 5.0 - 15.0 mmol/L    eGFR 35.6 (L) >60.0 mL/min/1.73   Magnesium    Collection Time: 01/04/25  4:14 PM    Specimen: Arm, Left; Blood   Result Value Ref Range    Magnesium 2.1 1.7 - 2.3 mg/dL   High Sensitivity Troponin T    Collection Time: 01/04/25  4:14 PM    Specimen: Arm, Left; Blood   Result Value Ref Range    HS Troponin T 23 (H) <14 ng/L   Green Top (Gel)    Collection Time: 01/04/25  4:14 PM   Result Value Ref Range    Extra Tube Hold for add-ons.    Lavender Top    Collection Time: 01/04/25  4:14 PM   Result Value Ref Range    Extra Tube hold for add-on    Gold Top - SST    Collection Time: 01/04/25  4:14 PM   Result Value Ref Range    Extra Tube Hold for add-ons.    Light Blue Top    Collection Time: 01/04/25  4:14 PM   Result Value Ref Range    Extra Tube Hold for add-ons.    CBC Auto Differential    Collection Time: 01/04/25  4:14 PM    Specimen: Arm, Left; Blood   Result Value Ref Range    WBC 9.50 3.40 - 10.80 10*3/mm3    RBC 3.99 3.77 - 5.28 10*6/mm3    Hemoglobin 11.5 (L) 12.0 - 15.9 g/dL    Hematocrit 36.6 34.0 - 46.6 %    MCV 91.7 79.0 - 97.0 fL    MCH 28.8 26.6 - 33.0 pg    MCHC 31.4 (L) 31.5 - 35.7 g/dL    RDW 13.0 12.3 - 15.4 %    RDW-SD 44.1 37.0 - 54.0 fl    MPV 10.1 6.0 - 12.0 fL    Platelets 230 140 - 450 10*3/mm3    Neutrophil % 74.1 42.7 - 76.0 %    Lymphocyte % 12.5 (L) 19.6 - 45.3 %    Monocyte % 10.7 5.0 - 12.0 %    Eosinophil % 1.7 0.3 - 6.2 %    Basophil % 0.5 0.0 - 1.5 %    Immature Grans % 0.5 0.0 - 0.5 %    Neutrophils, Absolute 7.03 (H) 1.70 - 7.00 10*3/mm3    Lymphocytes, Absolute 1.19 0.70 - 3.10 10*3/mm3    Monocytes, Absolute 1.02 (H) 0.10 - 0.90 10*3/mm3    Eosinophils, Absolute 0.16 0.00 - 0.40 10*3/mm3    Basophils, Absolute 0.05 0.00 - 0.20 10*3/mm3    Immature Grans, Absolute 0.05 0.00 - 0.05 10*3/mm3    nRBC 0.0 0.0 - 0.2 /100 WBC   Protime-INR    Collection Time: 01/04/25  4:14 PM    Specimen: Arm, Left; Blood   Result Value Ref Range    Protime 15.3 (H) 11.7 -  14.2 Seconds    INR 1.19 (H) 0.90 - 1.10   D-dimer, Quantitative    Collection Time: 01/04/25  4:14 PM    Specimen: Arm, Left; Blood   Result Value Ref Range    D-Dimer, Quantitative 0.94 (H) 0.00 - 0.91 MCGFEU/mL   POC Glucose Once    Collection Time: 01/04/25  4:43 PM    Specimen: Blood   Result Value Ref Range    Glucose 125 70 - 130 mg/dL   High Sensitivity Troponin T 1Hr    Collection Time: 01/04/25  5:43 PM    Specimen: Blood   Result Value Ref Range    HS Troponin T 21 (H) <14 ng/L    Troponin T Numeric Delta -2 ng/L    Troponin T % Delta -9 Abnormal if >/= 20% %       Ordered the above labs and independently reviewed the results.        RADIOLOGY  CT Head Without Contrast    Result Date: 1/4/2025  CT HEAD WITHOUT CONTRAST  CLINICAL HISTORY: Passed out hitting the back of the head.  TECHNIQUE: CT scan of the head was obtained with 3 mm axial soft tissue and 2 mm axial bone algorithm algorithm images. No intravenous contrast was administered. Sagittal and coronal reconstructions were obtained.  COMPARISON: CT head dated 1/23/2023.  FINDINGS:   There is no evidence for an acute extra-axial hemorrhage or a calvarial fracture.  Mild to moderate changes of chronic small vessel ischemic phenomena are identified. The ventricles, sulci, and cisterns are age-appropriate. The gray-white matter differentiation is within normal limits. The basal ganglia and thalami are unremarkable in appearance. The posterior fossa structures are within normal limits.  There is partial opacification of the right mastoid air cells. Polypoid mucosal thickening is noted within the maxillary sinuses and scattered areas of mucosal thickening are appreciated within the ethmoid air cells.       No evidence for acute traumatic intracranial pathology.   Radiation dose reduction techniques were utilized, including automated exposure control and exposure modulation based on body size.  This report was finalized on 1/4/2025 6:46 PM by Dr. Briceno  SOULEYMANE Steiner on Workstation: [id number]       I ordered the above noted radiological studies. Reviewed by me and discussed with radiologist.  See dictation for official radiology interpretation.      PROCEDURES    Procedures      MEDICATIONS GIVEN IN ER    Medications   sodium chloride 0.9 % flush 10 mL (has no administration in time range)   sodium chloride 0.9 % flush 10 mL (has no administration in time range)   sodium chloride 0.9 % bolus 500 mL (500 mL Intravenous New Bag 1/4/25 1922)         All labs have been independently reviewed by me.  All radiology studies have been reviewed by me and I discussed with radiologist dictating the report when indicated below.  All EKG's independently viewed and interpreted by me.  Discussion below represents my analysis of pertinent findings related to patient's condition, differential diagnosis, treatment plan and final disposition.        PROGRESS, DATA ANALYSIS, CONSULTS, AND MEDICAL DECISION MAKING    My differential diagnosis for syncope includes but is not limited to:  Vasovagal reflex - situational stimulus, micturition, defecation, cough, sneezing, swallowing, postprandial state, react sinus hypersensitivity  Vascular-prolonged recumbency, sudden postural change, prolonged standing, hypovolemia, vasodilator drugs, autonomic neuropathy, adrenal insufficiency, subclavian steal, pulmonary embolism  Cardiac -arrhythmia, heart block, myocardial infarction, aortic stenosis, cardiac myxoma, cardiac, LV Dysfunction, Aortic Dissection, Pulmonary Hypertension, Pulmonary Stenosis, Pacemaker Failure  CNS-seizure, hypoxia, hypoglycemia, TIA,(basal vertebral), hydrocephalus  This patient has had multiple episodes over the past several weeks of syncope or near syncope.  EKG is unremarkable.  She does have a heart murmur.  We can go ahead and check orthostatics on her check lab work I am also going to check a dimer on her.  In 2022 she did have cancer of the breast but she is cured  from that.  Informed patient and daughter of the test that we will order.  All questions answered at this time.      ED Course as of 01/04/25 2028   Sat Jan 04, 2025   1641 A look at this patient's EKG  My own independent reading that was done at 4:08 PM I see a rate of 72 it is normal sinus rhythm it is narrow complex there is signs of LVH  Nonspecific T wave changes I do not see any acute injury pattern QT looks normal I do not see any obvious signs of Brugada syndrome or WPW  I compared this EKG to an EKG that was done on January 3, 2022 and the EKGs look very similar.  I would like to add she has borderline prolongation of the [place] interval on her EKG today. [MM]   1741 D-Dimer, Quant(!): 0.94  This dimer is mildly elevated at 0.94.  Patient's age is 89.  I will CT her chest. [MM]   1741 Patient is not orthostatic.  There is no change in the blood pressure or the heart rate. [MM]   1758 This patient's GFR is 35.  I am going to go ahead and cancel the CT scan of the chest.  The dimer is minimally elevated and I have a low clinical sufficient this person has a pulmonary embolism.  She will be admitted to the observation unit.  They can do Dopplers of her lower extremities.  Unfortunately cannot do Dopplers at this time because I do not have the ability for vascular to a Doppler on the weekend after 5:00. [MM]   1910 I did discuss the case with Rj Bose who is on fourth the observation unit.  Informed him of the patient's presenting symptoms and results of the test.  He agrees to admit the patient to the hospital.  Informed him I am not can to do a CT angiogram at this time.  I would hydrate her watch her renal function.  2 weeks ago she had normal renal function and decide either do venous Dopplers of her lower extremities.  Again I do not believe she has a pulmonary embolism.  All questions answered [MM]   1911 CT scan of the head report was reviewed.  No acute abnormality seen. [MM]   1911 HS Troponin T(!): 21  [MM]   1911 Troponin T Numeric Delta: -2 [MM]   1940 Reevaluated the patient talk with the patient as well as the daughter at bedside.  Informed about the results of the test and treatment plan for observation.  All questions answered and she agrees with that plan. [MM]      ED Course User Index  [MM] Mike Jay MD       AS OF 20:28 EST VITALS:    BP - 155/87  HR - 89  TEMP - 98.9 °F (37.2 °C)  02 SATS - 95%    SOCIAL DETERMINANTS OF HEALTH THAT IMPACT OR LIMIT CARE (For example..Homelessness,safe discharge, inability to obtain care, follow up, or prescriptions):      DIAGNOSIS  Final diagnoses:   Syncope and collapse   Acute kidney injury         DISPOSITION  Patient admitted to the observation it to a monitored bed.          DICTATED UTILIZING DRAGON DICTATION    Note Disclaimer: At [facility], we believe that sharing information builds trust and better relationships. You are receiving this note because you recently visited [facility]. It is possible you will see health information before a provider has talked with you about it. This kind of information can be easy to misunderstand. To help you fully understand what it means for your health, we urge you to discuss this note with your provider.       Mike Jay MD  01/04/25 2029

## 2025-01-04 NOTE — Clinical Note
A sheath was successfully inserted using micropuncture technique with ultrasound guidance into the left axillary vein.

## 2025-01-05 ENCOUNTER — APPOINTMENT (OUTPATIENT)
Dept: CARDIOLOGY | Facility: HOSPITAL | Age: OVER 89
End: 2025-01-05
Payer: MEDICARE

## 2025-01-05 ENCOUNTER — APPOINTMENT (OUTPATIENT)
Dept: CT IMAGING | Facility: HOSPITAL | Age: OVER 89
End: 2025-01-05
Payer: MEDICARE

## 2025-01-05 LAB
ANION GAP SERPL CALCULATED.3IONS-SCNC: 8.3 MMOL/L (ref 5–15)
ANISOCYTOSIS BLD QL: ABNORMAL
AORTIC DIMENSIONLESS INDEX: 0.5 (DI)
ASCENDING AORTA: 3.6 CM
AV MEAN PRESS GRAD SYS DOP V1V2: 11.6 MMHG
AV VMAX SYS DOP: 233.8 CM/SEC
BASOPHILS # BLD MANUAL: 0 10*3/MM3 (ref 0–0.2)
BASOPHILS NFR BLD MANUAL: 0 % (ref 0–1.5)
BH CV ECHO MEAS - ACS: 1.57 CM
BH CV ECHO MEAS - AI P1/2T: 430.1 MSEC
BH CV ECHO MEAS - AO MAX PG: 21.9 MMHG
BH CV ECHO MEAS - AO ROOT DIAM: 3.4 CM
BH CV ECHO MEAS - AO V2 VTI: 54.5 CM
BH CV ECHO MEAS - AVA(I,D): 1.78 CM2
BH CV ECHO MEAS - EDV(CUBED): 102.9 ML
BH CV ECHO MEAS - EDV(MOD-SP2): 49 ML
BH CV ECHO MEAS - EDV(MOD-SP4): 61 ML
BH CV ECHO MEAS - EF(MOD-SP2): 57.1 %
BH CV ECHO MEAS - EF(MOD-SP4): 55.7 %
BH CV ECHO MEAS - ESV(CUBED): 28.5 ML
BH CV ECHO MEAS - ESV(MOD-SP2): 21 ML
BH CV ECHO MEAS - ESV(MOD-SP4): 27 ML
BH CV ECHO MEAS - FS: 34.8 %
BH CV ECHO MEAS - IVS/LVPW: 1.23 CM
BH CV ECHO MEAS - IVSD: 1.31 CM
BH CV ECHO MEAS - LAT PEAK E' VEL: 7 CM/SEC
BH CV ECHO MEAS - LV DIASTOLIC VOL/BSA (35-75): 38.3 CM2
BH CV ECHO MEAS - LV MASS(C)D: 208.1 GRAMS
BH CV ECHO MEAS - LV MAX PG: 7.6 MMHG
BH CV ECHO MEAS - LV MEAN PG: 4.2 MMHG
BH CV ECHO MEAS - LV SYSTOLIC VOL/BSA (12-30): 16.9 CM2
BH CV ECHO MEAS - LV V1 MAX: 137.4 CM/SEC
BH CV ECHO MEAS - LV V1 VTI: 31 CM
BH CV ECHO MEAS - LVIDD: 4.7 CM
BH CV ECHO MEAS - LVIDS: 3.1 CM
BH CV ECHO MEAS - LVOT AREA: 3.1 CM2
BH CV ECHO MEAS - LVOT DIAM: 2 CM
BH CV ECHO MEAS - LVPWD: 1.06 CM
BH CV ECHO MEAS - MED PEAK E' VEL: 4.1 CM/SEC
BH CV ECHO MEAS - MV A MAX VEL: 130.3 CM/SEC
BH CV ECHO MEAS - MV DEC SLOPE: 421.2 CM/SEC2
BH CV ECHO MEAS - MV DEC TIME: 0.27 SEC
BH CV ECHO MEAS - MV E MAX VEL: 82.3 CM/SEC
BH CV ECHO MEAS - MV E/A: 0.63
BH CV ECHO MEAS - MV MAX PG: 7.2 MMHG
BH CV ECHO MEAS - MV MEAN PG: 2.6 MMHG
BH CV ECHO MEAS - MV P1/2T: 72.1 MSEC
BH CV ECHO MEAS - MV V2 VTI: 36.2 CM
BH CV ECHO MEAS - MVA(P1/2T): 3.1 CM2
BH CV ECHO MEAS - MVA(VTI): 2.7 CM2
BH CV ECHO MEAS - PA ACC TIME: 0.08 SEC
BH CV ECHO MEAS - PA V2 MAX: 97.7 CM/SEC
BH CV ECHO MEAS - QP/QS: 1.33
BH CV ECHO MEAS - RAP SYSTOLE: 3 MMHG
BH CV ECHO MEAS - RV MAX PG: 2.8 MMHG
BH CV ECHO MEAS - RV V1 MAX: 84.4 CM/SEC
BH CV ECHO MEAS - RV V1 VTI: 20.4 CM
BH CV ECHO MEAS - RVOT DIAM: 2.8 CM
BH CV ECHO MEAS - RVSP: 35 MMHG
BH CV ECHO MEAS - SV(LVOT): 96.9 ML
BH CV ECHO MEAS - SV(MOD-SP2): 28 ML
BH CV ECHO MEAS - SV(MOD-SP4): 34 ML
BH CV ECHO MEAS - SV(RVOT): 128.8 ML
BH CV ECHO MEAS - SVI(LVOT): 60.8 ML/M2
BH CV ECHO MEAS - SVI(MOD-SP2): 17.6 ML/M2
BH CV ECHO MEAS - SVI(MOD-SP4): 21.3 ML/M2
BH CV ECHO MEAS - TAPSE (>1.6): 2.6 CM
BH CV ECHO MEAS - TR MAX PG: 32.8 MMHG
BH CV ECHO MEAS - TR MAX VEL: 286.3 CM/SEC
BH CV ECHO MEASUREMENTS AVERAGE E/E' RATIO: 14.83
BH CV XLRA - RV BASE: 3.1 CM
BH CV XLRA - RV LENGTH: 6.6 CM
BH CV XLRA - RV MID: 2.9 CM
BH CV XLRA - TDI S': 16.3 CM/SEC
BH CV XLRA MEAS CAROTID RIGHT ICA/CCA DIASTOLIC RATIO: 2.67
BH CV XLRA MEAS LEFT DIST CCA EDV: 15.7 CM/SEC
BH CV XLRA MEAS LEFT DIST CCA PSV: 49.6 CM/SEC
BH CV XLRA MEAS LEFT DIST ICA EDV: -16.7 CM/SEC
BH CV XLRA MEAS LEFT DIST ICA PSV: -55 CM/SEC
BH CV XLRA MEAS LEFT ICA/CCA DIASTOLIC RATIO: 1.61
BH CV XLRA MEAS LEFT ICA/CCA RATIO: 0.9
BH CV XLRA MEAS LEFT MID ICA EDV: -18.2 CM/SEC
BH CV XLRA MEAS LEFT MID ICA PSV: -52.6 CM/SEC
BH CV XLRA MEAS LEFT PROX CCA EDV: 14.7 CM/SEC
BH CV XLRA MEAS LEFT PROX CCA PSV: 64.9 CM/SEC
BH CV XLRA MEAS LEFT PROX ECA EDV: -13.3 CM/SEC
BH CV XLRA MEAS LEFT PROX ECA PSV: -63.4 CM/SEC
BH CV XLRA MEAS LEFT PROX ICA EDV: -23.6 CM/SEC
BH CV XLRA MEAS LEFT PROX ICA PSV: -58.5 CM/SEC
BH CV XLRA MEAS LEFT PROX SCLA EDV: 11.3 CM/SEC
BH CV XLRA MEAS LEFT PROX SCLA PSV: 99.8 CM/SEC
BH CV XLRA MEAS LEFT VERTEBRAL A EDV: 12.8 CM/SEC
BH CV XLRA MEAS LEFT VERTEBRAL A PSV: 43.7 CM/SEC
BH CV XLRA MEAS RIGHT DIST CCA EDV: 15.6 CM/SEC
BH CV XLRA MEAS RIGHT DIST CCA PSV: 44.7 CM/SEC
BH CV XLRA MEAS RIGHT DIST ICA EDV: -31.5 CM/SEC
BH CV XLRA MEAS RIGHT DIST ICA PSV: -94.4 CM/SEC
BH CV XLRA MEAS RIGHT ICA/CCA RATIO: 1.85
BH CV XLRA MEAS RIGHT MID ICA EDV: -27.5 CM/SEC
BH CV XLRA MEAS RIGHT MID ICA PSV: -81.1 CM/SEC
BH CV XLRA MEAS RIGHT PROX CCA EDV: -11.8 CM/SEC
BH CV XLRA MEAS RIGHT PROX CCA PSV: -51 CM/SEC
BH CV XLRA MEAS RIGHT PROX ECA EDV: -9.1 CM/SEC
BH CV XLRA MEAS RIGHT PROX ECA PSV: -59.9 CM/SEC
BH CV XLRA MEAS RIGHT PROX ICA EDV: -23.1 CM/SEC
BH CV XLRA MEAS RIGHT PROX ICA PSV: -66.2 CM/SEC
BH CV XLRA MEAS RIGHT PROX SCLA EDV: 8.4 CM/SEC
BH CV XLRA MEAS RIGHT PROX SCLA PSV: 76.2 CM/SEC
BH CV XLRA MEAS RIGHT VERTEBRAL A EDV: -11.3 CM/SEC
BH CV XLRA MEAS RIGHT VERTEBRAL A PSV: -38.8 CM/SEC
BUN SERPL-MCNC: 20 MG/DL (ref 8–23)
BUN/CREAT SERPL: 20.8 (ref 7–25)
CALCIUM SPEC-SCNC: 8.1 MG/DL (ref 8.2–9.6)
CHLORIDE SERPL-SCNC: 111 MMOL/L (ref 98–107)
CO2 SERPL-SCNC: 21.7 MMOL/L (ref 22–29)
CREAT SERPL-MCNC: 0.96 MG/DL (ref 0.57–1)
DEPRECATED RDW RBC AUTO: 42.7 FL (ref 37–54)
EGFRCR SERPLBLD CKD-EPI 2021: 56 ML/MIN/1.73
EOSINOPHIL # BLD MANUAL: 0.32 10*3/MM3 (ref 0–0.4)
EOSINOPHIL NFR BLD MANUAL: 4 % (ref 0.3–6.2)
ERYTHROCYTE [DISTWIDTH] IN BLOOD BY AUTOMATED COUNT: 13 % (ref 12.3–15.4)
GLUCOSE SERPL-MCNC: 83 MG/DL (ref 65–99)
HCT VFR BLD AUTO: 31.8 % (ref 34–46.6)
HGB BLD-MCNC: 10.3 G/DL (ref 12–15.9)
LEFT ATRIUM VOLUME INDEX: 49.8 ML/M2
LV EF BIPLANE MOD: 55.7 %
LYMPHOCYTES # BLD MANUAL: 1.04 10*3/MM3 (ref 0.7–3.1)
LYMPHOCYTES NFR BLD MANUAL: 9.1 % (ref 5–12)
MCH RBC QN AUTO: 29.4 PG (ref 26.6–33)
MCHC RBC AUTO-ENTMCNC: 32.4 G/DL (ref 31.5–35.7)
MCV RBC AUTO: 90.9 FL (ref 79–97)
MONOCYTES # BLD: 0.72 10*3/MM3 (ref 0.1–0.9)
NEUTROPHILS # BLD AUTO: 5.84 10*3/MM3 (ref 1.7–7)
NEUTROPHILS NFR BLD MANUAL: 73.7 % (ref 42.7–76)
PLAT MORPH BLD: NORMAL
PLATELET # BLD AUTO: 190 10*3/MM3 (ref 140–450)
PMV BLD AUTO: 10.8 FL (ref 6–12)
POTASSIUM SERPL-SCNC: 3.9 MMOL/L (ref 3.5–5.2)
QT INTERVAL: 413 MS
QTC INTERVAL: 430 MS
RBC # BLD AUTO: 3.5 10*6/MM3 (ref 3.77–5.28)
SINUS: 3.3 CM
SODIUM SERPL-SCNC: 141 MMOL/L (ref 136–145)
TROPONIN T SERPL HS-MCNC: 19 NG/L
TSH SERPL DL<=0.05 MIU/L-ACNC: 1.61 UIU/ML (ref 0.27–4.2)
VARIANT LYMPHS NFR BLD MANUAL: 1 % (ref 0–5)
VARIANT LYMPHS NFR BLD MANUAL: 12.1 % (ref 19.6–45.3)
WBC MORPH BLD: NORMAL
WBC NRBC COR # BLD AUTO: 7.93 10*3/MM3 (ref 3.4–10.8)

## 2025-01-05 PROCEDURE — 93880 EXTRACRANIAL BILAT STUDY: CPT | Performed by: STUDENT IN AN ORGANIZED HEALTH CARE EDUCATION/TRAINING PROGRAM

## 2025-01-05 PROCEDURE — 85027 COMPLETE CBC AUTOMATED: CPT | Performed by: PHYSICIAN ASSISTANT

## 2025-01-05 PROCEDURE — 93306 TTE W/DOPPLER COMPLETE: CPT | Performed by: INTERNAL MEDICINE

## 2025-01-05 PROCEDURE — 25510000001 PERFLUTREN 6.52 MG/ML SUSPENSION 2 ML VIAL: Performed by: PHYSICIAN ASSISTANT

## 2025-01-05 PROCEDURE — 99214 OFFICE O/P EST MOD 30 MIN: CPT

## 2025-01-05 PROCEDURE — G0378 HOSPITAL OBSERVATION PER HR: HCPCS

## 2025-01-05 PROCEDURE — 93970 EXTREMITY STUDY: CPT

## 2025-01-05 PROCEDURE — 84443 ASSAY THYROID STIM HORMONE: CPT | Performed by: PHYSICIAN ASSISTANT

## 2025-01-05 PROCEDURE — 85007 BL SMEAR W/DIFF WBC COUNT: CPT | Performed by: PHYSICIAN ASSISTANT

## 2025-01-05 PROCEDURE — 71275 CT ANGIOGRAPHY CHEST: CPT

## 2025-01-05 PROCEDURE — 84484 ASSAY OF TROPONIN QUANT: CPT | Performed by: PHYSICIAN ASSISTANT

## 2025-01-05 PROCEDURE — 25510000001 IOPAMIDOL PER 1 ML: Performed by: EMERGENCY MEDICINE

## 2025-01-05 PROCEDURE — 93306 TTE W/DOPPLER COMPLETE: CPT

## 2025-01-05 PROCEDURE — 93880 EXTRACRANIAL BILAT STUDY: CPT

## 2025-01-05 PROCEDURE — 93010 ELECTROCARDIOGRAM REPORT: CPT | Performed by: INTERNAL MEDICINE

## 2025-01-05 PROCEDURE — 80048 BASIC METABOLIC PNL TOTAL CA: CPT | Performed by: PHYSICIAN ASSISTANT

## 2025-01-05 PROCEDURE — 93005 ELECTROCARDIOGRAM TRACING: CPT | Performed by: PHYSICIAN ASSISTANT

## 2025-01-05 RX ORDER — LOSARTAN POTASSIUM 50 MG/1
50 TABLET ORAL
Status: DISCONTINUED | OUTPATIENT
Start: 2025-01-05 | End: 2025-01-07 | Stop reason: HOSPADM

## 2025-01-05 RX ORDER — IOPAMIDOL 755 MG/ML
100 INJECTION, SOLUTION INTRAVASCULAR
Status: COMPLETED | OUTPATIENT
Start: 2025-01-05 | End: 2025-01-05

## 2025-01-05 RX ADMIN — LOSARTAN POTASSIUM 50 MG: 50 TABLET, FILM COATED ORAL at 15:07

## 2025-01-05 RX ADMIN — IOPAMIDOL 95 ML: 755 INJECTION, SOLUTION INTRAVENOUS at 09:10

## 2025-01-05 RX ADMIN — AMLODIPINE BESYLATE 5 MG: 5 TABLET ORAL at 13:53

## 2025-01-05 RX ADMIN — TAMOXIFEN CITRATE 20 MG: 10 TABLET ORAL at 15:04

## 2025-01-05 RX ADMIN — PERFLUTREN 2 ML: 6.52 INJECTION, SUSPENSION INTRAVENOUS at 08:59

## 2025-01-05 RX ADMIN — Medication 10 ML: at 20:21

## 2025-01-05 RX ADMIN — CARVEDILOL 12.5 MG: 12.5 TABLET, FILM COATED ORAL at 13:54

## 2025-01-05 RX ADMIN — ASPIRIN 81 MG: 81 TABLET, COATED ORAL at 13:54

## 2025-01-05 NOTE — CONSULTS
Date of Consultation: 25    Referral Provider: Mike aJy MD    Reason for Consultation: Syncope    Encounter Provider: ENZO Schwab    Group of Service: [facility]     Patient Name: Neha Guillory    :1933    Chief complaint: Syncope    History of Present Illness: Neha Guillory is a 89-year-old female who has a past medical history that is significant for hypertrophic obstructive cardiomyopathy, mild aortic stenosis, hypertension, carotid artery stenosis, breast cancer, hyperlipidemia, and sleep apnea.    Presented to the ED on 2025 with complaints of a syncopal episode.  She reports that she had a syncopal episode in September, then again about 2 weeks ago, then just prior to her presentation to the ED.  She reports that her vision goes blurry and then black.  Sometimes she remains conscious but other times she feels she loses consciousness.  Her most recent incident occurred while she was standing in her kitchen using her rollator.  She reports she passed out and fell into her rollator.  She is unsure if she hit her head on the ground.  She denies any chest pain, shortness of breath, palpitations.  She has not had any recent illness, she reports her oral intake has been normal.  She denies any fever, chills, nausea, vomiting, diarrhea, lightheadedness, or dizziness.    Workup in the ED included: HS troponin 23 then 21 then 19.  Creatinine 1.4. D-dimer 0.94. Chest xray with no acute findings.  EKG showed sinus rhythm with a rate of 65 bpm.  CTA of the chest with no evidence of pulmonary embolus.  Carotid and bilateral lower venous extremity duplex pending.  TSH 1.61.    I saw her while she was in the cardiology department this morning.  She denies recurrence of her symptoms.  She denies chest pain or discomfort, palpitations, or shortness of breath.  She feels like her syncopal episode started after she injured her knee.  She has been receiving PT for this and denies any  issues during therapy sessions.     Echocardiogram 1/5/2025    Left ventricular systolic function is normal. Calculated left ventricular EF = 55.7%    Left ventricular wall thickness is consistent with moderate concentric hypertrophy.    Left ventricular diastolic function is consistent with (grade I) impaired relaxation.    The left atrial cavity is severely dilated.    Left atrial volume is severely increased.    Mild aortic valve stenosis is present. Aortic valve area is 1.8 cm2.    Peak velocity of the flow distal to the aortic valve is 233.8 cm/s. Aortic valve maximum pressure gradient is 22 mmHg. Aortic valve mean pressure gradient is 12 mmHg. Aortic valve dimensionless index is 0.5 .    Mild aortic valve regurgitation is present    Moderate mitral valve regurgitation is present.    Mild tricuspid valve regurgitation is present.    Estimated right ventricular systolic pressure from tricuspid regurgitation is mildly elevated (35-45 mmHg). Calculated right ventricular systolic pressure from tricuspid regurgitation is 35 mmHg.    Past Medical History:   Diagnosis Date    Aortic stenosis, mild 08/24/2022    Arthritis     Carcinoma of upper-inner quadrant of left breast in female, estrogen receptor positive 02/24/2022    Carotid artery stenosis     Carotid atherosclerosis, bilateral 07/12/2019    Cataract     BILATERAL    Depression     Gastroenteritis     Generalized osteoarthritis 02/01/2016    H/O diastolic dysfunction     Hyperlipidemia     Hyperparathyroidism     Hyperparathyroidism     Hypertension     Hypertrophic cardiomyopathy     Iron deficiency anemia 10/13/2022    Low back pain May, 2023    Lower, right    Lumbosacral disc disease May 2023    Multifocal pneumonia 10/10/2022    Multifocal pneumonia 10/10/2022    Neuromuscular disorder     Obstructive sleep apnea syndrome 02/01/2016    Osteopenia     Osteoporosis     Primary familial hypertrophic cardiomyopathy 02/01/2016    Pulmonary hypertension      Synovial cyst of popliteal space 02/01/2016    Vitamin D deficiency          Past Surgical History:   Procedure Laterality Date    BREAST BIOPSY Left 02/17/2022    BREAST LUMPECTOMY Left     BREAST LUMPECTOMY WITH SENTINEL NODE BIOPSY Left 04/07/2022    Procedure: LEFT BREAST LUMPECTOMY WITH SENTINEL NODE BIOPSY AND NEEDLE LOCALIZATION;  Surgeon: Bel Marina MD;  [place] [facility] [place] [facility] [place]  Service: General;  Laterality: Left;    BUNIONECTOMY Right     FOOT SURGERY    COLONOSCOPY N/A 2011    Dr. Luis    EPIDURAL BLOCK  July 2023    Epidural x2    HAND SURGERY Right     TUMOR REMOVED ON FOREFINGER    PITUITARY SURGERY      THYROIDECTOMY Right 04/20/2016    Procedure: RIGHT SUPERIOR PARATHYROIDECTOMY;  Surgeon: Bel Marnia MD;  Location: [facility] MAIN OR;  Service:          Allergies   Allergen Reactions    Amoxicillin Hives    Griseofulvin Ultramicrosize [Griseofulvin] Confusion    Tramadol Delirium    Atorvastatin Myalgia         No current facility-administered medications on file prior to encounter.     Current Outpatient Medications on File Prior to Encounter   Medication Sig Dispense Refill    amLODIPine (NORVASC) 5 MG tablet Take 1 tablet by mouth Daily. 90 tablet 3    aspirin 81 MG EC tablet Take 1 tablet by mouth Daily. Indications: Disease involving Lipid Deposits in the Arteries      B Complex Vitamins (vitamin b complex) capsule capsule Take 1 capsule by mouth Daily.      carvedilol (COREG) 12.5 MG tablet TAKE ONE TABLET BY MOUTH EVERY 12 HOURS 180 tablet 3    cholecalciferol (VITAMIN D3) 1000 units tablet Take 1 tablet by mouth Daily.      furosemide (LASIX) 20 MG tablet Take 1 tablet by mouth Daily As Needed (swelling). 90 tablet 2    gabapentin (NEURONTIN) 100 MG capsule Take 3 capsules by mouth 2 (Two) Times a Day As Needed (PAIN). (Patient not taking: Reported on 12/19/2024) 180 capsule 1    HYDROcodone-acetaminophen (NORCO) 5-325 MG per tablet Take 0.5-1 tablets by mouth Every 6 (Six) Hours As  Needed for Moderate Pain. (Patient not taking: Reported on 12/19/2024) 18 tablet 0    Ibuprofen 3 %, Gabapentin 10 %, Baclofen 2 %, lidocaine 4 % Apply 1-2 g topically to the appropriate area as directed 3 (Three) to 4 (Four) times daily. 90 g 5    Lactobacillus (PROBIOTIC ACIDOPHILUS PO) Take 1 tablet by mouth Every Night.      mirtazapine (REMERON) 15 MG tablet Take 1 tablet by mouth Every Night. 90 tablet 1    multivitamin (THERAGRAN) tablet tablet Take  by mouth Daily. Gummies      olmesartan (BENICAR) 20 MG tablet TAKE ONE TABLET BY MOUTH DAILY (Patient taking differently: Take 1 tablet by mouth Every Night.) 90 tablet 3    potassium chloride 10 MEQ CR tablet Take 2 tablets by mouth Daily As Needed (when taking lasix (furosemide)). 60 tablet 11    tamoxifen (NOLVADEX) 20 MG chemo tablet Take 1 tablet by mouth Daily. 90 tablet 1    vitamin C (ASCORBIC ACID) 250 MG tablet Take 4 tablets by mouth Every Night.      Zinc 50 MG tablet Take 1 tablet by mouth Every Night.           Social History     Socioeconomic History    Marital status:    Tobacco Use    Smoking status: Never    Smokeless tobacco: Never   Vaping Use    Vaping status: Never Used   Substance and Sexual Activity    Alcohol use: Never    Drug use: Never    Sexual activity: Defer         Family History   Problem Relation Age of Onset    Diabetes Mother     Heart attack Mother     Arthritis Father     Breast cancer Sister     Breast cancer Sister     Breast cancer Sister     Colon cancer Sister     Heart disease Brother         CABG    Other Brother         BRAIN TUMOR    Colon cancer Brother     Colon cancer Brother     Colon cancer Brother     Colon cancer Maternal Grandfather     Cancer Other     Malig Hyperthermia Neg Hx        REVIEW OF SYSTEMS:   12 point ROS was performed and is negative except as outlined in HPI       Objective:     Vitals:    01/05/25 0553 01/05/25 0556 01/05/25 0700 01/05/25 0858   BP:  161/76 122/56 166/77   BP  "Location:  Right arm Right arm    Patient Position:  Standing Lying    Pulse: 73 74  74   Resp:   16    Temp:   99 °F (37.2 °C)    TempSrc:   Temporal    SpO2: 97% 94%     Weight:    65 kg (143 lb 4.8 oz)   Height:    149 cm (58.66\")     Body mass index is 29.28 kg/m².  Flowsheet Rows      Flowsheet Row First Filed Value   Admission Height 149.9 cm (59\") Documented at 01/04/2025 2132   Admission Weight 65.8 kg (145 lb) Documented at 01/04/2025 2132              Physical Exam  Vitals reviewed.   Constitutional:       General: She is not in acute distress.  HENT:      Head: Normocephalic.      Nose: Nose normal.   Eyes:      Extraocular Movements: Extraocular movements intact.      Pupils: Pupils are equal, round, and reactive to light.   Cardiovascular:      Rate and Rhythm: Normal rate and regular rhythm.      Pulses: Normal pulses.      Heart sounds: Normal heart sounds. Heart sounds not distant. No murmur heard.     No friction rub. No gallop. No S3 or S4 sounds.   Pulmonary:      Effort: Pulmonary effort is normal.      Breath sounds: Normal breath sounds.   Abdominal:      General: Abdomen is flat. Bowel sounds are normal.      Palpations: Abdomen is soft.      Tenderness: There is no abdominal tenderness.   Skin:     General: Skin is warm and dry.   Neurological:      General: No focal deficit present.      Mental Status: She is alert and oriented to person, place, and time. Mental status is at baseline.   Psychiatric:         Mood and Affect: Mood normal.         Behavior: Behavior normal.         Lab Review:                Results from last 7 days   Lab Units 01/05/25  0343   SODIUM mmol/L 141   POTASSIUM mmol/L 3.9   CHLORIDE mmol/L 111*   CO2 mmol/L 21.7*   BUN mg/dL 20   CREATININE mg/dL 0.96   GLUCOSE mg/dL 83   CALCIUM mg/dL 8.1*     Results from last 7 days   Lab Units 01/05/25  0343 01/04/25  1743 01/04/25  1614   HSTROP T ng/L 19* 21* 23*     Results from last 7 days   Lab Units 01/05/25  0343   WBC " 10*3/mm3 7.93   HEMOGLOBIN g/dL 10.3*   HEMATOCRIT % 31.8*   PLATELETS 10*3/mm3 190     Results from last 7 days   Lab Units 01/04/25  1614   INR  1.19*         Results from last 7 days   Lab Units 01/04/25  1614   MAGNESIUM mg/dL 2.1           EKG:      Assessment/Plan:   Syncope  Unclear etiology  Echocardiogram today showed normal left ventricular systolic function, EF 55%, grade 1 diastolic dysfunction.  Left atrial cavity is severely dilated left atrial volume severely increased.  This is a new finding from her last echocardiogram in May 2024.  Mitral valve with moderate regurgitation and severe mitral annular calcification.  No evidence of arrhythmia on review of telemetry.  Would recommend a Zio monitor at discharge.  Await bilateral carotid artery duplex  Acute kidney injury with chronic kidney disease  Renal function improved with hydration.  Hypertension  Resume losartan  Elevated D-dimer  CTA negative for PE.  Await bilateral lower extremity duplex.  Hypertrophic cardiomyopathy  Continue carvedilol and losartan.  This could be playing a role in her syncope though she does not voice any other symptoms.  Encouraged proper hydration.    Cardiology will follow, thank you for this consult.    Franci Morales, APRN   01/05/25

## 2025-01-05 NOTE — H&P
[facility]   HISTORY AND PHYSICAL    Patient Name: Neha Guillory  : 1933  MRN: [id number]  Primary Care Physician:  Yvan Ruiz III, NP-C  Date of admission: 2025    Subjective   Subjective     Chief Complaint:   Chief Complaint   Patient presents with    Syncope         HPI:    Neha Guillory is a 89 y.o. female comes in complaining of syncopal episode.  Patient states that she has had 2 syncopal episodes in which her first was about 2 weeks ago.  Patient states that she had another syncopal episode today.  Patient states she will feel that her eyes will be blurred and go black.  Patient states that today she passed out upon standing and fell into her rollator.  Patient states she thinks she may have hit her head on the ground as well.  Patient denies any headache or pain of her head currently.  Patient denies any chest pain or shortness of breath currently or with the episodes.  Patient denies any recent illness, fever, chills, nausea, vomiting, diarrhea or leg swelling.      In the ED, initial troponin 23, repeat troponin of 21, serum creatinine 1.4, GFR of 35.  Hemoglobin 11.5, otherwise unremarkable CBC.  D-dimer mildly elevated 0.94.  CT head shows no acute findings.  EKG shows sinus rhythm 72 bpm, PVC, LVH, no ST elevation apparent.  Similar to previous study.  Patient is afebrile, pulse in 70s, on room air oxygen 91% SpO2 and blood pressure 160s over 70s.  Patient had 500 normal saline bolus.       Review of Systems   All systems were reviewed and negative except for: As per HPI    Personal History     Past Medical History:   Diagnosis Date    Aortic stenosis, mild 2022    Arthritis     Carcinoma of upper-inner quadrant of left breast in female, estrogen receptor positive 2022    Carotid artery stenosis     Carotid atherosclerosis, bilateral 2019    Cataract     BILATERAL    Depression     Gastroenteritis     Generalized osteoarthritis 2016    H/O  diastolic dysfunction     Hyperlipidemia     Hyperparathyroidism     Hyperparathyroidism     Hypertension     Hypertrophic cardiomyopathy     Iron deficiency anemia 10/13/2022    Low back pain May, 2023    Lower, right    Lumbosacral disc disease May 2023    Multifocal pneumonia 10/10/2022    Multifocal pneumonia 10/10/2022    Neuromuscular disorder     Obstructive sleep apnea syndrome 02/01/2016    Osteopenia     Osteoporosis     Primary familial hypertrophic cardiomyopathy 02/01/2016    Pulmonary hypertension     Synovial cyst of popliteal space 02/01/2016    Vitamin D deficiency        Past Surgical History:   Procedure Laterality Date    BREAST BIOPSY Left 02/17/2022    BREAST LUMPECTOMY Left     BREAST LUMPECTOMY WITH SENTINEL NODE BIOPSY Left 04/07/2022    Procedure: LEFT BREAST LUMPECTOMY WITH SENTINEL NODE BIOPSY AND NEEDLE LOCALIZATION;  Surgeon: Bel Marina MD;  Location: [facility] [place];  Service: General;  Laterality: Left;    BUNIONECTOMY Right     FOOT SURGERY    COLONOSCOPY N/A 2011    Dr. Luis    EPIDURAL BLOCK  July 2023    Epidural x2    HAND SURGERY Right     TUMOR REMOVED ON FOREFINGER    PITUITARY SURGERY      THYROIDECTOMY Right 04/20/2016    Procedure: RIGHT SUPERIOR PARATHYROIDECTOMY;  Surgeon: Bel Marina MD;  Location: [facility];  Service:        Family History: family history includes Arthritis in her father; Breast cancer in her sister, sister, and sister; Cancer in an other family member; Colon cancer in her brother, brother, brother, maternal grandfather, and sister; Diabetes in her mother; Heart attack in her mother; Heart disease in her brother; Other in her brother. Otherwise pertinent FHx was reviewed and not pertinent to current issue.    Social History:  reports that she has never smoked. She has never used smokeless tobacco. She reports that she does not drink alcohol and does not use drugs.    Home Medications:  HYDROcodone-acetaminophen, (Ibuprofen 3 %, Gabapentin  10 %, Baclofen 2 %, lidocaine 4 %), Lactobacillus, Zinc, amLODIPine, aspirin, carvedilol, cholecalciferol, furosemide, gabapentin, mirtazapine, multivitamin, olmesartan, potassium chloride, tamoxifen, vitamin C, and vitamin b complex    Allergies:  Allergies   Allergen Reactions    Amoxicillin Hives    Griseofulvin Ultramicrosize [Griseofulvin] Confusion    Tramadol Delirium    Atorvastatin Myalgia       Objective   Objective     Vitals:   Temp:  [97.9 °F (36.6 °C)-98.9 °F (37.2 °C)] 97.9 °F (36.6 °C)  Heart Rate:  [67-89] 70  Resp:  [16-18] 18  BP: (130-165)/(64-87) 136/80  Physical Exam    Constitutional: Awake, alert   Eyes: PERRLA, sclerae anicteric, no conjunctival injection   HENT: NCAT, mucous membranes moist   Neck: Supple, no thyromegaly, no lymphadenopathy, trachea midline   Respiratory: Clear to auscultation bilaterally, nonlabored respirations    Cardiovascular: RRR, no murmurs, rubs, or gallops, palpable pedal pulses bilaterally   Gastrointestinal: Positive bowel sounds, soft, nontender, nondistended   Musculoskeletal: No bilateral ankle edema, no clubbing or cyanosis to extremities   Psychiatric: Appropriate affect, cooperative   Neurologic: Oriented x 3, strength symmetric in all extremities, Cranial Nerves grossly intact to confrontation, speech clear   Skin: No rashes     Result Review    Result Review:  I have personally reviewed the results from the time of this admission to 1/4/2025 23:48 EST and agree with these findings:  [x]  Laboratory list / accordion  []  Microbiology  [x]  Radiology  [x]  EKG/Telemetry   []  Cardiology/Vascular   []  Pathology  []  Old records  []  Other:  Most notable findings include: see above      Assessment & Plan   Assessment / Plan     Brief Patient Summary:  Neha Guillory is a 89 y.o. female who comes in complaining of syncopal episodes    Active Hospital Problems:  Active Hospital Problems    Diagnosis     **Syncope      Plan:     Syncope  -initial troponin 23,  repeat troponin of 21,    -Hemoglobin 11.5, otherwise unremarkable CBC.    - CT head shows no acute findings.    -EKG shows sinus rhythm 72 bpm, PVC, LVH, no ST elevation apparent.  Similar to previous study.    -Patient is afebrile, pulse in 70s, on room air oxygen 91% SpO2 and blood pressure 160s over 70s.    -Patient had 500 normal saline bolus.  -Cardiology consult  -Check echo, TSH, chest x-ray, repeat troponin, EKG  -Check carotid Dopplers  -Continuous cardiac monitoring  -Heart healthy diet    Elevated D-dimer  -D-dimer mildly elevated 0.94.    -Patient not tachycardic or complaining of chest pain, GFR of 35, CTA a of the chest is not ideal given kidney function will hydrate and reconsider given patient is clinical status  -Check duplex lower extremities    MIKIE CKD stage IIIb  -serum creatinine 1.4, GFR of 35.  With baseline of 0.9.  -IV fluids 500 normal saline bolus  -Monitor    Essential hypertension, chronic, poorly controlled  -Continue home Norvasc, Coreg,   -Hold home Lasix, Benicar, resume when appropriate    Peripheral neuropathy  -Continue home gabapentin              VTE Prophylaxis: SCDs  Mechanical VTE prophylaxis orders are present.        CODE STATUS:    Code Status (Patient has no pulse and is not breathing): No CPR (Do Not Attempt to Resuscitate)  Medical Interventions (Patient has pulse or is breathing): Full Support    Admission Status:  I believe this patient meets observation status.    78 minutes have been spent by [facility] providers in the care of this patient while under observation status.      Appropriate PPE worn during patient encounter.  Hand hygeine performed before and after seeing the patient.      Electronically signed by NIMA Ryan, 01/04/25, 10:28 PM EST.

## 2025-01-05 NOTE — PLAN OF CARE
Goal Outcome Evaluation:  Plan of Care Reviewed With: patient           Outcome Evaluation: oriented, no c/o pain, assist x1 to the BSC, Echo pending, duplex lower, and carotid US pending. IVF @125 mL/hr for 500mL total volume, and NPO since midnight for cards consult.

## 2025-01-05 NOTE — PROGRESS NOTES
MD Attestation Note    SHARED VISIT: This visit was performed by BOTH a physician and an APC. The substantive portion of the medical decision making was performed by this attesting physician who made or approved the management plan and takes responsibility for patient management. All studies in the APC note (if performed) were independently interpreted by me.       My personal findings are:        Subjective:  Patient admitted for recurrent syncopal episodes.  She states that when she gets these episodes her vision will just go black and then she will lose consciousness.  She has lost consciousness on 2 occasions, once earlier today and once about 2 weeks ago.  She has had multiple additional episodes where her vision will go black for a moment but she will not lose consciousness.  She denies associated lightheadedness.  She denies nausea or diaphoresis associated with these episodes.  Her daughter states that her right calf appeared to be a little swollen today.  She denies history of DVT or PE.        Objective:  GENERAL: Awake, alert, in no acute distress.  Well-appearing.  HENT:  Normocephalic, atraumatic. Nares patent.   EYES: Pupils equal, reactive. Extraocular movements normal.   RESPIRATORY: Normal effort.   CARDIOVASCULAR: Regular rhythm, normal rate.   ABDOMEN:  Nontender. Abdomen nondistended.   NEUROLOGIC: Cranial nerves II-XII normal. Motor strength 5/5 in extremities.   EXTREMITIES: No appreciable lower extremity edema edema. 2+ pedal pulses bilaterally. No deformity.   SKIN:  No rash, normal to inspection.          Assessment/ Plan:  Syncope  Continuous cardiac monitoring  EKG with normal sinus rhythm, single PVC, normal QTc.  Occasional PVCs noted on cardiac monitor.  Initial and 1 hour cardiac troponin are reassuring.  Venous duplex bilateral lower extremities due to slightly elevated dimer and daughter concerned that her right calf appeared swollen today although she does not appear to have  swelling on my exam  Hydrate due to mild renal insufficiency  Echo in the morning  Cardiology consult  Carotid Doppler studies

## 2025-01-05 NOTE — PROGRESS NOTES
ED OBSERVATION PROGRESS/DISCHARGE SUMMARY    Date of Admission: 1/4/2025   LOS: 0 days   PCP: Yvan Ruiz III, NP-C    Final Diagnosis syncope      Subjective     Hospital Outcome: Neha Guillory is a 89 y.o. female comes in complaining of syncopal episode.  Patient states that she has had 2 syncopal episodes in which her first was about 2 weeks ago.  Patient states that she had another syncopal episode today.  Patient states she will feel that her eyes will be blurred and go black.  Patient states that today she passed out upon standing and fell into her rollator.  Patient states she thinks she may have hit her head on the ground as well.  Patient denies any headache or pain of her head currently.  Patient denies any chest pain or shortness of breath currently or with the episodes.  Patient denies any recent illness, fever, chills, nausea, vomiting, diarrhea or leg swelling.        In the ED, initial troponin 23, repeat troponin of 21, serum creatinine 1.4, GFR of 35.  Hemoglobin 11.5, otherwise unremarkable CBC.  D-dimer mildly elevated 0.94.  CT head shows no acute findings.  EKG shows sinus rhythm 72 bpm, PVC, LVH, no ST elevation apparent.  Similar to previous study.  Patient is afebrile, pulse in 70s, on room air oxygen 91% SpO2 and blood pressure 160s over 70s.  Patient had 500 normal saline bolus.     1/5/2025: Patient reports that she feels improved since admission.  CTA chest obtained this morning since her creatinine function was improved and negative for PE, her thoracic aorta shows mild dilation of the descending thoracic aorta with associated calcification measuring up to 3.4 cm but this is unchanged from imaging in October 2024, there are COPD changes with some minimal fibrotic scarring at the bases that is also unchanged from previous exam, enlarged precarinal hernial lymph node measuring up to 1.5 x 1.8 cm that is unchanged and likely reactive in the setting and similar in size from a  CT performed in January 2023, moderate coronary artery calcification noted, and a small hiatal hernia measuring 4.5 cm is unchanged.  Echo obtained and shows EF of 55.7% LV wall thick and consistent with moderate concentric hypertrophy, grade 1 diastolic dysfunction, LA cavity is severely dilated, LA volume is severely increased, mild aortic valve stenosis appreciated, and moderate mitral valve regurg present.  Bilateral carotid ultrasounds normal.  Lower extremity leg Dopplers pending.  Cardiology saw and evaluated the patient and unclear etiology for the patient's syncope.  They are recommending a Zio patch on discharge.  Discussed with patient and family at bedside and given the inclement weather would like patient to stay for the evening for continued monitoring and reassess in the morning to make sure there were no further acute events and will reevaluate in the a.m.    ROS:  General: no fevers, chills  Respiratory: no cough, dyspnea  Cardiovascular: no chest pain, palpitations  Abdomen: No abdominal pain, nausea, vomiting, or diarrhea  Neurologic: No focal weakness    Objective   Physical Exam:  I have reviewed the vital signs.  Temp:  [97.9 °F (36.6 °C)-98.9 °F (37.2 °C)] 98.6 °F (37 °C)  Heart Rate:  [66-89] 74  Resp:  [16-18] 18  BP: (124-165)/(59-87) 161/76  General Appearance:    Alert, cooperative, no distress, nontoxic-appearing  Head:    Normocephalic, atraumatic, mild hard of hearing  Eyes:    Sclerae anicteric, EOMI  Neck:   Supple, nontender  Lungs: Clear to auscultation bilaterally, respirations unlabored on room air  Heart: Regular rate and rhythm, S1 and S2 normal, no murmur  Abdomen:  Soft, nontender, bowel sounds active, nondistended  Extremities: No clubbing, cyanosis, or edema to lower extremities  Pulses:  2+ and symmetric in distal lower extremities  Skin: No rashes   Neurologic: Oriented x3, Normal strength to extremities    Results Review:    I have reviewed the labs, radiology results  and diagnostic studies.    Results from last 7 days   Lab Units 01/05/25  0343   WBC 10*3/mm3 7.93   HEMOGLOBIN g/dL 10.3*   HEMATOCRIT % 31.8*   PLATELETS 10*3/mm3 190     Results from last 7 days   Lab Units 01/05/25  0343 01/04/25  1614   SODIUM mmol/L 141 138   POTASSIUM mmol/L 3.9 4.4   CHLORIDE mmol/L 111* 104   CO2 mmol/L 21.7* 23.8   BUN mg/dL 20 25*   CREATININE mg/dL 0.96 1.40*   CALCIUM mg/dL 8.1* 9.1   BILIRUBIN mg/dL  --  0.3   ALK PHOS U/L  --  48   ALT (SGPT) U/L  --  10   AST (SGOT) U/L  --  20   GLUCOSE mg/dL 83 113*     Imaging Results (Last 24 Hours)       Procedure Component Value Units Date/Time    XR Chest 1 View [[id number]] Collected: 01/04/25 2333     Updated: 01/04/25 2337    Narrative:      SINGLE VIEW OF THE CHEST     HISTORY: Syncope     COMPARISON: October 10, 2024     FINDINGS:  There is cardiomegaly. No vascular congestion is seen. There is  calcification of the aorta. No pneumothorax or pleural effusion is seen.  No displaced rib fractures are identified. No acute infiltrates are  seen. There are advanced degenerative changes of the right shoulder.       Impression:      No acute findings.     This report was finalized on 1/4/2025 11:34 PM by Dr. Shiela Randle M.D on Workstation: [id number]       CT Head Without Contrast [[id number]] Collected: 01/04/25 1837     Updated: 01/04/25 1849    Narrative:      CT HEAD WITHOUT CONTRAST     CLINICAL HISTORY: Passed out hitting the back of the head.      TECHNIQUE: CT scan of the head was obtained with 3 mm axial soft tissue  and 2 mm axial bone algorithm algorithm images. No intravenous contrast  was administered. Sagittal and coronal reconstructions were obtained.     COMPARISON: CT head dated 1/23/2023.     FINDINGS:       There is no evidence for an acute extra-axial hemorrhage or a calvarial  fracture.     Mild to moderate changes of chronic small vessel ischemic phenomena are  identified. The ventricles, sulci, and cisterns are  age-appropriate. The  gray-white matter differentiation is within normal limits. The basal  ganglia and thalami are unremarkable in appearance. The posterior fossa  structures are within normal limits.      There is partial opacification of the right mastoid air cells. Polypoid  mucosal thickening is noted within the maxillary sinuses and scattered  areas of mucosal thickening are appreciated within the ethmoid air  cells.       Impression:         No evidence for acute traumatic intracranial pathology.        Radiation dose reduction techniques were utilized, including automated  exposure control and exposure modulation based on body size.     This report was finalized on 1/4/2025 6:46 PM by Dr. Janak Steiner M.D  on Workstation: [id number]               I have reviewed the medications.     Discharge Medications        ASK your doctor about these medications        Instructions Start Date   amLODIPine 5 MG tablet  Commonly known as: NORVASC   5 mg, Oral, Daily      aspirin 81 MG EC tablet   1 tablet, Daily      carvedilol 12.5 MG tablet  Commonly known as: COREG   12.5 mg, Oral, Every 12 Hours      cholecalciferol 25 MCG (1000 UT) tablet  Commonly known as: VITAMIN D3   1,000 Units, Daily      furosemide 20 MG tablet  Commonly known as: LASIX   20 mg, Oral, Daily PRN      gabapentin 100 MG capsule  Commonly known as: NEURONTIN   300 mg, Oral, 2 Times Daily PRN      HYDROcodone-acetaminophen 5-325 MG per tablet  Commonly known as: NORCO   0.5-1 tablets, Oral, Every 6 Hours PRN      Ibuprofen 3 %, Gabapentin 10 %, Baclofen 2 %, lidocaine 4 %   1-2 g, Topical, 3 to 4 Times Daily      mirtazapine 15 MG tablet  Commonly known as: REMERON   15 mg, Oral, Nightly      multivitamin tablet tablet   Daily      olmesartan 20 MG tablet  Commonly known as: BENICAR   TAKE ONE TABLET BY MOUTH DAILY      potassium chloride 10 MEQ CR tablet   20 mEq, Oral, Daily PRN      PROBIOTIC ACIDOPHILUS PO   1 tablet, Oral, Nightly       tamoxifen 20 MG chemo tablet  Commonly known as: NOLVADEX   20 mg, Oral, Daily      vitamin b complex capsule capsule   1 capsule, Daily      vitamin C 250 MG tablet  Commonly known as: ASCORBIC ACID   1,000 mg, Oral, Nightly      Zinc 50 MG tablet   50 mg, Oral, Nightly              ---------------------------------------------------------------------------------------------  Assessment & Plan   Assessment/Problem List    Syncope      Plan:    Syncope  -Troponins have remained flat and no changes to EKGs or abnormal events on cardiac monitor throughout admission  - CT head shows no acute findings.    -EKG shows sinus rhythm 72 bpm, PVC, LVH, no ST elevation apparent.  Similar to previous study.    -Carotid Dopplers negative for any acute issue  -CTA chest negative for any acute PE, does show chronic changes that are unchanged from previous imaging  -Cardiology is recommending a Zio monitor on discharge     Elevated D-dimer  -D-dimer mildly elevated 0.94.    -CTA chest negative for any acute issues but does show chronic findings that are unchanged from previous imaging  -Duplex lower extremities pending     MIKIE CKD stage IIIb  -serum creatinine 1.4, GFR of 35.  With baseline of 0.9.  -Resolved     Essential hypertension, chronic, poorly controlled  -Continue home Norvasc, Coreg,   -Hold home Lasix, Benicar, resume when appropriate     Peripheral neuropathy  -Continue home gabapentin    Disposition: Likely discharge in 1 to 2 days    Follow-up after Discharge: Dependent on hospital course    This note will serve as a progress note    Brisa Urias PA-C 01/05/25 07:09 EST    I have worn appropriate PPE during this patient encounter, sanitized my hands both with entering and exiting patient's room.      57 minutes has been spent by [facility] providers in the care of this patient while under observation status

## 2025-01-05 NOTE — PROGRESS NOTES
MD ATTESTATION NOTE    The GREGORIA and I have discussed this patient's history, physical exam, and treatment plan.  I have reviewed the documentation and personally had a face to face interaction with the patient. I affirm the documentation and agree with the treatment and plan.  The attached note describes my personal findings.      I provided a substantive portion of the care of the patient.  I personally performed the physical exam in its entirety, and below are my findings.       Brief HPI: Patient admitted with recurrent syncope..  Yesterday and 2 weeks ago.  No associated lightheadedness.  No nausea or chest pain or diaphoresis.    PHYSICAL EXAM  ED Triage Vitals   Temp Heart Rate Resp BP SpO2   01/04/25 1601 01/04/25 1601 01/04/25 1601 01/04/25 1608 01/04/25 1601   98.9 °F (37.2 °C) 79 16 131/66 95 %      Temp src Heart Rate Source Patient Position BP Location FiO2 (%)   01/04/25 2132 01/04/25 2132 01/04/25 1701 01/04/25 2132 --   Oral Monitor Lying Right arm          GENERAL: no acute distress  HENT: nares patent  EYES: no scleral icterus  CV: regular rhythm, normal rate  RESPIRATORY: normal effort  ABDOMEN: soft  MUSCULOSKELETAL: no deformity  NEURO: alert, moves all extremities, follows commands  PSYCH:  calm, cooperative  SKIN: warm, dry    Vital signs and nursing notes reviewed.        Plan: Cardiology consult.  Troponin without significant delta.  Troponin very mildly elevated.  CTA held secondary to elevated creatinine.  Patient's creatinine is normal this morning.

## 2025-01-05 NOTE — PLAN OF CARE
Goal Outcome Evaluation:  Plan of Care Reviewed With: patient           Outcome Evaluation: Patient is alert and oriented times 4. Patient denies pain. Patient is stand by assist with activity. Duplex lower ext pending. Cardiology following. Echo completed. On room air. Bp slighty elevated, Brisa Urias PA-C notified. Questions and concerns addressed. Patient is agreeable with plan of care.

## 2025-01-05 NOTE — ED NOTES
Nursing report ED to floor  Neha Guillory  89 y.o.  female    HPI :  HPI  Stated Reason for Visit: syncopal episode    Chief Complaint  Chief Complaint   Patient presents with    Syncope       Admitting doctor:   Aaron Day MD    Admitting diagnosis:   The primary encounter diagnosis was Syncope and collapse. A diagnosis of Acute kidney injury was also pertinent to this visit.    Code status:   Current Code Status       Date Active Code Status Order ID Comments User Context       Prior            Allergies:   Amoxicillin, Griseofulvin ultramicrosize [griseofulvin], Tramadol, and Atorvastatin    Isolation:   No active isolations    Intake and Output  No intake or output data in the 24 hours ending 01/04/25 2015    Weight:   There were no vitals filed for this visit.    Most recent vitals:   Vitals:    01/04/25 1703 01/04/25 1705 01/04/25 1831 01/04/25 1930   BP: 150/83 141/73 154/81 155/87   Patient Position: Standing Standing     Pulse: 75 74 89 89   Resp:       Temp:       SpO2:    95%       Active LDAs/IV Access:   Lines, Drains & Airways       Active LDAs       Name Placement date Placement time Site Days    Peripheral IV 01/04/25 1843 Right Antecubital 01/04/25  1843  Antecubital  less than 1                    Labs (abnormal labs have a star):   Labs Reviewed   COMPREHENSIVE METABOLIC PANEL - Abnormal; Notable for the following components:       Result Value    Glucose 113 (*)     BUN 25 (*)     Creatinine 1.40 (*)     eGFR 35.6 (*)     All other components within normal limits    Narrative:     GFR Categories in Chronic Kidney Disease (CKD)      GFR Category          GFR (mL/min/1.73)    Interpretation  G1                     90 or greater         Normal or high (1)  G2                      60-89                Mild decrease (1)  G3a                   45-59                Mild to moderate decrease  G3b                   30-44                Moderate to severe decrease  G4                    15-29                 Severe decrease  G5                    14 or less           Kidney failure          (1)In the absence of evidence of kidney disease, neither GFR category G1 or G2 fulfill the criteria for CKD.    eGFR calculation 2021 CKD-EPI creatinine equation, which does not include race as a factor   TROPONIN - Abnormal; Notable for the following components:    HS Troponin T 23 (*)     All other components within normal limits    Narrative:     High Sensitive Troponin T Reference Range:  <14.0 ng/L- Negative Female for AMI  <22.0 ng/L- Negative Male for AMI  >=14 - Abnormal Female indicating possible myocardial injury.  >=22 - Abnormal Male indicating possible myocardial injury.   Clinicians would have to utilize clinical acumen, EKG, Troponin, and serial changes to determine if it is an Acute Myocardial Infarction or myocardial injury due to an underlying chronic condition.        CBC WITH AUTO DIFFERENTIAL - Abnormal; Notable for the following components:    Hemoglobin 11.5 (*)     MCHC 31.4 (*)     Lymphocyte % 12.5 (*)     Neutrophils, Absolute 7.03 (*)     Monocytes, Absolute 1.02 (*)     All other components within normal limits   PROTIME-INR - Abnormal; Notable for the following components:    Protime 15.3 (*)     INR 1.19 (*)     All other components within normal limits   HIGH SENSITIVITIY TROPONIN T 1HR - Abnormal; Notable for the following components:    HS Troponin T 21 (*)     All other components within normal limits    Narrative:     High Sensitive Troponin T Reference Range:  <14.0 ng/L- Negative Female for AMI  <22.0 ng/L- Negative Male for AMI  >=14 - Abnormal Female indicating possible myocardial injury.  >=22 - Abnormal Male indicating possible myocardial injury.   Clinicians would have to utilize clinical acumen, EKG, Troponin, and serial changes to determine if it is an Acute Myocardial Infarction or myocardial injury due to an underlying chronic condition.        D-DIMER, QUANTITATIVE -  "Abnormal; Notable for the following components:    D-Dimer, Quantitative 0.94 (*)     All other components within normal limits    Narrative:     According to the assay 's published package insert, a normal (<0.50 MCGFEU/mL) D-dimer result in conjunction with a non-high clinical probability assessment, excludes deep vein thrombosis (DVT) and pulmonary embolism (PE) with high sensitivity.    D-dimer values increase with age and this can make VTE exclusion of an older population difficult. To address this, the [employer], based on best available evidence and recent guidelines, recommends that clinicians use age-adjusted D-dimer thresholds in patients greater than 50 years of age with: a) a low probability of PE who do not meet all Pulmonary Embolism Rule Out Criteria, or b) in those with intermediate probability of PE.   The formula for an age-adjusted D-dimer cut-off is \"age/100\".  For example, a 60 year old patient would have an age-adjusted cut-off of 0.60 MCGFEU/mL and an 80 year old 0.80 MCGFEU/mL.   MAGNESIUM - Normal   POCT GLUCOSE FINGERSTICK - Normal   RAINBOW DRAW    Narrative:     The following orders were created for panel order [facility] Draw.  Procedure                               Abnormality         Status                     ---------                               -----------         ------                     Green Top (Gel)[[id number]]                                  Final result               Lavender Top[[id number]]                                     Final result               Gold Top - SST[[id number]]                                   Final result               Light Blue Top[[id number]]                                   Final result                 Please view results for these tests on the individual orders.   POCT GLUCOSE FINGERSTICK   CBC AND DIFFERENTIAL    Narrative:     The following orders were created for panel order CBC & Differential.  Procedure            "                    Abnormality         Status                     ---------                               -----------         ------                     CBC Auto Differential[[id number]]        Abnormal            Final result                 Please view results for these tests on the individual orders.   GREEN TOP   LAVENDER TOP   GOLD TOP - SST   LIGHT BLUE TOP       EKG:   ECG 12 Lead ED Triage Standing Order; Syncope   Final Result   HEART RATE=72  bpm   RR Mwxlodwu=937  ms   SC Etevvhdw=292  ms   P Horizontal Axis=209  deg   P Front Axis=-57  deg   QRSD Interval=89  ms   QT Uthyqfyd=140  ms   IOjS=630  ms   QRS Axis=-18  deg   T Wave Axis=47  deg   - ABNORMAL ECG -   Sinus rhythm   Ventricular premature complex   Left ventricular hypertrophy   No change from previous tracing   Electronically Signed By: Miguel Munoz ([facility]) 2025-01-04 19:45:42   Date and Time of Study:2025-01-04 16:08:29          Meds given in ED:   Medications   sodium chloride 0.9 % flush 10 mL (has no administration in time range)   sodium chloride 0.9 % flush 10 mL (has no administration in time range)   sodium chloride 0.9 % bolus 500 mL (500 mL Intravenous New Bag 1/4/25 1922)       Imaging results:  CT Head Without Contrast    Result Date: 1/4/2025   No evidence for acute traumatic intracranial pathology.   Radiation dose reduction techniques were utilized, including automated exposure control and exposure modulation based on body size.  This report was finalized on 1/4/2025 6:46 PM by Dr. Janak Steiner M.D on Workstation: [id number]       Ambulatory status:   - standby assist    Social issues:   Social History     Socioeconomic History    Marital status:    Tobacco Use    Smoking status: Never    Smokeless tobacco: Never   Vaping Use    Vaping status: Never Used   Substance and Sexual Activity    Alcohol use: Never    Drug use: Never    Sexual activity: Not Currently       Peripheral Neurovascular  Peripheral Neurovascular  (Adult)  Peripheral Neurovascular WDL: WDL    Neuro Cognitive  Neuro Cognitive (Adult)  Cognitive/Neuro/Behavioral WDL: .WDL except  Additional Documentation: Dizziness Assessment (Group)  Dizziness Assessment  Dizziness Reported Symptoms: intermittent, faint  Dizziness Occurs With: no activity    Learning  Learning Assessment  Learning Readiness and Ability: no barriers identified    Respiratory  Respiratory  Airway WDL: WDL  Respiratory WDL  Respiratory WDL: WDL    Abdominal Pain       Pain Assessments  Pain (Adult)  (0-10) Pain Rating: Rest: 0    NIH Stroke Scale       Ana Alejandra RN  01/04/25 20:15 EST

## 2025-01-06 ENCOUNTER — APPOINTMENT (OUTPATIENT)
Dept: CARDIOLOGY | Facility: HOSPITAL | Age: OVER 89
End: 2025-01-06
Payer: MEDICARE

## 2025-01-06 PROBLEM — I44.2 AV BLOCK, COMPLETE: Status: ACTIVE | Noted: 2025-01-04

## 2025-01-06 LAB
ANION GAP SERPL CALCULATED.3IONS-SCNC: 10 MMOL/L (ref 5–15)
BH CV LOWER VASCULAR LEFT COMMON FEMORAL AUGMENT: NORMAL
BH CV LOWER VASCULAR LEFT COMMON FEMORAL COMPETENT: NORMAL
BH CV LOWER VASCULAR LEFT COMMON FEMORAL COMPRESS: NORMAL
BH CV LOWER VASCULAR LEFT COMMON FEMORAL PHASIC: NORMAL
BH CV LOWER VASCULAR LEFT COMMON FEMORAL SPONT: NORMAL
BH CV LOWER VASCULAR LEFT DISTAL FEMORAL COMPRESS: NORMAL
BH CV LOWER VASCULAR LEFT GASTRONEMIUS COMPRESS: NORMAL
BH CV LOWER VASCULAR LEFT GREATER SAPH AK COMPRESS: NORMAL
BH CV LOWER VASCULAR LEFT GREATER SAPH BK COMPRESS: NORMAL
BH CV LOWER VASCULAR LEFT LESSER SAPH COMPRESS: NORMAL
BH CV LOWER VASCULAR LEFT MID FEMORAL AUGMENT: NORMAL
BH CV LOWER VASCULAR LEFT MID FEMORAL COMPETENT: NORMAL
BH CV LOWER VASCULAR LEFT MID FEMORAL COMPRESS: NORMAL
BH CV LOWER VASCULAR LEFT MID FEMORAL PHASIC: NORMAL
BH CV LOWER VASCULAR LEFT MID FEMORAL SPONT: NORMAL
BH CV LOWER VASCULAR LEFT PERONEAL COMPRESS: NORMAL
BH CV LOWER VASCULAR LEFT POPLITEAL AUGMENT: NORMAL
BH CV LOWER VASCULAR LEFT POPLITEAL COMPETENT: NORMAL
BH CV LOWER VASCULAR LEFT POPLITEAL COMPRESS: NORMAL
BH CV LOWER VASCULAR LEFT POPLITEAL PHASIC: NORMAL
BH CV LOWER VASCULAR LEFT POPLITEAL SPONT: NORMAL
BH CV LOWER VASCULAR LEFT POSTERIOR TIBIAL COMPRESS: NORMAL
BH CV LOWER VASCULAR LEFT PROFUNDA FEMORAL COMPRESS: NORMAL
BH CV LOWER VASCULAR LEFT PROXIMAL FEMORAL COMPRESS: NORMAL
BH CV LOWER VASCULAR LEFT SAPHENOFEMORAL JUNCTION COMPRESS: NORMAL
BH CV LOWER VASCULAR RIGHT COMMON FEMORAL AUGMENT: NORMAL
BH CV LOWER VASCULAR RIGHT COMMON FEMORAL COMPETENT: NORMAL
BH CV LOWER VASCULAR RIGHT COMMON FEMORAL COMPRESS: NORMAL
BH CV LOWER VASCULAR RIGHT COMMON FEMORAL PHASIC: NORMAL
BH CV LOWER VASCULAR RIGHT COMMON FEMORAL SPONT: NORMAL
BH CV LOWER VASCULAR RIGHT DISTAL FEMORAL COMPRESS: NORMAL
BH CV LOWER VASCULAR RIGHT GASTRONEMIUS COMPRESS: NORMAL
BH CV LOWER VASCULAR RIGHT GREATER SAPH AK COMPRESS: NORMAL
BH CV LOWER VASCULAR RIGHT GREATER SAPH BK COMPRESS: NORMAL
BH CV LOWER VASCULAR RIGHT LESSER SAPH COMPRESS: NORMAL
BH CV LOWER VASCULAR RIGHT MID FEMORAL AUGMENT: NORMAL
BH CV LOWER VASCULAR RIGHT MID FEMORAL COMPETENT: NORMAL
BH CV LOWER VASCULAR RIGHT MID FEMORAL COMPRESS: NORMAL
BH CV LOWER VASCULAR RIGHT MID FEMORAL PHASIC: NORMAL
BH CV LOWER VASCULAR RIGHT MID FEMORAL SPONT: NORMAL
BH CV LOWER VASCULAR RIGHT PERONEAL COMPRESS: NORMAL
BH CV LOWER VASCULAR RIGHT POPLITEAL AUGMENT: NORMAL
BH CV LOWER VASCULAR RIGHT POPLITEAL COMPETENT: NORMAL
BH CV LOWER VASCULAR RIGHT POPLITEAL COMPRESS: NORMAL
BH CV LOWER VASCULAR RIGHT POPLITEAL PHASIC: NORMAL
BH CV LOWER VASCULAR RIGHT POPLITEAL SPONT: NORMAL
BH CV LOWER VASCULAR RIGHT POSTERIOR TIBIAL COMPRESS: NORMAL
BH CV LOWER VASCULAR RIGHT PROFUNDA FEMORAL COMPRESS: NORMAL
BH CV LOWER VASCULAR RIGHT PROXIMAL FEMORAL COMPRESS: NORMAL
BH CV LOWER VASCULAR RIGHT SAPHENOFEMORAL JUNCTION COMPRESS: NORMAL
BH CV POP FLUID COLLECT LEFT: 1
BH CV VAS PRELIMINARY FINDINGS SCRIPTING: 1
BUN SERPL-MCNC: 20 MG/DL (ref 8–23)
BUN/CREAT SERPL: 20.2 (ref 7–25)
CALCIUM SPEC-SCNC: 8.7 MG/DL (ref 8.2–9.6)
CHLORIDE SERPL-SCNC: 107 MMOL/L (ref 98–107)
CO2 SERPL-SCNC: 24 MMOL/L (ref 22–29)
CREAT SERPL-MCNC: 0.99 MG/DL (ref 0.57–1)
DEPRECATED RDW RBC AUTO: 43.6 FL (ref 37–54)
EGFRCR SERPLBLD CKD-EPI 2021: 53.9 ML/MIN/1.73
ERYTHROCYTE [DISTWIDTH] IN BLOOD BY AUTOMATED COUNT: 12.9 % (ref 12.3–15.4)
GLUCOSE SERPL-MCNC: 83 MG/DL (ref 65–99)
HCT VFR BLD AUTO: 34.4 % (ref 34–46.6)
HGB BLD-MCNC: 10.8 G/DL (ref 12–15.9)
MCH RBC QN AUTO: 28.8 PG (ref 26.6–33)
MCHC RBC AUTO-ENTMCNC: 31.4 G/DL (ref 31.5–35.7)
MCV RBC AUTO: 91.7 FL (ref 79–97)
PLATELET # BLD AUTO: 192 10*3/MM3 (ref 140–450)
PMV BLD AUTO: 10.8 FL (ref 6–12)
POTASSIUM SERPL-SCNC: 4.3 MMOL/L (ref 3.5–5.2)
RBC # BLD AUTO: 3.75 10*6/MM3 (ref 3.77–5.28)
SODIUM SERPL-SCNC: 141 MMOL/L (ref 136–145)
TROPONIN T SERPL HS-MCNC: 20 NG/L
WBC NRBC COR # BLD AUTO: 9.98 10*3/MM3 (ref 3.4–10.8)

## 2025-01-06 PROCEDURE — 93970 EXTREMITY STUDY: CPT

## 2025-01-06 PROCEDURE — 99223 1ST HOSP IP/OBS HIGH 75: CPT

## 2025-01-06 PROCEDURE — 84484 ASSAY OF TROPONIN QUANT: CPT | Performed by: PHYSICIAN ASSISTANT

## 2025-01-06 PROCEDURE — 80048 BASIC METABOLIC PNL TOTAL CA: CPT | Performed by: PHYSICIAN ASSISTANT

## 2025-01-06 PROCEDURE — 99232 SBSQ HOSP IP/OBS MODERATE 35: CPT | Performed by: INTERNAL MEDICINE

## 2025-01-06 PROCEDURE — 93005 ELECTROCARDIOGRAM TRACING: CPT | Performed by: PHYSICIAN ASSISTANT

## 2025-01-06 PROCEDURE — 93970 EXTREMITY STUDY: CPT | Performed by: SURGERY

## 2025-01-06 PROCEDURE — 85027 COMPLETE CBC AUTOMATED: CPT | Performed by: PHYSICIAN ASSISTANT

## 2025-01-06 PROCEDURE — 93010 ELECTROCARDIOGRAM REPORT: CPT | Performed by: INTERNAL MEDICINE

## 2025-01-06 RX ORDER — AMLODIPINE BESYLATE 5 MG/1
5 TABLET ORAL 2 TIMES DAILY
Status: DISCONTINUED | OUTPATIENT
Start: 2025-01-06 | End: 2025-01-07 | Stop reason: HOSPADM

## 2025-01-06 RX ADMIN — AMLODIPINE BESYLATE 5 MG: 5 TABLET ORAL at 20:52

## 2025-01-06 RX ADMIN — AMLODIPINE BESYLATE 5 MG: 5 TABLET ORAL at 09:42

## 2025-01-06 RX ADMIN — LOSARTAN POTASSIUM 50 MG: 50 TABLET, FILM COATED ORAL at 09:42

## 2025-01-06 RX ADMIN — Medication 10 ML: at 20:53

## 2025-01-06 RX ADMIN — Medication 10 ML: at 09:42

## 2025-01-06 RX ADMIN — ASPIRIN 81 MG: 81 TABLET, COATED ORAL at 09:42

## 2025-01-06 NOTE — PLAN OF CARE
Goal Outcome Evaluation:  Plan of Care Reviewed With: patient        Progress: no change  Outcome Evaluation: Assumed care of patient. Patient is alert and orientedx4, on nocturnal oxygen at night, on assistx1, tele shows SR with occ pac's, VSS stable. Provider was notified by MT regarding a sinus pause at 1857hrs-provider held dose of carvedilol, Cardiology following. still pending for Dupplex LE US report. To call ZIO upon discharge per Cardio input. Encouraged symptoms reporting. Plan of care ongoing.

## 2025-01-06 NOTE — PLAN OF CARE
Goal Outcome Evaluation: No complaints from patient for today.  No pauses since shift change last night.  Blood pressure within normal limits.  Admitted to cardiology and order for pacemaker placement tomorrow.

## 2025-01-06 NOTE — CONSULTS
Electrophysiology Hospital Consult            Patient Name: Neha Guillory  Age/Sex: 89 y.o. female  : 1933  MRN: [id number]    Date of Admission: 2025  Date of Encounter Visit: 25  Encounter Provider: ENZO Morton  Referring Provider: No ref. provider found  Place of Service: [facility] CARDIOLOGY  Electrophysiologist: Dr. Malone  Cardiologist: Dr. Mohr  Subjective:   EP Consultation for: Pause    Chief Complaint: syncope    History of Present Illness:  Neha Guillory is a 89 y.o. female who presented to ER on 2024 with complaints of an episode of syncope. Associated with blurred vision that then goes black. She was in kitchen using walker when most recent episod occurred causing her to fall to ground.     This is her third episode of syncope  in past 6 months; one in September and then 1 two weeks ago. At home she takes coreg 12.5 mg twice daily and norvasc 5 mg daily.    She was admitted for further work up.     She had an echo 2024 that showed EF of 55.7% with mild AV stenosis.     Last night, while being monitored. She was seen to have an 8 second pause of ventricular standstill with symptoms of fatigue, dizziness and vision changes.     EP was consulted to evaluate further.         Dr. Fry and I saw her in observation.  This morning.    She says for the past few months she has had intermittent episodes of loss of consciousness.    Most of these episodes are a couple seconds.    She says she will just be sitting there watching TV suddenly things go black and then she feels fine.    We spoke with her daughter over the phone.  She says these episodes were occurring about once a month now they seem about once per week.    She had an episode of complete heart block last night for about 8 seconds.  She had similar symptoms associated with this event.    Past Medical History: HOCM, mild aortic stenosis, HTN, HL, EJ, breast caner and RICH  Past  Medical History:   Diagnosis Date    Aortic stenosis, mild 08/24/2022    Arthritis     Carcinoma of upper-inner quadrant of left breast in female, estrogen receptor positive 02/24/2022    Carotid artery stenosis     Carotid atherosclerosis, bilateral 07/12/2019    Cataract     BILATERAL    Depression     Gastroenteritis     Generalized osteoarthritis 02/01/2016    H/O diastolic dysfunction     Hyperlipidemia     Hyperparathyroidism     Hyperparathyroidism     Hypertension     Hypertrophic cardiomyopathy     Iron deficiency anemia 10/13/2022    Low back pain May, 2023    Lower, right    Lumbosacral disc disease May 2023    Multifocal pneumonia 10/10/2022    Multifocal pneumonia 10/10/2022    Neuromuscular disorder     Obstructive sleep apnea syndrome 02/01/2016    Osteopenia     Osteoporosis     Primary familial hypertrophic cardiomyopathy 02/01/2016    Pulmonary hypertension     Synovial cyst of popliteal space 02/01/2016    Vitamin D deficiency        Past Surgical History:   Procedure Laterality Date    BREAST BIOPSY Left 02/17/2022    BREAST LUMPECTOMY Left     BREAST LUMPECTOMY WITH SENTINEL NODE BIOPSY Left 04/07/2022    Procedure: LEFT BREAST LUMPECTOMY WITH SENTINEL NODE BIOPSY AND NEEDLE LOCALIZATION;  Surgeon: Bel Marina MD;  [place] [facility] [place] [facility] [place]  Service: General;  Laterality: Left;    BUNIONECTOMY Right     FOOT SURGERY    COLONOSCOPY N/A 2011    Dr. Luis    EPIDURAL BLOCK  July 2023    Epidural x2    HAND SURGERY Right     TUMOR REMOVED ON FOREFINGER    PITUITARY SURGERY      THYROIDECTOMY Right 04/20/2016    Procedure: RIGHT SUPERIOR PARATHYROIDECTOMY;  Surgeon: Bel Marina MD;  Location: [facility] OR;  Service:        Home Medications:   Medications Prior to Admission   Medication Sig Dispense Refill Last Dose/Taking    amLODIPine (NORVASC) 5 MG tablet Take 1 tablet by mouth Daily. 90 tablet 3     aspirin 81 MG EC tablet Take 1 tablet by mouth Daily. Indications: Disease involving  Lipid Deposits in the Arteries       B Complex Vitamins (vitamin b complex) capsule capsule Take 1 capsule by mouth Daily.       carvedilol (COREG) 12.5 MG tablet TAKE ONE TABLET BY MOUTH EVERY 12 HOURS 180 tablet 3     cholecalciferol (VITAMIN D3) 1000 units tablet Take 1 tablet by mouth Daily.       furosemide (LASIX) 20 MG tablet Take 1 tablet by mouth Daily As Needed (swelling). 90 tablet 2 Unknown    gabapentin (NEURONTIN) 100 MG capsule Take 3 capsules by mouth 2 (Two) Times a Day As Needed (PAIN). (Patient not taking: Reported on 12/19/2024) 180 capsule 1 Not Taking    HYDROcodone-acetaminophen (NORCO) 5-325 MG per tablet Take 0.5-1 tablets by mouth Every 6 (Six) Hours As Needed for Moderate Pain. (Patient not taking: Reported on 12/19/2024) 18 tablet 0     Ibuprofen 3 %, Gabapentin 10 %, Baclofen 2 %, lidocaine 4 % Apply 1-2 g topically to the appropriate area as directed 3 (Three) to 4 (Four) times daily. 90 g 5     Lactobacillus (PROBIOTIC ACIDOPHILUS PO) Take 1 tablet by mouth Every Night.       mirtazapine (REMERON) 15 MG tablet Take 1 tablet by mouth Every Night. 90 tablet 1     multivitamin (THERAGRAN) tablet tablet Take  by mouth Daily. Gummies       olmesartan (BENICAR) 20 MG tablet TAKE ONE TABLET BY MOUTH DAILY (Patient taking differently: Take 1 tablet by mouth Every Night.) 90 tablet 3     potassium chloride 10 MEQ CR tablet Take 2 tablets by mouth Daily As Needed (when taking lasix (furosemide)). 60 tablet 11     tamoxifen (NOLVADEX) 20 MG chemo tablet Take 1 tablet by mouth Daily. 90 tablet 1     vitamin C (ASCORBIC ACID) 250 MG tablet Take 4 tablets by mouth Every Night.       Zinc 50 MG tablet Take 1 tablet by mouth Every Night.          Allergies:  Allergies   Allergen Reactions    Amoxicillin Hives    Griseofulvin Ultramicrosize [Griseofulvin] Confusion    Tramadol Delirium    Atorvastatin Myalgia       Past Social History:  Social History     Socioeconomic History    Marital status:     Tobacco Use    Smoking status: Never    Smokeless tobacco: Never   Vaping Use    Vaping status: Never Used   Substance and Sexual Activity    Alcohol use: Never    Drug use: Never    Sexual activity: Defer       Past Family History:  Family History   Problem Relation Age of Onset    Diabetes Mother     Heart attack Mother     Arthritis Father     Breast cancer Sister     Breast cancer Sister     Breast cancer Sister     Colon cancer Sister     Heart disease Brother         CABG    Other Brother         BRAIN TUMOR    Colon cancer Brother     Colon cancer Brother     Colon cancer Brother     Colon cancer Maternal Grandfather     Cancer Other     Malig Hyperthermia Neg Hx        14 point ROS was performed and is negative except as outlined in HPI.     Objective:     Objective:  Vital Signs (last 24 hours)         01/05 0700  01/06 0659 01/06 0700  01/06 1030   Most Recent      Temp (°F) 98 -  99      98     98 (36.7) 01/06 0932    Heart Rate 63 -  93    73 -  91     73 01/06 0935    Resp 16 -  19      16     16 01/06 0932    /56 -  169/77    136/81 -  156/78     141/82 01/06 0935    SpO2 (%) 93 -  95    97 -  99     97 01/06 0935    Flow (L/min) (Oxygen Therapy)   2      2     2 01/06 0800          Temp:  [98 °F (36.7 °C)-99 °F (37.2 °C)] 98 °F (36.7 °C)  Heart Rate:  [63-93] 73  Resp:  [16-19] 16  BP: (122-169)/(69-82) 141/82  Body mass index is 29.28 kg/m².        Physical Exam:     General Appearance: No acute distress, well developed and well nourished.   Eyes: Conjunctiva and lids: No erythema, swelling, or discharge. Sclera non-icteric.   HENT: Atraumatic, normocephalic. External eyes, ears, and nose normal.   Respiratory: No signs of respiratory distress. Respiration rhythm and depth normal.   Clear to auscultation. No rales, crackles, rhonchi, or wheezing auscultated.   Cardiovascular:  Jugular Venous Pressure: Normal  Heart Rate and Rhythm: Normal, Heart Sounds: Normal S1 and S2. No S3 or S4  noted.  Murmurs: No murmurs noted. No rubs, thrills, or gallops.   Arterial Pulses: Posterior tibialis and dorsalis pedis pulses normal.   Lower Extremities: No edema noted.  Gastrointestinal:  Abdomen soft, non-distended, non-tender.  Musculoskeletal: Normal movement of extremities  Skin: Warm and dry.   Psychiatric: Patient alert and oriented to person, place, and time. Speech and behavior appropriate. Normal mood and affect.    Labs:   Lab Review:     Results from last 7 days   Lab Units 01/06/25  0500 01/05/25  0343 01/04/25  1614   SODIUM mmol/L 141 141 138   POTASSIUM mmol/L 4.3 3.9 4.4   CHLORIDE mmol/L 107 111* 104   CO2 mmol/L 24.0 21.7* 23.8   BUN mg/dL 20 20 25*   CREATININE mg/dL 0.99 0.96 1.40*   GLUCOSE mg/dL 83 83 113*   CALCIUM mg/dL 8.7 8.1* 9.1   AST (SGOT) U/L  --   --  20   ALT (SGPT) U/L  --   --  10     Results from last 7 days   Lab Units 01/06/25  0500 01/05/25  0343 01/04/25  1743   HSTROP T ng/L 20* 19* 21*     Results from last 7 days   Lab Units 01/06/25  0500   WBC 10*3/mm3 9.98   HEMOGLOBIN g/dL 10.8*   HEMATOCRIT % 34.4   PLATELETS 10*3/mm3 192     Results from last 7 days   Lab Units 01/04/25  1614   INR  1.19*         Results from last 7 days   Lab Units 01/04/25  1614   MAGNESIUM mg/dL 2.1                 Results from last 7 days   Lab Units 01/05/25  0343   TSH uIU/mL 1.610           Imaging:     Echo 1/5/2024: EF of 55.7% with Grade 1 DD. Severely dilated LA. Mild AV stenosis with MARGE of 1.8 cm2 with mild AI. Moderate MR and Mild TR with RVSP 35 mmHg.     CT ANGIOGRAPHY OF THE CHEST WITH INTRAVENOUS CONTRAST AND 3D  RECONSTRUCTIONS     HISTORY: Syncope. Elevated D-dimer.     The CT scan was performed through the chest with CT angiography protocol  using intravenous contrast and 3D reconstructions. The following  findings are present:     1. The pulmonary arteries are well-opacified. There is no evidence of  pulmonary embolus. The thoracic aorta shows mild dilatation of  the  descending thoracic aorta with associated calcification measuring up to  3.4 cm. This shows no change from 10/22/2024.     2. There are changes of COPD and there is some minimal fibrotic scarring  at the bases unchanged from the previous exam. A previous small area of  more focal inflammatory change at the left base medially has resolved.     3. There is an enlarged precarinal lymph node measuring up to 1.5 x 1.8  cm that is unchanged and likely reactive in the setting. It is also  quite similar in size to an earlier CT scan dating back to 1/6/2023.  There is no hilar or axillary adenopathy. There is moderate coronary  artery calcification. There is a trace pericardial effusion unchanged.     4. There is a small hiatus hernia measuring 4.5 cm unchanged. There are  several small gallstones layering within the gallbladder unchanged. The  visualized liver and spleen and both adrenal glands are unremarkable.         EKG/Tele: SR with episode of ventricular standstill occurring on 1/5/2024 at 18:30 PM lasting for 8 seconds                      I personally viewed and interpreted the patient's EKG/Telemetry tracings.    Assessment/Plan:       Syncope    She presented with syncope and has had intermittent episodes of loss of consciousness for the past month.  She was noted on telemetry to have a 8 seconds of complete heart block last night with associated symptoms.    We recommended proceeding with permanent pacemaker placement.  We discussed risk, benefits, and alternatives.    We discussed potentially placing temporary pacemaker today as due to ankle event whether there is minimal staff.  After discussion we elected to defer this and we will plan for permanent pacemaker tomorrow.    If she has further more frequent episodes, could consider temp        Thank you for allowing me to participate in the care of Neha MARY Guillory. Feel free to contact me directly with any further questions or concerns.    Herbie Espinosa,  ENZO  [facility]  01/06/25  12:29 EST

## 2025-01-06 NOTE — PROGRESS NOTES
MOHAN ASCENCIO Attestation Note    I supervised care provided by the midlevel provider.    The GREGORIA and I have discussed this patient's history, physical exam, and treatment plan. I have reviewed the documentation and personally had a face to face interaction with the patient  I affirm the documentation and agree with the treatment and plan. I provided a substantive portion of the care of this patient.  I personally performed the physical exam, in its entirety.  My personal findings are documented in below:    History:  89-year-old female who still lives independently and is quite active.  Patient admitted for syncopal episode.  While awaiting disposition planning yesterday, the patient was noted to have an 8-second sinus pause during which she was quite symptomatic.  Patient reports no recurrence during the night and feels well this morning.    Physical Exam:  General: No acute distress.  HENT: NCAT  Eyes: no scleral icterus.  Neck: Painless range of motion  CV: Pink warm and well-perfused throughout  Respiratory: No distress or increased work of breathing  Musculoskeletal: no deformity.  Neuro: Alert, speech fluent and easily intelligible  Skin: warm, dry.    Assessment and Plan:  Syncope/sinus pause: Reconsult cardiology

## 2025-01-06 NOTE — PROGRESS NOTES
ED OBSERVATION PROGRESS/DISCHARGE SUMMARY    Date of Admission: 1/4/2025   LOS: 0 days   PCP: Yvan Ruiz III, NP-C    Final Diagnosis Syncope      Subjective     Hospital Outcome:     Neha Guillory is a 89 y.o. female comes in complaining of syncopal episode.  Patient states that she has had 2 syncopal episodes in which her first was about 2 weeks ago.  Patient states that she had another syncopal episode today.  Patient states she will feel that her eyes will be blurred and go black.  Patient states that today she passed out upon standing and fell into her rollator.  Patient states she thinks she may have hit her head on the ground as well.  Patient denies any headache or pain of her head currently.  Patient denies any chest pain or shortness of breath currently or with the episodes.  Patient denies any recent illness, fever, chills, nausea, vomiting, diarrhea or leg swelling.        In the ED, initial troponin 23, repeat troponin of 21, serum creatinine 1.4, GFR of 35.  Hemoglobin 11.5, otherwise unremarkable CBC.  D-dimer mildly elevated 0.94.  CT head shows no acute findings.  EKG shows sinus rhythm 72 bpm, PVC, LVH, no ST elevation apparent.  Similar to previous study.  Patient is afebrile, pulse in 70s, on room air oxygen 91% SpO2 and blood pressure 160s over 70s.  Patient had 500 normal saline bolus.      1/5/2025: Patient reports that she feels improved since admission.  CTA chest obtained this morning since her creatinine function was improved and negative for PE, her thoracic aorta shows mild dilation of the descending thoracic aorta with associated calcification measuring up to 3.4 cm but this is unchanged from imaging in October 2024, there are COPD changes with some minimal fibrotic scarring at the bases that is also unchanged from previous exam, enlarged precarinal hernial lymph node measuring up to 1.5 x 1.8 cm that is unchanged and likely reactive in the setting and similar in size  from a CT performed in January 2023, moderate coronary artery calcification noted, and a small hiatal hernia measuring 4.5 cm is unchanged.  Echo obtained and shows EF of 55.7% LV wall thick and consistent with moderate concentric hypertrophy, grade 1 diastolic dysfunction, LA cavity is severely dilated, LA volume is severely increased, mild aortic valve stenosis appreciated, and moderate mitral valve regurg present.  Bilateral carotid ultrasounds normal.  Lower extremity leg Dopplers pending.  Cardiology saw and evaluated the patient and unclear etiology for the patient's syncope.  They are recommending a Zio patch on discharge.  Discussed with patient and family at bedside and given the inclement weather would like patient to stay for the evening for continued monitoring and reassess in the morning to make sure there were no further acute events and will reevaluate in the a.m.    1/6/2024:  Patient had a 8-second sinus pause on the heart monitor and the strip was recorded.  Patient had a shorter pause that happened about 20 minutes later yesterday evening. Patient was asymptomatic with this. No chest pain, soa, palpitations, syncope or lightheadedness. Patient seen by Dr. Espinoza with the cardiology team who recommends EP eval. Patient subsequently seen by EP team who recommend pacemaker placement in AM. Will make patient NPO at midnight, discussed with Dr. Espinoza who has graciously accepted to a tele bed to the cardiology service.       ROS:  General: no fevers, chills  Respiratory: no cough, dyspnea  Cardiovascular: no chest pain, palpitations  Abdomen: No abdominal pain, nausea, vomiting, or diarrhea  Neurologic: No focal weakness    Objective   Physical Exam:  I have reviewed the vital signs.  Temp:  [98 °F (36.7 °C)-99 °F (37.2 °C)] 98.1 °F (36.7 °C)  Heart Rate:  [63-93] 64  Resp:  [16-19] 16  BP: (122-169)/(56-81) 147/69  General Appearance:    Alert, cooperative, no distress  Head:    Normocephalic,  atraumatic  Eyes:    Sclerae anicteric  Neck:   Supple, no mass  Lungs: Clear to auscultation bilaterally, respirations unlabored  Heart: Regular rate and rhythm, S1 and S2 normal, no murmur, rub or gallop  Abdomen:  Soft, nontender, bowel sounds active, nondistended  Extremities: No clubbing, cyanosis, or edema to lower extremities  Pulses:  2+ and symmetric in distal lower extremities  Skin: No rashes   Neurologic: Oriented x3, Normal strength to extremities    Results Review:    I have reviewed the labs, radiology results and diagnostic studies.    Results from last 7 days   Lab Units 01/06/25  0500   WBC 10*3/mm3 9.98   HEMOGLOBIN g/dL 10.8*   HEMATOCRIT % 34.4   PLATELETS 10*3/mm3 192     Results from last 7 days   Lab Units 01/06/25  0500 01/05/25  0343 01/04/25  1614   SODIUM mmol/L 141 141 138   POTASSIUM mmol/L 4.3 3.9 4.4   CHLORIDE mmol/L 107 111* 104   CO2 mmol/L 24.0 21.7* 23.8   BUN mg/dL 20 20 25*   CREATININE mg/dL 0.99 0.96 1.40*   CALCIUM mg/dL 8.7 8.1* 9.1   BILIRUBIN mg/dL  --   --  0.3   ALK PHOS U/L  --   --  48   ALT (SGPT) U/L  --   --  10   AST (SGOT) U/L  --   --  20   GLUCOSE mg/dL 83 83 113*     Imaging Results (Last 24 Hours)       Procedure Component Value Units Date/Time    CT Angiogram Chest [[id number]] Collected: 01/05/25 0934     Updated: 01/05/25 1018    Narrative:      CT ANGIOGRAPHY OF THE CHEST WITH INTRAVENOUS CONTRAST AND 3D  RECONSTRUCTIONS     HISTORY: Syncope. Elevated D-dimer.     The CT scan was performed through the chest with CT angiography protocol  using intravenous contrast and 3D reconstructions. The following  findings are present:     1. The pulmonary arteries are well-opacified. There is no evidence of  pulmonary embolus. The thoracic aorta shows mild dilatation of the  descending thoracic aorta with associated calcification measuring up to  3.4 cm. This shows no change from 10/22/2024.     2. There are changes of COPD and there is some minimal fibrotic  scarring  at the bases unchanged from the previous exam. A previous small area of  more focal inflammatory change at the left base medially has resolved.     3. There is an enlarged precarinal lymph node measuring up to 1.5 x 1.8  cm that is unchanged and likely reactive in the setting. It is also  quite similar in size to an earlier CT scan dating back to 1/6/2023.  There is no hilar or axillary adenopathy. There is moderate coronary  artery calcification. There is a trace pericardial effusion unchanged.     4. There is a small hiatus hernia measuring 4.5 cm unchanged. There are  several small gallstones layering within the gallbladder unchanged. The  visualized liver and spleen and both adrenal glands are unremarkable.                 Radiation dose reduction techniques were utilized, including automated  exposure control and exposure modulation based on body size.        This report was finalized on 1/5/2025 10:15 AM by Dr. Marcos Torres M.D  on Workstation: [employer]               I have reviewed the medications.     Discharge Medications        ASK your doctor about these medications        Instructions Start Date   amLODIPine 5 MG tablet  Commonly known as: NORVASC   5 mg, Oral, Daily      aspirin 81 MG EC tablet   1 tablet, Daily      carvedilol 12.5 MG tablet  Commonly known as: COREG   12.5 mg, Oral, Every 12 Hours      cholecalciferol 25 MCG (1000 UT) tablet  Commonly known as: VITAMIN D3   1,000 Units, Daily      furosemide 20 MG tablet  Commonly known as: LASIX   20 mg, Oral, Daily PRN      gabapentin 100 MG capsule  Commonly known as: NEURONTIN   300 mg, Oral, 2 Times Daily PRN      HYDROcodone-acetaminophen 5-325 MG per tablet  Commonly known as: NORCO   0.5-1 tablets, Oral, Every 6 Hours PRN      Ibuprofen 3 %, Gabapentin 10 %, Baclofen 2 %, lidocaine 4 %   1-2 g, Topical, 3 to 4 Times Daily      mirtazapine 15 MG tablet  Commonly known as: REMERON   15 mg, Oral, Nightly      multivitamin tablet  tablet   Daily      olmesartan 20 MG tablet  Commonly known as: BENICAR   TAKE ONE TABLET BY MOUTH DAILY      potassium chloride 10 MEQ CR tablet   20 mEq, Oral, Daily PRN      PROBIOTIC ACIDOPHILUS PO   1 tablet, Oral, Nightly      tamoxifen 20 MG chemo tablet  Commonly known as: NOLVADEX   20 mg, Oral, Daily      vitamin b complex capsule capsule   1 capsule, Daily      vitamin C 250 MG tablet  Commonly known as: ASCORBIC ACID   1,000 mg, Oral, Nightly      Zinc 50 MG tablet   50 mg, Oral, Nightly              ---------------------------------------------------------------------------------------------  Assessment & Plan   Assessment/Problem List    Syncope      Plan:    Syncope  -Troponins have remained flat and no changes to EKGs or abnormal events on cardiac monitor throughout admission  - CT head shows no acute findings.    -EKG shows sinus rhythm 72 bpm, PVC, LVH, no ST elevation apparent.  Similar to previous study.    -Carotid Dopplers negative for any acute issue  -CTA chest negative for any acute PE, does show chronic changes that are unchanged from previous imaging  -Echo normal  -Cardiology is recommending a Zio monitor on discharge, patient observed due to inclement weather.  -at 1830 1/5/2024, patient had an 8 second pause on the monitor followed a shorter pause about 20 mins later. Patient asymptomatic with this. Will touch base with cardiology in AM again.     Elevated D-dimer  -D-dimer mildly elevated 0.94.    -CTA chest negative for any acute issues but does show chronic findings that are unchanged from previous imaging, patient aware of chronic issues  -Duplex lower extremities negative     MIKIE CKD stage IIIb  -serum creatinine 1.4, GFR of 35.  With baseline of 0.9.  -Resolved     Essential hypertension, chronic, poorly controlled  -Continue home Norvasc, Coreg,   -Hold home Lasix, Benicar, resume when appropriate     Peripheral neuropathy  -Continue home gabapentin    Disposition: admitted to  cardiology, plan for pacemaker in AM    This note will serve as a progress note    NIMA Ryan 01/06/25 05:35 EST    I have worn appropriate PPE during this patient encounter, sanitized my hands both with entering and exiting patient's room.      56 minutes has been spent by [facility] providers in the care of this patient while under observation status

## 2025-01-07 ENCOUNTER — READMISSION MANAGEMENT (OUTPATIENT)
Dept: CALL CENTER | Facility: HOSPITAL | Age: OVER 89
End: 2025-01-07
Payer: MEDICARE

## 2025-01-07 ENCOUNTER — APPOINTMENT (OUTPATIENT)
Dept: GENERAL RADIOLOGY | Facility: HOSPITAL | Age: OVER 89
End: 2025-01-07
Payer: MEDICARE

## 2025-01-07 VITALS
HEART RATE: 74 BPM | OXYGEN SATURATION: 95 % | RESPIRATION RATE: 16 BRPM | TEMPERATURE: 99.5 F | BODY MASS INDEX: 28.89 KG/M2 | WEIGHT: 143.3 LBS | DIASTOLIC BLOOD PRESSURE: 61 MMHG | SYSTOLIC BLOOD PRESSURE: 136 MMHG | HEIGHT: 59 IN

## 2025-01-07 LAB
ALBUMIN SERPL-MCNC: 3.5 G/DL (ref 3.5–5.2)
ALBUMIN/GLOB SERPL: 1.5 G/DL
ALP SERPL-CCNC: 38 U/L (ref 39–117)
ALT SERPL W P-5'-P-CCNC: 13 U/L (ref 1–33)
ANION GAP SERPL CALCULATED.3IONS-SCNC: 9 MMOL/L (ref 5–15)
AST SERPL-CCNC: 18 U/L (ref 1–32)
BILIRUB SERPL-MCNC: 0.5 MG/DL (ref 0–1.2)
BUN SERPL-MCNC: 24 MG/DL (ref 8–23)
BUN/CREAT SERPL: 21.1 (ref 7–25)
CALCIUM SPEC-SCNC: 9 MG/DL (ref 8.2–9.6)
CHLORIDE SERPL-SCNC: 105 MMOL/L (ref 98–107)
CO2 SERPL-SCNC: 24 MMOL/L (ref 22–29)
CREAT SERPL-MCNC: 1.14 MG/DL (ref 0.57–1)
DEPRECATED RDW RBC AUTO: 42.4 FL (ref 37–54)
EGFRCR SERPLBLD CKD-EPI 2021: 45.5 ML/MIN/1.73
ERYTHROCYTE [DISTWIDTH] IN BLOOD BY AUTOMATED COUNT: 12.9 % (ref 12.3–15.4)
GLOBULIN UR ELPH-MCNC: 2.4 GM/DL
GLUCOSE SERPL-MCNC: 89 MG/DL (ref 65–99)
HCT VFR BLD AUTO: 34.4 % (ref 34–46.6)
HGB BLD-MCNC: 11.3 G/DL (ref 12–15.9)
MCH RBC QN AUTO: 29.8 PG (ref 26.6–33)
MCHC RBC AUTO-ENTMCNC: 32.8 G/DL (ref 31.5–35.7)
MCV RBC AUTO: 90.8 FL (ref 79–97)
PLATELET # BLD AUTO: 203 10*3/MM3 (ref 140–450)
PMV BLD AUTO: 10.5 FL (ref 6–12)
POTASSIUM SERPL-SCNC: 4.1 MMOL/L (ref 3.5–5.2)
PROT SERPL-MCNC: 5.9 G/DL (ref 6–8.5)
QT INTERVAL: 354 MS
QT INTERVAL: 388 MS
QTC INTERVAL: 429 MS
QTC INTERVAL: 431 MS
RBC # BLD AUTO: 3.79 10*6/MM3 (ref 3.77–5.28)
SODIUM SERPL-SCNC: 138 MMOL/L (ref 136–145)
WBC NRBC COR # BLD AUTO: 11.13 10*3/MM3 (ref 3.4–10.8)

## 2025-01-07 PROCEDURE — C1785 PMKR, DUAL, RATE-RESP: HCPCS | Performed by: INTERNAL MEDICINE

## 2025-01-07 PROCEDURE — 25010000002 FENTANYL CITRATE (PF) 50 MCG/ML SOLUTION: Performed by: INTERNAL MEDICINE

## 2025-01-07 PROCEDURE — C1898 LEAD, PMKR, OTHER THAN TRANS: HCPCS | Performed by: INTERNAL MEDICINE

## 2025-01-07 PROCEDURE — 0JH606Z INSERTION OF PACEMAKER, DUAL CHAMBER INTO CHEST SUBCUTANEOUS TISSUE AND FASCIA, OPEN APPROACH: ICD-10-PCS | Performed by: INTERNAL MEDICINE

## 2025-01-07 PROCEDURE — C1894 INTRO/SHEATH, NON-LASER: HCPCS | Performed by: INTERNAL MEDICINE

## 2025-01-07 PROCEDURE — 80053 COMPREHEN METABOLIC PANEL: CPT | Performed by: NURSE PRACTITIONER

## 2025-01-07 PROCEDURE — C1887 CATHETER, GUIDING: HCPCS | Performed by: INTERNAL MEDICINE

## 2025-01-07 PROCEDURE — 93010 ELECTROCARDIOGRAM REPORT: CPT | Performed by: INTERNAL MEDICINE

## 2025-01-07 PROCEDURE — 25010000002 CEFAZOLIN PER 500 MG: Performed by: INTERNAL MEDICINE

## 2025-01-07 PROCEDURE — 25010000002 LIDOCAINE 1 % SOLUTION: Performed by: INTERNAL MEDICINE

## 2025-01-07 PROCEDURE — 33208 INSRT HEART PM ATRIAL & VENT: CPT | Performed by: INTERNAL MEDICINE

## 2025-01-07 PROCEDURE — 85027 COMPLETE CBC AUTOMATED: CPT | Performed by: INTERNAL MEDICINE

## 2025-01-07 PROCEDURE — 93005 ELECTROCARDIOGRAM TRACING: CPT

## 2025-01-07 PROCEDURE — 02HK3JZ INSERTION OF PACEMAKER LEAD INTO RIGHT VENTRICLE, PERCUTANEOUS APPROACH: ICD-10-PCS | Performed by: INTERNAL MEDICINE

## 2025-01-07 PROCEDURE — 99024 POSTOP FOLLOW-UP VISIT: CPT

## 2025-01-07 PROCEDURE — 71045 X-RAY EXAM CHEST 1 VIEW: CPT

## 2025-01-07 PROCEDURE — 25010000002 MIDAZOLAM PER 1 MG: Performed by: INTERNAL MEDICINE

## 2025-01-07 PROCEDURE — 02H63JZ INSERTION OF PACEMAKER LEAD INTO RIGHT ATRIUM, PERCUTANEOUS APPROACH: ICD-10-PCS | Performed by: INTERNAL MEDICINE

## 2025-01-07 DEVICE — IMPLANTABLE DEVICE: Type: IMPLANTABLE DEVICE | Status: FUNCTIONAL

## 2025-01-07 DEVICE — LD PM CAPSUREFIX NOVUS5076 52CM: Type: IMPLANTABLE DEVICE | Status: FUNCTIONAL

## 2025-01-07 DEVICE — GEN PM AZURE XT SURESCAN DR MRI: Type: IMPLANTABLE DEVICE | Status: FUNCTIONAL

## 2025-01-07 RX ORDER — LIDOCAINE HYDROCHLORIDE 10 MG/ML
INJECTION, SOLUTION INFILTRATION; PERINEURAL
Status: DISCONTINUED | OUTPATIENT
Start: 2025-01-07 | End: 2025-01-07 | Stop reason: HOSPADM

## 2025-01-07 RX ORDER — MIDAZOLAM HYDROCHLORIDE 1 MG/ML
INJECTION, SOLUTION INTRAMUSCULAR; INTRAVENOUS
Status: DISCONTINUED | OUTPATIENT
Start: 2025-01-07 | End: 2025-01-07 | Stop reason: HOSPADM

## 2025-01-07 RX ORDER — FENTANYL CITRATE 50 UG/ML
INJECTION, SOLUTION INTRAMUSCULAR; INTRAVENOUS
Status: DISCONTINUED | OUTPATIENT
Start: 2025-01-07 | End: 2025-01-07 | Stop reason: HOSPADM

## 2025-01-07 RX ORDER — METHOHEXITAL IN WATER/PF 100MG/10ML
SYRINGE (ML) INTRAVENOUS
Status: DISCONTINUED | OUTPATIENT
Start: 2025-01-07 | End: 2025-01-07 | Stop reason: HOSPADM

## 2025-01-07 NOTE — DISCHARGE INSTRUCTIONS
"[facility]   [phone]    Post Pacemaker / Defibrillator Implant Instructions      1.  The dressing may be removed the next day.    2. If steri-strips were used, they should not be removed. Allow them to \"fall off\".      3. You may shower after the dressing is removed. Do not allow shower water to hit directly on incision.    4. No lotion/powder/ointment/cream on incision until it is healed.    5. Gently wash incision daily with soap and water and pat dry.    6. You may reapply a dressing if there is drainage, otherwise leave your incision open to air. If you reapply a dressing, please notify the pacemaker clinic.    7. No heavy lifting, pulling, or pushing.    8. Do not raise the affected arm over your head for a minimum of 1 month.    9. The pacemaker clinic will contact you (usually within 1 business day) to schedule a pacemaker/incision check. The check is usually done 7-10 days post-implant. If you have not heard from the pacemaker clinic within 3 days, please call the office.    10.  No driving until seen at your follow up appointment    11. Please call the office if you experience any of the following:   bleeding or drainage from your incision   swelling, redness, or opening of your incision   fever or chills   pain not relieved with medication   chest pain or difficulty breathing   lightheadness    12. For defibrillator patients only: If you receive a shock from your device, please call the office. If you receive 2 or more shocks within a 24 hour period OR if you receive 1 shock and feel poorly, you should be evaluated in the emergency room. Please DO NOT DRIVE if you have received a shock until your device has been checked.  "

## 2025-01-07 NOTE — PLAN OF CARE
Goal Outcome Evaluation:      Pt in for falls at home, HR WDL throughout night, currently now pauses, bed low, vitals stable, NPO since midnight, pacer placement in AM.

## 2025-01-07 NOTE — DISCHARGE SUMMARY
DISCHARGE NOTE    Patient Name: Neha Guillory  Age/Sex: 89 y.o. female  : 1933  MRN: [id number]    Date of Discharge:  2025   Date of Admit: 2025  Encounter Provider: ENZO Morton  Place of Service: [facility] CARDIOLOGY  Patient Care Team:  Yvan Ruiz III, NP-C as PCP - General (Internal Medicine)  Bel Marina MD as Referring Physician (General Surgery)  Hunter Acosta MD as Consulting Physician (Radiation Oncology)  Ricky Dill MD as Consulting Physician (Hematology and Oncology)  Jose Martin Liu APRN as Nurse Practitioner (Family Medicine)  Zack Mohr MD as Consulting Physician (Cardiology)  Tatyana Dozier RN as Ambulatory  ([facility])    Subjective:     Discharge Diagnosis:    Syncope    AV block, complete      Hospital Course:   Neha Guillory is a 89 y.o. female who presented to ER on 2024 with complaints of an episode of syncope. Associated with blurred vision that then goes black. She was in kitchen using walker when most recent episod occurred causing her to fall to ground.      This is her third episode of syncope  in past 6 months; one in September and then 1 two weeks ago. At home she takes coreg 12.5 mg twice daily and norvasc 5 mg daily.     She was admitted for further work up.      She had an echo 2024 that showed EF of 55.7% with mild AV stenosis.      , while being monitored. She was seen to have an 8 second pause of ventricular standstill with symptoms of fatigue, dizziness and vision changes.      Lisandra and KAYE saw her on .  She had not had any further events but the episode the day before was very similar to what she had been experiencing at home.    Given her symptomatic AV block.  We recommended proceeding with dual-chamber pacemaker placement.          She had dual-chamber LB  Medtronic pacemaker placed this morning 1/7.  She tolerated the procedure well.  She will be discharged this afternoon with an incision/device check in 1 week.  The office will call her to schedule this.  Advised to call with any questions or concerns regarding pacemaker or incision.      Vital Signs  Temp:  [98.2 °F (36.8 °C)-99.5 °F (37.5 °C)] 99.5 °F (37.5 °C)  Heart Rate:  [80-91] 81  Resp:  [11-23] 21  BP: (119-139)/(67-78) 122/67  No intake or output data in the 24 hours ending 01/07/25 0948    Physical Exam:    General Appearance: No acute distress, well developed and well nourished.   Eyes: Conjunctiva and lids: No erythema, swelling, or discharge. Sclera non-icteric.   HENT: Atraumatic, normocephalic. External eyes, ears, and nose normal.   Respiratory: No signs of respiratory distress. Respiration rhythm and depth normal.   Clear to auscultation. No rales, crackles, rhonchi, or wheezing auscultated.   Cardiovascular:  Jugular Venous Pressure: Normal  Heart Rate and Rhythm: Normal, Heart Sounds: Normal S1 and S2. No S3 or S4 noted.  Murmurs: No murmurs noted. No rubs, thrills, or gallops.   Arterial Pulses: Posterior tibialis and dorsalis pedis pulses normal.   Lower Extremities: No edema noted.  Gastrointestinal:  Abdomen soft, non-distended, non-tender.  Musculoskeletal: Normal movement of extremities  Skin: Warm and dry.   Psychiatric: Patient alert and oriented to person, place, and time. Speech and behavior appropriate. Normal mood and affect.    Labs:   Results from last 7 days   Lab Units 01/07/25  0352 01/06/25  0500 01/05/25  0343 01/04/25  1614   SODIUM mmol/L 138 141 141 138   POTASSIUM mmol/L 4.1 4.3 3.9 4.4   CHLORIDE mmol/L 105 107 111* 104   CO2 mmol/L 24.0 24.0 21.7* 23.8   BUN mg/dL 24* 20 20 25*   CREATININE mg/dL 1.14* 0.99 0.96 1.40*   GLUCOSE mg/dL 89 83 83 113*   CALCIUM mg/dL 9.0 8.7 8.1* 9.1   AST (SGOT) U/L 18  --   --  20   ALT (SGPT) U/L 13  --   --  10     Results from last 7  days   Lab Units 01/06/25  0500 01/05/25  0343 01/04/25  1743 01/04/25  1614   HSTROP T ng/L 20* 19* 21* 23*     Results from last 7 days   Lab Units 01/07/25  0352 01/06/25  0500 01/05/25  0343 01/04/25  1614   WBC 10*3/mm3 11.13* 9.98 7.93 9.50   HEMOGLOBIN g/dL 11.3* 10.8* 10.3* 11.5*   HEMATOCRIT % 34.4 34.4 31.8* 36.6   PLATELETS 10*3/mm3 203 192 190 230     Results from last 7 days   Lab Units 01/04/25  1614   INR  1.19*     Results from last 7 days   Lab Units 01/04/25  1614   MAGNESIUM mg/dL 2.1             Results from last 7 days   Lab Units 01/05/25  0343   TSH uIU/mL 1.610       Discharge Diet:    Dietary Orders (From admission, onward)       Start     Ordered    01/07/25 0001  NPO Diet NPO Type: Strict NPO  Diet Effective Midnight        Question:  NPO Type  Answer:  Strict NPO    01/06/25 1230                      Activity at Discharge:      Discharge Medications     Discharge Medications        Continue These Medications        Instructions Start Date   amLODIPine 5 MG tablet  Commonly known as: NORVASC   5 mg, Oral, Daily      aspirin 81 MG EC tablet   1 tablet, Daily      carvedilol 12.5 MG tablet  Commonly known as: COREG   12.5 mg, Oral, Every 12 Hours      cholecalciferol 25 MCG (1000 UT) tablet  Commonly known as: VITAMIN D3   1,000 Units, Daily      furosemide 20 MG tablet  Commonly known as: LASIX   20 mg, Oral, Daily PRN      Ibuprofen 3 %, Gabapentin 10 %, Baclofen 2 %, lidocaine 4 %   1-2 g, Topical, 3 to 4 Times Daily      mirtazapine 15 MG tablet  Commonly known as: REMERON   15 mg, Oral, Nightly      multivitamin tablet tablet   Daily      olmesartan 20 MG tablet  Commonly known as: BENICAR   TAKE ONE TABLET BY MOUTH DAILY      potassium chloride 10 MEQ CR tablet   20 mEq, Oral, Daily PRN      PROBIOTIC ACIDOPHILUS PO   1 tablet, Oral, Nightly      tamoxifen 20 MG chemo tablet  Commonly known as: NOLVADEX   20 mg, Oral, Daily      vitamin b complex capsule capsule   1 capsule, Daily       vitamin C 250 MG tablet  Commonly known as: ASCORBIC ACID   1,000 mg, Oral, Nightly      Zinc 50 MG tablet   50 mg, Oral, Nightly             ASK your doctor about these medications        Instructions Start Date   gabapentin 100 MG capsule  Commonly known as: NEURONTIN   300 mg, Oral, 2 Times Daily PRN      HYDROcodone-acetaminophen 5-325 MG per tablet  Commonly known as: NORCO   0.5-1 tablets, Oral, Every 6 Hours PRN               Discharge disposition: home    Follow-up Appointments   Follow-up Information       Yvan Ruiz III, NP-C .    Specialty: Internal Medicine  Contact information:  [address] [address]  [phone]                           Future Appointments   Date Time Provider Department Center   1/24/2025 11:00 AM [facility] DEXA  [facility]ONA DX BR None   1/31/2025 11:20 AM LAB CHAIR 3 CLAUDIA BAKER  LAB [facility]   1/31/2025 11:40 AM Lidya Colin APRN [facility] CBC [facility]   2/12/2025  1:00 PM Yvan Ruiz III NP-C [facility]   8/1/2025  1:00 PM Saundra Nichols APRN [facility] CD [facility]         ENZO Morton  01/07/25  09:48 EST

## 2025-01-07 NOTE — OUTREACH NOTE
Prep Survey      Flowsheet Row Responses   [facility] patient discharged from? [place]   Is LACE score < 7 ? No   Eligibility [facility]   Date of Admission 01/04/25   Date of Discharge 01/07/25   Discharge Disposition Home or Self Care   Discharge diagnosis Syncope   Does the patient have one of the following disease processes/diagnoses(primary or secondary)? Other   Does the patient have Home health ordered? Yes   Is there a DME ordered? Yes   Prep survey completed? Yes            NEDRA OCHOA - Registered Nurse

## 2025-01-08 ENCOUNTER — TRANSITIONAL CARE MANAGEMENT TELEPHONE ENCOUNTER (OUTPATIENT)
Dept: CALL CENTER | Facility: HOSPITAL | Age: OVER 89
End: 2025-01-08
Payer: MEDICARE

## 2025-01-08 NOTE — OUTREACH NOTE
Call Center TCM Note      Flowsheet Row Responses   [facility] patient discharged from? [place]   Does the patient have one of the following disease processes/diagnoses(primary or secondary)? Other   TCM attempt successful? Yes   Call start time 1116   Call end time 1118   Discharge diagnosis Syncope,  Pacemaker   Person spoke with today (if not patient) and relationship dtr Monica Edmonds reviewed with patient/caregiver? Yes   Does the patient have all medications ordered at discharge? N/A   Is the patient taking all medications as directed (includes completed medication regime)? Yes   Does the patient have an appointment with their PCP within 7-14 days of discharge? No   Nursing Interventions Routed TCM call to PCP office, Patient declined scheduling/rescheduling appointment at this time   Has home health visited the patient within 72 hours of discharge? N/A   Psychosocial issues? No   Did the patient receive a copy of their discharge instructions? Yes   Nursing interventions Reviewed instructions with patient   What is the patient's perception of their health status since discharge? Improving   Is the patient/caregiver able to teach back signs and symptoms related to disease process for when to call PCP? Yes   Is the patient/caregiver able to teach back signs and symptoms related to disease process for when to call 911? Yes   Is the patient/caregiver able to teach back the hierarchy of who to call/visit for symptoms/problems? PCP, Specialist, Home health nurse, Urgent Care, ED, 911 Yes   If the patient is a current smoker, are they able to teach back resources for cessation? Not a smoker   TCM call completed? Yes   Wrap up additional comments D/C DX: Syncope,  Pacemaker - Pt doing well per dtr Monica. Incision site fine. No questions. Dtr declines TCM APPT with PCP Dr Ruiz but will call if sooner appt needed before scheduled ov 02/12/2025.   Call end time 1118            Renea Carmichael,  MA    1/8/2025, 11:20 EST

## 2025-01-15 ENCOUNTER — TELEPHONE (OUTPATIENT)
Dept: INTERNAL MEDICINE | Facility: CLINIC | Age: OVER 89
End: 2025-01-15

## 2025-01-15 NOTE — TELEPHONE ENCOUNTER
Caller: KEYSHA    Best call back number: [phone]    Patient is needing:   STATES THE PATIENT CALLED TOLD THEM THAT SHE WAS DOING BETTER AND DIDN'T BELIEVE SHE NEEDED ANY MORE THERAPY.     THEY WILL BE DISCHARGING THE PATIENT WITHOUT SEEING HER.

## 2025-01-16 ENCOUNTER — CLINICAL SUPPORT NO REQUIREMENTS (OUTPATIENT)
Age: OVER 89
End: 2025-01-16
Payer: MEDICARE

## 2025-01-16 DIAGNOSIS — Z95.0 PRESENCE OF CARDIAC PACEMAKER: Primary | ICD-10-CM

## 2025-01-17 ENCOUNTER — READMISSION MANAGEMENT (OUTPATIENT)
Dept: CALL CENTER | Facility: HOSPITAL | Age: OVER 89
End: 2025-01-17
Payer: MEDICARE

## 2025-01-17 ENCOUNTER — TELEPHONE (OUTPATIENT)
Dept: CASE MANAGEMENT | Facility: OTHER | Age: OVER 89
End: 2025-01-17
Payer: MEDICARE

## 2025-01-17 NOTE — TELEPHONE ENCOUNTER
Thirty day chart review performed. Went to ER on January 4 after an episode of syncope. Found to have complete AV block, pacemaker placed on January 7. Tolerated procedure well and discharged home same day. Home health arranged, but patient declined. Doing well per daughter. Declined TCM appointment with PCP. Patient goal achieved at this time, will close program.

## 2025-01-17 NOTE — OUTREACH NOTE
Medical Week 2 Survey      Flowsheet Row Responses   [facility] patient discharged from? [place]   Does the patient have one of the following disease processes/diagnoses(primary or secondary)? Other   Week 2 attempt successful? No   Unsuccessful attempts Attempt 1            Lucina OCHOA - Registered Nurse

## 2025-01-22 ENCOUNTER — READMISSION MANAGEMENT (OUTPATIENT)
Dept: CALL CENTER | Facility: HOSPITAL | Age: OVER 89
End: 2025-01-22
Payer: MEDICARE

## 2025-01-22 NOTE — OUTREACH NOTE
Medical Week 2 Survey      Flowsheet Row Responses   [facility] patient discharged from? [place]   Does the patient have one of the following disease processes/diagnoses(primary or secondary)? Other   Week 2 attempt successful? Yes   Call start time 1525   Call end time 1528   Is patient permission given to speak with other caregiver? Yes   Person spoke with today (if not patient) and relationship Monica-daughter.   Meds reviewed with patient/caregiver? Yes   Is the patient having any side effects they believe may be caused by any medication additions or changes? No   Does the patient have all medications ordered at discharge? N/A   Is the patient taking all medications as directed (includes completed medication regime)? Yes   Does the patient have a primary care provider?  Yes   Does the patient have an appointment with their PCP within 7 days of discharge? Yes   Has the patient kept scheduled appointments due by today? Yes   Has home health visited the patient within 72 hours of discharge? N/A   Psychosocial issues? No   What is the patient's perception of their health status since discharge? Improving   Week 2 Call Completed? Yes   Graduated Yes   Is the patient interested in additional calls from an ambulatory ? No   Would this patient benefit from a Referral to [facility] Social Work? No   Wrap up additional comments Brief call with daughter. States patient is doing very well. States pacemaker site healing well. Ambulating well with walker, and has been released to return to driving by her cardiologist. Denies any needs or concerns today.   Call end time 1528            Mariya GREENE - Registered Nurse   [FreeTextEntry1] : [facility],   \par [address]  [place] [address]        [phone] \par \par Patient  Name:  [address]  :  1954 \par Patient  ID:  [id number] \par Account:  [id number] \par Patient  Location:  [facility] \par \par Accession:  [id number] \par Procedure:  CT  ABDOMEN  PELVIS  W  IV  AND  PO  CONTRAST  [id number] \par Date  of  Exam:  2017  01:52  PM \par Attending  Physician:  ALEX CONSTANTINO \par Requesting  Physician:  ALEX CONSTANTINO MD \par \par \par CLINICAL  STATEMENT:  Rectal  cancer  post  chemotherapy,  radiation  therapy  and  surgery.    \par   \par TECHNIQUE:  Contiguous  axial  CT  images  were  obtained  from  the  lung  bases  to  the  pubic  symphysis  following  administration  of  100cc  Optiray  320  intravenous  contrast.    Oral  contrast  was  administered.    Reformatted  images  in  the  coronal  and  sagittal  planes  were  acquired. \par   \par COMPARISON:    CT  abdomen  pelvis  dated  2016  and  MRI  of  pelvis  dated  2016 \par   \par FINDINGS: \par   \par LUNG  BASES:  There  is  an  unchanged  6  mm  groundglass  right  lower  lobe  pulmonary  nodule  (series  4,  image  42). \par LIVER:  Hepatic  steatosis. \par SPLEEN:  Unremarkable. \par PANCREAS:  Unremarkable. \par GALLBLADDER  AND  BILIARY  TREE:  There  is  cholelithiasis.  No  biliary  ductal  dilatation. \par ADRENALS:  Unremarkable. \par KIDNEYS:  Symmetric  enhancement  without  hydronephrosis. \par LYMPH  NODES:  There  are  no  enlarged  abdominal  or  pelvic  lymph  nodes. \par VASCULATURE:  The  abdominal  aorta  is  normal  in  caliber  and  demonstrates  atherosclerotic  changes. \par BOWEL:  Patient  is  post  rectal  anastomosis.  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  No  bowel  obstruction. \par PERITONEUM/RETROPERITONEUM/MESENTERY:  Presacral  edema.  No  ascites  or  pneumoperitoneum. \par PELVIC  VISCERA:  Unremarkable. \par BONES  AND  SOFT  TISSUES:  No  acute  osseous  abnormality  is  noted.  L4-L5  spondylolysis.  Grade  1  anterolisthesis  of  L5  on  S1.  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 \par   \par Unchanged  6  mm  groundglass  right  lower  lobe  pulmonary  nodule. \par   \par \par \par \par Original  final  report  dictated  and  signed  by  Dr. Eric Cristobal  on  2017  03:50  PM

## 2025-01-24 ENCOUNTER — TELEPHONE (OUTPATIENT)
Dept: ONCOLOGY | Facility: CLINIC | Age: OVER 89
End: 2025-01-24
Payer: MEDICARE

## 2025-01-24 NOTE — TELEPHONE ENCOUNTER
----- Message from Sakina GREENE sent at 1/24/2025  2:58 PM EST -----  RS AND CONFIRMED W/ PT DAUGHTER.  THANK YOU  ----- Message -----  From: Stephanie Jeronimo RN  Sent: 1/24/2025   2:17 PM EST  To: Ellen Tavares    This pt has a new pacemaker and they cancelled her DEXA apt at [facility] because they cannot scan her with a pacemaker. I asked around and the other nurses said [facility] will do her DEXA. Can you please reschedule her DEXA for [facility]? Her f/u will likely have to be moved until after her DEXA. Thank you!!!

## 2025-02-03 ENCOUNTER — TELEPHONE (OUTPATIENT)
Dept: INTERNAL MEDICINE | Facility: CLINIC | Age: OVER 89
End: 2025-02-03
Payer: MEDICARE

## 2025-02-03 RX ORDER — OLMESARTAN MEDOXOMIL 20 MG/1
20 TABLET ORAL DAILY
Qty: 90 TABLET | Refills: 3 | Status: SHIPPED | OUTPATIENT
Start: 2025-02-03

## 2025-02-03 NOTE — TELEPHONE ENCOUNTER
[facility]    Relationship: Other    Best call back number: [phone]    What was the call regarding: [facility] TO CHECK IF THE OFFICE HAS RECEIVED A FAX FOR A PRESCRIPTION REQUEST. OBED STATED HE WILL BE RESENDING THE DOCUMENT. PLEASE ADVISE.

## 2025-02-04 ENCOUNTER — HOSPITAL ENCOUNTER (OUTPATIENT)
Dept: BONE DENSITY | Facility: HOSPITAL | Age: OVER 89
Discharge: HOME | End: 2025-02-04
Admitting: INTERNAL MEDICINE
Payer: MEDICARE

## 2025-02-04 DIAGNOSIS — M81.0 OSTEOPOROSIS, POST-MENOPAUSAL: Chronic | ICD-10-CM

## 2025-02-04 PROCEDURE — 77080 DXA BONE DENSITY AXIAL: CPT

## 2025-02-06 ENCOUNTER — TELEPHONE (OUTPATIENT)
Dept: INTERNAL MEDICINE | Facility: CLINIC | Age: OVER 89
End: 2025-02-06
Payer: MEDICARE

## 2025-02-06 NOTE — TELEPHONE ENCOUNTER
Caller: [facility]    Best call back number: [phone]     What was the call regarding: CALLING TO FOLLOW UP ON PRESCRIPTION REQUEST FOR PATIENT'S CPAP SUPPLIES

## 2025-02-12 ENCOUNTER — OFFICE VISIT (OUTPATIENT)
Dept: INTERNAL MEDICINE | Facility: CLINIC | Age: OVER 89
End: 2025-02-12
Payer: MEDICARE

## 2025-02-12 ENCOUNTER — LAB (OUTPATIENT)
Dept: LAB | Facility: HOSPITAL | Age: OVER 89
End: 2025-02-12
Payer: MEDICARE

## 2025-02-12 ENCOUNTER — OFFICE VISIT (OUTPATIENT)
Dept: ONCOLOGY | Facility: CLINIC | Age: OVER 89
End: 2025-02-12
Payer: MEDICARE

## 2025-02-12 VITALS
HEIGHT: 59 IN | WEIGHT: 122.2 LBS | BODY MASS INDEX: 24.64 KG/M2 | RESPIRATION RATE: 17 BRPM | SYSTOLIC BLOOD PRESSURE: 131 MMHG | DIASTOLIC BLOOD PRESSURE: 77 MMHG | OXYGEN SATURATION: 95 % | HEART RATE: 75 BPM | TEMPERATURE: 98.3 F

## 2025-02-12 VITALS
OXYGEN SATURATION: 99 % | WEIGHT: 123 LBS | HEIGHT: 59 IN | HEART RATE: 78 BPM | BODY MASS INDEX: 24.8 KG/M2 | SYSTOLIC BLOOD PRESSURE: 130 MMHG | DIASTOLIC BLOOD PRESSURE: 80 MMHG

## 2025-02-12 DIAGNOSIS — Z17.0 CARCINOMA OF UPPER-INNER QUADRANT OF LEFT BREAST IN FEMALE, ESTROGEN RECEPTOR POSITIVE: ICD-10-CM

## 2025-02-12 DIAGNOSIS — Z00.00 MEDICARE ANNUAL WELLNESS VISIT, SUBSEQUENT: Primary | ICD-10-CM

## 2025-02-12 DIAGNOSIS — C50.212 CARCINOMA OF UPPER-INNER QUADRANT OF LEFT BREAST IN FEMALE, ESTROGEN RECEPTOR POSITIVE: Chronic | ICD-10-CM

## 2025-02-12 DIAGNOSIS — I10 ESSENTIAL HYPERTENSION: Chronic | ICD-10-CM

## 2025-02-12 DIAGNOSIS — S83.203A OTHER TEAR OF MENISCUS OF RIGHT KNEE, UNSPECIFIED MENISCUS, UNSPECIFIED WHETHER OLD OR CURRENT TEAR, INITIAL ENCOUNTER: ICD-10-CM

## 2025-02-12 DIAGNOSIS — I35.0 AORTIC STENOSIS, MILD: Chronic | ICD-10-CM

## 2025-02-12 DIAGNOSIS — I65.23 BILATERAL CAROTID ARTERY STENOSIS: ICD-10-CM

## 2025-02-12 DIAGNOSIS — C50.212 CARCINOMA OF UPPER-INNER QUADRANT OF LEFT BREAST IN FEMALE, ESTROGEN RECEPTOR POSITIVE: Primary | ICD-10-CM

## 2025-02-12 DIAGNOSIS — Z17.0 CARCINOMA OF UPPER-INNER QUADRANT OF LEFT BREAST IN FEMALE, ESTROGEN RECEPTOR POSITIVE: Chronic | ICD-10-CM

## 2025-02-12 DIAGNOSIS — M81.0 OSTEOPOROSIS, POST-MENOPAUSAL: Chronic | ICD-10-CM

## 2025-02-12 DIAGNOSIS — Z12.31 VISIT FOR SCREENING MAMMOGRAM: ICD-10-CM

## 2025-02-12 DIAGNOSIS — E55.9 VITAMIN D DEFICIENCY: Chronic | ICD-10-CM

## 2025-02-12 DIAGNOSIS — C50.212 CARCINOMA OF UPPER-INNER QUADRANT OF LEFT BREAST IN FEMALE, ESTROGEN RECEPTOR POSITIVE: ICD-10-CM

## 2025-02-12 DIAGNOSIS — M81.0 OSTEOPOROSIS, POST-MENOPAUSAL: ICD-10-CM

## 2025-02-12 DIAGNOSIS — M15.9 GENERALIZED OSTEOARTHRITIS: ICD-10-CM

## 2025-02-12 DIAGNOSIS — Z90.89 H/O PARATHYROIDECTOMY: ICD-10-CM

## 2025-02-12 DIAGNOSIS — E78.49 OTHER HYPERLIPIDEMIA: Chronic | ICD-10-CM

## 2025-02-12 DIAGNOSIS — Z98.890 H/O PARATHYROIDECTOMY: ICD-10-CM

## 2025-02-12 DIAGNOSIS — I44.2 AV BLOCK, COMPLETE: ICD-10-CM

## 2025-02-12 DIAGNOSIS — Z17.0 CARCINOMA OF UPPER-INNER QUADRANT OF LEFT BREAST IN FEMALE, ESTROGEN RECEPTOR POSITIVE: Primary | ICD-10-CM

## 2025-02-12 PROBLEM — M80.0B9A: Status: RESOLVED | Noted: 2023-01-24 | Resolved: 2025-02-12

## 2025-02-12 PROBLEM — R55 SYNCOPE: Status: RESOLVED | Noted: 2025-01-04 | Resolved: 2025-02-12

## 2025-02-12 PROBLEM — S32.592A PUBIC RAMUS FRACTURE, LEFT, CLOSED, INITIAL ENCOUNTER: Status: RESOLVED | Noted: 2023-01-23 | Resolved: 2025-02-12

## 2025-02-12 PROBLEM — E86.0 DEHYDRATION: Status: RESOLVED | Noted: 2017-12-26 | Resolved: 2025-02-12

## 2025-02-12 LAB
ALBUMIN SERPL-MCNC: 4.1 G/DL (ref 3.5–5.2)
ALBUMIN/GLOB SERPL: 1.5 G/DL
ALP SERPL-CCNC: 46 U/L (ref 39–117)
ALT SERPL W P-5'-P-CCNC: 11 U/L (ref 1–33)
ANION GAP SERPL CALCULATED.3IONS-SCNC: 8.1 MMOL/L (ref 5–15)
AST SERPL-CCNC: 18 U/L (ref 1–32)
BASOPHILS # BLD AUTO: 0.06 10*3/MM3 (ref 0–0.2)
BASOPHILS NFR BLD AUTO: 0.7 % (ref 0–1.5)
BILIRUB SERPL-MCNC: 0.3 MG/DL (ref 0–1.2)
BUN SERPL-MCNC: 20 MG/DL (ref 8–23)
BUN/CREAT SERPL: 17.1 (ref 7–25)
CALCIUM SPEC-SCNC: 9.7 MG/DL (ref 8.2–9.6)
CHLORIDE SERPL-SCNC: 106 MMOL/L (ref 98–107)
CO2 SERPL-SCNC: 25.9 MMOL/L (ref 22–29)
CREAT SERPL-MCNC: 1.17 MG/DL (ref 0.57–1)
DEPRECATED RDW RBC AUTO: 45.6 FL (ref 37–54)
EGFRCR SERPLBLD CKD-EPI 2021: 43.9 ML/MIN/1.73
EOSINOPHIL # BLD AUTO: 0.19 10*3/MM3 (ref 0–0.4)
EOSINOPHIL NFR BLD AUTO: 2.2 % (ref 0.3–6.2)
ERYTHROCYTE [DISTWIDTH] IN BLOOD BY AUTOMATED COUNT: 13.3 % (ref 12.3–15.4)
GLOBULIN UR ELPH-MCNC: 2.7 GM/DL
GLUCOSE SERPL-MCNC: 99 MG/DL (ref 65–99)
HCT VFR BLD AUTO: 37.8 % (ref 34–46.6)
HGB BLD-MCNC: 11.8 G/DL (ref 12–15.9)
IMM GRANULOCYTES # BLD AUTO: 0.06 10*3/MM3 (ref 0–0.05)
IMM GRANULOCYTES NFR BLD AUTO: 0.7 % (ref 0–0.5)
LYMPHOCYTES # BLD AUTO: 1.12 10*3/MM3 (ref 0.7–3.1)
LYMPHOCYTES NFR BLD AUTO: 12.8 % (ref 19.6–45.3)
MCH RBC QN AUTO: 29 PG (ref 26.6–33)
MCHC RBC AUTO-ENTMCNC: 31.2 G/DL (ref 31.5–35.7)
MCV RBC AUTO: 92.9 FL (ref 79–97)
MONOCYTES # BLD AUTO: 0.79 10*3/MM3 (ref 0.1–0.9)
MONOCYTES NFR BLD AUTO: 9.1 % (ref 5–12)
NEUTROPHILS NFR BLD AUTO: 6.5 10*3/MM3 (ref 1.7–7)
NEUTROPHILS NFR BLD AUTO: 74.5 % (ref 42.7–76)
NRBC BLD AUTO-RTO: 0 /100 WBC (ref 0–0.2)
PLATELET # BLD AUTO: 210 10*3/MM3 (ref 140–450)
PMV BLD AUTO: 9.7 FL (ref 6–12)
POTASSIUM SERPL-SCNC: 4.7 MMOL/L (ref 3.5–5.2)
PROT SERPL-MCNC: 6.8 G/DL (ref 6–8.5)
RBC # BLD AUTO: 4.07 10*6/MM3 (ref 3.77–5.28)
SODIUM SERPL-SCNC: 140 MMOL/L (ref 136–145)
WBC NRBC COR # BLD AUTO: 8.72 10*3/MM3 (ref 3.4–10.8)

## 2025-02-12 PROCEDURE — 1170F FXNL STATUS ASSESSED: CPT | Performed by: NURSE PRACTITIONER

## 2025-02-12 PROCEDURE — G0439 PPPS, SUBSEQ VISIT: HCPCS | Performed by: NURSE PRACTITIONER

## 2025-02-12 PROCEDURE — 80053 COMPREHEN METABOLIC PANEL: CPT

## 2025-02-12 PROCEDURE — 1159F MED LIST DOCD IN RCRD: CPT | Performed by: NURSE PRACTITIONER

## 2025-02-12 PROCEDURE — 1126F AMNT PAIN NOTED NONE PRSNT: CPT | Performed by: NURSE PRACTITIONER

## 2025-02-12 PROCEDURE — 1160F RVW MEDS BY RX/DR IN RCRD: CPT | Performed by: NURSE PRACTITIONER

## 2025-02-12 PROCEDURE — 36415 COLL VENOUS BLD VENIPUNCTURE: CPT

## 2025-02-12 PROCEDURE — 85025 COMPLETE CBC W/AUTO DIFF WBC: CPT

## 2025-02-12 PROCEDURE — 99214 OFFICE O/P EST MOD 30 MIN: CPT | Performed by: NURSE PRACTITIONER

## 2025-02-12 RX ORDER — MIRTAZAPINE 15 MG/1
15 TABLET, FILM COATED ORAL NIGHTLY
Qty: 90 TABLET | Refills: 1 | Status: SHIPPED | OUTPATIENT
Start: 2025-02-12

## 2025-02-12 NOTE — PATIENT INSTRUCTIONS
Medicare Wellness  Personal Prevention Plan of Service     Date of Office Visit:    Encounter Provider:  Yvan Ruiz III, NP-C  Place of Service:  [facility] PRIMARY CARE  Patient Name: Neha Guillory  :  1933    As part of the Medicare Wellness portion of your visit today, we are providing you with this personalized preventive plan of services (PPPS). This plan is based upon recommendations of the [place] Preventive Services Task Force (USPSTF) and the Advisory Committee on Immunization Practices (ACIP).    This lists the preventive care services that should be considered, and provides dates of when you are due. Items listed as completed are up-to-date and do not require any further intervention.    Health Maintenance   Topic Date Due    TDAP/TD VACCINES (1 - Tdap) Never done    Pneumococcal Vaccine 50+ (1 of 1 - PCV) Never done    ZOSTER VACCINE (1 of 2) Never done    RSV Vaccine - Adults (1 - 1-dose 75+ series) Never done    BMI FOLLOWUP  2024    INFLUENZA VACCINE  2024    ANNUAL WELLNESS VISIT  2024    COVID-19 Vaccine (1 - -25 season) Never done    LIPID PANEL  01/10/2025    DXA SCAN  2027    MAMMOGRAM  Discontinued       No orders of the defined types were placed in this encounter.      No follow-ups on file.

## 2025-02-12 NOTE — ASSESSMENT & PLAN NOTE
Hypertension is improving with treatment.  Continue current treatment regimen.  Blood pressure will be reassessed at the next regular appointment.    Continue benicar, norvasc, coreg

## 2025-02-12 NOTE — PROGRESS NOTES
Subjective     REASON FOR FOLLOW-UP left sided early stage breast cancer                           History of Present Illness    The patient is a 89 y.o. female with the above-mentioned history, who returns to the office today for 6-month follow-up and review.  She continues on tamoxifen daily which she overall tolerates well.  She was recently hospitalized January 2025 for syncopal episode.  During hospitalization she was noted to have an 8-second ventricular pause which ultimately resulted in pacemaker placement.  She reports since returning home she has had no further syncopal episodes.  She did undergo DEXA scan February 2025 which continued to note osteoporosis.  Slight worsening of her bone density was noted.  The patient is seen today with her family member who does question continuing tamoxifen given the patient's age and worsening bone health.  We have reviewed that typically tamoxifen is protective of the bones though this will be further discussed with Dr. Dill.  Additionally, the patient is overdue for mammogram having last been obtained July 2023.      ONCOLOGY HISTORY:      The patient is an 89-year-old female with a previously surgically treated hyperparathyroidism.  She had recently developed spontaneous bleeding led additional in her right neck extending to the shoulder and across the shoulder and breast with subsequent certain about inflammatory breast cancer.  She underwent a screening mammogram 1/26/2022 and then diagnostic mammogram and ultrasound right breast.  There was an area of focal asymmetry in the posterior one third upper inner quadrant left breast not the affected breast, persist on spot compression negative findings on ultrasound though there was a 5 mm hypoechoic mass left breast that was noted.  Ultrasound-guided biopsy 2/17 revealed invasive lobular carcinoma grade 1 with no lymphatic or perineural invasion, ER 99%, GA 60%, HER-2 negative and Ki-67 of 4%.     Her chest wall  erythema led to an ER visit with CT scan demonstrating chronic right shoulder disease, complete rotator cuff tear.     The patient was seen 2/24/2022 by Dr. Marina general surgery with the surgery requesting not to have radiation therapy at home mastectomy but agreeable to lumpectomy and sentinel lymph node.     Patient now referred to medical oncology for consideration of endocrine therapy as appropriate.      At the time of this dictation she has been seen by radiation therapy.  There are plans to proceed with lumpectomy and sentinel lymph node biopsy and the fact that women of 70 years or older with ER positive tumors do not gain substantially with the addition of radiation therapy.  This has to be balanced with the fact that the patient is known to have osteoporosis involving both hips with left hip T score -3.1 and right hip T score of -2.8, lumbar T score is -0.8.    These findings are discussed with the patient and her daughter in some detail when they are seen 3/7/2022 particularly recognizing the patient is quite active, lives on her own and continues to manage all her daily activities.  Notably she continues to have issues with bilateral rotator cuff injury right greater than left and is to be seen for aspiration of her right shoulder prior to decision made for her breast surgery.    Patient proceeded to right shoulder aspiration that was negative for infectious concerns and then to to surgery 4/7/2022 undergoing a left needle localization lumpectomy with tracing and left axillary sentinel node biopsy.  Her findings revealed invasive lobular carcinoma measuring 5 mm x 5 mm x 4 mm Olivia grade 1, negative margins sentinel lymph nodes x1 --[id number] ER/CO positive, HER-2 negative.    The patient is seen with her daughter 4/11/2022 fortunately having recovered quickly post surgery.  We discussed the findings of her pathology and her current status with plans to initiate tamoxifen (previously discussed) and  proceed with Reclast.    The patient went on to be treated as described with Reclast and fortunately had no additional side effects.  She is next seen 6/27/2022 and  2 months also without side effect profile.  Both she and her daughter indicate that she is doing quite well.    Patient next evaluated 1/11/2023.  Fortunately she is doing quite well and follow-up scan shows no substantial change from previous with multifocal pneumonia and prior to exam having resolved, mediastinal no enlargement with improvement in 5 mm right upper lobe nodule that is also present new from 10/11/2022.  She is having considerable issues with her shoulder reviewed by Dr. Cantrell and request an assessment by [facility].    Patient next seen 4/28/2023 status post orthopedic assessment- healing pelvic fracture, lumbar degenerative disc disease with suspected right-sided radiculopathy.  Patient tolerating tamoxifen without additional complication.  We discussed ongoing treatment with Reclast which is given yearly .    Patient next evaluated 6/20/2024.  Her general performance status remains excellent.  She is having no trouble with current medications including tamoxifen.  We have discussed a follow-up bone density prior to her next visit.      Past Medical History:   Diagnosis Date    Aortic stenosis, mild 08/24/2022    Arthritis     Carcinoma of upper-inner quadrant of left breast in female, estrogen receptor positive 02/24/2022    Carotid artery stenosis     Carotid atherosclerosis, bilateral 07/12/2019    Cataract     BILATERAL    Depression     Gastroenteritis     Generalized osteoarthritis 02/01/2016    H/O diastolic dysfunction     Hyperlipidemia     Hyperparathyroidism     Hyperparathyroidism     Hypertension     Hypertrophic cardiomyopathy     Iron deficiency anemia 10/13/2022    Low back pain May, 2023    Lower, right    Lumbosacral disc disease May 2023    Multifocal pneumonia 10/10/2022    Multifocal pneumonia  10/10/2022    Neuromuscular disorder     Obstructive sleep apnea syndrome 02/01/2016    Osteopenia     Osteoporosis     Primary familial hypertrophic cardiomyopathy 02/01/2016    Pulmonary hypertension     Synovial cyst of popliteal space 02/01/2016    Vitamin D deficiency         Past Surgical History:   Procedure Laterality Date    BREAST BIOPSY Left 02/17/2022    BREAST LUMPECTOMY Left     BREAST LUMPECTOMY WITH SENTINEL NODE BIOPSY Left 04/07/2022    Procedure: LEFT BREAST LUMPECTOMY WITH SENTINEL NODE BIOPSY AND NEEDLE LOCALIZATION;  Surgeon: Bel Marina MD;  Location: [facility] OR [facility];  Service: General;  Laterality: Left;    BUNIONECTOMY Right     FOOT SURGERY    CARDIAC ELECTROPHYSIOLOGY PROCEDURE N/A 1/7/2025    Procedure: Pacemaker DC new MEDT;  Surgeon: Donaldo Fry MD;  Location: [facility] CATH INVASIVE LOCATION;  Service: Cardiovascular;  Laterality: N/A;    COLONOSCOPY N/A 2011    Dr. Luis    EPIDURAL BLOCK  July 2023    Epidural x2    HAND SURGERY Right     TUMOR REMOVED ON FOREFINGER    PITUITARY SURGERY      THYROIDECTOMY Right 04/20/2016    Procedure: RIGHT SUPERIOR PARATHYROIDECTOMY;  Surgeon: Bel Marina MD;  Location: [facility] MAIN OR;  Service:         Current Outpatient Medications on File Prior to Visit   Medication Sig Dispense Refill    amLODIPine (NORVASC) 5 MG tablet Take 1 tablet by mouth Daily. 90 tablet 3    aspirin 81 MG EC tablet Take 1 tablet by mouth Daily. Indications: Disease involving Lipid Deposits in the Arteries      B Complex Vitamins (vitamin b complex) capsule capsule Take 1 capsule by mouth Daily.      carvedilol (COREG) 12.5 MG tablet TAKE ONE TABLET BY MOUTH EVERY 12 HOURS 180 tablet 3    cholecalciferol (VITAMIN D3) 1000 units tablet Take 1 tablet by mouth Daily.      furosemide (LASIX) 20 MG tablet Take 1 tablet by mouth Daily As Needed (swelling). 90 tablet 2    gabapentin (NEURONTIN) 100 MG capsule Take 3 capsules by mouth 2 (Two) Times a Day As Needed  (PAIN). 180 capsule 1    HYDROcodone-acetaminophen (NORCO) 5-325 MG per tablet Take 0.5-1 tablets by mouth Every 6 (Six) Hours As Needed for Moderate Pain. 18 tablet 0    Ibuprofen 3 %, Gabapentin 10 %, Baclofen 2 %, lidocaine 4 % Apply 1-2 g topically to the appropriate area as directed 3 (Three) to 4 (Four) times daily. 90 g 5    Lactobacillus (PROBIOTIC ACIDOPHILUS PO) Take 1 tablet by mouth Every Night.      mirtazapine (REMERON) 15 MG tablet Take 1 tablet by mouth Every Night. 90 tablet 1    multivitamin (THERAGRAN) tablet tablet Take  by mouth Daily. Gummies      olmesartan (BENICAR) 20 MG tablet TAKE 1 TABLET BY MOUTH DAILY 90 tablet 3    potassium chloride 10 MEQ CR tablet Take 2 tablets by mouth Daily As Needed (when taking lasix (furosemide)). 60 tablet 11    tamoxifen (NOLVADEX) 20 MG chemo tablet Take 1 tablet by mouth Daily. 90 tablet 1    vitamin C (ASCORBIC ACID) 250 MG tablet Take 4 tablets by mouth Every Night.      Zinc 50 MG tablet Take 1 tablet by mouth Every Night.       No current facility-administered medications on file prior to visit.        ALLERGIES:    Allergies   Allergen Reactions    Amoxicillin Hives    Griseofulvin Ultramicrosize [Griseofulvin] Confusion    Tramadol Delirium    Atorvastatin Myalgia        Social History     Socioeconomic History    Marital status:    Tobacco Use    Smoking status: Never    Smokeless tobacco: Never   Vaping Use    Vaping status: Never Used   Substance and Sexual Activity    Alcohol use: Never    Drug use: Never    Sexual activity: Defer        Family History   Problem Relation Age of Onset    Diabetes Mother     Heart attack Mother     Arthritis Father     Breast cancer Sister     Breast cancer Sister     Breast cancer Sister     Colon cancer Sister     Heart disease Brother         CABG    Other Brother         BRAIN TUMOR    Colon cancer Brother     Colon cancer Brother     Colon cancer Brother     Colon cancer Maternal Grandfather     Cancer  "Other     Malig Hyperthermia Neg Hx       Family cancer history is positive for sister with breast cancer, a broth had a brain tumor, a brother/sister/maternal grandfather had colon cancer.     Review of Systems     Objective     Vitals:    02/12/25 1053   BP: 131/77   Pulse: 75   Resp: 17   Temp: 98.3 °F (36.8 °C)   TempSrc: Oral   SpO2: 95%   Weight: 55.4 kg (122 lb 3.2 oz)   Height: 149 cm (58.66\")   PainSc: 0-No pain           2/12/2025    10:58 AM   Current Status   ECOG score 1       Physical Exam  Constitutional:       Appearance: Normal appearance.   HENT:      Head: Normocephalic and atraumatic.      Nose: Nose normal.      Mouth/Throat:      Mouth: Mucous membranes are moist.      Pharynx: Oropharynx is clear.   Eyes:      Extraocular Movements: Extraocular movements intact.      Conjunctiva/sclera: Conjunctivae normal.      Pupils: Pupils are equal, round, and reactive to light.   Neck:      Comments: Status post previous parathyroid surgery, well-healed  Cardiovascular:      Rate and Rhythm: Normal rate and regular rhythm.      Pulses: Normal pulses.      Heart sounds: Normal heart sounds.   Pulmonary:      Effort: Pulmonary effort is normal.      Breath sounds: Normal breath sounds.      Comments: Left breast lumpectomy site well-healing, left axillary site also without additional inflammation  Musculoskeletal:      Cervical back: Normal range of motion and neck supple.   Neurological:      Mental Status: She is alert.     Bilateral breast exams without palpable abnormality    RECENT LABS:  Results from last 7 days   Lab Units 02/12/25  1048   WBC 10*3/mm3 8.72   NEUTROS ABS 10*3/mm3 6.50   HEMOGLOBIN g/dL 11.8*   HEMATOCRIT % 37.8   PLATELETS 10*3/mm3 210     Results from last 7 days   Lab Units 02/12/25  1048   SODIUM mmol/L 140   POTASSIUM mmol/L 4.7   CHLORIDE mmol/L 106   CO2 mmol/L 25.9   BUN mg/dL 20   CREATININE mg/dL 1.17*   CALCIUM mg/dL 9.7*   ALBUMIN g/dL 4.1   BILIRUBIN mg/dL 0.3   ALK PHOS " U/L 46   ALT (SGPT) U/L 11   AST (SGOT) U/L 18   GLUCOSE mg/dL 99             Assessment & Plan          89-year-old female with a history of osteoarthritis, hyperparathyroidism treated surgically, obstructive sleep apnea, carotid artery disease, hypertrophic cardiomyopathy, osteoporosis and bilateral rotator cuff injuries right greater than left.  She has, recently, been found to have a carcinoma the upper inner quadrant of the left breast-invasive lobular carcinoma overall grade 1, 5 mm, ER 99%, KY 60%, Ki-67 4%-[id number].     The patient is here with her daughter 3/7/2022 we have discussed her treatment overall for her newfound breast cancer with patient preference to avoid chemotherapy or adjuvant radiation therapy.      There is no evidence that chemotherapy has a role in this patient's care and we have discussed that it would not be necessary.  Radiation therapy benefit has also been discussed and will not be pursued.      Notably the latter is supported by a previous meta-analysis that looked at women greater than 70 years of age with clinical stage I ER positive women who were treated with radiation and tamoxifen with results that indicate that baseline risk is low enough to reasonably avoid RT.    As we discussed endocrine therapy she is also felt better served with tamoxifen therapy as result of her underlying osteoporosis.  She has been on long-term Prolia without substantial effect to this point and might be better served with an alternative therapy such as Reclast.          After discussion the patient went on to have fluid aspiration from her right shoulder joint .  Her studies were unremarkable for infectious process and the erythema was thought to be related to hematoma from DJD.                                                                                                                                                    She was able to proceed to breast surgery 4/7/2022 undergoing a left needle  localization lumpectomy with tracing and left axillary sentinel node biopsy.  Her findings revealed invasive lobular carcinoma measuring 5 mm x 5 mm x 4 mm Olivia grade 1, negative margins sentinel lymph nodes x1 --[id number] ER/UT positive, HER-2 negative.    We went on to proceed with Reclast, continue vitamin D and calcium supplementation 4/11/2022.  The patient began tamoxifen and is seen back 6/27/2022 without any side effect profile.  We discussed the patient's mild leukocytosis and that she should be aware of any fever or symptoms of infection.  Nothing is present today that might indicate this.  We will also inquire again as to whether she has had any steroid injections but this is not the case thus far.      Patient next evaluated 1/11/2023.  Fortunately she is doing quite well and follow-up scan shows no substantial change from previous with multifocal pneumonia and prior to exam having resolved, mediastinal no enlargement with improvement in 5 mm right upper lobe nodule that is also present new from 10/11/2022.  She is having considerable issues with her shoulder reviewed by Dr. Cantrell and request an assessment by [facility].    Patient next seen 4/28/2023 status post orthopedic assessment- healing pelvic fracture, lumbar degenerative disc disease with suspected right-sided radiculopathy.  Patient tolerating tamoxifen without additional complication.  We discussed ongoing treatment with Reclast which is given yearly.    Patient reassessed 6/20/2028 stable per her general physical status and tolerance to tamoxifen.  She will need a follow-up bone density as she continues treatment with Reclast.    Patient reviewed 2/12/2025 continuing on tamoxifen.  Bone density 2/4/2025 no osteoporosis with slight worsening of bone density.  The patient is due for Reclast June 2025 which will be scheduled.  He is also overdue for mammogram which will be scheduled    Plan:  Continue tamoxifen.  Reclast is  due June 2025 which will be scheduled today  Patient is overdue for annual mammogram which will be scheduled  MD follow-up June 2025 with CBC, CMP, magnesium, phosphorus and Reclast  I will discuss ongoing tamoxifen use in light of worsening bone health with Dr. Dill.  However, was reviewed with the patient and family today tamoxifen is the best endocrine therapy of choice in light of osteoporosis.    Patient is on high risk medication requiring close monitoring for toxicity    Briseyda Palumbo, APRN  02/12/2025

## 2025-02-12 NOTE — PROGRESS NOTES
The ABCs of the Annual Wellness Visit  Subsequent Medicare Wellness Visit    Subjective    Neha Guillory is a 89 y.o. female who presents for a Subsequent Medicare Wellness Visit.    The following portions of the patient's history were reviewed and   updated as appropriate: allergies, current medications, past family history, past medical history, past social history, past surgical history, and problem list.    Wt Readings from Last 4 Encounters:   02/12/25 55.8 kg (123 lb)   02/12/25 55.4 kg (122 lb 3.2 oz)   01/05/25 65 kg (143 lb 4.8 oz)   12/19/24 55.3 kg (122 lb)       Weight trend is stable.      Mobility    [] Ambulates independently  [x] Ambulates with cane   [] Ambulates with quad cane  [] Ambulates with rollator   [] Ambulates with walker   [] Mobile with wheelchair   [] Mobile with electric wheelchair     Rollator at home: with walking exercises     Continence    [x] Continent of bowel and bladder  [] Incontinent bowel and bladder   [] Incontinent bladder   [] Incontinent bowel      Raised toilet seat    Social   Daughter active in care     Laundry room is in basement     Son lives in basement.     Advised a fall alert     Compared to one year ago, the patient feels her physical   health is the same.    Compared to one year ago, the patient feels her mental   health is the same.    Recent Hospitalizations:  This patient has had a [facility] admission record on file within the last 365 days.    Current Medical Providers:  Patient Care Team:  Yvan Ruiz III NP-C as PCP - General (Internal Medicine)  Bel Marina MD as Referring Physician (General Surgery)  Hunter Acosta MD as Consulting Physician (Radiation Oncology)  Ricky Dill MD as Consulting Physician (Hematology and Oncology)  Jose Martin Liu APRN as Nurse Practitioner (Family Medicine)  Zack Mohr MD as Consulting Physician (Cardiology)    Outpatient Medications Prior to Visit   Medication Sig Dispense  Refill    amLODIPine (NORVASC) 5 MG tablet Take 1 tablet by mouth Daily. 90 tablet 3    aspirin 81 MG EC tablet Take 1 tablet by mouth Daily. Indications: Disease involving Lipid Deposits in the Arteries      B Complex Vitamins (vitamin b complex) capsule capsule Take 1 capsule by mouth Daily.      carvedilol (COREG) 12.5 MG tablet TAKE ONE TABLET BY MOUTH EVERY 12 HOURS 180 tablet 3    cholecalciferol (VITAMIN D3) 1000 units tablet Take 1 tablet by mouth Daily.      Ibuprofen 3 %, Gabapentin 10 %, Baclofen 2 %, lidocaine 4 % Apply 1-2 g topically to the appropriate area as directed 3 (Three) to 4 (Four) times daily. 90 g 5    Lactobacillus (PROBIOTIC ACIDOPHILUS PO) Take 1 tablet by mouth Every Night.      multivitamin (THERAGRAN) tablet tablet Take  by mouth Daily. Gummies      olmesartan (BENICAR) 20 MG tablet TAKE 1 TABLET BY MOUTH DAILY 90 tablet 3    tamoxifen (NOLVADEX) 20 MG chemo tablet Take 1 tablet by mouth Daily. 90 tablet 1    vitamin C (ASCORBIC ACID) 250 MG tablet Take 4 tablets by mouth Every Night.      Zinc 50 MG tablet Take 1 tablet by mouth Every Night.      mirtazapine (REMERON) 15 MG tablet Take 1 tablet by mouth Every Night. 90 tablet 1    furosemide (LASIX) 20 MG tablet Take 1 tablet by mouth Daily As Needed (swelling). (Patient not taking: Reported on 2/12/2025) 90 tablet 2    gabapentin (NEURONTIN) 100 MG capsule Take 3 capsules by mouth 2 (Two) Times a Day As Needed (PAIN). (Patient not taking: Reported on 2/12/2025) 180 capsule 1    HYDROcodone-acetaminophen (NORCO) 5-325 MG per tablet Take 0.5-1 tablets by mouth Every 6 (Six) Hours As Needed for Moderate Pain. (Patient not taking: Reported on 2/12/2025) 18 tablet 0    potassium chloride 10 MEQ CR tablet Take 2 tablets by mouth Daily As Needed (when taking lasix (furosemide)). (Patient not taking: Reported on 2/12/2025) 60 tablet 11     No facility-administered medications prior to visit.       No opioid medication identified on active  "medication list. I have reviewed chart for other potential  high risk medication/s and harmful drug interactions in the elderly.        Aspirin is on active medication list. Aspirin use is indicated based on review of current medical condition/s. Pros and cons of this therapy have been discussed today. Benefits of this medication outweigh potential harm.  Patient has been encouraged to continue taking this medication.  .      Patient Active Problem List   Diagnosis    Hyperlipidemia    Essential hypertension    Hypertrophic cardiomyopathy    Generalized osteoarthritis    RICH on CPAP    Osteoporosis    Vitamin D deficiency    Colon polyps    Family hx of colon cancer    H/O parathyroidectomy    Pulmonary hypertension    Ischemic rest pain of lower extremity    Bilateral carpal tunnel syndrome    Osteopenia    Hyperuricemia    Diastolic dysfunction    Hip pain, left    Hammer toe of second toe of right foot    Gastroenteritis    Starvation ketoacidosis    Stage 3a chronic kidney disease    Tophus    Osteoporosis, post-menopausal    Hyperglycemia    Bilateral carotid artery stenosis    Prediabetes    Resistant hypertension    FH: breast cancer    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  ACP discussion was held with the patient during this visit. Patient has an advance directive in EMR which is still valid.      Objective    Vitals:    02/12/25 1247   BP: 130/80   BP Location: Left arm   Patient Position: Sitting   Cuff Size: Adult   Pulse: 78   SpO2: 99%   Weight: 55.8 kg (123 lb)   Height: 149 cm (58.66\")   PainSc: 0-No pain " "    Estimated body mass index is 25.13 kg/m² as calculated from the following:    Height as of this encounter: 149 cm (58.66\").    Weight as of this encounter: 55.8 kg (123 lb).    BMI is >= 25 and <30. (Overweight) The following options were offered after discussion;: exercise counseling/recommendations      [facility]  Gait and Balance Evaluation:  Slow Tentative Pace and uses cane    Does the patient have evidence of cognitive impairment? No          HEALTH RISK ASSESSMENT    Smoking Status:  Social History     Tobacco Use   Smoking Status Never   Smokeless Tobacco Never     Alcohol Consumption:  Social History     Substance and Sexual Activity   Alcohol Use Never     Fall Risk Screen:    [facility], and patient is at HIGH risk for falls. Assessment completed on:2025    Depression Screenin/12/2025    12:52 PM   PHQ-2/PHQ-9 Depression Screening   Little interest or pleasure in doing things Not at all   Feeling down, depressed, or hopeless Not at all       Health Habits and Functional and Cognitive Screenin/12/2025    12:53 PM   Functional & Cognitive Status   Do you have difficulty preparing food and eating? No   Do you have difficulty bathing yourself, getting dressed or grooming yourself? Yes   Do you have difficulty using the toilet? No   Do you have difficulty moving around from place to place? No   Do you have trouble with steps or getting out of a bed or a chair? No   Current Diet Well Balanced Diet   Dental Exam Up to date   Eye Exam Up to date   Exercise (times per week) 2 times per week   Current Exercises Include Walking   Do you need help using the phone?  No   Are you deaf or do you have serious difficulty hearing?  Yes   Do you need help to go to places out of walking distance? No   Do you need help shopping? No   Do you need help preparing meals?  No   Do you need help with housework?  No   Do you need help with laundry? No "   Do you need help taking your medications? No   Do you need help managing money? No   Do you ever drive or ride in a car without wearing a seat belt? No   Have you felt unusual stress, anger or loneliness in the last month? No   Who do you live with? Alone   If you need help, do you have trouble finding someone available to you? No   Have you been bothered in the last four weeks by sexual problems? No   Do you have difficulty concentrating, remembering or making decisions? No       Age-appropriate Screening Schedule:  Refer to the list below for future screening recommendations based on patient's age, sex and/or medical conditions. Orders for these recommended tests are listed in the plan section. The patient has been provided with a written plan.    Health Maintenance   Topic Date Due    BMI FOLLOWUP  04/12/2024    ANNUAL WELLNESS VISIT  07/05/2024    LIPID PANEL  01/10/2025    DXA SCAN  02/04/2027    COVID-19 Vaccine  Discontinued    RSV Vaccine - Adults  Discontinued    INFLUENZA VACCINE  Discontinued    Pneumococcal Vaccine 50+  Discontinued    MAMMOGRAM  Discontinued    TDAP/TD VACCINES  Discontinued    ZOSTER VACCINE  Discontinued                CMS Preventative Services Quick Reference  Risk Factors Identified During Encounter  Fall Risk-High or Moderate: Discussed Fall Prevention in the home, Sit to Stand Exercise Information Shared in After Visit Summary, and advised falls alert - just completed PT at home.  Immunizations Discussed/Encouraged:  Declines vaccines       The above risks/problems have been discussed with the patient.  Pertinent information has been shared with the patient in the After Visit Summary.  An After Visit Summary and PPPS were made available to the patient.    Follow Up:   Next Medicare Wellness visit to be scheduled in 1 year.       Additional E&M Note during same encounter follows:  Patient has multiple medical problems which are significant and separately identifiable that require  "additional work above and beyond the Medicare Wellness Visit.      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    Complete AV block - s/p pacemaker 1/7/25  Lasix, potassium discontinued.     1/15/25: declined further PT from caretenders    Fall: 12/2024: right knee medial meniscal tear: Dr Spears   Had follow up with Progress Notes by Haley Aparicio APRN (12/19/2024 1:50 PM)   States doing better     Hypertension: chronic and continues coreg, norvasc, benicar       Breast cancer: continues tamoxifen   She had follow up with oncology this am.   Continue tamoxifen.   Mammogram is scheduled.     OP: next reclast July 2nd 2025   Hx Left sided lateral compression pelvic fracture         Objective   Vital Signs:  /80 (BP Location: Left arm, Patient Position: Sitting, Cuff Size: Adult)   Pulse 78   Ht 149 cm (58.66\")   Wt 55.8 kg (123 lb)   SpO2 99%   BMI 25.13 kg/m²     Physical Exam  Vitals reviewed.   Constitutional:       Appearance: Normal appearance. She is well-developed.   Neck:      Thyroid: No thyromegaly.      Vascular: No carotid bruit.   Cardiovascular:      Rate and Rhythm: Normal rate and regular rhythm.      Pulses: Normal pulses.      Heart sounds: Murmur heard.   Pulmonary:      Effort: Pulmonary effort is normal.      Breath sounds: Normal breath sounds.      Comments: E/U   Chest:       Abdominal:      General: Bowel sounds are normal.      Palpations: Abdomen is soft.   Musculoskeletal:         General: Normal range of motion.      Cervical back: Normal range of motion " and neck supple.      Right lower leg: No edema.      Left lower leg: No edema.   Lymphadenopathy:      Cervical: No cervical adenopathy.   Skin:     General: Skin is warm and dry.      Capillary Refill: Capillary refill takes 2 to 3 seconds.   Neurological:      Mental Status: She is alert and oriented to person, place, and time.   Psychiatric:         Mood and Affect: Mood normal.         Behavior: Behavior normal. Behavior is cooperative.         Thought Content: Thought content normal.         Judgment: Judgment normal.          The following data was reviewed by: Yvan Ruiz III, NP-C on 02/12/2025:  Common labs          1/6/2025    05:00 1/7/2025    03:52 2/12/2025    10:48   Common Labs   Glucose 83  89  99    BUN 20  24  20    Creatinine 0.99  1.14  1.17    Sodium 141  138  140    Potassium 4.3  4.1  4.7    Chloride 107  105  106    Calcium 8.7  9.0  9.7    Albumin  3.5  4.1    Total Bilirubin  0.5  0.3    Alkaline Phosphatase  38  46    AST (SGOT)  18  18    ALT (SGPT)  13  11    WBC 9.98  11.13  8.72    Hemoglobin 10.8  11.3  11.8    Hematocrit 34.4  34.4  37.8    Platelets 192  203  210                 Assessment and Plan     Problem List Items Addressed This Visit          Cardiac and Vasculature    Aortic stenosis, mild (Chronic)    Essential hypertension (Chronic)    Current Assessment & Plan     Hypertension is improving with treatment.  Continue current treatment regimen.  Blood pressure will be reassessed at the next regular appointment.    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, estrogen receptor positive (Chronic)       Musculoskeletal and Injuries    Generalized osteoarthritis (Chronic)    Osteoporosis, post-menopausal (Chronic)    Current Assessment & Plan     Continues reclast - next this summer  Advised daily weight bearing exercise          Right knee meniscal tear    Current Assessment & Plan     Did not have surgical intervention - states improved           Other Visit Diagnoses       Medicare annual wellness visit, subsequent    -  Primary                    Disabled parking permits are designed to provide accessibility and convenience for individuals with disabilities.   6 year placard     [] Mobility Impairments: Conditions like spinal cord injuries, muscular dystrophy, severe arthritis, or the need to use assistive devices such as crutches, walkers, or wheelchairs.  [x] Cardiovascular Conditions: Diseases such as congestive heart failure, coronary artery disease, or severe arrhythmias that limit mobility and stamina.  [] Neurological Disorders: Conditions like multiple sclerosis, Parkinson’s disease, or stroke that result in significant mobility challenges.  [] Chronic Respiratory Conditions: Diseases such as chronic obstructive pulmonary disease (COPD), asthma, or pulmonary fibrosis that make physical activity and walking difficult.  [x] Severe Arthritis: Joint pain and reduced mobility due to conditions like osteoarthritis or rheumatoid arthritis.    [] Temporary Disabilities: Individuals recovering from surgeries, injuries, or temporary conditions like broken bones or post-surgical recovery. (3 month)     These permits allow individuals to park closer to their destinations, reducing the physical strain and obstacles they may face.    Form completed today.   Patient should not sign their portion until in front of notary.        Follow Up     Return in about 6  months (around 8/12/2025).    Patient was given instructions and counseling regarding her condition or for health maintenance advice. Please see specific information pulled into the AVS if appropriate.

## 2025-02-19 ENCOUNTER — TELEPHONE (OUTPATIENT)
Dept: ONCOLOGY | Facility: CLINIC | Age: OVER 89
End: 2025-02-19
Payer: MEDICARE

## 2025-02-28 ENCOUNTER — CLINICAL SUPPORT NO REQUIREMENTS (OUTPATIENT)
Age: OVER 89
End: 2025-02-28
Payer: MEDICARE

## 2025-02-28 ENCOUNTER — OFFICE VISIT (OUTPATIENT)
Age: OVER 89
End: 2025-02-28
Payer: MEDICARE

## 2025-02-28 VITALS — WEIGHT: 123 LBS | BODY MASS INDEX: 24.8 KG/M2 | HEART RATE: 67 BPM | HEIGHT: 59 IN

## 2025-02-28 DIAGNOSIS — I44.2 AV BLOCK, COMPLETE: Primary | ICD-10-CM

## 2025-03-11 ENCOUNTER — HOSPITAL ENCOUNTER (OUTPATIENT)
Dept: MAMMOGRAPHY | Facility: HOSPITAL | Age: OVER 89
Discharge: HOME | End: 2025-03-11
Admitting: NURSE PRACTITIONER
Payer: MEDICARE

## 2025-03-11 DIAGNOSIS — Z17.0 CARCINOMA OF UPPER-INNER QUADRANT OF LEFT BREAST IN FEMALE, ESTROGEN RECEPTOR POSITIVE: ICD-10-CM

## 2025-03-11 DIAGNOSIS — C50.212 CARCINOMA OF UPPER-INNER QUADRANT OF LEFT BREAST IN FEMALE, ESTROGEN RECEPTOR POSITIVE: ICD-10-CM

## 2025-03-11 DIAGNOSIS — Z12.31 VISIT FOR SCREENING MAMMOGRAM: ICD-10-CM

## 2025-03-11 PROCEDURE — 77067 SCR MAMMO BI INCL CAD: CPT

## 2025-03-11 PROCEDURE — 77063 BREAST TOMOSYNTHESIS BI: CPT

## 2025-04-02 ENCOUNTER — APPOINTMENT (OUTPATIENT)
Dept: CT IMAGING | Facility: HOSPITAL | Age: OVER 89
End: 2025-04-02
Payer: MEDICARE

## 2025-04-02 ENCOUNTER — HOSPITAL ENCOUNTER (OUTPATIENT)
Facility: HOSPITAL | Age: OVER 89
Setting detail: OBSERVATION
Discharge: HOME | End: 2025-04-04
Attending: EMERGENCY MEDICINE | Admitting: INTERNAL MEDICINE
Payer: MEDICARE
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
## 2025-04-02 DIAGNOSIS — S06.5XAA SUBDURAL HEMATOMA: Primary | ICD-10-CM

## 2025-04-02 DIAGNOSIS — S01.112A LACERATION OF LEFT EYEBROW, INITIAL ENCOUNTER: ICD-10-CM

## 2025-04-02 LAB
ALBUMIN SERPL-MCNC: 3.7 G/DL (ref 3.5–5.2)
ALBUMIN/GLOB SERPL: 1.3 G/DL
ALP SERPL-CCNC: 57 U/L (ref 39–117)
ALT SERPL W P-5'-P-CCNC: 10 U/L (ref 1–33)
ANION GAP SERPL CALCULATED.3IONS-SCNC: 10.4 MMOL/L (ref 5–15)
APTT PPP: 26.3 SECONDS (ref 22.7–35.4)
AST SERPL-CCNC: 19 U/L (ref 1–32)
BASOPHILS # BLD AUTO: 0.08 10*3/MM3 (ref 0–0.2)
BASOPHILS NFR BLD AUTO: 0.8 % (ref 0–1.5)
BILIRUB SERPL-MCNC: <0.2 MG/DL (ref 0–1.2)
BUN SERPL-MCNC: 20 MG/DL (ref 8–23)
BUN/CREAT SERPL: 17.5 (ref 7–25)
CALCIUM SPEC-SCNC: 9.6 MG/DL (ref 8.2–9.6)
CHLORIDE SERPL-SCNC: 104 MMOL/L (ref 98–107)
CO2 SERPL-SCNC: 24.6 MMOL/L (ref 22–29)
CREAT SERPL-MCNC: 1.14 MG/DL (ref 0.57–1)
DEPRECATED RDW RBC AUTO: 42.7 FL (ref 37–54)
EGFRCR SERPLBLD CKD-EPI 2021: 45.3 ML/MIN/1.73
EOSINOPHIL # BLD AUTO: 0.18 10*3/MM3 (ref 0–0.4)
EOSINOPHIL NFR BLD AUTO: 1.8 % (ref 0.3–6.2)
ERYTHROCYTE [DISTWIDTH] IN BLOOD BY AUTOMATED COUNT: 13.1 % (ref 12.3–15.4)
GLOBULIN UR ELPH-MCNC: 2.8 GM/DL
GLUCOSE SERPL-MCNC: 129 MG/DL (ref 65–99)
HCT VFR BLD AUTO: 38.7 % (ref 34–46.6)
HGB BLD-MCNC: 12.9 G/DL (ref 12–15.9)
IMM GRANULOCYTES # BLD AUTO: 0.07 10*3/MM3 (ref 0–0.05)
IMM GRANULOCYTES NFR BLD AUTO: 0.7 % (ref 0–0.5)
INR PPP: 1.25 (ref 0.9–1.1)
LYMPHOCYTES # BLD AUTO: 1.1 10*3/MM3 (ref 0.7–3.1)
LYMPHOCYTES NFR BLD AUTO: 10.7 % (ref 19.6–45.3)
MCH RBC QN AUTO: 29.9 PG (ref 26.6–33)
MCHC RBC AUTO-ENTMCNC: 33.3 G/DL (ref 31.5–35.7)
MCV RBC AUTO: 89.6 FL (ref 79–97)
MONOCYTES # BLD AUTO: 0.93 10*3/MM3 (ref 0.1–0.9)
MONOCYTES NFR BLD AUTO: 9 % (ref 5–12)
NEUTROPHILS NFR BLD AUTO: 7.92 10*3/MM3 (ref 1.7–7)
NEUTROPHILS NFR BLD AUTO: 77 % (ref 42.7–76)
NRBC BLD AUTO-RTO: 0 /100 WBC (ref 0–0.2)
PLATELET # BLD AUTO: 210 10*3/MM3 (ref 140–450)
PMV BLD AUTO: 10.8 FL (ref 6–12)
POTASSIUM SERPL-SCNC: 4.6 MMOL/L (ref 3.5–5.2)
PROT SERPL-MCNC: 6.5 G/DL (ref 6–8.5)
PROTHROMBIN TIME: 15.7 SECONDS (ref 11.7–14.2)
RBC # BLD AUTO: 4.32 10*6/MM3 (ref 3.77–5.28)
SODIUM SERPL-SCNC: 139 MMOL/L (ref 136–145)
WBC NRBC COR # BLD AUTO: 10.28 10*3/MM3 (ref 3.4–10.8)

## 2025-04-02 PROCEDURE — 90471 IMMUNIZATION ADMIN: CPT | Performed by: PHYSICIAN ASSISTANT

## 2025-04-02 PROCEDURE — 25010000002 LABETALOL 5 MG/ML SOLUTION: Performed by: PHYSICIAN ASSISTANT

## 2025-04-02 PROCEDURE — G0378 HOSPITAL OBSERVATION PER HR: HCPCS

## 2025-04-02 PROCEDURE — 80053 COMPREHEN METABOLIC PANEL: CPT | Performed by: PHYSICIAN ASSISTANT

## 2025-04-02 PROCEDURE — 85025 COMPLETE CBC W/AUTO DIFF WBC: CPT | Performed by: PHYSICIAN ASSISTANT

## 2025-04-02 PROCEDURE — 85730 THROMBOPLASTIN TIME PARTIAL: CPT | Performed by: PHYSICIAN ASSISTANT

## 2025-04-02 PROCEDURE — 99291 CRITICAL CARE FIRST HOUR: CPT

## 2025-04-02 PROCEDURE — 25010000002 TETANUS-DIPHTH-ACELL PERTUSSIS 5-2.5-18.5 LF-MCG/0.5 SUSPENSION PREFILLED SYRINGE: Performed by: PHYSICIAN ASSISTANT

## 2025-04-02 PROCEDURE — 96374 THER/PROPH/DIAG INJ IV PUSH: CPT

## 2025-04-02 PROCEDURE — 90715 TDAP VACCINE 7 YRS/> IM: CPT | Performed by: PHYSICIAN ASSISTANT

## 2025-04-02 PROCEDURE — 72125 CT NECK SPINE W/O DYE: CPT

## 2025-04-02 PROCEDURE — 85610 PROTHROMBIN TIME: CPT | Performed by: PHYSICIAN ASSISTANT

## 2025-04-02 PROCEDURE — 70450 CT HEAD/BRAIN W/O DYE: CPT

## 2025-04-02 RX ORDER — LABETALOL HYDROCHLORIDE 5 MG/ML
10 INJECTION, SOLUTION INTRAVENOUS ONCE
Status: COMPLETED | OUTPATIENT
Start: 2025-04-02 | End: 2025-04-02

## 2025-04-02 RX ORDER — SODIUM CHLORIDE 0.9 % (FLUSH) 0.9 %
10 SYRINGE (ML) INJECTION AS NEEDED
Status: DISCONTINUED | OUTPATIENT
Start: 2025-04-02 | End: 2025-04-04 | Stop reason: HOSPADM

## 2025-04-02 RX ADMIN — TETANUS TOXOID, REDUCED DIPHTHERIA TOXOID AND ACELLULAR PERTUSSIS VACCINE, ADSORBED 0.5 ML: 5; 2.5; 8; 8; 2.5 SUSPENSION INTRAMUSCULAR at 22:09

## 2025-04-02 RX ADMIN — LABETALOL HYDROCHLORIDE 10 MG: 5 INJECTION, SOLUTION INTRAVENOUS at 23:31

## 2025-04-03 ENCOUNTER — APPOINTMENT (OUTPATIENT)
Dept: CT IMAGING | Facility: HOSPITAL | Age: OVER 89
End: 2025-04-03
Payer: MEDICARE

## 2025-04-03 LAB
ANION GAP SERPL CALCULATED.3IONS-SCNC: 13.4 MMOL/L (ref 5–15)
BUN SERPL-MCNC: 19 MG/DL (ref 8–23)
BUN/CREAT SERPL: 18.4 (ref 7–25)
CALCIUM SPEC-SCNC: 9 MG/DL (ref 8.2–9.6)
CHLORIDE SERPL-SCNC: 107 MMOL/L (ref 98–107)
CO2 SERPL-SCNC: 22.6 MMOL/L (ref 22–29)
CREAT SERPL-MCNC: 1.03 MG/DL (ref 0.57–1)
DEPRECATED RDW RBC AUTO: 43 FL (ref 37–54)
EGFRCR SERPLBLD CKD-EPI 2021: 51.1 ML/MIN/1.73
ERYTHROCYTE [DISTWIDTH] IN BLOOD BY AUTOMATED COUNT: 13.1 % (ref 12.3–15.4)
GLUCOSE SERPL-MCNC: 87 MG/DL (ref 65–99)
HCT VFR BLD AUTO: 35.4 % (ref 34–46.6)
HGB BLD-MCNC: 11.7 G/DL (ref 12–15.9)
MCH RBC QN AUTO: 29.5 PG (ref 26.6–33)
MCHC RBC AUTO-ENTMCNC: 33.1 G/DL (ref 31.5–35.7)
MCV RBC AUTO: 89.4 FL (ref 79–97)
PLATELET # BLD AUTO: 192 10*3/MM3 (ref 140–450)
PMV BLD AUTO: 11.2 FL (ref 6–12)
POTASSIUM SERPL-SCNC: 3.9 MMOL/L (ref 3.5–5.2)
RBC # BLD AUTO: 3.96 10*6/MM3 (ref 3.77–5.28)
SODIUM SERPL-SCNC: 143 MMOL/L (ref 136–145)
WBC NRBC COR # BLD AUTO: 10.67 10*3/MM3 (ref 3.4–10.8)

## 2025-04-03 PROCEDURE — G0378 HOSPITAL OBSERVATION PER HR: HCPCS

## 2025-04-03 PROCEDURE — 70450 CT HEAD/BRAIN W/O DYE: CPT

## 2025-04-03 PROCEDURE — 80048 BASIC METABOLIC PNL TOTAL CA: CPT | Performed by: NURSE PRACTITIONER

## 2025-04-03 PROCEDURE — 36415 COLL VENOUS BLD VENIPUNCTURE: CPT | Performed by: NURSE PRACTITIONER

## 2025-04-03 PROCEDURE — 97161 PT EVAL LOW COMPLEX 20 MIN: CPT | Performed by: PHYSICAL THERAPIST

## 2025-04-03 PROCEDURE — 99213 OFFICE O/P EST LOW 20 MIN: CPT | Performed by: NURSE PRACTITIONER

## 2025-04-03 PROCEDURE — 85027 COMPLETE CBC AUTOMATED: CPT | Performed by: NURSE PRACTITIONER

## 2025-04-03 RX ORDER — ONDANSETRON 2 MG/ML
4 INJECTION INTRAMUSCULAR; INTRAVENOUS EVERY 6 HOURS PRN
Status: DISCONTINUED | OUTPATIENT
Start: 2025-04-03 | End: 2025-04-04 | Stop reason: HOSPADM

## 2025-04-03 RX ORDER — SODIUM CHLORIDE 0.9 % (FLUSH) 0.9 %
10 SYRINGE (ML) INJECTION EVERY 12 HOURS SCHEDULED
Status: DISCONTINUED | OUTPATIENT
Start: 2025-04-03 | End: 2025-04-04 | Stop reason: HOSPADM

## 2025-04-03 RX ORDER — TAMOXIFEN CITRATE 10 MG/1
20 TABLET ORAL DAILY
Status: DISCONTINUED | OUTPATIENT
Start: 2025-04-03 | End: 2025-04-04 | Stop reason: HOSPADM

## 2025-04-03 RX ORDER — AMOXICILLIN 250 MG
2 CAPSULE ORAL 2 TIMES DAILY PRN
Status: DISCONTINUED | OUTPATIENT
Start: 2025-04-03 | End: 2025-04-04 | Stop reason: HOSPADM

## 2025-04-03 RX ORDER — CARVEDILOL 12.5 MG/1
12.5 TABLET ORAL EVERY 12 HOURS
Status: DISCONTINUED | OUTPATIENT
Start: 2025-04-03 | End: 2025-04-04 | Stop reason: HOSPADM

## 2025-04-03 RX ORDER — BISACODYL 10 MG
10 SUPPOSITORY, RECTAL RECTAL DAILY PRN
Status: DISCONTINUED | OUTPATIENT
Start: 2025-04-03 | End: 2025-04-04 | Stop reason: HOSPADM

## 2025-04-03 RX ORDER — TAMOXIFEN CITRATE 20 MG/1
20 TABLET ORAL DAILY
COMMUNITY

## 2025-04-03 RX ORDER — AMLODIPINE BESYLATE 5 MG/1
5 TABLET ORAL DAILY
Status: DISCONTINUED | OUTPATIENT
Start: 2025-04-03 | End: 2025-04-04 | Stop reason: HOSPADM

## 2025-04-03 RX ORDER — ONDANSETRON 4 MG/1
4 TABLET, ORALLY DISINTEGRATING ORAL EVERY 6 HOURS PRN
Status: DISCONTINUED | OUTPATIENT
Start: 2025-04-03 | End: 2025-04-04 | Stop reason: HOSPADM

## 2025-04-03 RX ORDER — POLYETHYLENE GLYCOL 3350 17 G/17G
17 POWDER, FOR SOLUTION ORAL DAILY PRN
Status: DISCONTINUED | OUTPATIENT
Start: 2025-04-03 | End: 2025-04-04 | Stop reason: HOSPADM

## 2025-04-03 RX ORDER — CALCIUM CARBONATE 500 MG/1
2 TABLET, CHEWABLE ORAL 2 TIMES DAILY PRN
Status: DISCONTINUED | OUTPATIENT
Start: 2025-04-03 | End: 2025-04-04 | Stop reason: HOSPADM

## 2025-04-03 RX ORDER — L.ACID,PARA/B.BIFIDUM/S.THERM 8B CELL
1 CAPSULE ORAL DAILY
Status: DISCONTINUED | OUTPATIENT
Start: 2025-04-03 | End: 2025-04-04 | Stop reason: HOSPADM

## 2025-04-03 RX ORDER — LOSARTAN POTASSIUM 50 MG/1
50 TABLET ORAL
Status: DISCONTINUED | OUTPATIENT
Start: 2025-04-03 | End: 2025-04-04 | Stop reason: HOSPADM

## 2025-04-03 RX ORDER — MIRTAZAPINE 15 MG/1
15 TABLET, FILM COATED ORAL NIGHTLY
Status: DISCONTINUED | OUTPATIENT
Start: 2025-04-03 | End: 2025-04-04 | Stop reason: HOSPADM

## 2025-04-03 RX ORDER — ACETAMINOPHEN 160 MG/5ML
650 SOLUTION ORAL EVERY 4 HOURS PRN
Status: DISCONTINUED | OUTPATIENT
Start: 2025-04-03 | End: 2025-04-04 | Stop reason: HOSPADM

## 2025-04-03 RX ORDER — ACETAMINOPHEN 650 MG/1
650 SUPPOSITORY RECTAL EVERY 4 HOURS PRN
Status: DISCONTINUED | OUTPATIENT
Start: 2025-04-03 | End: 2025-04-04 | Stop reason: HOSPADM

## 2025-04-03 RX ORDER — ACETAMINOPHEN 325 MG/1
650 TABLET ORAL EVERY 4 HOURS PRN
Status: DISCONTINUED | OUTPATIENT
Start: 2025-04-03 | End: 2025-04-04 | Stop reason: HOSPADM

## 2025-04-03 RX ORDER — SODIUM CHLORIDE 0.9 % (FLUSH) 0.9 %
10 SYRINGE (ML) INJECTION AS NEEDED
Status: DISCONTINUED | OUTPATIENT
Start: 2025-04-03 | End: 2025-04-04 | Stop reason: HOSPADM

## 2025-04-03 RX ORDER — BISACODYL 5 MG/1
5 TABLET, DELAYED RELEASE ORAL DAILY PRN
Status: DISCONTINUED | OUTPATIENT
Start: 2025-04-03 | End: 2025-04-04 | Stop reason: HOSPADM

## 2025-04-03 RX ORDER — SODIUM CHLORIDE 9 MG/ML
40 INJECTION, SOLUTION INTRAVENOUS AS NEEDED
Status: DISCONTINUED | OUTPATIENT
Start: 2025-04-03 | End: 2025-04-04 | Stop reason: HOSPADM

## 2025-04-03 RX ADMIN — CARVEDILOL 12.5 MG: 12.5 TABLET, FILM COATED ORAL at 10:43

## 2025-04-03 RX ADMIN — CARVEDILOL 12.5 MG: 12.5 TABLET, FILM COATED ORAL at 21:26

## 2025-04-03 RX ADMIN — LOSARTAN POTASSIUM 50 MG: 50 TABLET, FILM COATED ORAL at 10:49

## 2025-04-03 RX ADMIN — MIRTAZAPINE 15 MG: 15 TABLET, FILM COATED ORAL at 21:27

## 2025-04-03 RX ADMIN — AMLODIPINE BESYLATE 5 MG: 5 TABLET ORAL at 10:43

## 2025-04-03 RX ADMIN — TAMOXIFEN CITRATE 20 MG: 10 TABLET ORAL at 10:44

## 2025-04-03 RX ADMIN — Medication 1 CAPSULE: at 10:43

## 2025-04-03 NOTE — PLAN OF CARE
Goal Outcome Evaluation:  Plan of Care Reviewed With: patient           Outcome Evaluation: Patient was admitted with a subdural hematoma fall at home.  Patient reports she is independently mobile at baseline with use of a cane.  Patient was in bed and agreeable to therapy when PT arrived.  She demonstrated independent bed mobility.  She stood with standby assist.  She ambulated 120 feet CGA and STC.  Patient safely navigated up and down 4 steps with CGA and use of handrail.  Strength, balance and range of motion are within functional limits.  Patient denies dizziness or lightheadedness.  Patient reports mobility is at baseline level.  She plans to DC home when medically stable.  PT will sign off.    Anticipated Discharge Disposition (PT): home

## 2025-04-03 NOTE — ED NOTES
"Nursing report ED to floor  Neha Guillory  89 y.o.  female    Neha Guillory is a 89 y.o. female with a medical history of hyperlipidemia, hypertension, RICH, osteoporosis, pulmonary hypertension, CKD, breast cancer who presents to the ED c/o acute fall.  Patient reports she was closing the blinds in her house when she got her feet caught up in the curtain.  She fell forward and struck her forehead on the hardwood floor.  Denies LOC.  Patient takes baby aspirin.  She is not anticoagulated.  Sustained a laceration on her forehead.  Denies headache, neck pain, vision loss, vomiting.  Unknown last Tdap.  No other systemic complaints at this time.     HPI :  HPI  Stated Reason for Visit: Pt arrived via pv from home w/ c/o getting up to close the blinds & \"got her feet tripped up & fell forward hitting her face.\" Pt reports hitting her face on the hardwood floor while wearing her glasses causing a large laceration to the L eyebrow & hematoma. Bleeding controlled during triage exam, lac wrapped in kerlex. Pt denies LOC & denies blood thinners. Pt unaware of last tetanus.  History Obtained From: patient    Chief Complaint  Chief Complaint   Patient presents with    Fall    Facial Laceration       Admitting doctor:   Herbie Delgado MD    Admitting diagnosis:   The primary encounter diagnosis was Subdural hematoma. Diagnoses of Laceration of left eyebrow, initial encounter and Fall, initial encounter were also pertinent to this visit.    Code status:   Current Code Status       Date Active Code Status Order ID Comments User Context       Prior            Allergies:   Amoxicillin, Griseofulvin ultramicrosize [griseofulvin], Tramadol, and Atorvastatin    Isolation:   No active isolations    Intake and Output  No intake or output data in the 24 hours ending 04/02/25 2336    Weight:       04/02/25  2100   Weight: 55.8 kg (123 lb 0.3 oz)       Most recent vitals:   Vitals:    04/02/25 2201 04/02/25 2205 04/02/25 2206 04/02/25 2331 "   BP: 118/72   150/98   Pulse: 74 70 71 89   Resp:       Temp:       TempSrc:       SpO2:  90% 92%    Weight:       Height:           Active LDAs/IV Access:   Lines, Drains & Airways       Active LDAs       Name Placement date Placement time Site Days    Peripheral IV 04/02/25 2232 Right Antecubital 04/02/25 2232  Antecubital  less than 1                    Labs (abnormal labs have a star):   Labs Reviewed   COMPREHENSIVE METABOLIC PANEL - Abnormal; Notable for the following components:       Result Value    Glucose 129 (*)     Creatinine 1.14 (*)     eGFR 45.3 (*)     All other components within normal limits    Narrative:     GFR Categories in Chronic Kidney Disease (CKD)      GFR Category          GFR (mL/min/1.73)    Interpretation  G1                     90 or greater         Normal or high (1)  G2                      60-89                Mild decrease (1)  G3a                   45-59                Mild to moderate decrease  G3b                   30-44                Moderate to severe decrease  G4                    15-29                Severe decrease  G5                    14 or less           Kidney failure          (1)In the absence of evidence of kidney disease, neither GFR category G1 or G2 fulfill the criteria for CKD.    eGFR calculation 2021 CKD-EPI creatinine equation, which does not include race as a factor   PROTIME-INR - Abnormal; Notable for the following components:    Protime 15.7 (*)     INR 1.25 (*)     All other components within normal limits   CBC WITH AUTO DIFFERENTIAL - Abnormal; Notable for the following components:    Neutrophil % 77.0 (*)     Lymphocyte % 10.7 (*)     Immature Grans % 0.7 (*)     Neutrophils, Absolute 7.92 (*)     Monocytes, Absolute 0.93 (*)     Immature Grans, Absolute 0.07 (*)     All other components within normal limits   APTT - Normal   CBC AND DIFFERENTIAL    Narrative:     The following orders were created for panel order CBC & Differential.  Procedure                                Abnormality         Status                     ---------                               -----------         ------                     CBC Auto Differential[[id number]]        Abnormal            Final result                 Please view results for these tests on the individual orders.       EKG:   No orders to display       Meds given in ED:   Medications   sodium chloride 0.9 % flush 10 mL (has no administration in time range)   Tetanus-Diphth-Acell Pertussis (BOOSTRIX) injection 0.5 mL (0.5 mL Intramuscular Given 4/2/25 2209)   labetalol (NORMODYNE,TRANDATE) injection 10 mg (10 mg Intravenous Given 4/2/25 2331)       Imaging results:  CT Head Without Contrast  Result Date: 4/2/2025   1. Tiny subdural hematoma overlying the left temporal lobe, with maximum thickness of 2 mm. 2. No acute fracture or subluxation of the cervical spine is seen.  FINDINGS were called to [place] [place] at 10:11 p.m.  Radiation dose reduction techniques were utilized, including automated exposure control and exposure modulation based on body size.   This report was finalized on 4/2/2025 10:12 PM by Dr. Shiela Randle M.D on Workstation: [id number]      CT Cervical Spine Without Contrast  Result Date: 4/2/2025   1. Tiny subdural hematoma overlying the left temporal lobe, with maximum thickness of 2 mm. 2. No acute fracture or subluxation of the cervical spine is seen.  FINDINGS were called to [place] [place] at 10:11 p.m.  Radiation dose reduction techniques were utilized, including automated exposure control and exposure modulation based on body size.   This report was finalized on 4/2/2025 10:12 PM by Dr. Shiela Randle M.D on Workstation: [id number]            Social issues:   Social History     Socioeconomic History    Marital status:    Tobacco Use    Smoking status: Never     Passive exposure: Never    Smokeless tobacco: Never   Vaping Use    Vaping status: Never Used   Substance and Sexual  Activity    Alcohol use: Never    Drug use: Never    Sexual activity: Defer       Peripheral Neurovascular  Peripheral Neurovascular (Adult)  Peripheral Neurovascular WDL: WDL    Neuro Cognitive  Neuro Cognitive (Adult)  Cognitive/Neuro/Behavioral WDL: WDL, orientation  Orientation: oriented x 4    Learning  Learning Assessment  Learning Readiness and Ability: no barriers identified  Education Provided  Person Taught: patient  Teaching Method: verbal instruction  Teaching Focus: symptom/problem overview, diagnostic test, medication administration  Education Outcome Evaluation: verbalizes understanding    Respiratory  Respiratory WDL  Respiratory WDL: WDL    Abdominal Pain       Pain Assessments  Pain (Adult)  (0-10) Pain Rating: Rest: 2  Pain Location: face    NIH Stroke Scale       Katya Mendenhall RN  04/02/25 23:36 EDT

## 2025-04-03 NOTE — ED PROVIDER NOTES
MD ATTESTATION NOTE    SHARED VISIT: This visit was performed by BOTH a physician and an APC. The substantive portion of the medical decision making was performed by this attesting physician who made or approved the management plan and takes responsibility for patient management. All studies documented in the APC note (if performed) were independently interpreted by me.    The GREGORIA and I have discussed this patient's history, physical exam, MDM, and treatment plan.  I have reviewed the documentation and personally had a face to face interaction with the patient. The attached note describes my personal findings.      Neha Guillory is a 89 y.o. female who presents to the ED c/o acute fall.  She states that she got tripped up at home with her feet getting stuck in the curtains.  She fell forward hitting her head.  No loss of consciousness.  She takes a baby aspirin.    On exam:  GENERAL: not distressed  HENT: nares patent, laceration overlying the left eyebrow running parallel to it  EYES: no scleral icterus, EOMI with no diplopia  CV: regular rhythm, regular rate  RESPIRATORY: normal effort  MUSCULOSKELETAL: no deformity  NEURO: alert, moves all extremities, follows commands  SKIN: warm, dry    Labs  Recent Results (from the past 24 hours)   Comprehensive Metabolic Panel    Collection Time: 04/02/25 10:33 PM    Specimen: Blood   Result Value Ref Range    Glucose 129 (H) 65 - 99 mg/dL    BUN 20 8 - 23 mg/dL    Creatinine 1.14 (H) 0.57 - 1.00 mg/dL    Sodium 139 136 - 145 mmol/L    Potassium 4.6 3.5 - 5.2 mmol/L    Chloride 104 98 - 107 mmol/L    CO2 24.6 22.0 - 29.0 mmol/L    Calcium 9.6 8.2 - 9.6 mg/dL    Total Protein 6.5 6.0 - 8.5 g/dL    Albumin 3.7 3.5 - 5.2 g/dL    ALT (SGPT) 10 1 - 33 U/L    AST (SGOT) 19 1 - 32 U/L    Alkaline Phosphatase 57 39 - 117 U/L    Total Bilirubin <0.2 0.0 - 1.2 mg/dL    Globulin 2.8 gm/dL    A/G Ratio 1.3 g/dL    BUN/Creatinine Ratio 17.5 7.0 - 25.0    Anion Gap 10.4 5.0 - 15.0 mmol/L     eGFR 45.3 (L) >60.0 mL/min/1.73   Protime-INR    Collection Time: 04/02/25 10:33 PM    Specimen: Blood   Result Value Ref Range    Protime 15.7 (H) 11.7 - 14.2 Seconds    INR 1.25 (H) 0.90 - 1.10   aPTT    Collection Time: 04/02/25 10:33 PM    Specimen: Blood   Result Value Ref Range    PTT 26.3 22.7 - 35.4 seconds   CBC Auto Differential    Collection Time: 04/02/25 10:33 PM    Specimen: Blood   Result Value Ref Range    WBC 10.28 3.40 - 10.80 10*3/mm3    RBC 4.32 3.77 - 5.28 10*6/mm3    Hemoglobin 12.9 12.0 - 15.9 g/dL    Hematocrit 38.7 34.0 - 46.6 %    MCV 89.6 79.0 - 97.0 fL    MCH 29.9 26.6 - 33.0 pg    MCHC 33.3 31.5 - 35.7 g/dL    RDW 13.1 12.3 - 15.4 %    RDW-SD 42.7 37.0 - 54.0 fl    MPV 10.8 6.0 - 12.0 fL    Platelets 210 140 - 450 10*3/mm3    Neutrophil % 77.0 (H) 42.7 - 76.0 %    Lymphocyte % 10.7 (L) 19.6 - 45.3 %    Monocyte % 9.0 5.0 - 12.0 %    Eosinophil % 1.8 0.3 - 6.2 %    Basophil % 0.8 0.0 - 1.5 %    Immature Grans % 0.7 (H) 0.0 - 0.5 %    Neutrophils, Absolute 7.92 (H) 1.70 - 7.00 10*3/mm3    Lymphocytes, Absolute 1.10 0.70 - 3.10 10*3/mm3    Monocytes, Absolute 0.93 (H) 0.10 - 0.90 10*3/mm3    Eosinophils, Absolute 0.18 0.00 - 0.40 10*3/mm3    Basophils, Absolute 0.08 0.00 - 0.20 10*3/mm3    Immature Grans, Absolute 0.07 (H) 0.00 - 0.05 10*3/mm3    nRBC 0.0 0.0 - 0.2 /100 WBC       Radiology  CT Head Without Contrast  CT Head Without Contrast, CT Cervical Spine Without Contrast  Result Date: 4/2/2025  CT OF THE HEAD WITHOUT CONTRAST; CT OF THE CERVICAL SPINE  HISTORY: Fall  COMPARISON: January 4, 2025  TECHNIQUE: Axial CT imaging was obtained through the brain. No IV contrast was administered. Axial CT imaging was obtained through the brain. No IV contrast was administered. Axial CT imaging was obtained through the cervical spine. Coronal and sagittal reformatted images were obtained.  FINDINGS:  CT HEAD: Tiny rind of hyperdense material overlies the left temporal lobe, with a maximum  thickness of 2 mm. This is best seen on images 18 through 23 of axial series 1 and images 14 and 15 of axial series 1. The appearance is suspicious for a small subdural hematoma. There is diffuse atrophy. There is periventricular and deep white matter microangiopathic change. No additional areas of hemorrhage are seen. There is no midline shift or mass effect. There is a left frontal scalp hematoma/laceration. Mucosal thickening is noted within the ethmoid and maxillary sinuses, with multiple mucous retention cysts noted within the maxillary sinuses. There is partial opacification of the right mastoid air cells. This was also present on the prior study. There is some trace fluid within the left mastoid air cells as well.  CT OF THE CERVICAL SPINE: No acute fracture or subluxation of the cervical spine is seen. There is anterolisthesis of C4 on C5. There is retrolisthesis of C5 on C6. There is anterolisthesis of C7 on T1. Sclerosis and deformity of the inferior endplate of T2 was present on the CT from January 5, 2025 images through the lung apices demonstrate some minimal scarring. There is no prevertebral soft tissue swelling. Canal stenosis is most significant at C5-C6. Neural foraminal narrowing is most significant at C5-C6 on the left. There are carotid calcifications. Thyroid gland is heterogeneous, with a dominant nodule, measuring 1.5 cm.       1. Tiny subdural hematoma overlying the left temporal lobe, with maximum thickness of 2 mm. 2. No acute fracture or subluxation of the cervical spine is seen.  FINDINGS were called to Carole Park at 10:11 p.m.  Radiation dose reduction techniques were utilized, including automated exposure control and exposure modulation based on body size.   This report was finalized on 4/2/2025 10:12 PM by Dr. Shiela Randle M.D on Workstation: [id number]        Medications given in the ED:  Medications   sodium chloride 0.9 % flush 10 mL (has no administration in time range)    Tetanus-Diphth-Acell Pertussis (BOOSTRIX) injection 0.5 mL (0.5 mL Intramuscular Given 4/2/25 2209)       Orders placed during this visit:  Orders Placed This Encounter   Procedures    Laceration Repair    CT Head Without Contrast    CT Cervical Spine Without Contrast    Comprehensive Metabolic Panel    Protime-INR    aPTT    CBC Auto Differential    Neurosurgery (on-call MD unless specified)    [facility] (on-call MD unless specified) Details    Insert Peripheral IV    CBC & Differential       Medical Decision Making:  ED Course as of 04/02/25 2324 Wed Apr 02, 2025 2213 Preliminary report per Dr. Randle is small left temporal subdural hematoma [MP]   2227 I spoke with Dr. Hudson.  Discussed patient presentation and ED findings.  He agrees patient is appropriate for telemetry.  Will repeat head CT in the a.m. [MP]   2300 CT of the head independently interpreted by myself.  There is a tiny subdural overlying the left temporal lobe. [TD]   2306 WBC: 10.28 [MP]   2306 Hemoglobin: 12.9 [MP]   2306 INR(!): 1.25 [MP]   2323 I spoke with Rona with [facility].  Discussed patient presentation and ED findings.  She agrees admit patient to a telemetry observation bed to the service of Dr. Delgado. [MP]      ED Course User Index  [MP] Carole Park PA-C  [TD] Carson Hsu II, MD       Clinical Scores:                               Critical Care    Performed by: Carson Hus II, MD  Authorized by: Carson Hsu II, MD    Critical care provider statement:     Critical care time (minutes):  30    Critical care was necessary to treat or prevent imminent or life-threatening deterioration of the following conditions:  CNS failure or compromise    Critical care was time spent personally by me on the following activities:  Ordering and review of laboratory studies, ordering and review of radiographic studies, pulse oximetry, re-evaluation of patient's condition, review of old charts, examination of  patient and obtaining history from patient or surrogate        Diagnosis  Final diagnoses:   Subdural hematoma   Laceration of left eyebrow, initial encounter   Fall, initial encounter

## 2025-04-03 NOTE — CONSULTS
[facility] NEUROSURGERY CONSULT NOTE    Patient name: Neha Guillory  Referring Provider: Carole Park PA-C  Reason for Consultation: SDH    Patient Care Team:  Yvan Ruiz III, NP-C as PCP - General (Internal Medicine)  Bel Marina MD as Referring Physician (General Surgery)  Hunter Acosta MD as Consulting Physician (Radiation Oncology)  Ricky Dill MD as Consulting Physician (Hematology and Oncology)  Jose Martin Liu APRN as Nurse Practitioner (Family Medicine)  Zack Mohr MD as Consulting Physician (Cardiology)    Chief complaint: Fall, head injury    Subjective .     History of present illness:    Patient is a 89 y.o.  female with past medical history of hyperlipidemia, HTN, CKD, breast cancer, osteoporosis.  Patient presented after a fall at home.  She states she had gotten up to close her blinds and did not take her cane with her.  Her feet got tangled and she fell onto her hardwood floor.  She did strike her head but denies LOC.  She was able to get herself up off the floor.  She denies nausea, vomiting, headache, neck pain, back pain, dizziness, visual changes.  She does take a baby aspirin a day but no other anticoagulating medication.      History  PAST MEDICAL HISTORY  Past Medical History:   Diagnosis Date    Aortic stenosis, mild 08/24/2022    Arthritis     Carcinoma of upper-inner quadrant of left breast in female, estrogen receptor positive 02/24/2022    Carotid artery stenosis     Carotid atherosclerosis, bilateral 07/12/2019    Cataract     BILATERAL    Depression     Gastroenteritis     Generalized osteoarthritis 02/01/2016    H/O diastolic dysfunction     Hyperlipidemia     Hyperparathyroidism     Hyperparathyroidism     Hypertension     Hypertrophic cardiomyopathy     Iron deficiency anemia 10/13/2022    Low back pain May, 2023    Lower, right    Lumbosacral disc disease May 2023    Multifocal pneumonia 10/10/2022    Multifocal pneumonia 10/10/2022     Neuromuscular disorder     Obstructive sleep apnea syndrome 02/01/2016    Osteopenia     Osteoporosis     Pathological fracture of pelvis due to osteoporosis 01/24/2023    Primary familial hypertrophic cardiomyopathy 02/01/2016    Pubic ramus fracture, left, closed, initial encounter 01/23/2023    Pulmonary hypertension     Senile osteoporosis 02/07/2017    Synovial cyst of popliteal space 02/01/2016    Vitamin D deficiency        PAST SURGICAL HISTORY  Past Surgical History:   Procedure Laterality Date    BREAST BIOPSY Left 02/17/2022    BREAST LUMPECTOMY Left     BREAST LUMPECTOMY WITH SENTINEL NODE BIOPSY Left 04/07/2022    Procedure: LEFT BREAST LUMPECTOMY WITH SENTINEL NODE BIOPSY AND NEEDLE LOCALIZATION;  Surgeon: Bel Marina MD;  Location:  [facility];  Service: General;  Laterality: Left;    BUNIONECTOMY Right     FOOT SURGERY    CARDIAC ELECTROPHYSIOLOGY PROCEDURE N/A 1/7/2025    Procedure: Pacemaker DC new MEDT;  Surgeon: Donaldo Fry MD;  Location:  [facility];  Service: Cardiovascular;  Laterality: N/A;    COLONOSCOPY N/A 2011    Dr. Luis    EPIDURAL BLOCK  July 2023    Epidural x2    HAND SURGERY Right     TUMOR REMOVED ON FOREFINGER    PITUITARY SURGERY      THYROIDECTOMY Right 04/20/2016    Procedure: RIGHT SUPERIOR PARATHYROIDECTOMY;  Surgeon: Bel Marina MD;  [facility];  Service:        FAMILY HISTORY  Family History   Problem Relation Age of Onset    Diabetes Mother     Heart attack Mother     Arthritis Father     Breast cancer Sister     Breast cancer Sister     Breast cancer Sister     Colon cancer Sister     Heart disease Brother         CABG    Other Brother         BRAIN TUMOR    Colon cancer Brother     Colon cancer Brother     Colon cancer Brother     Colon cancer Maternal Grandfather     Cancer Other     Malig Hyperthermia Neg Hx        SOCIAL HISTORY  Social History     Tobacco Use    Smoking status: Never     Passive exposure: Never     Smokeless tobacco: Never   Vaping Use    Vaping status: Never Used   Substance Use Topics    Alcohol use: Never    Drug use: Never       Allergies:  Amoxicillin, Griseofulvin ultramicrosize [griseofulvin], Tramadol, and Atorvastatin    MEDICATIONS:  Medications Prior to Admission   Medication Sig Dispense Refill Last Dose/Taking    amLODIPine (NORVASC) 5 MG tablet Take 1 tablet by mouth Daily. 90 tablet 3 4/2/2025 Morning    aspirin 81 MG EC tablet Take 1 tablet by mouth Daily. Indications: Disease involving Lipid Deposits in the Arteries   4/2/2025 Morning    B Complex Vitamins (vitamin b complex) capsule capsule Take 1 capsule by mouth Daily.   4/2/2025 Morning    carvedilol (COREG) 12.5 MG tablet TAKE ONE TABLET BY MOUTH EVERY 12 HOURS 180 tablet 3 4/2/2025 Evening    cholecalciferol (VITAMIN D3) 1000 units tablet Take 1 tablet by mouth Daily.   4/2/2025 Morning    mirtazapine (REMERON) 15 MG tablet Take 1 tablet by mouth Every Night. 90 tablet 1 4/2/2025 Evening    multivitamin (THERAGRAN) tablet tablet Take  by mouth Daily. Gummies   4/2/2025    olmesartan (BENICAR) 20 MG tablet TAKE 1 TABLET BY MOUTH DAILY 90 tablet 3 4/2/2025    tamoxifen (NOLVADEX) 20 MG chemo tablet Take 1 tablet by mouth Daily.   4/2/2025 at  8:00 AM    vitamin C (ASCORBIC ACID) 250 MG tablet Take 4 tablets by mouth Every Night.   4/2/2025    Zinc 50 MG tablet Take 1 tablet by mouth Every Night.   4/2/2025    Lactobacillus (PROBIOTIC ACIDOPHILUS PO) Take 1 tablet by mouth Every Night.          Current Facility-Administered Medications:     acetaminophen (TYLENOL) tablet 650 mg, 650 mg, Oral, Q4H PRN **OR** acetaminophen (TYLENOL) 160 MG/5ML oral solution 650 mg, 650 mg, Oral, Q4H PRN **OR** acetaminophen (TYLENOL) suppository 650 mg, 650 mg, Rectal, Q4H PRN, John Bush MD    amLODIPine (NORVASC) tablet 5 mg, 5 mg, Oral, Daily, Effie Rodriguez, APRN, 5 mg at 04/03/25 1043    sennosides-docusate (PERICOLACE) 8.6-50 MG per  tablet 2 tablet, 2 tablet, Oral, BID PRN **AND** polyethylene glycol (MIRALAX) packet 17 g, 17 g, Oral, Daily PRN **AND** bisacodyl (DULCOLAX) EC tablet 5 mg, 5 mg, Oral, Daily PRN **AND** bisacodyl (DULCOLAX) suppository 10 mg, 10 mg, Rectal, Daily PRN, John Bush MD    calcium carbonate (TUMS) chewable tablet 500 mg (200 mg elemental), 2 tablet, Oral, BID PRN, John Bush MD    carvedilol (COREG) tablet 12.5 mg, 12.5 mg, Oral, Q12H, Effie Rodriguez APRN, 12.5 mg at 04/03/25 1043    lactobacillus acidophilus (RISAQUAD) capsule 1 capsule, 1 capsule, Oral, Daily, Effie Rodriguez APRN, 1 capsule at 04/03/25 1043    losartan (COZAAR) tablet 50 mg, 50 mg, Oral, Q24H, Effie Rodriguez APRN, 50 mg at 04/03/25 1049    mirtazapine (REMERON) tablet 15 mg, 15 mg, Oral, Nightly, Effie Rodriguez APRN    ondansetron ODT (ZOFRAN-ODT) disintegrating tablet 4 mg, 4 mg, Oral, Q6H PRN **OR** ondansetron (ZOFRAN) injection 4 mg, 4 mg, Intravenous, Q6H PRN, John Bush MD    [COMPLETED] Insert Peripheral IV, , , Once **AND** sodium chloride 0.9 % flush 10 mL, 10 mL, Intravenous, PRN, John Bush MD    sodium chloride 0.9 % flush 10 mL, 10 mL, Intravenous, Q12H, John Bush MD    sodium chloride 0.9 % flush 10 mL, 10 mL, Intravenous, PRN, John Bush MD    sodium chloride 0.9 % infusion 40 mL, 40 mL, Intravenous, PRNEleazar Rahul Kandada, MD    tamoxifen (NOLVADEX) tablet 20 mg, 20 mg, Oral, Daily, Effie Rodriguez APRN, 20 mg at 04/03/25 1044    COMORBID CONDITIONS:  Cancer including (primary or metastatic), chronic kidney disease including stage, Hypertension, and Osteoporosis      Review of Systems  Review of Systems   Constitutional:  Negative for chills and fever.   Eyes:  Negative for visual disturbance.   Gastrointestinal:  Negative for nausea and vomiting.   Musculoskeletal:  Negative for back pain and neck pain.   Skin:  Positive for color  change (Bruising around left eye) and wound (Laceration left eyebrow).   Neurological:  Negative for dizziness, syncope, weakness, light-headedness and headaches.   Psychiatric/Behavioral:  The patient is not nervous/anxious.      Objective     Physical Exam  Physical Exam  Vitals reviewed.   Constitutional:       Appearance: Normal appearance.   Eyes:      General: Lids are normal.      Extraocular Movements: Extraocular movements intact.      Pupils: Pupils are equal, round, and reactive to light.   Cardiovascular:      Pulses: Normal pulses.   Musculoskeletal:      Cervical back: Normal.      Thoracic back: Normal.      Lumbar back: Normal.   Skin:     General: Skin is warm and dry.      Comments: Left eyebrow laceration with sutures in place  Left periorbital ecchymosis     Neurological:      Motor: Motor strength is normal.  Psychiatric:         Speech: Speech normal.       Neurological Exam  Mental Status  Awake, alert and oriented to person, place and time. Recent and remote memory are intact. Speech is normal. Language is fluent with no aphasia. Attention and concentration are normal.    Cranial Nerves  CN II: Visual acuity is normal. Visual fields full to confrontation.  CN III, IV, VI: Extraocular movements intact bilaterally. Normal lids and orbits bilaterally. Pupils equal round and reactive to light bilaterally.  CN V: Facial sensation is normal.  CN VII: Full and symmetric facial movement.  CN IX, X: Palate elevates symmetrically. Normal gag reflex.  CN XI: Shoulder shrug strength is normal.  CN XII: Tongue midline without atrophy or fasciculations.    Motor  Normal muscle bulk throughout. Normal muscle tone. Strength is 5/5 throughout all four extremities.    Sensory  Sensation is intact to light touch, pinprick, vibration and proprioception in all four extremities.    Coordination  Right: Finger-to-nose normal.Left: Finger-to-nose normal.    Gait    Not tested.        Results  Review:    LABS:    Admission on 04/02/2025   Component Date Value Ref Range Status    Glucose 04/02/2025 129 (H)  65 - 99 mg/dL Final    BUN 04/02/2025 20  8 - 23 mg/dL Final    Creatinine 04/02/2025 1.14 (H)  0.57 - 1.00 mg/dL Final    Sodium 04/02/2025 139  136 - 145 mmol/L Final    Potassium 04/02/2025 4.6  3.5 - 5.2 mmol/L Final    Chloride 04/02/2025 104  98 - 107 mmol/L Final    CO2 04/02/2025 24.6  22.0 - 29.0 mmol/L Final    Calcium 04/02/2025 9.6  8.2 - 9.6 mg/dL Final    Total Protein 04/02/2025 6.5  6.0 - 8.5 g/dL Final    Albumin 04/02/2025 3.7  3.5 - 5.2 g/dL Final    ALT (SGPT) 04/02/2025 10  1 - 33 U/L Final    AST (SGOT) 04/02/2025 19  1 - 32 U/L Final    Alkaline Phosphatase 04/02/2025 57  39 - 117 U/L Final    Total Bilirubin 04/02/2025 <0.2  0.0 - 1.2 mg/dL Final    Globulin 04/02/2025 2.8  gm/dL Final    A/G Ratio 04/02/2025 1.3  g/dL Final    BUN/Creatinine Ratio 04/02/2025 17.5  7.0 - 25.0 Final    Anion Gap 04/02/2025 10.4  5.0 - 15.0 mmol/L Final    eGFR 04/02/2025 45.3 (L)  >60.0 mL/min/1.73 Final    Protime 04/02/2025 15.7 (H)  11.7 - 14.2 Seconds Final    INR 04/02/2025 1.25 (H)  0.90 - 1.10 Final    PTT 04/02/2025 26.3  22.7 - 35.4 seconds Final    WBC 04/02/2025 10.28  3.40 - 10.80 10*3/mm3 Final    RBC 04/02/2025 4.32  3.77 - 5.28 10*6/mm3 Final    Hemoglobin 04/02/2025 12.9  12.0 - 15.9 g/dL Final    Hematocrit 04/02/2025 38.7  34.0 - 46.6 % Final    MCV 04/02/2025 89.6  79.0 - 97.0 fL Final    MCH 04/02/2025 29.9  26.6 - 33.0 pg Final    MCHC 04/02/2025 33.3  31.5 - 35.7 g/dL Final    RDW 04/02/2025 13.1  12.3 - 15.4 % Final    RDW-SD 04/02/2025 42.7  37.0 - 54.0 fl Final    MPV 04/02/2025 10.8  6.0 - 12.0 fL Final    Platelets 04/02/2025 210  140 - 450 10*3/mm3 Final    Neutrophil % 04/02/2025 77.0 (H)  42.7 - 76.0 % Final    Lymphocyte % 04/02/2025 10.7 (L)  19.6 - 45.3 % Final    Monocyte % 04/02/2025 9.0  5.0 - 12.0 % Final    Eosinophil % 04/02/2025 1.8  0.3 - 6.2 % Final     Basophil % 04/02/2025 0.8  0.0 - 1.5 % Final    Immature Grans % 04/02/2025 0.7 (H)  0.0 - 0.5 % Final    Neutrophils, Absolute 04/02/2025 7.92 (H)  1.70 - 7.00 10*3/mm3 Final    Lymphocytes, Absolute 04/02/2025 1.10  0.70 - 3.10 10*3/mm3 Final    Monocytes, Absolute 04/02/2025 0.93 (H)  0.10 - 0.90 10*3/mm3 Final    Eosinophils, Absolute 04/02/2025 0.18  0.00 - 0.40 10*3/mm3 Final    Basophils, Absolute 04/02/2025 0.08  0.00 - 0.20 10*3/mm3 Final    Immature Grans, Absolute 04/02/2025 0.07 (H)  0.00 - 0.05 10*3/mm3 Final    nRBC 04/02/2025 0.0  0.0 - 0.2 /100 WBC Final    Glucose 04/03/2025 87  65 - 99 mg/dL Final    BUN 04/03/2025 19  8 - 23 mg/dL Final    Creatinine 04/03/2025 1.03 (H)  0.57 - 1.00 mg/dL Final    Sodium 04/03/2025 143  136 - 145 mmol/L Final    Potassium 04/03/2025 3.9  3.5 - 5.2 mmol/L Final    Chloride 04/03/2025 107  98 - 107 mmol/L Final    CO2 04/03/2025 22.6  22.0 - 29.0 mmol/L Final    Calcium 04/03/2025 9.0  8.2 - 9.6 mg/dL Final    BUN/Creatinine Ratio 04/03/2025 18.4  7.0 - 25.0 Final    Anion Gap 04/03/2025 13.4  5.0 - 15.0 mmol/L Final    eGFR 04/03/2025 51.1 (L)  >60.0 mL/min/1.73 Final    WBC 04/03/2025 10.67  3.40 - 10.80 10*3/mm3 Final    RBC 04/03/2025 3.96  3.77 - 5.28 10*6/mm3 Final    Hemoglobin 04/03/2025 11.7 (L)  12.0 - 15.9 g/dL Final    Hematocrit 04/03/2025 35.4  34.0 - 46.6 % Final    MCV 04/03/2025 89.4  79.0 - 97.0 fL Final    MCH 04/03/2025 29.5  26.6 - 33.0 pg Final    MCHC 04/03/2025 33.1  31.5 - 35.7 g/dL Final    RDW 04/03/2025 13.1  12.3 - 15.4 % Final    RDW-SD 04/03/2025 43.0  37.0 - 54.0 fl Final    MPV 04/03/2025 11.2  6.0 - 12.0 fL Final    Platelets 04/03/2025 192  140 - 450 10*3/mm3 Final       DIAGNOSTICS:  CT head:    When compared to previous imaging 4/2/2025 there is a stable appearing tiny left temporal subdural hematoma.  No new areas of hemorrhage.  No midline shift or mass effect seen.    Results Review:   I reviewed the patient's new clinical  results.  I personally viewed  the patient's chart/CT head, it was also discussed with and reviewed by Dr. Reilly    Vital Signs   Temp:  [97.9 °F (36.6 °C)-98.5 °F (36.9 °C)] 97.9 °F (36.6 °C)  Heart Rate:  [] 66  Resp:  [16-18] 16  BP: (118-155)/(63-98) 137/63      Assessment & Plan       Subdural hematoma    Hyperlipidemia    Essential hypertension    RICH on CPAP    Stage 3a chronic kidney disease      Problem List Items Addressed This Visit       * (Principal) Subdural hematoma - Primary     Other Visit Diagnoses         Laceration of left eyebrow, initial encounter          Fall, initial encounter                 89-year-old female who presents after a mechanical fall at home and found to have tiny left temporal subdural hematoma.  Patient only takes a baby aspirin a day.  CT head today is stable in appearance.  No further imaging needed from neurosurgery standpoint.  Patient is okay for discharge.  Patient hold her aspirin for 3 days.  Neurosurgery will sign off at this time.  Please feel free to call with any questions or concerns.    PLAN:   -NINI to sign off  -Hold baby aspirin x 3 days  -No follow up needed    I discussed the patient's findings and my recommendations with patient, family, nursing staff, and Dr Reilly    During patient visit, I utilized appropriate personal protective equipment including gloves and mask.  Mask used was standard procedure mask. Appropriate PPE was worn during the entire visit.  Hand hygiene was completed before and after.     I spent 45 minutes caring for Neha Guillory on this date of service. This time includes time spent by me in the following activities: preparing for the visit, reviewing tests, obtaining and/or reviewing a separately obtained history, performing a medically appropriate examination and/or evaluation, counseling and educating the patient/family/caregiver, ordering medications, tests, or procedures, referring and communicating with other health care  "professionals, documenting information in the medical record, independently interpreting results and communicating that information with the patient/family/caregiver, and care coordination         Myesha Packer, APRN  04/03/25  08:57 EDT    \"Dictated utilizing Dragon dictation\".      "

## 2025-04-03 NOTE — THERAPY EVALUATION
Patient Name: Neha Guillory  : 1933    MRN: [id number]                              Today's Date: 4/3/2025       Admit Date: 2025    Visit Dx:     ICD-10-CM ICD-9-CM   1. Subdural hematoma  S06.5XAA 432.1   2. Laceration of left eyebrow, initial encounter  S01.112A 873.42   3. Fall, initial encounter  W19.XXXA E888.9     Patient Active Problem List   Diagnosis    Hyperlipidemia    Essential hypertension    Hypertrophic cardiomyopathy    Generalized osteoarthritis    RICH on CPAP    Osteoporosis    Vitamin D deficiency    Colon polyps    Family hx of colon cancer    H/O parathyroidectomy    Pulmonary hypertension    Ischemic rest pain of lower extremity    Bilateral carpal tunnel syndrome    Osteopenia    Hyperuricemia    Diastolic dysfunction    Hip pain, left    Hammer toe of second toe of right foot    Gastroenteritis    Starvation ketoacidosis    Stage 3a chronic kidney disease    Tophus    Osteoporosis, post-menopausal    Hyperglycemia    Bilateral carotid artery stenosis    Prediabetes    Resistant hypertension    FH: breast cancer    Carcinoma of upper-inner quadrant of left breast in female, estrogen receptor positive    Long-term use of high-risk medication    Aortic stenosis, mild    DNR (do not resuscitate)    Iron deficiency anemia    Shoulder disorder    SI (sacroiliac) joint inflammation    Right knee pain    Right knee meniscal tear    AV block, complete    Subdural hematoma     Past Medical History:   Diagnosis Date    Aortic stenosis, mild 2022    Arthritis     Carcinoma of upper-inner quadrant of left breast in female, estrogen receptor positive 2022    Carotid artery stenosis     Carotid atherosclerosis, bilateral 2019    Cataract     BILATERAL    Depression     Gastroenteritis     Generalized osteoarthritis 2016    H/O diastolic dysfunction     Hyperlipidemia     Hyperparathyroidism     Hyperparathyroidism     Hypertension     Hypertrophic cardiomyopathy     Iron  deficiency anemia 10/13/2022    Low back pain May, 2023    Lower, right    Lumbosacral disc disease May 2023    Multifocal pneumonia 10/10/2022    Multifocal pneumonia 10/10/2022    Neuromuscular disorder     Obstructive sleep apnea syndrome 02/01/2016    Osteopenia     Osteoporosis     Pathological fracture of pelvis due to osteoporosis 01/24/2023    Primary familial hypertrophic cardiomyopathy 02/01/2016    Pubic ramus fracture, left, closed, initial encounter 01/23/2023    Pulmonary hypertension     Senile osteoporosis 02/07/2017    Synovial cyst of popliteal space 02/01/2016    Vitamin D deficiency      Past Surgical History:   Procedure Laterality Date    BREAST BIOPSY Left 02/17/2022    BREAST LUMPECTOMY Left     BREAST LUMPECTOMY WITH SENTINEL NODE BIOPSY Left 04/07/2022    Procedure: LEFT BREAST LUMPECTOMY WITH SENTINEL NODE BIOPSY AND NEEDLE LOCALIZATION;  Surgeon: Bel Marina MD;  Location: [facility];  Service: General;  Laterality: Left;    BUNIONECTOMY Right     FOOT SURGERY    CARDIAC ELECTROPHYSIOLOGY PROCEDURE N/A 1/7/2025    Procedure: Pacemaker DC new MEDT;  Surgeon: Donaldo Fry MD;  Location: [facility] CATH INVASIVE LOCATION;  Service: Cardiovascular;  Laterality: N/A;    COLONOSCOPY N/A 2011    Dr. Luis    EPIDURAL BLOCK  July 2023    Epidural x2    HAND SURGERY Right     TUMOR REMOVED ON FOREFINGER    PITUITARY SURGERY      THYROIDECTOMY Right 04/20/2016    Procedure: RIGHT SUPERIOR PARATHYROIDECTOMY;  Surgeon: Bel Marina MD;  Location: [facility] OR;  Service:       General Information       Row Name 04/03/25 1127          Physical Therapy Time and Intention    Document Type evaluation  -     Mode of Treatment individual therapy;physical therapy  -       Row Name 04/03/25 1127          General Information    Patient Profile Reviewed yes  -JOHNNIE     Prior Level of Function independent:  uses a cane at times  -JOHNNIE     Barriers to Rehab none identified  -       Row Name  04/03/25 1127          Living Environment    Current Living Arrangements home  -     People in Home child(alex), adult  -       Row Name 04/03/25 1127          Home Main Entrance    Number of Stairs, Main Entrance two  -KH     Stair Railings, Main Entrance railings safe and in good condition  -       Row Name 04/03/25 1127          Cognition    Orientation Status (Cognition) oriented x 4  -               User Key  (r) = Recorded By, (t) = Taken By, (c) = Cosigned By      Initials Name Provider Type    Jyoti Toledo PT Physical Therapist                   Mobility       Row Name 04/03/25 1128          Bed Mobility    Bed Mobility supine-sit;sit-supine  -     Supine-Sit [place] (Bed Mobility) independent  -     Sit-Supine [place] (Bed Mobility) independent  -       Row Name 04/03/25 1128          Sit-Stand Transfer    Sit-Stand [place] (Transfers) standby assist  -     Assistive Device (Sit-Stand Transfers) cane, straight  -       Row Name 04/03/25 1128          Gait/Stairs (Locomotion)    [place] Level (Gait) standby assist;contact guard  -     Assistive Device (Gait) cane, straight  -KH     Distance in Feet (Gait) 125  -KH     [place] Level (Stairs) contact guard  -     Assistive Device (Stairs) cane, straight  -KH     Handrail Location (Stairs) left side (ascending)  -     Number of Steps (Stairs) 4  -KH     Ascending Technique (Stairs) step-to-step  -KH     Descending Technique (Stairs) step-to-step  -KH               User Key  (r) = Recorded By, (t) = Taken By, (c) = Cosigned By      Initials Name Provider Type    Jyoti Toledo PT Physical Therapist                   Obj/Interventions       Row Name 04/03/25 1129          Range of Motion Comprehensive    Comment, General Range of Motion WFL  -       Row Name 04/03/25 1129          Strength Comprehensive (MMT)    Comment, General Manual Muscle Testing (MMT) Assessment [facility]  Name 04/03/25 1129          Balance    Balance Assessment sitting static balance;sitting dynamic balance;standing static balance;standing dynamic balance  -     Static Sitting Balance independent  -     Dynamic Sitting Balance independent  -     Position, Sitting Balance sitting edge of bed  -     Static Standing Balance standby assist  -     Dynamic Standing Balance contact guard  -     Position/Device Used, Standing Balance cane, straight  -       Row Name 04/03/25 1129          Sensory Assessment (Somatosensory)    Sensory Assessment (Somatosensory) sensation intact  -               User Key  (r) = Recorded By, (t) = Taken By, (c) = Cosigned By      Initials Name Provider Type    Jyoti Toledo, PT Physical Therapist                   Goals/Plan    No documentation.                  Clinical Impression       Row Name 04/03/25 1129          Pain    Pretreatment Pain Rating 0/10 - no pain  -     Posttreatment Pain Rating 0/10 - no pain  -       Row Name 04/03/25 1129          Plan of Care Review    Plan of Care Reviewed With patient  -     Outcome Evaluation Patient was admitted with a subdural hematoma fall at home.  Patient reports she is independently mobile at baseline with use of a cane.  Patient was in bed and agreeable to therapy when PT arrived.  She demonstrated independent bed mobility.  She stood with standby assist.  She ambulated 120 feet CGA and STC.  Patient safely navigated up and down 4 steps with CGA and use of handrail.  Strength, balance and range of motion are within functional limits.  Patient denies dizziness or lightheadedness.  Patient reports mobility is at baseline level.  She plans to DC home when medically stable.  PT will sign off.  -       Row Name 04/03/25 1129          Therapy Assessment/Plan (PT)    Patient/Family Therapy Goals Statement (PT) Return home to prior level of function  -     Criteria for Skilled Interventions Met (PT) no problems  identified which require skilled intervention  -     Therapy Frequency (PT) evaluation only  -       Row Name 04/03/25 1129          Positioning and Restraints    Pre-Treatment Position in bed  -KH     Post Treatment Position bed  -KH     In Bed fowlers;call light within reach;encouraged to call for assist;exit alarm on  -               User Key  (r) = Recorded By, (t) = Taken By, (c) = Cosigned By      Initials Name Provider Type     Jyoti Palmer, PT Physical Therapist                   Outcome Measures       Row Name 04/03/25 1132 04/03/25 0746       How much help from another person do you currently need...    Turning from your back to your side while in flat bed without using bedrails? 4  -KH 4  -KP    Moving from lying on back to sitting on the side of a flat bed without bedrails? 4  -KH 4  -KP    Moving to and from a bed to a chair (including a wheelchair)? 4  -KH 4  -KP    Standing up from a chair using your arms (e.g., wheelchair, bedside chair)? 4  -KH 4  -KP    Climbing 3-5 steps with a railing? 3  -KH 4  -KP    To walk in hospital room? 3  -KH 3  -KP    AM-PAC 6 Clicks Score (PT) 22  -KH 23  -KP    Highest Level of Mobility Goal 7 --> Walk 25 feet or more  -KH 7 --> Walk 25 feet or more  -KP      Row Name 04/03/25 0229          How much help from another person do you currently need...    Turning from your back to your side while in flat bed without using bedrails? 4  -KK     Moving from lying on back to sitting on the side of a flat bed without bedrails? 4  -KK     Moving to and from a bed to a chair (including a wheelchair)? 4  -KK     Standing up from a chair using your arms (e.g., wheelchair, bedside chair)? 3  -KK     Climbing 3-5 steps with a railing? 3  -KK     To walk in hospital room? 3  -KK     AM-PAC 6 Clicks Score (PT) 21  -KK     Highest Level of Mobility Goal 6 --> Walk 10 steps or more  -KK       Row Name 04/03/25 1132          Functional Assessment    Outcome Measure  Options AM-PAC 6 Clicks Basic Mobility (PT)  -               User Key  (r) = Recorded By, (t) = Taken By, (c) = Cosigned By      Initials Name Provider Type    Jyoti Toledo PT Physical Therapist    Jenna Judd, RN Registered Nurse    Angelica Rogers, RN Registered Nurse                                   PT Recommendation and Plan     Outcome Evaluation: Patient was admitted with a subdural hematoma fall at home.  Patient reports she is independently mobile at baseline with use of a cane.  Patient was in bed and agreeable to therapy when PT arrived.  She demonstrated independent bed mobility.  She stood with standby assist.  She ambulated 120 feet CGA and STC.  Patient safely navigated up and down 4 steps with CGA and use of handrail.  Strength, balance and range of motion are within functional limits.  Patient denies dizziness or lightheadedness.  Patient reports mobility is at baseline level.  She plans to DC home when medically stable.  PT will sign off.     Time Calculation:         PT Charges       Row Name 04/03/25 1132             Time Calculation    Start Time 0947  -      Stop Time 0958  -      Time Calculation (min) 11 min  -      PT Received On 04/03/25  -                User Key  (r) = Recorded By, (t) = Taken By, (c) = Cosigned By      Initials Name Provider Type    Jyoti Toledo PT Physical Therapist                  Therapy Charges for Today       Code Description Service Date Service Provider Modifiers Qty    [id number]  PT EVAL LOW COMPLEXITY 1 4/3/2025 Jyoti Palmer PT GP 1            PT G-Codes  Outcome Measure Options: AM-PAC 6 Clicks Basic Mobility (PT)  AM-PAC 6 Clicks Score (PT): 22  PT Discharge Summary  Anticipated Discharge Disposition (PT): home    Jyoti Palmer PT  4/3/2025

## 2025-04-03 NOTE — PLAN OF CARE
Goal Outcome Evaluation:      Patient alert and oriented. Ambulatory in room with min assist x1 or stand by assist with cane. No neuro deficits noted. Laceration to left eye sutured, dry and intact. No complaints verbalized.     Problem: Adult Inpatient Plan of Care  Goal: Plan of Care Review  Outcome: Progressing  Goal: Patient-Specific Goal (Individualized)  Outcome: Progressing  Goal: Absence of Hospital-Acquired Illness or Injury  Outcome: Progressing  Intervention: Identify and Manage Fall Risk  Recent Flowsheet Documentation  Taken 4/3/2025 0400 by Angelica Dye RN  Safety Promotion/Fall Prevention: safety round/check completed  Taken 4/3/2025 0130 by Angelica Dye RN  Safety Promotion/Fall Prevention:   clutter free environment maintained   fall prevention program maintained   room organization consistent   safety round/check completed  Intervention: Prevent and Manage VTE (Venous Thromboembolism) Risk  Recent Flowsheet Documentation  Taken 4/3/2025 0130 by Angelica Dye RN  VTE Prevention/Management: (pump not available)   bilateral   SCDs (sequential compression devices) off  Goal: Optimal Comfort and Wellbeing  Outcome: Progressing  Intervention: Provide Person-Centered Care  Recent Flowsheet Documentation  Taken 4/3/2025 0130 by Angelica Dye RN  Trust Relationship/Rapport:   care explained   choices provided   questions answered  Goal: Readiness for Transition of Care  Outcome: Progressing  Intervention: Mutually Develop Transition Plan  Recent Flowsheet Documentation  Taken 4/3/2025 0232 by Angelica Dye RN  Transportation Anticipated: family or friend will provide  Patient/Family Anticipated Services at Transition: none  Patient/Family Anticipates Transition to: home  Taken 4/3/2025 0157 by Angelica Dye RN  Equipment Currently Used at Home:   cane, straight   walker, rolling     Problem: Comorbidity Management  Goal: Blood Pressure in Desired Range  Outcome: Progressing  Intervention: Maintain Blood  Pressure Management  Recent Flowsheet Documentation  Taken 4/3/2025 0130 by Angelica Dye, RN  Medication Review/Management: medications reviewed     Problem: Wound  Goal: Optimal Functional Ability  Outcome: Progressing  Goal: Absence of Infection Signs and Symptoms  Outcome: Progressing  Goal: Optimal Pain Control and Function  Outcome: Progressing  Goal: Skin Health and Integrity  Outcome: Progressing  Goal: Optimal Wound Healing  Outcome: Progressing

## 2025-04-03 NOTE — CASE MANAGEMENT/SOCIAL WORK
Discharge Planning Assessment  [facility]     Patient Name: Neha Guillory  MRN: [id number]  Today's Date: 4/3/2025    Admit Date: 4/2/2025    Plan: Home, family to transport   Discharge Needs Assessment       Row Name 04/03/25 1527       Living Environment    People in Home child(alex), adult    Current Living Arrangements home    Primary Care Provided by self    Provides Primary Care For no one    Family Caregiver if Needed child(alex), adult    Able to Return to Prior Arrangements yes       Transition Planning    Patient/Family Anticipates Transition to home with family    Patient/Family Anticipated Services at Transition none    Transportation Anticipated family or friend will provide       Discharge Needs Assessment    Equipment Currently Used at Home cane, straight;rollator;shower chair;grab bar;lift device    Concerns to be Addressed discharge planning                   Discharge Plan       Row Name 04/03/25 1529       Plan    Plan Home, family to transport    Patient/Family in Agreement with Plan yes    Plan Comments CCP spoke with patient and daughter Monica at bedside; explained role, verified facesheet, and discussed dc plan. Patient uses a cane, rollator, shower chair, grab bars, and transfer shower chair. Patient lives alone in a 2 level home. She does go down the steps to the basement to do laundry. She has 4 steps to enter her home. [facility]. She has no history of subacute or acute rehab. Patient walked 120ft CGA with PT and they signed off and recommended home. Patient's family denies any HH or DME needs and reports plan is home via family transport. EDELMIRA, PAIGE                  Selected Continued Care - Episodes Includes continued care and service providers with selected services from the active episodes listed below          Expected Discharge Date and Time       Expected Discharge Date Expected Discharge Time    Apr 4, 2025            Demographic Summary       Row  Name 04/03/25 1527       General Information    Admission Type observation    Arrived From home    Referral Source admission list    Reason for Consult discharge planning    Preferred Language English       Contact Information    Permission Granted to Share Info With family/designee                   Functional Status       Row Name 04/03/25 1527       Functional Status    Usual Activity Tolerance good    Current Activity Tolerance good       Functional Status, IADL    Medications assistive equipment    Meal Preparation assistive equipment    Housekeeping assistive equipment    Laundry assistive equipment    Shopping assistive equipment       Mental Status    General Appearance WDL WDL                   Psychosocial    No documentation.                  Abuse/Neglect    No documentation.                  Legal    No documentation.                  Substance Abuse    No documentation.                  Patient Forms    No documentation.                     PAIGE Workman

## 2025-04-03 NOTE — ED PROVIDER NOTES
EMERGENCY DEPARTMENT ENCOUNTER  Room Number:  38/38  PCP: Yvan Ruiz III, NPJoseyC  Independent Historians: Patient and Daughter      HPI:  Chief Complaint: had concerns including Fall and Facial Laceration.     A complete HPI/ROS/PMH/PSH/SH/FH are unobtainable due to: Hard of hearing    Chronic or social conditions impacting patient care (Social Determinants of Health): None      Context: Neha Guillory is a 89 y.o. female with a medical history of hyperlipidemia, hypertension, RICH, osteoporosis, pulmonary hypertension, CKD, breast cancer who presents to the ED c/o acute fall.  Patient reports she was closing the blinds in her house when she got her feet caught up in the curtain.  She fell forward and struck her forehead on the hardwood floor.  Denies LOC.  Patient takes baby aspirin.  She is not anticoagulated.  Sustained a laceration on her forehead.  Denies headache, neck pain, vision loss, vomiting.  Unknown last Tdap.  No other systemic complaints at this time.      Review of prior external notes (non-ED) -and- Review of prior external test results outside of this encounter:  Patient seen in office by cardiology on 2/28/2025 for complete AV block.  Reviewed assessment and plan.  Will schedule patient for pacemaker placement.  Reviewed labs collected on 2/12/2025.  CBC with hemoglobin 11.8, CMP with creatinine 1.17.    Prescription drug monitoring program review:     N/A    PAST MEDICAL HISTORY  Active Ambulatory Problems     Diagnosis Date Noted    Hyperlipidemia 02/01/2016    Essential hypertension 02/01/2016    Hypertrophic cardiomyopathy 02/01/2016    Generalized osteoarthritis 02/01/2016    RICH on CPAP 02/01/2016    Osteoporosis 02/01/2016    Vitamin D deficiency 02/01/2016    Colon polyps 02/24/2016    Family hx of colon cancer 02/24/2016    H/O parathyroidectomy 04/20/2016    Pulmonary hypertension 04/25/2016    Ischemic rest pain of lower extremity 07/07/2016    Bilateral carpal tunnel  syndrome 07/07/2016    Osteopenia 08/05/2016    Hyperuricemia 09/09/2016    Diastolic dysfunction 03/17/2017    Hip pain, left 07/26/2017    Hammer toe of second toe of right foot 07/26/2017    Gastroenteritis 12/26/2017    Starvation ketoacidosis 12/26/2017    Stage 3a chronic kidney disease 12/26/2017    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    Osteoporosis, post-menopausal 02/12/2018    Hyperglycemia 10/22/2018    Bilateral carotid artery stenosis 07/12/2019    Prediabetes 08/01/2019    Resistant hypertension 02/24/2021    FH: breast cancer 07/02/2021    Carcinoma of upper-inner quadrant of left breast in female, estrogen receptor positive 02/24/2022    Long-term use of high-risk medication     Aortic stenosis, mild 08/24/2022    DNR (do not resuscitate) 10/11/2022    Iron deficiency anemia 10/13/2022    Shoulder disorder 01/11/2023    SI (sacroiliac) joint inflammation 08/10/2023    Right knee pain 12/15/2024    Right knee meniscal tear 12/16/2024    AV block, complete 01/04/2025     Resolved Ambulatory Problems     Diagnosis Date Noted    Hypercalcemia 02/01/2016    Secondary hyperparathyroidism, non-renal 02/01/2016    Depression 02/01/2016    Synovial cyst of popliteal space 02/01/2016    Thrombophlebitis of deep veins of lower extremity 02/01/2016    Echocardiogram abnormal 03/16/2015    Visit for screening mammogram 07/07/2016    Rash 12/28/2016    Allergic reaction caused by a drug 12/28/2016    Upper respiratory infection 01/26/2017    Senile osteoporosis 02/07/2017    Dehydration 12/26/2017    Deep vein thrombosis     Multifocal pneumonia 10/10/2022    Acute kidney failure 10/11/2022    Hyperglycemia 10/11/2022    Pubic ramus fracture, left, closed, initial encounter 01/23/2023    Pathological fracture of pelvis due to osteoporosis 01/24/2023    Syncope 01/04/2025     Past Medical History:   Diagnosis Date    Arthritis     Carotid artery stenosis     Carotid atherosclerosis, bilateral 07/12/2019    Cataract     H/O  diastolic dysfunction     Hyperparathyroidism     Hyperparathyroidism     Hypertension     Low back pain May, 2023    Lumbosacral disc disease May 2023    Neuromuscular disorder     Obstructive sleep apnea syndrome 02/01/2016    Primary familial hypertrophic cardiomyopathy 02/01/2016         PAST SURGICAL HISTORY  Past Surgical History:   Procedure Laterality Date    BREAST BIOPSY Left 02/17/2022    BREAST LUMPECTOMY Left     BREAST LUMPECTOMY WITH SENTINEL NODE BIOPSY Left 04/07/2022    Procedure: LEFT BREAST LUMPECTOMY WITH SENTINEL NODE BIOPSY AND NEEDLE LOCALIZATION;  Surgeon: Bel Marina MD;  Location: [facility] OR [facility];  Service: General;  Laterality: Left;    BUNIONECTOMY Right     FOOT SURGERY    CARDIAC ELECTROPHYSIOLOGY PROCEDURE N/A 1/7/2025    Procedure: Pacemaker DC new MEDT;  Surgeon: Donaldo Fry MD;  Location: [facility] CATH INVASIVE LOCATION;  Service: Cardiovascular;  Laterality: N/A;    COLONOSCOPY N/A 2011    Dr. Luis    EPIDURAL BLOCK  July 2023    Epidural x2    HAND SURGERY Right     TUMOR REMOVED ON FOREFINGER    PITUITARY SURGERY      THYROIDECTOMY Right 04/20/2016    Procedure: RIGHT SUPERIOR PARATHYROIDECTOMY;  Surgeon: Bel Marina MD;  Location: [facility] MAIN OR;  Service:          FAMILY HISTORY  Family History   Problem Relation Age of Onset    Diabetes Mother     Heart attack Mother     Arthritis Father     Breast cancer Sister     Breast cancer Sister     Breast cancer Sister     Colon cancer Sister     Heart disease Brother         CABG    Other Brother         BRAIN TUMOR    Colon cancer Brother     Colon cancer Brother     Colon cancer Brother     Colon cancer Maternal Grandfather     Cancer Other     Malig Hyperthermia Neg Hx          SOCIAL HISTORY  Social History     Socioeconomic History    Marital status:    Tobacco Use    Smoking status: Never     Passive exposure: Never    Smokeless tobacco: Never   Vaping Use    Vaping status: Never Used   Substance and  Sexual Activity    Alcohol use: Never    Drug use: Never    Sexual activity: Defer         ALLERGIES  Amoxicillin, Griseofulvin ultramicrosize [griseofulvin], Tramadol, and Atorvastatin      REVIEW OF SYSTEMS  Included in HPI  All systems reviewed and negative except for those discussed in HPI.      PHYSICAL EXAM    I have reviewed the triage vital signs and nursing notes.    ED Triage Vitals   Temp Heart Rate Resp BP SpO2   04/02/25 2100 04/02/25 2100 04/02/25 2100 04/02/25 2107 04/02/25 2100   98.5 °F (36.9 °C) 114 18 155/96 97 %      Temp src Heart Rate Source Patient Position BP Location FiO2 (%)   04/02/25 2100 04/02/25 2100 -- -- --   Tympanic Monitor          Physical Exam  Constitutional:       General: She is not in acute distress.     Appearance: She is well-developed.   HENT:      Head: Normocephalic and atraumatic.   Eyes:      Extraocular Movements: Extraocular movements intact.   Cardiovascular:      Rate and Rhythm: Normal rate and regular rhythm.      Heart sounds: Normal heart sounds.   Pulmonary:      Effort: Pulmonary effort is normal.      Breath sounds: Normal breath sounds.   Abdominal:      General: There is no distension.   Skin:     General: Skin is warm.   Neurological:      General: No focal deficit present.      Mental Status: She is alert and oriented to person, place, and time.   Psychiatric:         Mood and Affect: Mood normal.             LAB RESULTS  Recent Results (from the past 24 hours)   Comprehensive Metabolic Panel    Collection Time: 04/02/25 10:33 PM    Specimen: Blood   Result Value Ref Range    Glucose 129 (H) 65 - 99 mg/dL    BUN 20 8 - 23 mg/dL    Creatinine 1.14 (H) 0.57 - 1.00 mg/dL    Sodium 139 136 - 145 mmol/L    Potassium 4.6 3.5 - 5.2 mmol/L    Chloride 104 98 - 107 mmol/L    CO2 24.6 22.0 - 29.0 mmol/L    Calcium 9.6 8.2 - 9.6 mg/dL    Total Protein 6.5 6.0 - 8.5 g/dL    Albumin 3.7 3.5 - 5.2 g/dL    ALT (SGPT) 10 1 - 33 U/L    AST (SGOT) 19 1 - 32 U/L     Alkaline Phosphatase 57 39 - 117 U/L    Total Bilirubin <0.2 0.0 - 1.2 mg/dL    Globulin 2.8 gm/dL    A/G Ratio 1.3 g/dL    BUN/Creatinine Ratio 17.5 7.0 - 25.0    Anion Gap 10.4 5.0 - 15.0 mmol/L    eGFR 45.3 (L) >60.0 mL/min/1.73   Protime-INR    Collection Time: 04/02/25 10:33 PM    Specimen: Blood   Result Value Ref Range    Protime 15.7 (H) 11.7 - 14.2 Seconds    INR 1.25 (H) 0.90 - 1.10   aPTT    Collection Time: 04/02/25 10:33 PM    Specimen: Blood   Result Value Ref Range    PTT 26.3 22.7 - 35.4 seconds   CBC Auto Differential    Collection Time: 04/02/25 10:33 PM    Specimen: Blood   Result Value Ref Range    WBC 10.28 3.40 - 10.80 10*3/mm3    RBC 4.32 3.77 - 5.28 10*6/mm3    Hemoglobin 12.9 12.0 - 15.9 g/dL    Hematocrit 38.7 34.0 - 46.6 %    MCV 89.6 79.0 - 97.0 fL    MCH 29.9 26.6 - 33.0 pg    MCHC 33.3 31.5 - 35.7 g/dL    RDW 13.1 12.3 - 15.4 %    RDW-SD 42.7 37.0 - 54.0 fl    MPV 10.8 6.0 - 12.0 fL    Platelets 210 140 - 450 10*3/mm3    Neutrophil % 77.0 (H) 42.7 - 76.0 %    Lymphocyte % 10.7 (L) 19.6 - 45.3 %    Monocyte % 9.0 5.0 - 12.0 %    Eosinophil % 1.8 0.3 - 6.2 %    Basophil % 0.8 0.0 - 1.5 %    Immature Grans % 0.7 (H) 0.0 - 0.5 %    Neutrophils, Absolute 7.92 (H) 1.70 - 7.00 10*3/mm3    Lymphocytes, Absolute 1.10 0.70 - 3.10 10*3/mm3    Monocytes, Absolute 0.93 (H) 0.10 - 0.90 10*3/mm3    Eosinophils, Absolute 0.18 0.00 - 0.40 10*3/mm3    Basophils, Absolute 0.08 0.00 - 0.20 10*3/mm3    Immature Grans, Absolute 0.07 (H) 0.00 - 0.05 10*3/mm3    nRBC 0.0 0.0 - 0.2 /100 WBC         RADIOLOGY  CT Head Without Contrast  CT Head Without Contrast, CT Cervical Spine Without Contrast  Result Date: 4/2/2025  CT OF THE HEAD WITHOUT CONTRAST; CT OF THE CERVICAL SPINE  HISTORY: Fall  COMPARISON: January 4, 2025  TECHNIQUE: Axial CT imaging was obtained through the brain. No IV contrast was administered. Axial CT imaging was obtained through the brain. No IV contrast was administered. Axial CT imaging was  obtained through the cervical spine. Coronal and sagittal reformatted images were obtained.  FINDINGS:  CT HEAD: Tiny rind of hyperdense material overlies the left temporal lobe, with a maximum thickness of 2 mm. This is best seen on images 18 through 23 of axial series 1 and images 14 and 15 of axial series 1. The appearance is suspicious for a small subdural hematoma. There is diffuse atrophy. There is periventricular and deep white matter microangiopathic change. No additional areas of hemorrhage are seen. There is no midline shift or mass effect. There is a left frontal scalp hematoma/laceration. Mucosal thickening is noted within the ethmoid and maxillary sinuses, with multiple mucous retention cysts noted within the maxillary sinuses. There is partial opacification of the right mastoid air cells. This was also present on the prior study. There is some trace fluid within the left mastoid air cells as well.  CT OF THE CERVICAL SPINE: No acute fracture or subluxation of the cervical spine is seen. There is anterolisthesis of C4 on C5. There is retrolisthesis of C5 on C6. There is anterolisthesis of C7 on T1. Sclerosis and deformity of the inferior endplate of T2 was present on the CT from January 5, 2025 images through the lung apices demonstrate some minimal scarring. There is no prevertebral soft tissue swelling. Canal stenosis is most significant at C5-C6. Neural foraminal narrowing is most significant at C5-C6 on the left. There are carotid calcifications. Thyroid gland is heterogeneous, with a dominant nodule, measuring 1.5 cm.       1. Tiny subdural hematoma overlying the left temporal lobe, with maximum thickness of 2 mm. 2. No acute fracture or subluxation of the cervical spine is seen.  FINDINGS were called to Carole Park at 10:11 p.m.  Radiation dose reduction techniques were utilized, including automated exposure control and exposure modulation based on body size.   This report was finalized on  4/2/2025 10:12 PM by Dr. Shiela Randle M.D on Workstation: [id number]          MEDICATIONS GIVEN IN ER  Medications   Tetanus-Diphth-Acell Pertussis (BOOSTRIX) injection 0.5 mL (has no administration in time range)         ORDERS PLACED DURING THIS VISIT:  Orders Placed This Encounter   Procedures    CT Head Without Contrast    CT Cervical Spine Without Contrast         OUTPATIENT MEDICATION MANAGEMENT:  Current Facility-Administered Medications Ordered in Epic   Medication Dose Route Frequency Provider Last Rate Last Admin    Tetanus-Diphth-Acell Pertussis (BOOSTRIX) injection 0.5 mL  0.5 mL Intramuscular Once Carole Park PA-C         Current Outpatient Medications Ordered in Epic   Medication Sig Dispense Refill    amLODIPine (NORVASC) 5 MG tablet Take 1 tablet by mouth Daily. 90 tablet 3    aspirin 81 MG EC tablet Take 1 tablet by mouth Daily. Indications: Disease involving Lipid Deposits in the Arteries      B Complex Vitamins (vitamin b complex) capsule capsule Take 1 capsule by mouth Daily.      carvedilol (COREG) 12.5 MG tablet TAKE ONE TABLET BY MOUTH EVERY 12 HOURS 180 tablet 3    cholecalciferol (VITAMIN D3) 1000 units tablet Take 1 tablet by mouth Daily.      Ibuprofen 3 %, Gabapentin 10 %, Baclofen 2 %, lidocaine 4 % Apply 1-2 g topically to the appropriate area as directed 3 (Three) to 4 (Four) times daily. 90 g 5    Lactobacillus (PROBIOTIC ACIDOPHILUS PO) Take 1 tablet by mouth Every Night.      mirtazapine (REMERON) 15 MG tablet Take 1 tablet by mouth Every Night. 90 tablet 1    multivitamin (THERAGRAN) tablet tablet Take  by mouth Daily. Gummies      olmesartan (BENICAR) 20 MG tablet TAKE 1 TABLET BY MOUTH DAILY 90 tablet 3    vitamin C (ASCORBIC ACID) 250 MG tablet Take 4 tablets by mouth Every Night.      Zinc 50 MG tablet Take 1 tablet by mouth Every Night.           PROCEDURES  Laceration Repair    Date/Time: 4/2/2025 10:10 PM    Performed by: Carole Park PA-C  Authorized by:  Carson Hsu II, MD    Consent:     Consent obtained:  Verbal    Consent given by:  Patient    Risks, benefits, and alternatives were discussed: yes      Risks discussed:  Pain, poor cosmetic result and poor wound healing    Alternatives discussed:  No treatment  Universal protocol:     Procedure explained and questions answered to patient or proxy's satisfaction: yes      Patient identity confirmed:  Verbally with patient  Anesthesia:     Anesthesia method:  Local infiltration    Local anesthetic:  Lidocaine 1% WITH epi  Laceration details:     Location:  Face    Face location:  L eyebrow    Length (cm):  5  Pre-procedure details:     Preparation:  Patient was prepped and draped in usual sterile fashion and imaging obtained to evaluate for foreign bodies  Exploration:     Limited defect created (wound extended): no      Hemostasis achieved with:  Epinephrine and direct pressure    Imaging outcome: foreign body not noted      Wound exploration: wound explored through full range of motion and entire depth of wound visualized      Wound extent: no foreign bodies/material noted      Contaminated: no    Treatment:     Area cleansed with:  Saline    Amount of cleaning:  Standard    Irrigation solution:  Sterile saline    Irrigation volume:  20 ml    Irrigation method:  Syringe and pressure wash    Visualized foreign bodies/material removed: no      Debridement:  None    Undermining:  None    Scar revision: no    Skin repair:     Repair method:  Sutures    Suture size:  5-0    Suture material:  Nylon    Suture technique:  Simple interrupted        40 minutes of critical care provided. This time excludes other billable procedures. Time does include preparation of documents, medical consultations, review of old records, and direct bedside care. Patient is at high risk for life-threatening deterioration due to acute subdural hematoma requiring close BP monitoring, frequent neurologic reassessment, neurosurgery  consultation, admission to the hospital.         PROGRESS, DATA ANALYSIS, CONSULTS, AND MEDICAL DECISION MAKING  All labs have been independently interpreted by me.  All radiology studies have been reviewed by me. All EKG's have been independently viewed and interpreted by me.  Discussion below represents my analysis of pertinent findings related to patient's condition, differential diagnosis, treatment plan and final disposition.    Differential diagnosis includes but is not limited to forehead laceration, closed head injury, intracranial bleed.        ED Course as of 04/03/25 0049   Wed Apr 02, 2025 2213 Preliminary report per Dr. Randle is small left temporal subdural hematoma [MP]   2227 I spoke with Dr. Hudson.  Discussed patient presentation and ED findings.  He agrees patient is appropriate for telemetry.  Will repeat head CT in the a.m. [MP]   2300 CT of the head independently interpreted by myself.  There is a tiny subdural overlying the left temporal lobe. [TD]   2306 WBC: 10.28 [MP]   2306 Hemoglobin: 12.9 [MP]   2306 INR(!): 1.25 [MP]   2323 I spoke with Rona with [facility].  Discussed patient presentation and ED findings.  She agrees admit patient to a telemetry observation bed to the service of Dr. Delgado. [MP]   2326 BP now 155 systolic.  Will order dose of labetalol [MP]      ED Course User Index  [MP] Carole Park PA-C  [TD] Carson Hsu II, MD             AS OF 22:10 EDT VITALS:    BP - 155/96  HR - 99  TEMP - 98.5 °F (36.9 °C) (Tympanic)  O2 SATS - 97%    COMPLEXITY OF CARE  The patient requires admission.      DIAGNOSIS  Final diagnoses:   Subdural hematoma   Laceration of left eyebrow, initial encounter   Fall, initial encounter         DISPOSITION  ED Disposition       ED Disposition   Decision to Admit    Condition   --    Comment   Level of Care: Telemetry [5]   Diagnosis: Subdural hematoma [185022]   Admitting Physician: IRENE DELGADO [[id number]]   Attending Physician:  IRENE JOHNSON [[id number]]   Is patient appropriate for Inpatient Observation Unit?: No [0]                  Please note that portions of this document were completed with a voice recognition program.    Note Disclaimer: At [facility], we believe that sharing information builds trust and better relationships. You are receiving this note because you recently visited [facility]. It is possible you will see health information before a provider has talked with you about it. This kind of information can be easy to misunderstand. To help you fully understand what it means for your health, we urge you to discuss this note with your provider.     Carole Park PA-C  04/03/25 0050

## 2025-04-03 NOTE — H&P
Patient Name:  Neha Guillory  YOB: 1933  MRN:  [id number]  Admit Date:  4/2/2025  Patient Care Team:  Yvan Ruiz III, NP-C as PCP - General (Internal Medicine)  Bel Marina MD as Referring Physician (General Surgery)  Hunter Acosta MD as Consulting Physician (Radiation Oncology)  Ricky Dill MD as Consulting Physician (Hematology and Oncology)  Jose Martin Liu APRN as Nurse Practitioner (Family Medicine)  Zack Mohr MD as Consulting Physician (Cardiology)      Subjective   History Present Illness     Chief Complaint   Patient presents with    Fall    Facial Laceration       Ms. Guillory is a 89 y.o. non-smoker with a history of hypertension, hyperlipidemia, CKD stage 3a, and RICH on CPAP that presents to [facility] complaining of a fall and facial injury. She reports she got tripped up over her feet last night and fell forward, hitting her head on the floor. She states she had her glasses on when she fell and they caused a laceration over her left eye. She denies loss of consciousness. She reports a mild headache at this time but denies trouble with her speech, visual changes, facial asymmetry, numbness, tingling, weakness, and neck pain. A CT of the head in the ED showed a tiny subdural hematoma overlying the left temporal lobe, with maximum thickness of 2 mm. No acute fracture or subluxation of the cervical spine is seen. She had a laceration to her left forehead sutured in the ED. She has been admitted for repeat CT in the morning and evaluation by neurosurgery.      History of Present Illness  Review of Systems   Constitutional:  Negative for chills and fever.   HENT:  Negative for congestion and sore throat.    Eyes:  Negative for photophobia and visual disturbance.   Respiratory:  Negative for cough and shortness of breath.    Cardiovascular:  Negative for chest pain, palpitations and leg swelling.   Gastrointestinal:  Negative for abdominal  pain, nausea and vomiting.   Musculoskeletal:  Negative for arthralgias and myalgias.   Neurological:  Positive for headaches. Negative for dizziness, tremors, seizures, syncope, facial asymmetry, speech difficulty, weakness, light-headedness and numbness.        Personal History     Past Medical History:   Diagnosis Date    Aortic stenosis, mild 08/24/2022    Arthritis     Carcinoma of upper-inner quadrant of left breast in female, estrogen receptor positive 02/24/2022    Carotid artery stenosis     Carotid atherosclerosis, bilateral 07/12/2019    Cataract     BILATERAL    Depression     Gastroenteritis     Generalized osteoarthritis 02/01/2016    H/O diastolic dysfunction     Hyperlipidemia     Hyperparathyroidism     Hyperparathyroidism     Hypertension     Hypertrophic cardiomyopathy     Iron deficiency anemia 10/13/2022    Low back pain May, 2023    Lower, right    Lumbosacral disc disease May 2023    Multifocal pneumonia 10/10/2022    Multifocal pneumonia 10/10/2022    Neuromuscular disorder     Obstructive sleep apnea syndrome 02/01/2016    Osteopenia     Osteoporosis     Pathological fracture of pelvis due to osteoporosis 01/24/2023    Primary familial hypertrophic cardiomyopathy 02/01/2016    Pubic ramus fracture, left, closed, initial encounter 01/23/2023    Pulmonary hypertension     Senile osteoporosis 02/07/2017    Synovial cyst of popliteal space 02/01/2016    Vitamin D deficiency      Past Surgical History:   Procedure Laterality Date    BREAST BIOPSY Left 02/17/2022    BREAST LUMPECTOMY Left     BREAST LUMPECTOMY WITH SENTINEL NODE BIOPSY Left 04/07/2022    Procedure: LEFT BREAST LUMPECTOMY WITH SENTINEL NODE BIOPSY AND NEEDLE LOCALIZATION;  Surgeon: Bel Marina MD;  [place] [facility] [place]  Service: General;  Laterality: Left;    BUNIONECTOMY Right     FOOT SURGERY    CARDIAC ELECTROPHYSIOLOGY PROCEDURE N/A 1/7/2025    Procedure: Pacemaker DC new MEDT;  Surgeon: Donaldo Fry MD;   Location:  [facility];  Service: Cardiovascular;  Laterality: N/A;    COLONOSCOPY N/A 2011    Dr. Luis    EPIDURAL BLOCK  July 2023    Epidural x2    HAND SURGERY Right     TUMOR REMOVED ON FOREFINGER    PITUITARY SURGERY      THYROIDECTOMY Right 04/20/2016    Procedure: RIGHT SUPERIOR PARATHYROIDECTOMY;  Surgeon: Bel Marina MD;  Location: [facility] MAIN OR;  Service:      Family History   Problem Relation Age of Onset    Diabetes Mother     Heart attack Mother     Arthritis Father     Breast cancer Sister     Breast cancer Sister     Breast cancer Sister     Colon cancer Sister     Heart disease Brother         CABG    Other Brother         BRAIN TUMOR    Colon cancer Brother     Colon cancer Brother     Colon cancer Brother     Colon cancer Maternal Grandfather     Cancer Other     Malig Hyperthermia Neg Hx      Social History     Tobacco Use    Smoking status: Never     Passive exposure: Never    Smokeless tobacco: Never   Vaping Use    Vaping status: Never Used   Substance Use Topics    Alcohol use: Never    Drug use: Never     (Not in a hospital admission)    Allergies:    Allergies   Allergen Reactions    Amoxicillin Hives    Griseofulvin Ultramicrosize [Griseofulvin] Confusion    Tramadol Delirium    Atorvastatin Myalgia       Objective    Objective     Vital Signs  Temp:  [98.5 °F (36.9 °C)] 98.5 °F (36.9 °C)  Heart Rate:  [] 66  Resp:  [18] 18  BP: (118-155)/(72-98) 140/80  SpO2:  [90 %-97 %] 95 %  on  Flow (L/min) (Oxygen Therapy):  [2] 2;   Device (Oxygen Therapy): nasal cannula  Body mass index is 24.85 kg/m².    Physical Exam  Vitals and nursing note reviewed.   Constitutional:       Appearance: Normal appearance.   HENT:      Head: Normocephalic.      Nose: Nose normal.      Mouth/Throat:      Mouth: Mucous membranes are moist.      Pharynx: Oropharynx is clear.   Eyes:      Extraocular Movements: Extraocular movements intact.      Conjunctiva/sclera: Conjunctivae normal.    Cardiovascular:      Rate and Rhythm: Normal rate and regular rhythm.      Pulses: Normal pulses.      Heart sounds: Normal heart sounds.   Pulmonary:      Effort: Pulmonary effort is normal.      Breath sounds: Normal breath sounds.   Abdominal:      General: Bowel sounds are normal.      Palpations: Abdomen is soft.   Musculoskeletal:         General: No swelling. Normal range of motion.      Cervical back: Normal range of motion and neck supple.   Skin:     General: Skin is warm and dry.   Neurological:      General: No focal deficit present.      Mental Status: She is alert and oriented to person, place, and time.      Cranial Nerves: No cranial nerve deficit.   Psychiatric:         Mood and Affect: Mood normal.         Behavior: Behavior normal.         Results Review:  I reviewed the patient's new clinical results.  I reviewed the patient's new imaging results and agree with the interpretation.  I reviewed the patient's other test results and agree with the interpretation  I personally viewed and interpreted the patient's EKG/Telemetry data  Discussed with ED provider.    Lab Results (last 24 hours)       Procedure Component Value Units Date/Time    CBC & Differential [[id number]]  (Abnormal) Collected: 04/02/25 2233    Specimen: Blood Updated: 04/02/25 2246    Narrative:      The following orders were created for panel order CBC & Differential.  Procedure                               Abnormality         Status                     ---------                               -----------         ------                     CBC Auto Differential[[id number]]        Abnormal            Final result                 Please view results for these tests on the individual orders.    Comprehensive Metabolic Panel [[id number]]  (Abnormal) Collected: 04/02/25 2233    Specimen: Blood Updated: 04/02/25 2309     Glucose 129 mg/dL      BUN 20 mg/dL      Creatinine 1.14 mg/dL      Sodium 139 mmol/L      Potassium 4.6 mmol/L       Chloride 104 mmol/L      CO2 24.6 mmol/L      Calcium 9.6 mg/dL      Total Protein 6.5 g/dL      Albumin 3.7 g/dL      ALT (SGPT) 10 U/L      AST (SGOT) 19 U/L      Alkaline Phosphatase 57 U/L      Total Bilirubin <0.2 mg/dL      Globulin 2.8 gm/dL      A/G Ratio 1.3 g/dL      BUN/Creatinine Ratio 17.5     Anion Gap 10.4 mmol/L      eGFR 45.3 mL/min/1.73     Narrative:      GFR Categories in Chronic Kidney Disease (CKD)      GFR Category          GFR (mL/min/1.73)    Interpretation  G1                     90 or greater         Normal or high (1)  G2                      60-89                Mild decrease (1)  G3a                   45-59                Mild to moderate decrease  G3b                   30-44                Moderate to severe decrease  G4                    15-29                Severe decrease  G5                    14 or less           Kidney failure          (1)In the absence of evidence of kidney disease, neither GFR category G1 or G2 fulfill the criteria for CKD.    eGFR calculation 2021 CKD-EPI creatinine equation, which does not include race as a factor    Protime-INR [[id number]]  (Abnormal) Collected: 04/02/25 2233    Specimen: Blood Updated: 04/02/25 2304     Protime 15.7 Seconds      INR 1.25    aPTT [[id number]]  (Normal) Collected: 04/02/25 2233    Specimen: Blood Updated: 04/02/25 2304     PTT 26.3 seconds     CBC Auto Differential [[id number]]  (Abnormal) Collected: 04/02/25 2233    Specimen: Blood Updated: 04/02/25 2246     WBC 10.28 10*3/mm3      RBC 4.32 10*6/mm3      Hemoglobin 12.9 g/dL      Hematocrit 38.7 %      MCV 89.6 fL      MCH 29.9 pg      MCHC 33.3 g/dL      RDW 13.1 %      RDW-SD 42.7 fl      MPV 10.8 fL      Platelets 210 10*3/mm3      Neutrophil % 77.0 %      Lymphocyte % 10.7 %      Monocyte % 9.0 %      Eosinophil % 1.8 %      Basophil % 0.8 %      Immature Grans % 0.7 %      Neutrophils, Absolute 7.92 10*3/mm3      Lymphocytes, Absolute 1.10 10*3/mm3      Monocytes,  Absolute 0.93 10*3/mm3      Eosinophils, Absolute 0.18 10*3/mm3      Basophils, Absolute 0.08 10*3/mm3      Immature Grans, Absolute 0.07 10*3/mm3      nRBC 0.0 /100 WBC             Imaging Results (Last 24 Hours)       Procedure Component Value Units Date/Time    CT Head Without Contrast [[id number]] Collected: 04/02/25 2159     Updated: 04/02/25 2215    Narrative:      CT OF THE HEAD WITHOUT CONTRAST; CT OF THE CERVICAL SPINE     HISTORY: Fall     COMPARISON: January 4, 2025     TECHNIQUE: Axial CT imaging was obtained through the brain. No IV  contrast was administered. Axial CT imaging was obtained through the  brain. No IV contrast was administered. Axial CT imaging was obtained  through the cervical spine. Coronal and sagittal reformatted images were  obtained.     FINDINGS:     CT HEAD: Tiny rind of hyperdense material overlies the left temporal  lobe, with a maximum thickness of 2 mm. This is best seen on images 18  through 23 of axial series 1 and images 14 and 15 of axial series 1. The  appearance is suspicious for a small subdural hematoma. There is diffuse  atrophy. There is periventricular and deep white matter microangiopathic  change. No additional areas of hemorrhage are seen. There is no midline  shift or mass effect. There is a left frontal scalp hematoma/laceration.  Mucosal thickening is noted within the ethmoid and maxillary sinuses,  with multiple mucous retention cysts noted within the maxillary sinuses.  There is partial opacification of the right mastoid air cells. This was  also present on the prior study. There is some trace fluid within the  left mastoid air cells as well.     CT OF THE CERVICAL SPINE: No acute fracture or subluxation of the  cervical spine is seen. There is anterolisthesis of C4 on C5. There is  retrolisthesis of C5 on C6. There is anterolisthesis of C7 on T1.  Sclerosis and deformity of the inferior endplate of T2 was present on  the CT from January 5, 2025 images  through the lung apices demonstrate  some minimal scarring. There is no prevertebral soft tissue swelling.  Canal stenosis is most significant at C5-C6. Neural foraminal narrowing  is most significant at C5-C6 on the left. There are carotid  calcifications. Thyroid gland is heterogeneous, with a dominant nodule,  measuring 1.5 cm.       Impression:         1. Tiny subdural hematoma overlying the left temporal lobe, with maximum  thickness of 2 mm.  2. No acute fracture or subluxation of the cervical spine is seen.     FINDINGS were called to [place] at 10:11 p.m.     Radiation dose reduction techniques were utilized, including automated  exposure control and exposure modulation based on body size.        This report was finalized on 4/2/2025 10:12 PM by Dr. Shiela Randle M.D on Workstation: [id number]       CT Cervical Spine Without Contrast [[id number]] Collected: 04/02/25 2159     Updated: 04/02/25 2215    Narrative:      CT OF THE HEAD WITHOUT CONTRAST; CT OF THE CERVICAL SPINE     HISTORY: Fall     COMPARISON: January 4, 2025     TECHNIQUE: Axial CT imaging was obtained through the brain. No IV  contrast was administered. Axial CT imaging was obtained through the  brain. No IV contrast was administered. Axial CT imaging was obtained  through the cervical spine. Coronal and sagittal reformatted images were  obtained.     FINDINGS:     CT HEAD: Tiny rind of hyperdense material overlies the left temporal  lobe, with a maximum thickness of 2 mm. This is best seen on images 18  through 23 of axial series 1 and images 14 and 15 of axial series 1. The  appearance is suspicious for a small subdural hematoma. There is diffuse  atrophy. There is periventricular and deep white matter microangiopathic  change. No additional areas of hemorrhage are seen. There is no midline  shift or mass effect. There is a left frontal scalp hematoma/laceration.  Mucosal thickening is noted within the ethmoid and maxillary  sinuses,  with multiple mucous retention cysts noted within the maxillary sinuses.  There is partial opacification of the right mastoid air cells. This was  also present on the prior study. There is some trace fluid within the  left mastoid air cells as well.     CT OF THE CERVICAL SPINE: No acute fracture or subluxation of the  cervical spine is seen. There is anterolisthesis of C4 on C5. There is  retrolisthesis of C5 on C6. There is anterolisthesis of C7 on T1.  Sclerosis and deformity of the inferior endplate of T2 was present on  the CT from January 5, 2025 images through the lung apices demonstrate  some minimal scarring. There is no prevertebral soft tissue swelling.  Canal stenosis is most significant at C5-C6. Neural foraminal narrowing  is most significant at C5-C6 on the left. There are carotid  calcifications. Thyroid gland is heterogeneous, with a dominant nodule,  measuring 1.5 cm.       Impression:         1. Tiny subdural hematoma overlying the left temporal lobe, with maximum  thickness of 2 mm.  2. No acute fracture or subluxation of the cervical spine is seen.     FINDINGS were called to [place] at 10:11 p.m.     Radiation dose reduction techniques were utilized, including automated  exposure control and exposure modulation based on body size.        This report was finalized on 4/2/2025 10:12 PM by Dr. Shiela Randle M.D on Workstation: [id number]               Results for orders placed during the hospital encounter of 01/04/25    Adult Transthoracic Echo Complete W/ Cont if Necessary Per Protocol    Interpretation Summary    Left ventricular systolic function is normal. Calculated left ventricular EF = 55.7%    Left ventricular wall thickness is consistent with moderate concentric hypertrophy.    Left ventricular diastolic function is consistent with (grade I) impaired relaxation.    The left atrial cavity is severely dilated.    Left atrial volume is severely increased.    Mild  aortic valve stenosis is present. Aortic valve area is 1.8 cm2.    Peak velocity of the flow distal to the aortic valve is 233.8 cm/s. Aortic valve maximum pressure gradient is 22 mmHg. Aortic valve mean pressure gradient is 12 mmHg. Aortic valve dimensionless index is 0.5 .    Mild aortic valve regurgitation is present    Moderate mitral valve regurgitation is present.    Mild tricuspid valve regurgitation is present.    Estimated right ventricular systolic pressure from tricuspid regurgitation is mildly elevated (35-45 mmHg). Calculated right ventricular systolic pressure from tricuspid regurgitation is 35 mmHg.      No orders to display        Assessment/Plan     Active Hospital Problems    Diagnosis  POA    **Subdural hematoma [S06.5XAA]  Yes    Stage 3a chronic kidney disease [N18.31]  Yes    Essential hypertension [I10]  Yes    Hyperlipidemia [E78.5]  Yes    RICH on CPAP [G47.33]  Yes       Subdural hematoma  -She has no focal deficits on exam  -Admit to a neuro telemetry unit for monitoring  -Neurosurgery consult  -Repeat CT head without contrast in the AM  -Neuro checks    Hypertension  -Blood pressures have been stable. Continue Coreg, Amlodipine, and Olmesartan  -Monitor    Hyperlipidemia  -Hold aspirin secondary to SDH    CKD Stage 3a  -Her baseline creatinine is around 1.13  -Avoid nephrotoxins as able  -Repeat labs in AM    RICH  -She may use a home CPAP  -Supplemental oxygen as needed at night to keep sats greater than or equal to 92%  -Continuous pulse oximetry    History of breast cancer  -Continue Tamoxifen    -I discussed the patients findings and my recommendations with patient.    VTE Prophylaxis - SCDs.  Code Status - Full code.       ENZO Holguin  [facility]  04/03/25  01:37 EDT

## 2025-04-04 ENCOUNTER — READMISSION MANAGEMENT (OUTPATIENT)
Dept: CALL CENTER | Facility: HOSPITAL | Age: OVER 89
End: 2025-04-04
Payer: MEDICARE

## 2025-04-04 VITALS
HEART RATE: 74 BPM | SYSTOLIC BLOOD PRESSURE: 119 MMHG | DIASTOLIC BLOOD PRESSURE: 64 MMHG | RESPIRATION RATE: 16 BRPM | HEIGHT: 59 IN | WEIGHT: 123.02 LBS | BODY MASS INDEX: 24.8 KG/M2 | TEMPERATURE: 98.1 F | OXYGEN SATURATION: 92 %

## 2025-04-04 LAB
ANION GAP SERPL CALCULATED.3IONS-SCNC: 8 MMOL/L (ref 5–15)
BASOPHILS # BLD AUTO: 0.05 10*3/MM3 (ref 0–0.2)
BASOPHILS NFR BLD AUTO: 0.5 % (ref 0–1.5)
BUN SERPL-MCNC: 21 MG/DL (ref 8–23)
BUN/CREAT SERPL: 18.4 (ref 7–25)
CALCIUM SPEC-SCNC: 8.7 MG/DL (ref 8.2–9.6)
CHLORIDE SERPL-SCNC: 106 MMOL/L (ref 98–107)
CO2 SERPL-SCNC: 22 MMOL/L (ref 22–29)
CREAT SERPL-MCNC: 1.14 MG/DL (ref 0.57–1)
DEPRECATED RDW RBC AUTO: 42.5 FL (ref 37–54)
EGFRCR SERPLBLD CKD-EPI 2021: 45.3 ML/MIN/1.73
EOSINOPHIL # BLD AUTO: 0.17 10*3/MM3 (ref 0–0.4)
EOSINOPHIL NFR BLD AUTO: 1.7 % (ref 0.3–6.2)
ERYTHROCYTE [DISTWIDTH] IN BLOOD BY AUTOMATED COUNT: 13.1 % (ref 12.3–15.4)
GLUCOSE SERPL-MCNC: 82 MG/DL (ref 65–99)
HCT VFR BLD AUTO: 33.4 % (ref 34–46.6)
HGB BLD-MCNC: 10.7 G/DL (ref 12–15.9)
IMM GRANULOCYTES # BLD AUTO: 0.06 10*3/MM3 (ref 0–0.05)
IMM GRANULOCYTES NFR BLD AUTO: 0.6 % (ref 0–0.5)
LYMPHOCYTES # BLD AUTO: 1.65 10*3/MM3 (ref 0.7–3.1)
LYMPHOCYTES NFR BLD AUTO: 16.4 % (ref 19.6–45.3)
MCH RBC QN AUTO: 29 PG (ref 26.6–33)
MCHC RBC AUTO-ENTMCNC: 32 G/DL (ref 31.5–35.7)
MCV RBC AUTO: 90.5 FL (ref 79–97)
MONOCYTES # BLD AUTO: 1.05 10*3/MM3 (ref 0.1–0.9)
MONOCYTES NFR BLD AUTO: 10.4 % (ref 5–12)
NEUTROPHILS NFR BLD AUTO: 7.1 10*3/MM3 (ref 1.7–7)
NEUTROPHILS NFR BLD AUTO: 70.4 % (ref 42.7–76)
NRBC BLD AUTO-RTO: 0 /100 WBC (ref 0–0.2)
PLATELET # BLD AUTO: 183 10*3/MM3 (ref 140–450)
PMV BLD AUTO: 11.1 FL (ref 6–12)
POTASSIUM SERPL-SCNC: 3.9 MMOL/L (ref 3.5–5.2)
RBC # BLD AUTO: 3.69 10*6/MM3 (ref 3.77–5.28)
SODIUM SERPL-SCNC: 136 MMOL/L (ref 136–145)
WBC NRBC COR # BLD AUTO: 10.08 10*3/MM3 (ref 3.4–10.8)

## 2025-04-04 PROCEDURE — G0378 HOSPITAL OBSERVATION PER HR: HCPCS

## 2025-04-04 PROCEDURE — 85025 COMPLETE CBC W/AUTO DIFF WBC: CPT | Performed by: INTERNAL MEDICINE

## 2025-04-04 PROCEDURE — 80048 BASIC METABOLIC PNL TOTAL CA: CPT | Performed by: INTERNAL MEDICINE

## 2025-04-04 RX ADMIN — TAMOXIFEN CITRATE 20 MG: 10 TABLET ORAL at 09:24

## 2025-04-04 RX ADMIN — LOSARTAN POTASSIUM 50 MG: 50 TABLET, FILM COATED ORAL at 08:07

## 2025-04-04 RX ADMIN — Medication 1 CAPSULE: at 08:07

## 2025-04-04 RX ADMIN — CARVEDILOL 12.5 MG: 12.5 TABLET, FILM COATED ORAL at 08:07

## 2025-04-04 RX ADMIN — Medication 10 ML: at 08:07

## 2025-04-04 RX ADMIN — AMLODIPINE BESYLATE 5 MG: 5 TABLET ORAL at 08:07

## 2025-04-04 NOTE — PLAN OF CARE
Patient to [place] home today with family to drive home.  Problem: Adult Inpatient Plan of Care  Goal: Plan of Care Review  Outcome: Adequate for Care Transition  Goal: Patient-Specific Goal (Individualized)  Outcome: Adequate for Care Transition  Goal: Absence of Hospital-Acquired Illness or Injury  Outcome: Adequate for Care Transition  Intervention: Identify and Manage Fall Risk  Recent Flowsheet Documentation  Taken 4/4/2025 0807 by Martha Figueroa RN  Safety Promotion/Fall Prevention:   clutter free environment maintained   safety round/check completed  Intervention: Prevent Skin Injury  Recent Flowsheet Documentation  Taken 4/4/2025 0807 by Martha Figueroa RN  Body Position: position changed independently  Intervention: Prevent and Manage VTE (Venous Thromboembolism) Risk  Recent Flowsheet Documentation  Taken 4/4/2025 0807 by Martha Figueroa RN  VTE Prevention/Management:   bilateral   SCDs (sequential compression devices) off  Intervention: Prevent Infection  Recent Flowsheet Documentation  Taken 4/4/2025 0807 by Martha Figueroa RN  Infection Prevention:   environmental surveillance performed   single patient room provided  Goal: Optimal Comfort and Wellbeing  Outcome: Adequate for Care Transition  Intervention: Provide Person-Centered Care  Recent Flowsheet Documentation  Taken 4/4/2025 0807 by Martha Figueroa RN  Trust Relationship/Rapport:   care explained   thoughts/feelings acknowledged  Goal: Readiness for Transition of Care  Outcome: Adequate for Care Transition     Problem: Comorbidity Management  Goal: Blood Pressure in Desired Range  Outcome: Adequate for Care Transition     Problem: Wound  Goal: Optimal Functional Ability  Outcome: Adequate for Care Transition  Goal: Absence of Infection Signs and Symptoms  Outcome: Adequate for Care Transition  Goal: Optimal Pain Control and Function  Outcome: Adequate for Care Transition  Goal: Skin Health and Integrity  Outcome: Adequate for Care  Transition  Intervention: Optimize Skin Protection  Recent Flowsheet Documentation  Taken 4/4/2025 0807 by Martha Figueroa, RN  Head of Bed (HOB) Positioning: HOB at 20-30 degrees  Goal: Optimal Wound Healing  Outcome: Adequate for Care Transition   Goal Outcome Evaluation:

## 2025-04-04 NOTE — DISCHARGE SUMMARY
Patient Name: Neha Guillory  : 1933  MRN: [id number]    Date of Admission: 2025  Date of Discharge:  2025  Primary Care Physician: Yvan Ruiz III, NP-C      Chief Complaint:   Fall and Facial Laceration      Discharge Diagnoses     Active Hospital Problems    Diagnosis  POA    **Subdural hematoma [S06.5XAA]  Yes    Stage 3a chronic kidney disease [N18.31]  Yes    Essential hypertension [I10]  Yes    Hyperlipidemia [E78.5]  Yes    RICH on CPAP [G47.33]  Yes      Resolved Hospital Problems   No resolved problems to display.        Hospital Course     Patient is a pleasant 89-year-old female with known history of hypertension, hyperlipidemia, CKD stage III had a mechanical fall.  Reportedly she was trying to close her blinds and subsequently tripped and fell.  Patient denies any loss of consciousness, seizure-like activity or urinary or bowel incontinence.  Had head injury with laceration on her left eyebrow and came to the emergency room.  In the ER underwent repair of the laceration and CT brain was suggestive of small subdural hematoma.  Normally takes aspirin which has been held and neurosurgery did evaluate and cleared patient to be discharged.  Will discontinue aspirin and follow up as an outpatient basis.      Day of Discharge         Physical Exam:  Temp:  [98.1 °F (36.7 °C)-99 °F (37.2 °C)] 98.1 °F (36.7 °C)  Heart Rate:  [74-76] 74  Resp:  [16] 16  BP: (109-131)/(53-70) 119/64  Body mass index is 24.83 kg/m².  Physical Exam  HEENT: PERRLA, extraocular movements intact, Scleras no icterus, left eyebrow laceration sutured  Neck: Supple, no JVD  Cardiovascular: Regular rate and rhythm with normal S1 and S2  Respiratory: Fairly clear to auscultation bilaterally with no wheezes  GI: Soft, nontender, bowel sounds are present  Extremities: No edema, palpable pedal pulses  Neurologic: Grossly nonfocal, no facial asymmetry    Consultants     Consult Orders (all) (From admission,  onward)       Start     Ordered    04/03/25 0005  Inpatient Neurosurgery Consult  Once        Specialty:  Neurosurgery  Provider:  Ivan Hudson MD    04/03/25 0007    04/02/25 2302  [facility] (on-call MD unless specified) Details  Once        Specialty:  Hospitalist  Provider:  (Not yet assigned)    04/02/25 2301    04/02/25 2214  Neurosurgery (on-call MD unless specified)  Once        Specialty:  Neurosurgery  Provider:  Ivan Hudson MD    04/02/25 2213                  Procedures     * Surgery not found *    Imaging Results (All)       Procedure Component Value Units Date/Time    CT Head Without Contrast [[id number]] Collected: 04/03/25 0547     Updated: 04/03/25 0554    Narrative:      CT OF THE HEAD WITHOUT CONTRAST     HISTORY: Subdural hematoma     COMPARISON: April 2, 2020     TECHNIQUE: Axial CT imaging was obtained through the brain. No IV  contrast was administered.     FINDINGS:  Patient is again noted to have a trace amount of subdural hemorrhage  overlying the left temporal lobe. Again, maximum thickness is  approximately 2 mm. No new areas of hemorrhage are seen. There is no  midline shift or mass effect. There is atrophy. There is periventricular  and deep white matter microangiopathic change. There is left periorbital  soft tissue swelling.       Impression:      Stable tiny left temporal subdural hematoma.     Radiation dose reduction techniques were utilized, including automated  exposure control and exposure modulation based on body size.        This report was finalized on 4/3/2025 5:51 AM by Dr. Shiela Randle M.D on Workstation: [id number]       CT Head Without Contrast [[id number]] Collected: 04/02/25 2159     Updated: 04/02/25 2215    Narrative:      CT OF THE HEAD WITHOUT CONTRAST; CT OF THE CERVICAL SPINE     HISTORY: Fall     COMPARISON: January 4, 2025     TECHNIQUE: Axial CT imaging was obtained through the brain. No IV  contrast was administered. Axial CT imaging was obtained  through the  brain. No IV contrast was administered. Axial CT imaging was obtained  through the cervical spine. Coronal and sagittal reformatted images were  obtained.     FINDINGS:     CT HEAD: Tiny rind of hyperdense material overlies the left temporal  lobe, with a maximum thickness of 2 mm. This is best seen on images 18  through 23 of axial series 1 and images 14 and 15 of axial series 1. The  appearance is suspicious for a small subdural hematoma. There is diffuse  atrophy. There is periventricular and deep white matter microangiopathic  change. No additional areas of hemorrhage are seen. There is no midline  shift or mass effect. There is a left frontal scalp hematoma/laceration.  Mucosal thickening is noted within the ethmoid and maxillary sinuses,  with multiple mucous retention cysts noted within the maxillary sinuses.  There is partial opacification of the right mastoid air cells. This was  also present on the prior study. There is some trace fluid within the  left mastoid air cells as well.     CT OF THE CERVICAL SPINE: No acute fracture or subluxation of the  cervical spine is seen. There is anterolisthesis of C4 on C5. There is  retrolisthesis of C5 on C6. There is anterolisthesis of C7 on T1.  Sclerosis and deformity of the inferior endplate of T2 was present on  the CT from January 5, 2025 images through the lung apices demonstrate  some minimal scarring. There is no prevertebral soft tissue swelling.  Canal stenosis is most significant at C5-C6. Neural foraminal narrowing  is most significant at C5-C6 on the left. There are carotid  calcifications. Thyroid gland is heterogeneous, with a dominant nodule,  measuring 1.5 cm.       Impression:         1. Tiny subdural hematoma overlying the left temporal lobe, with maximum  thickness of 2 mm.  2. No acute fracture or subluxation of the cervical spine is seen.     FINDINGS were called to [place] at 10:11 p.m.     Radiation dose reduction  techniques were utilized, including automated  exposure control and exposure modulation based on body size.        This report was finalized on 4/2/2025 10:12 PM by Dr. Shiela Randle M.D on Workstation: [id number]       CT Cervical Spine Without Contrast [[id number]] Collected: 04/02/25 2159     Updated: 04/02/25 2215    Narrative:      CT OF THE HEAD WITHOUT CONTRAST; CT OF THE CERVICAL SPINE     HISTORY: Fall     COMPARISON: January 4, 2025     TECHNIQUE: Axial CT imaging was obtained through the brain. No IV  contrast was administered. Axial CT imaging was obtained through the  brain. No IV contrast was administered. Axial CT imaging was obtained  through the cervical spine. Coronal and sagittal reformatted images were  obtained.     FINDINGS:     CT HEAD: Tiny rind of hyperdense material overlies the left temporal  lobe, with a maximum thickness of 2 mm. This is best seen on images 18  through 23 of axial series 1 and images 14 and 15 of axial series 1. The  appearance is suspicious for a small subdural hematoma. There is diffuse  atrophy. There is periventricular and deep white matter microangiopathic  change. No additional areas of hemorrhage are seen. There is no midline  shift or mass effect. There is a left frontal scalp hematoma/laceration.  Mucosal thickening is noted within the ethmoid and maxillary sinuses,  with multiple mucous retention cysts noted within the maxillary sinuses.  There is partial opacification of the right mastoid air cells. This was  also present on the prior study. There is some trace fluid within the  left mastoid air cells as well.     CT OF THE CERVICAL SPINE: No acute fracture or subluxation of the  cervical spine is seen. There is anterolisthesis of C4 on C5. There is  retrolisthesis of C5 on C6. There is anterolisthesis of C7 on T1.  Sclerosis and deformity of the inferior endplate of T2 was present on  the CT from January 5, 2025 images through the lung apices  demonstrate  some minimal scarring. There is no prevertebral soft tissue swelling.  Canal stenosis is most significant at C5-C6. Neural foraminal narrowing  is most significant at C5-C6 on the left. There are carotid  calcifications. Thyroid gland is heterogeneous, with a dominant nodule,  measuring 1.5 cm.       Impression:         1. Tiny subdural hematoma overlying the left temporal lobe, with maximum  thickness of 2 mm.  2. No acute fracture or subluxation of the cervical spine is seen.     FINDINGS were called to [place] at 10:11 p.m.     Radiation dose reduction techniques were utilized, including automated  exposure control and exposure modulation based on body size.        This report was finalized on 4/2/2025 10:12 PM by Dr. Shiela Randle M.D on Workstation: [id number]             Results for orders placed during the hospital encounter of 01/04/25    Duplex Carotid Ultrasound CAR    Interpretation Summary    Right internal carotid artery demonstrates a less than 50% stenosis.    Retrograde right vertebral flow.    Left internal carotid artery demonstrates a less than 50% stenosis.    Antegrade left vertebral flow.    Results for orders placed during the hospital encounter of 01/04/25    Adult Transthoracic Echo Complete W/ Cont if Necessary Per Protocol    Interpretation Summary    Left ventricular systolic function is normal. Calculated left ventricular EF = 55.7%    Left ventricular wall thickness is consistent with moderate concentric hypertrophy.    Left ventricular diastolic function is consistent with (grade I) impaired relaxation.    The left atrial cavity is severely dilated.    Left atrial volume is severely increased.    Mild aortic valve stenosis is present. Aortic valve area is 1.8 cm2.    Peak velocity of the flow distal to the aortic valve is 233.8 cm/s. Aortic valve maximum pressure gradient is 22 mmHg. Aortic valve mean pressure gradient is 12 mmHg. Aortic valve dimensionless  "index is 0.5 .    Mild aortic valve regurgitation is present    Moderate mitral valve regurgitation is present.    Mild tricuspid valve regurgitation is present.    Estimated right ventricular systolic pressure from tricuspid regurgitation is mildly elevated (35-45 mmHg). Calculated right ventricular systolic pressure from tricuspid regurgitation is 35 mmHg.    Pertinent Labs     Results from last 7 days   Lab Units 04/04/25 0454 04/03/25  0529 04/02/25  2233   WBC 10*3/mm3 10.08 10.67 10.28   HEMOGLOBIN g/dL 10.7* 11.7* 12.9   PLATELETS 10*3/mm3 183 192 210     Results from last 7 days   Lab Units 04/04/25 0454 04/03/25  0529 04/02/25  2233   SODIUM mmol/L 136 143 139   POTASSIUM mmol/L 3.9 3.9 4.6   CHLORIDE mmol/L 106 107 104   CO2 mmol/L 22.0 22.6 24.6   BUN mg/dL 21 19 20   CREATININE mg/dL 1.14* 1.03* 1.14*   GLUCOSE mg/dL 82 87 129*   EGFR mL/min/1.73 45.3* 51.1* 45.3*     Results from last 7 days   Lab Units 04/02/25  2233   ALBUMIN g/dL 3.7   BILIRUBIN mg/dL <0.2   ALK PHOS U/L 57   AST (SGOT) U/L 19   ALT (SGPT) U/L 10     Results from last 7 days   Lab Units 04/04/25 0454 04/03/25  0529 04/02/25  2233   CALCIUM mg/dL 8.7 9.0 9.6   ALBUMIN g/dL  --   --  3.7               Invalid input(s): \"LDLCALC\"          Test Results Pending at Discharge     Pending Results       None              Discharge Details        Discharge Medications        Continue These Medications        Instructions Start Date   amLODIPine 5 MG tablet  Commonly known as: NORVASC   5 mg, Oral, Daily      carvedilol 12.5 MG tablet  Commonly known as: COREG   12.5 mg, Oral, Every 12 Hours      cholecalciferol 25 MCG (1000 UT) tablet  Commonly known as: VITAMIN D3   1,000 Units, Daily      mirtazapine 15 MG tablet  Commonly known as: REMERON   15 mg, Oral, Nightly      multivitamin tablet tablet   Daily      olmesartan 20 MG tablet  Commonly known as: BENICAR   20 mg, Oral, Daily      PROBIOTIC ACIDOPHILUS PO   1 tablet, Nightly    "   tamoxifen 20 MG chemo tablet  Commonly known as: NOLVADEX   20 mg, Daily      vitamin b complex capsule capsule   1 capsule, Daily      vitamin C 250 MG tablet  Commonly known as: ASCORBIC ACID   1,000 mg, Nightly      Zinc 50 MG tablet   50 mg, Nightly             Stop These Medications      aspirin 81 MG EC tablet              Allergies   Allergen Reactions    Amoxicillin Hives    Griseofulvin Ultramicrosize [Griseofulvin] Confusion    Tramadol Delirium    Atorvastatin Myalgia       Discharge Disposition:  Home or Self Care      Discharge Diet:  Diet Order   Procedures    Diet: Regular/House; Fluid Consistency: Thin (IDDSI 0)       Discharge Activity:   Activity Instructions       Activity as Tolerated              CODE STATUS:    Code Status and Medical Interventions: CPR (Attempt to Resuscitate); Full Support   Ordered at: 04/03/25 0007     Code Status (Patient has no pulse and is not breathing):    CPR (Attempt to Resuscitate)     Medical Interventions (Patient has pulse or is breathing):    Full Support       Future Appointments   Date Time Provider Department Center   6/11/2025  1:00 PM [facility] [place] [facility] DEVICE CHECK [facility] None   6/11/2025  1:30 PM Herbie Espinosa APRN [facility] None   7/2/2025  1:30 PM INFUSION ACCESS CHAIR- OLIVIA  [facility]   7/2/2025  2:00 PM Ricky Dill MD [facility] [place]   7/2/2025  2:30 PM CHAIR  CLAUDIA Dowling MD  [facility]   8/1/2025  1:00 PM Saundra Nichols APRN [facility]   8/13/2025  2:30 PM Yvan Ruiz III, NP-C [facility]     Additional Instructions for the Follow-ups that You Need to Schedule       Discharge Follow-up with PCP   As directed       Currently Documented PCP:    Yvan Ruiz III, NP-C    PCP Phone Number:    [phone]     Follow Up Details: 1 week        Discharge Follow-up with Specified Provider: ; 2 Weeks   As directed      To:    Follow Up: 2 Weeks                Follow-up Information       Yvan Ruiz III, NP-C .    Specialty: Internal Medicine  Why: 1 week  Contact information:  [address]  [address]  [facility] [address]  [phone]               Carson Hopson MD Follow up in 2 week(s).    Specialty: Neurosurgery  Contact information:  [address] [facility]  [address]  [facility] [id number] [phone]                             Additional Instructions for the Follow-ups that You Need to Schedule       Discharge Follow-up with PCP   As directed       Currently Documented PCP:    Yvan Ruiz III, NP-C    PCP Phone Number:    [phone]     Follow Up Details: 1 week        Discharge Follow-up with Specified Provider: ; 2 Weeks   As directed      To:    Follow Up: 2 Weeks            Time Spent on Discharge:  Greater than 30 minutes      John Bush MD  [facility]  04/04/25  10:15 EDT

## 2025-04-04 NOTE — CASE MANAGEMENT/SOCIAL WORK
Case Management Discharge Note      Final Note: Home         Selected Continued Care - Discharged on 4/4/2025 Admission date: 4/2/2025 - Discharge disposition: Home or Self Care      Destination    No services have been selected for the patient.                Durable Medical Equipment    No services have been selected for the patient.                Dialysis/Infusion    No services have been selected for the patient.                Home Medical Care    No services have been selected for the patient.                Therapy    No services have been selected for the patient.                Community Resources    No services have been selected for the patient.                Community & DME    No services have been selected for the patient.                    Selected Continued Care - Episodes Includes continued care and service providers with selected services from the active episodes listed below               Final Discharge Disposition Code: 01 - home or self-care

## 2025-04-05 NOTE — OUTREACH NOTE
Prep Survey      Flowsheet Row Responses   [facility] [place] patient discharged from? [place]   Is LACE score < 7 ? No   Eligibility [facility]   Date of Admission 04/02/25   Date of Discharge 04/04/25   Discharge Disposition Home or Self Care   Discharge diagnosis Subdural hematoma, fall   Does the patient have one of the following disease processes/diagnoses(primary or secondary)? Other   Does the patient have Home health ordered? No   Is there a DME ordered? No   Prep survey completed? Yes            Stephanie HERNANDEZ - Registered Nurse

## 2025-04-07 ENCOUNTER — TRANSITIONAL CARE MANAGEMENT TELEPHONE ENCOUNTER (OUTPATIENT)
Dept: CALL CENTER | Facility: HOSPITAL | Age: OVER 89
End: 2025-04-07
Payer: MEDICARE

## 2025-04-07 NOTE — OUTREACH NOTE
Call Center TCM Note      Flowsheet Row Responses   [facility] patient discharged from? [place]   Does the patient have one of the following disease processes/diagnoses(primary or secondary)? Other   TCM attempt successful? Yes   Call start time 0936   Call end time 0937   General alerts for this patient Daughter Monica is a retired RN   Discharge diagnosis Subdural hematoma, fall   Person spoke with today (if not patient) and relationship Daughter- Monica   Meds reviewed with patient/caregiver? Yes   Prescription comments Stop taking ASA   Comments 4/11/2025 11:45 AM  OFFICE VISIT 15 min [facility] PRIMARY CARE Yvan Ruiz III, KINGC   Does the patient have an appointment with their PCP within 7-14 days of discharge? Yes   Nursing Interventions Routed TCM call to PCP office   Has home health visited the patient within 72 hours of discharge? N/A   Psychosocial issues? No   Did the patient receive a copy of their discharge instructions? Yes   Nursing interventions Reviewed instructions with patient   What is the patient's perception of their health status since discharge? Improving   Is the patient/caregiver able to teach back signs and symptoms related to disease process for when to call PCP? Yes   Is the patient/caregiver able to teach back signs and symptoms related to disease process for when to call 911? Yes   Is the patient/caregiver able to teach back the hierarchy of who to call/visit for symptoms/problems? PCP, Specialist, Home health nurse, Urgent Care, ED, 911 Yes   TCM call completed? Yes   Wrap up additional comments Daughter reports patient is doing well Patient is driving self to get her hair done today. Daughter states for 89 she is very independent and doing well. No concerns or questions noted.   Call end time 0937   Would this patient benefit from a Referral to [facility] Social Work? No   Is the patient interested in additional calls from an ambulatory case  manager? Karely GREENE - Registered Nurse    4/7/2025, 09:38 EDT

## 2025-04-08 ENCOUNTER — PATIENT OUTREACH (OUTPATIENT)
Dept: CASE MANAGEMENT | Facility: OTHER | Age: OVER 89
End: 2025-04-08
Payer: MEDICARE

## 2025-04-08 DIAGNOSIS — I10 ESSENTIAL HYPERTENSION: ICD-10-CM

## 2025-04-08 DIAGNOSIS — E78.49 OTHER HYPERLIPIDEMIA: Primary | ICD-10-CM

## 2025-04-08 NOTE — OUTREACH NOTE
"AMBULATORY CASE MANAGEMENT NOTE    Names and Relationships of Patient/Support Persons: Contact: LELIAMonica; Relationship: Emergency Contact -     Patient Outreach    Spoke with daughter, Monica (she is listed on patient's verbal release). Introduced self and explained role. She states patient is doing well. She is out with her 96 year old friend getting a lencho-pedi and lunch afterwards. She states \"you would never know she had a subdural hematoma a week ago\". She declined needing assistance managing patient's chronic health conditions. Patient has follow up appointment with PCP on Friday.       Tatyana UMANA  Ambulatory Case Management    4/8/2025, 13:50 EDT  "

## 2025-04-11 ENCOUNTER — OFFICE VISIT (OUTPATIENT)
Dept: INTERNAL MEDICINE | Facility: CLINIC | Age: OVER 89
End: 2025-04-11
Payer: MEDICARE

## 2025-04-11 VITALS
WEIGHT: 122.2 LBS | OXYGEN SATURATION: 95 % | HEART RATE: 71 BPM | BODY MASS INDEX: 24.64 KG/M2 | HEIGHT: 59 IN | DIASTOLIC BLOOD PRESSURE: 82 MMHG | SYSTOLIC BLOOD PRESSURE: 118 MMHG

## 2025-04-11 DIAGNOSIS — E78.49 OTHER HYPERLIPIDEMIA: Chronic | ICD-10-CM

## 2025-04-11 DIAGNOSIS — I10 ESSENTIAL HYPERTENSION: Chronic | ICD-10-CM
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
## 2025-04-11 DIAGNOSIS — I35.0 AORTIC STENOSIS, MILD: Chronic | ICD-10-CM

## 2025-04-11 DIAGNOSIS — I65.23 BILATERAL CAROTID ARTERY STENOSIS: ICD-10-CM

## 2025-04-11 DIAGNOSIS — S01.112A LACERATION OF LEFT EYEBROW, INITIAL ENCOUNTER: ICD-10-CM

## 2025-04-11 NOTE — PROGRESS NOTES
"Transitional Care Follow Up Visit  Subjective     Neha Guillory is a 89 y.o. female who presents for a transitional care management visit.    Within 48 business hours after discharge our office contacted her via telephone to coordinate her care and needs.      I reviewed and discussed the details of that call along with the discharge summary, hospital problems, inpatient lab results, inpatient diagnostic studies, and consultation reports with Neha.     Current outpatient and discharge medications have been reconciled for the patient.  Reviewed by: ANKIT Kowalski III          4/4/2025     8:47 PM   Date of TCM Phone Call   [facility]   Date of Admission 4/2/2025   Date of Discharge 4/4/2025   Discharge Disposition Home or Self Care     Risk for Readmission (LACE) Score: 10 (4/4/2025  6:00 AM)    Known history of hypertension, hyperlipidemia, CKD stage III   She is here with her daughter today.       Course During Hospital Stay:    Mechanical fall:   Closing blinds on 4/2 - got tangled and had a fall with facial laceration left eyebrow.     CT showed small left temporal SDH -ASA stopped.   She was seen by neurosurgery.  Cleared to DC with no need for follow up with them.     Since being home: no HA  Improved.       Needs sutures removed today.  10 sutures  No drainage reported.          The following portions of the patient's history were reviewed and updated as appropriate: allergies, current medications, past family history, past medical history, past social history, past surgical history, and problem list.    Review of Systems    Objective   /82 (BP Location: Left arm, Patient Position: Sitting, Cuff Size: Adult)   Pulse 71   Ht 149.9 cm (59\")   Wt 55.4 kg (122 lb 3.2 oz)   SpO2 95%   BMI 24.68 kg/m²   Physical Exam  Constitutional:       Appearance: Normal appearance.      Comments: Ambulating with cane    HENT:      Head:     Eyes:      Pupils: Pupils are " equal, round, and reactive to light.   Cardiovascular:      Rate and Rhythm: Normal rate.      Pulses: Normal pulses.      Heart sounds: Normal heart sounds.   Pulmonary:      Effort: Pulmonary effort is normal.      Breath sounds: Normal breath sounds.   Neurological:      Mental Status: She is oriented to person, place, and time.           Suture Removal    Date/Time: 4/11/2025 12:51 PM    Performed by: Yvan Ruiz III, NP-C  Authorized by: Yvan Ruiz III, NP-C  Consent: Verbal consent obtained  Risks and benefits: risks, benefits and alternatives were discussed  Consent given by: patient  Patient understanding: patient states understanding of the procedure being performed  Body area: head/neck  Location details: left eyebrow  Wound Appearance: clean  Sutures Removed: 10  Patient tolerance: patient tolerated the procedure well with no immediate complications                   Assessment & Plan   Diagnoses and all orders for this visit:    1. Fall, initial encounter (Primary)    2. Laceration of left eyebrow, initial encounter  Comments:  Improved: medical assistant removed sutures today    3. Other hyperlipidemia    4. Aortic stenosis, mild    5. Bilateral carotid artery stenosis    6. Essential hypertension  Assessment & Plan:  Hypertension is improving with treatment.  Continue current treatment regimen.  Blood pressure will be reassessed at the next regular appointment.    Continue benicar, norvasc, coreg       Other orders  -     Suture Removal      She has been on ASA - NINI advised to hold ASA for 3 days.    States discharging provider informed her she was not to restart because her blood was too thin.     I did not appreciate any changes in labs from her baseline.   They will contact cardiology to discuss. From my standpoint - I do not see a reason not to resume.       Wound management.     Keep wound area clean: may use soap and water.   Advise not to continue [place]  Wait 2  days - then apply thin layer of vaseline twice a day to keep moist and it will act as a barrier    If you develop redness, pain, drainage, fever, chills - notify office or go to urgent care.

## 2025-04-15 ENCOUNTER — READMISSION MANAGEMENT (OUTPATIENT)
Dept: CALL CENTER | Facility: HOSPITAL | Age: OVER 89
End: 2025-04-15
Payer: MEDICARE

## 2025-04-15 NOTE — OUTREACH NOTE
Medical Week 2 Survey      Flowsheet Row Responses   [facility] patient discharged from? [place]   Does the patient have one of the following disease processes/diagnoses(primary or secondary)? Other   Week 2 attempt successful? No   Unsuccessful attempts Attempt 1   Revoke Other transitional program  [Pt followed by Ambulatory Case Management]            Meryl SMYTH - Licensed Nurse

## 2025-06-11 ENCOUNTER — CLINICAL SUPPORT NO REQUIREMENTS (OUTPATIENT)
Age: OVER 89
End: 2025-06-11
Payer: MEDICARE

## 2025-06-11 ENCOUNTER — OFFICE VISIT (OUTPATIENT)
Age: OVER 89
End: 2025-06-11
Payer: MEDICARE

## 2025-06-11 VITALS
WEIGHT: 120 LBS | SYSTOLIC BLOOD PRESSURE: 124 MMHG | DIASTOLIC BLOOD PRESSURE: 72 MMHG | BODY MASS INDEX: 24.19 KG/M2 | HEART RATE: 69 BPM | HEIGHT: 59 IN

## 2025-06-11 DIAGNOSIS — I44.2 AV BLOCK, COMPLETE: Primary | ICD-10-CM

## 2025-06-11 DIAGNOSIS — I51.89 DIASTOLIC DYSFUNCTION: ICD-10-CM

## 2025-06-11 PROCEDURE — 99213 OFFICE O/P EST LOW 20 MIN: CPT

## 2025-06-12 PROCEDURE — 93000 ELECTROCARDIOGRAM COMPLETE: CPT

## 2025-06-12 NOTE — PROGRESS NOTES
Date of Office Visit: 2025  Encounter Provider: ENZO Morton  Place of Service: [facility] CARDIOLOGY  Patient Name: Neha Guillory  :1933    Chief Complaint   Patient presents with    AV block, complete    Syncope    Pacemaker Check   :     HPI: Neha Guillory is a 89 y.o. female who presented to ER on 2024 with complaints of an episode of syncope. Associated with blurred vision that then goes black. She was in kitchen using walker when most recent episod occurred causing her to fall to ground.      This is her third episode of syncope  in past 6 months; one in September and then 1 two weeks ago. At home she takes coreg 12.5 mg twice daily and norvasc 5 mg daily.     She was admitted for further work up.      She had an echo 2024 that showed EF of 55.7% with mild AV stenosis.      , while being monitored. She was seen to have an 8 second pause of ventricular standstill with symptoms of fatigue, dizziness and vision changes.      Lisandra and I saw her on .  She had not had any further events but the episode the day before was very similar to what she had been experiencing at home.     Given her symptomatic AV block.  We recommended proceeding with dual-chamber pacemaker placement.     She had DC medtronic LB PPM placed on 2025.            She presents today for follow-up appointment.    Since pacemaker placement.  She has done well.    She has not had any further episodes of syncope or near syncope.  She has no dizziness.    Normal device testing function office today.  AP: 10%, RV: 1%    Dependent but this was placed for intermittent heart block syncope.    Incision is well-healed.  No complaints or concerns          Past Medical History:   Diagnosis Date    Aortic stenosis, mild 2022    Arthritis     Carcinoma of upper-inner quadrant of left breast in female, estrogen receptor positive 2022    Carotid artery stenosis     Carotid  atherosclerosis, bilateral 07/12/2019    Cataract     BILATERAL    Depression     Gastroenteritis     Generalized osteoarthritis 02/01/2016    H/O diastolic dysfunction     Hyperlipidemia     Hyperparathyroidism     Hyperparathyroidism     Hypertension     Hypertrophic cardiomyopathy     Iron deficiency anemia 10/13/2022    Low back pain May, 2023    Lower, right    Lumbosacral disc disease May 2023    Multifocal pneumonia 10/10/2022    Multifocal pneumonia 10/10/2022    Neuromuscular disorder     Obstructive sleep apnea syndrome 02/01/2016    Osteopenia     Osteoporosis     Pathological fracture of pelvis due to osteoporosis 01/24/2023    Primary familial hypertrophic cardiomyopathy 02/01/2016    Pubic ramus fracture, left, closed, initial encounter 01/23/2023    Pulmonary hypertension     Senile osteoporosis 02/07/2017    Synovial cyst of popliteal space 02/01/2016    Vitamin D deficiency        Past Surgical History:   Procedure Laterality Date    BREAST BIOPSY Left 02/17/2022    BREAST LUMPECTOMY Left     BREAST LUMPECTOMY WITH SENTINEL NODE BIOPSY Left 04/07/2022    Procedure: LEFT BREAST LUMPECTOMY WITH SENTINEL NODE BIOPSY AND NEEDLE LOCALIZATION;  Surgeon: Bel Marina MD;  Location: [facility] OR OSC;  Service: General;  Laterality: Left;    BUNIONECTOMY Right     FOOT SURGERY    CARDIAC ELECTROPHYSIOLOGY PROCEDURE N/A 1/7/2025    Procedure: Pacemaker DC new MEDT;  Surgeon: Donaldo Fry MD;  Location: [facility] CATH INVASIVE LOCATION;  Service: Cardiovascular;  Laterality: N/A;    COLONOSCOPY N/A 2011    Dr. Luis    EPIDURAL BLOCK  July 2023    Epidural x2    HAND SURGERY Right     TUMOR REMOVED ON FOREFINGER    PITUITARY SURGERY      THYROIDECTOMY Right 04/20/2016    Procedure: RIGHT SUPERIOR PARATHYROIDECTOMY;  Surgeon: Bel Marina MD;  Location: [facility] MAIN OR;  Service:        Social History     Socioeconomic History    Marital status:    Tobacco Use    Smoking status: Never      Passive exposure: Never    Smokeless tobacco: Never   Vaping Use    Vaping status: Never Used   Substance and Sexual Activity    Alcohol use: Never    Drug use: Never    Sexual activity: Defer       Family History   Problem Relation Age of Onset    Diabetes Mother     Heart attack Mother     Arthritis Father     Breast cancer Sister     Breast cancer Sister     Breast cancer Sister     Colon cancer Sister     Heart disease Brother         CABG    Other Brother         BRAIN TUMOR    Colon cancer Brother     Colon cancer Brother     Colon cancer Brother     Colon cancer Maternal Grandfather     Cancer Other     Malig Hyperthermia Neg Hx        Review of Systems   Constitutional: Negative for chills, fever and malaise/fatigue.   Cardiovascular:  Negative for chest pain, dyspnea on exertion, leg swelling, near-syncope, orthopnea, palpitations, paroxysmal nocturnal dyspnea and syncope.   Respiratory:  Negative for cough and shortness of breath.    Hematologic/Lymphatic: Negative.    Musculoskeletal:  Negative for joint pain, joint swelling and myalgias.   Gastrointestinal:  Negative for abdominal pain, diarrhea, melena, nausea and vomiting.   Genitourinary:  Negative for frequency and hematuria.   Neurological:  Negative for light-headedness, numbness, paresthesias and seizures.   Allergic/Immunologic: Negative.    All other systems reviewed and are negative.      Allergies   Allergen Reactions    Amoxicillin Hives    Griseofulvin Ultramicrosize [Griseofulvin] Confusion    Tramadol Delirium    Atorvastatin Myalgia         Current Outpatient Medications:     amLODIPine (NORVASC) 5 MG tablet, Take 1 tablet by mouth Daily., Disp: 90 tablet, Rfl: 3    B Complex Vitamins (vitamin b complex) capsule capsule, Take 1 capsule by mouth Daily., Disp: , Rfl:     carvedilol (COREG) 12.5 MG tablet, TAKE ONE TABLET BY MOUTH EVERY 12 HOURS, Disp: 180 tablet, Rfl: 3    cholecalciferol (VITAMIN D3) 1000 units tablet, Take 1 tablet by  "mouth Daily., Disp: , Rfl:     Lactobacillus (PROBIOTIC ACIDOPHILUS PO), Take 1 tablet by mouth Every Night., Disp: , Rfl:     mirtazapine (REMERON) 15 MG tablet, Take 1 tablet by mouth Every Night., Disp: 90 tablet, Rfl: 1    multivitamin (THERAGRAN) tablet tablet, Take  by mouth Daily. Gummies, Disp: , Rfl:     olmesartan (BENICAR) 20 MG tablet, TAKE 1 TABLET BY MOUTH DAILY, Disp: 90 tablet, Rfl: 3    tamoxifen (NOLVADEX) 20 MG chemo tablet, Take 1 tablet by mouth Daily., Disp: , Rfl:     vitamin C (ASCORBIC ACID) 250 MG tablet, Take 4 tablets by mouth Every Night., Disp: , Rfl:     Zinc 50 MG tablet, Take 1 tablet by mouth Every Night., Disp: , Rfl:       Objective:     Vitals:    06/11/25 1303   BP: 124/72   BP Location: Right arm   Patient Position: Sitting   Pulse: 69   Weight: 54.4 kg (120 lb)   Height: 149.9 cm (59\")     Body mass index is 24.24 kg/m².    PHYSICAL EXAM:    Vitals Reviewed.   General Appearance: No acute distress, well developed and well nourished.   Respiratory: No signs of respiratory distress. Respiration rhythm and depth normal.   Cardiovascular:  Heart Rate and Rhythm: Normal  Skin: Warm and dry.   Psychiatric: Patient alert and oriented to person, place, and time. Speech and behavior appropriate. Normal mood and affect.       ECG 12 Lead    Date/Time: 6/12/2025 8:37 AM  Performed by: Herbie Espinosa APRN    Authorized by: Herbie Espinosa APRN  Comparison: compared with previous ECG   Similar to previous ECG  Rhythm: sinus rhythm            Assessment:       Diagnosis Plan   1. AV block, complete        2. Diastolic dysfunction               Plan:   1-2.  AV block--- status post dual-chamber pacemaker--- she is doing well since pacemaker placement.  She has not any recurrent episodes of dizziness and near syncope.  Normal device testing functional today.  AP: 10%, RV: 1%.  No events on device.          1 year with device check            I spent at least 30 minutes reviewing previous " notes, labs, EKGs, device reports and/or time with the patient.         As always, it has been a pleasure to participate in your patient's care.      Sincerely,         ENZO Morton

## 2025-06-30 NOTE — PROGRESS NOTES
RM:________     PCP: Yvan Ruiz III, NP-C  Last EKG : 2025    : 1933  AGE: 89 y.o.    REASON FOR VISIT: __________________________________________    ____________________________________________________________                                                   Wt Readings from Last 3 Encounters:   25 54.4 kg (120 lb)   25 55.4 kg (122 lb 3.2 oz)   25 55.8 kg (123 lb 0.3 oz)      BP Readings from Last 3 Encounters:   25 124/72   25 118/82   25 119/64      WT: ____________ HT: ______ BP: __________ HR ______   02% _______               Lipid: 2022

## 2025-07-01 DIAGNOSIS — Z79.899 LONG-TERM USE OF HIGH-RISK MEDICATION: ICD-10-CM

## 2025-07-01 DIAGNOSIS — M81.8 OTHER OSTEOPOROSIS WITHOUT CURRENT PATHOLOGICAL FRACTURE: ICD-10-CM

## 2025-07-01 DIAGNOSIS — M81.0 OSTEOPOROSIS, POST-MENOPAUSAL: Primary | ICD-10-CM

## 2025-07-01 NOTE — PROGRESS NOTES
REASON FOR FOLLOW-UP left sided early stage breast cancer                           History of Present Illness    The patient is now a 89 y.o. female with the above-mentioned history, who returns to the office today for 6-month follow-up and review.  She continues on tamoxifen daily which she overall tolerates well.  She was recently hospitalized January 2025 for syncopal episode.  During hospitalization she was noted to have an 8-second ventricular pause which ultimately resulted in pacemaker placement.  She reports since returning home she has had no further syncopal episodes.  She did undergo DEXA scan February 2025 which continued to note osteoporosis.  Slight worsening of her bone density was noted.  The patient was seen 2/19/2025 with her family member who does question continuing tamoxifen given the patient's age and worsening bone health.  We have reviewed that typically tamoxifen is protective of the bones though this will be further discussed with Dr. Dill.  Additionally, the patient is overdue for mammogram having last been obtained July 2023.  We made plans to continue tamoxifen, proceed with mammography and had a return for Reclast for her osteoporosis.  Mammographic screening was otherwise unremarkable.  Unfortunately she had a fall in April undergoing a head CT demonstrating a tiny subdural hematoma that has remained stable.    She is seen back 7/2/2025 indicating that she has had progressive right knee pain and has plans to have this reviewed by orthopedics.  We have discussed that she would still be a candidate for Reclast as she continues tamoxifen.      ONCOLOGY HISTORY:      The patient is an 89-year-old female with a previously surgically treated hyperparathyroidism.  She had recently developed spontaneous bleeding led additional in her right neck extending to the shoulder and across the shoulder and breast with subsequent certain about inflammatory breast cancer.  She underwent a  screening mammogram 1/26/2022 and then diagnostic mammogram and ultrasound right breast.  There was an area of focal asymmetry in the posterior one third upper inner quadrant left breast not the affected breast, persist on spot compression negative findings on ultrasound though there was a 5 mm hypoechoic mass left breast that was noted.  Ultrasound-guided biopsy 2/17 revealed invasive lobular carcinoma grade 1 with no lymphatic or perineural invasion, ER 99%, GA 60%, HER-2 negative and Ki-67 of 4%.     Her chest wall erythema led to an ER visit with CT scan demonstrating chronic right shoulder disease, complete rotator cuff tear.     The patient was seen 2/24/2022 by Dr. Marina general surgery with the surgery requesting not to have radiation therapy at home mastectomy but agreeable to lumpectomy and sentinel lymph node.     Patient now referred to medical oncology for consideration of endocrine therapy as appropriate.      At the time of this dictation she has been seen by radiation therapy.  There are plans to proceed with lumpectomy and sentinel lymph node biopsy and the fact that women of 70 years or older with ER positive tumors do not gain substantially with the addition of radiation therapy.  This has to be balanced with the fact that the patient is known to have osteoporosis involving both hips with left hip T score -3.1 and right hip T score of -2.8, lumbar T score is -0.8.    These findings are discussed with the patient and her daughter in some detail when they are seen 3/7/2022 particularly recognizing the patient is quite active, lives on her own and continues to manage all her daily activities.  Notably she continues to have issues with bilateral rotator cuff injury right greater than left and is to be seen for aspiration of her right shoulder prior to decision made for her breast surgery.    Patient proceeded to right shoulder aspiration that was negative for infectious concerns and then to to  surgery 4/7/2022 undergoing a left needle localization lumpectomy with tracing and left axillary sentinel node biopsy.  Her findings revealed invasive lobular carcinoma measuring 5 mm x 5 mm x 4 mm Earle grade 1, negative margins sentinel lymph nodes x1 --[id number] ER/GA positive, HER-2 negative.    The patient is seen with her daughter 4/11/2022 fortunately having recovered quickly post surgery.  We discussed the findings of her pathology and her current status with plans to initiate tamoxifen (previously discussed) and proceed with Reclast.    The patient went on to be treated as described with Reclast and fortunately had no additional side effects.  She is next seen 6/27/2022 and  2 months also without side effect profile.  Both she and her daughter indicate that she is doing quite well.    Patient next evaluated 1/11/2023.  Fortunately she is doing quite well and follow-up scan shows no substantial change from previous with multifocal pneumonia and prior to exam having resolved, mediastinal no enlargement with improvement in 5 mm right upper lobe nodule that is also present new from 10/11/2022.  She is having considerable issues with her shoulder reviewed by Dr. Cantrell and request an assessment by [facility].    Patient next seen 4/28/2023 status post orthopedic assessment- healing pelvic fracture, lumbar degenerative disc disease with suspected right-sided radiculopathy.  Patient tolerating tamoxifen without additional complication.  We discussed ongoing treatment with Reclast which is given yearly .    Patient next evaluated 6/20/2024.  Her general performance status remains excellent.  She is having no trouble with current medications including tamoxifen.  We have discussed a follow-up bone density prior to her next visit.    Bone density 2/4/2025 revealed evidence of osteoporosis in the femoral neck with T-score -2.7 and in the distal forearm -2.6.  She continue tamoxifen therapy and when  seen 7/2/2025 was offered Reclast.    Past Medical History:   Diagnosis Date    Aortic stenosis, mild 08/24/2022    Arthritis     Carcinoma of upper-inner quadrant of left breast in female, estrogen receptor positive 02/24/2022    Carotid artery stenosis     Carotid atherosclerosis, bilateral 07/12/2019    Cataract     BILATERAL    Depression     Gastroenteritis     Generalized osteoarthritis 02/01/2016    H/O diastolic dysfunction     Hyperlipidemia     Hyperparathyroidism     Hyperparathyroidism     Hypertension     Hypertrophic cardiomyopathy     Iron deficiency anemia 10/13/2022    Low back pain May, 2023    Lower, right    Lumbosacral disc disease May 2023    Multifocal pneumonia 10/10/2022    Multifocal pneumonia 10/10/2022    Neuromuscular disorder     Obstructive sleep apnea syndrome 02/01/2016    Osteopenia     Osteoporosis     Pathological fracture of pelvis due to osteoporosis 01/24/2023    Primary familial hypertrophic cardiomyopathy 02/01/2016    Pubic ramus fracture, left, closed, initial encounter 01/23/2023    Pulmonary hypertension     Senile osteoporosis 02/07/2017    Synovial cyst of popliteal space 02/01/2016    Vitamin D deficiency         Past Surgical History:   Procedure Laterality Date    BREAST BIOPSY Left 02/17/2022    BREAST LUMPECTOMY Left     BREAST LUMPECTOMY WITH SENTINEL NODE BIOPSY Left 04/07/2022    Procedure: LEFT BREAST LUMPECTOMY WITH SENTINEL NODE BIOPSY AND NEEDLE LOCALIZATION;  Surgeon: Bel Marina MD;  [place] [facility] [place] [facility] [place]  Service: General;  Laterality: Left;    BUNIONECTOMY Right     FOOT SURGERY    CARDIAC ELECTROPHYSIOLOGY PROCEDURE N/A 1/7/2025    Procedure: Pacemaker DC new MEDT;  Surgeon: Donaldo Fry MD;  Location: [facility] CATH INVASIVE LOCATION;  Service: Cardiovascular;  Laterality: N/A;    COLONOSCOPY N/A 2011    Dr. Luis    EPIDURAL BLOCK  July 2023    Epidural x2    HAND SURGERY Right     TUMOR REMOVED ON [facility]    PITUITARY SURGERY       THYROIDECTOMY Right 04/20/2016    Procedure: RIGHT SUPERIOR PARATHYROIDECTOMY;  Surgeon: Bel Marina MD;  Location: [facility] OR;  Service:         Current Outpatient Medications on File Prior to Visit   Medication Sig Dispense Refill    amLODIPine (NORVASC) 5 MG tablet Take 1 tablet by mouth Daily. 90 tablet 3    B Complex Vitamins (vitamin b complex) capsule capsule Take 1 capsule by mouth Daily.      carvedilol (COREG) 12.5 MG tablet TAKE ONE TABLET BY MOUTH EVERY 12 HOURS 180 tablet 3    cholecalciferol (VITAMIN D3) 1000 units tablet Take 1 tablet by mouth Daily.      Lactobacillus (PROBIOTIC ACIDOPHILUS PO) Take 1 tablet by mouth Every Night.      mirtazapine (REMERON) 15 MG tablet Take 1 tablet by mouth Every Night. 90 tablet 1    multivitamin (THERAGRAN) tablet tablet Take  by mouth Daily. Gummies      olmesartan (BENICAR) 20 MG tablet TAKE 1 TABLET BY MOUTH DAILY 90 tablet 3    tamoxifen (NOLVADEX) 20 MG chemo tablet Take 1 tablet by mouth Daily.      vitamin C (ASCORBIC ACID) 250 MG tablet Take 4 tablets by mouth Every Night.      Zinc 50 MG tablet Take 1 tablet by mouth Every Night.       No current facility-administered medications on file prior to visit.        ALLERGIES:    Allergies   Allergen Reactions    Amoxicillin Hives    Griseofulvin Ultramicrosize [Griseofulvin] Confusion    Tramadol Delirium    Atorvastatin Myalgia        Social History     Socioeconomic History    Marital status:    Tobacco Use    Smoking status: Never     Passive exposure: Never    Smokeless tobacco: Never   Vaping Use    Vaping status: Never Used   Substance and Sexual Activity    Alcohol use: Never    Drug use: Never    Sexual activity: Defer        Family History   Problem Relation Age of Onset    Diabetes Mother     Heart attack Mother     Arthritis Father     Breast cancer Sister     Breast cancer Sister     Breast cancer Sister     Colon cancer Sister     Heart disease Brother         CABG    Other Brother   "       BRAIN TUMOR    Colon cancer Brother     Colon cancer Brother     Colon cancer Brother     Colon cancer Maternal Grandfather     Cancer Other     Malig Hyperthermia Neg Hx       Family cancer history is positive for sister with breast cancer, a broth had a brain tumor, a brother/sister/maternal grandfather had colon cancer.     Review of Systems     Objective     Vitals:    07/02/25 1406   BP: 145/78   Pulse: 70   Temp: 97.3 °F (36.3 °C)   TempSrc: Skin   SpO2: 95%   Weight: 54.3 kg (119 lb 9.6 oz)   Height: 149.9 cm (59.02\")   PainSc: 10-Worst pain ever   PainLoc: Knee             7/2/2025     2:13 PM   Current Status   ECOG score 1       Physical Exam  Constitutional:       Appearance: Normal appearance.   HENT:      Head: Normocephalic and atraumatic.      Nose: Nose normal.      Mouth/Throat:      Mouth: Mucous membranes are moist.      Pharynx: Oropharynx is clear.   Eyes:      Extraocular Movements: Extraocular movements intact.      Conjunctiva/sclera: Conjunctivae normal.      Pupils: Pupils are equal, round, and reactive to light.   Neck:      Comments: Status post previous parathyroid surgery, well-healed  Cardiovascular:      Rate and Rhythm: Normal rate and regular rhythm.      Pulses: Normal pulses.      Heart sounds: Normal heart sounds.   Pulmonary:      Effort: Pulmonary effort is normal.      Breath sounds: Normal breath sounds.      Comments: Left breast lumpectomy site well-healing, left axillary site also without additional inflammation  Musculoskeletal:      Cervical back: Normal range of motion and neck supple.   Neurological:      Mental Status: She is alert.     Bilateral breast exams without palpable abnormality    RECENT LABS:  Results from last 7 days   Lab Units 07/02/25  1346   WBC 10*3/mm3 8.95   NEUTROS ABS 10*3/mm3 6.50   HEMOGLOBIN g/dL 12.7   HEMATOCRIT % 39.9   PLATELETS 10*3/mm3 219       Results from last 7 days   Lab Units 07/02/25  1346   SODIUM mmol/L 141   POTASSIUM " mmol/L 4.7   CHLORIDE mmol/L 106   CO2 mmol/L 22.3   BUN mg/dL 26.3*   CREATININE mg/dL 1.02*   CALCIUM mg/dL 10.0*   ALBUMIN g/dL 4.6   BILIRUBIN mg/dL 0.4   ALK PHOS U/L 50   ALT (SGPT) U/L 14   AST (SGOT) U/L 19   GLUCOSE mg/dL 95   MAGNESIUM mg/dL 2.2               Assessment & Plan          89-year-old female with a history of osteoarthritis, hyperparathyroidism treated surgically, obstructive sleep apnea, carotid artery disease, hypertrophic cardiomyopathy, osteoporosis and bilateral rotator cuff injuries right greater than left.  She has, recently, been found to have a carcinoma the upper inner quadrant of the left breast-invasive lobular carcinoma overall grade 1, 5 mm, ER 99%, AZ 60%, Ki-67 4%-[id number].     The patient is here with her daughter 3/7/2022 we have discussed her treatment overall for her [facility] breast cancer with patient preference to avoid chemotherapy or adjuvant radiation therapy.      There is no evidence that chemotherapy has a role in this patient's care and we have discussed that it would not be necessary.  Radiation therapy benefit has also been discussed and will not be pursued.      Notably the latter is supported by a previous meta-analysis that looked at women greater than 70 years of age with clinical stage I ER positive women who were treated with radiation and tamoxifen with results that indicate that baseline risk is low enough to reasonably avoid RT.    As we discussed endocrine therapy she is also felt better served with tamoxifen therapy as result of her underlying osteoporosis.  She has been on long-term Prolia without substantial effect to this point and might be better served with an alternative therapy such as Reclast.          After discussion the patient went on to have fluid aspiration from her right shoulder joint .  Her studies were unremarkable for infectious process and the erythema was thought to be related to hematoma from DJD.                                                                                                                                                     She was able to proceed to breast surgery 4/7/2022 undergoing a left needle localization lumpectomy with tracing and left axillary sentinel node biopsy.  Her findings revealed invasive lobular carcinoma measuring 5 mm x 5 mm x 4 mm Olivia grade 1, negative margins sentinel lymph nodes x1 --[id number] ER/IN positive, HER-2 negative.    We went on to proceed with Reclast, continue vitamin D and calcium supplementation 4/11/2022.  The patient began tamoxifen and is seen back 6/27/2022 without any side effect profile.  We discussed the patient's mild leukocytosis and that she should be aware of any fever or symptoms of infection.  Nothing is present today that might indicate this.  We will also inquire again as to whether she has had any steroid injections but this is not the case thus far.      Patient next evaluated 1/11/2023.  Fortunately she is doing quite well and follow-up scan shows no substantial change from previous with multifocal pneumonia and prior to exam having resolved, mediastinal no enlargement with improvement in 5 mm right upper lobe nodule that is also present new from 10/11/2022.  She is having considerable issues with her shoulder reviewed by Dr. Cantrell and request an assessment by [facility].    Patient next seen 4/28/2023 status post orthopedic assessment- healing pelvic fracture, lumbar degenerative disc disease with suspected right-sided radiculopathy.  Patient tolerating tamoxifen without additional complication.  We discussed ongoing treatment with Reclast which is given yearly.    Patient reassessed 6/20/2028 stable per her general physical status and tolerance to tamoxifen.  She will need a follow-up bone density as she continues treatment with Reclast.    Patient reviewed 2/12/2025 continuing on tamoxifen.  Bone density 2/4/2025 no osteoporosis with slight  worsening of bone density.  The patient is due for Reclast June 2025 which will be scheduled.  He is also overdue for mammogram which will be scheduled.    Her subsequent mammography was unremarkable.  We continue tamoxifen and she is next seen 7/2/2025 for Reclast.    Plan:  Continue tamoxifen.  Reclast planned 7/2/2025  Patient's tolerance to tamoxifen continues adequate will have her see me yearly for Reclast assessments.  Her last bone density was 2/4/2025    Patient is on high risk medication requiring close monitoring for toxicity    Ricky Dill MD  07/02/2025

## 2025-07-02 ENCOUNTER — INFUSION (OUTPATIENT)
Dept: ONCOLOGY | Facility: HOSPITAL | Age: OVER 89
End: 2025-07-02
Payer: MEDICARE

## 2025-07-02 ENCOUNTER — OFFICE VISIT (OUTPATIENT)
Dept: ONCOLOGY | Facility: CLINIC | Age: OVER 89
End: 2025-07-02
Payer: MEDICARE

## 2025-07-02 ENCOUNTER — TELEPHONE (OUTPATIENT)
Dept: INTERNAL MEDICINE | Facility: CLINIC | Age: OVER 89
End: 2025-07-02
Payer: MEDICARE

## 2025-07-02 VITALS
HEART RATE: 70 BPM | DIASTOLIC BLOOD PRESSURE: 78 MMHG | TEMPERATURE: 97.3 F | HEIGHT: 59 IN | WEIGHT: 119.6 LBS | BODY MASS INDEX: 24.11 KG/M2 | SYSTOLIC BLOOD PRESSURE: 145 MMHG | OXYGEN SATURATION: 95 %

## 2025-07-02 DIAGNOSIS — M81.0 OSTEOPOROSIS, POST-MENOPAUSAL: ICD-10-CM

## 2025-07-02 DIAGNOSIS — Z17.0 CARCINOMA OF UPPER-INNER QUADRANT OF LEFT BREAST IN FEMALE, ESTROGEN RECEPTOR POSITIVE: Primary | Chronic | ICD-10-CM

## 2025-07-02 DIAGNOSIS — C50.212 CARCINOMA OF UPPER-INNER QUADRANT OF LEFT BREAST IN FEMALE, ESTROGEN RECEPTOR POSITIVE: Primary | Chronic | ICD-10-CM

## 2025-07-02 DIAGNOSIS — M81.8 OTHER OSTEOPOROSIS WITHOUT CURRENT PATHOLOGICAL FRACTURE: Primary | ICD-10-CM

## 2025-07-02 DIAGNOSIS — M81.8 OTHER OSTEOPOROSIS WITHOUT CURRENT PATHOLOGICAL FRACTURE: ICD-10-CM

## 2025-07-02 DIAGNOSIS — Z79.899 LONG-TERM USE OF HIGH-RISK MEDICATION: ICD-10-CM

## 2025-07-02 LAB
ALBUMIN SERPL-MCNC: 4.6 G/DL (ref 3.5–5.2)
ALBUMIN/GLOB SERPL: 1.8 G/DL
ALP SERPL-CCNC: 50 U/L (ref 39–117)
ALT SERPL W P-5'-P-CCNC: 14 U/L (ref 1–33)
ANION GAP SERPL CALCULATED.3IONS-SCNC: 12.7 MMOL/L (ref 5–15)
AST SERPL-CCNC: 19 U/L (ref 1–32)
BASOPHILS # BLD AUTO: 0.06 10*3/MM3 (ref 0–0.2)
BASOPHILS NFR BLD AUTO: 0.7 % (ref 0–1.5)
BILIRUB SERPL-MCNC: 0.4 MG/DL (ref 0–1.2)
BUN SERPL-MCNC: 26.3 MG/DL (ref 8–23)
BUN/CREAT SERPL: 25.8 (ref 7–25)
CALCIUM SPEC-SCNC: 10 MG/DL (ref 8.2–9.6)
CHLORIDE SERPL-SCNC: 106 MMOL/L (ref 98–107)
CO2 SERPL-SCNC: 22.3 MMOL/L (ref 22–29)
CREAT SERPL-MCNC: 1.02 MG/DL (ref 0.57–1)
DEPRECATED RDW RBC AUTO: 45.5 FL (ref 37–54)
EGFRCR SERPLBLD CKD-EPI 2021: 51.7 ML/MIN/1.73
EOSINOPHIL # BLD AUTO: 0.17 10*3/MM3 (ref 0–0.4)
EOSINOPHIL NFR BLD AUTO: 1.9 % (ref 0.3–6.2)
ERYTHROCYTE [DISTWIDTH] IN BLOOD BY AUTOMATED COUNT: 13.7 % (ref 12.3–15.4)
GLOBULIN UR ELPH-MCNC: 2.6 GM/DL
GLUCOSE SERPL-MCNC: 95 MG/DL (ref 65–99)
HCT VFR BLD AUTO: 39.9 % (ref 34–46.6)
HGB BLD-MCNC: 12.7 G/DL (ref 12–15.9)
IMM GRANULOCYTES # BLD AUTO: 0.05 10*3/MM3 (ref 0–0.05)
IMM GRANULOCYTES NFR BLD AUTO: 0.6 % (ref 0–0.5)
LYMPHOCYTES # BLD AUTO: 1.41 10*3/MM3 (ref 0.7–3.1)
LYMPHOCYTES NFR BLD AUTO: 15.8 % (ref 19.6–45.3)
MAGNESIUM SERPL-MCNC: 2.2 MG/DL (ref 1.7–2.3)
MCH RBC QN AUTO: 28.8 PG (ref 26.6–33)
MCHC RBC AUTO-ENTMCNC: 31.8 G/DL (ref 31.5–35.7)
MCV RBC AUTO: 90.5 FL (ref 79–97)
MONOCYTES # BLD AUTO: 0.76 10*3/MM3 (ref 0.1–0.9)
MONOCYTES NFR BLD AUTO: 8.5 % (ref 5–12)
NEUTROPHILS NFR BLD AUTO: 6.5 10*3/MM3 (ref 1.7–7)
NEUTROPHILS NFR BLD AUTO: 72.5 % (ref 42.7–76)
NRBC BLD AUTO-RTO: 0 /100 WBC (ref 0–0.2)
PHOSPHATE SERPL-MCNC: 3.5 MG/DL (ref 2.5–4.5)
PLATELET # BLD AUTO: 219 10*3/MM3 (ref 140–450)
PMV BLD AUTO: 11.3 FL (ref 6–12)
POTASSIUM SERPL-SCNC: 4.7 MMOL/L (ref 3.5–5.2)
PROT SERPL-MCNC: 7.2 G/DL (ref 6–8.5)
RBC # BLD AUTO: 4.41 10*6/MM3 (ref 3.77–5.28)
SODIUM SERPL-SCNC: 141 MMOL/L (ref 136–145)
WBC NRBC COR # BLD AUTO: 8.95 10*3/MM3 (ref 3.4–10.8)

## 2025-07-02 PROCEDURE — 85025 COMPLETE CBC W/AUTO DIFF WBC: CPT

## 2025-07-02 PROCEDURE — 99213 OFFICE O/P EST LOW 20 MIN: CPT | Performed by: INTERNAL MEDICINE

## 2025-07-02 PROCEDURE — 96374 THER/PROPH/DIAG INJ IV PUSH: CPT

## 2025-07-02 PROCEDURE — 1125F AMNT PAIN NOTED PAIN PRSNT: CPT | Performed by: INTERNAL MEDICINE

## 2025-07-02 PROCEDURE — 80053 COMPREHEN METABOLIC PANEL: CPT

## 2025-07-02 PROCEDURE — 84100 ASSAY OF PHOSPHORUS: CPT

## 2025-07-02 PROCEDURE — 83735 ASSAY OF MAGNESIUM: CPT

## 2025-07-02 PROCEDURE — 25010000002 ZOLEDRONIC ACID 5 MG/100ML SOLUTION: Performed by: INTERNAL MEDICINE

## 2025-07-02 RX ORDER — ZOLEDRONIC ACID 0.05 MG/ML
5 INJECTION, SOLUTION INTRAVENOUS ONCE
Status: CANCELLED | OUTPATIENT
Start: 2025-07-02

## 2025-07-02 RX ORDER — SODIUM CHLORIDE 9 MG/ML
250 INJECTION, SOLUTION INTRAVENOUS ONCE
Status: CANCELLED | OUTPATIENT
Start: 2025-07-02

## 2025-07-02 RX ORDER — ZOLEDRONIC ACID 0.05 MG/ML
5 INJECTION, SOLUTION INTRAVENOUS ONCE
Status: COMPLETED | OUTPATIENT
Start: 2025-07-02 | End: 2025-07-02

## 2025-07-02 RX ADMIN — ZOLEDRONIC ACID 5 MG: 5 INJECTION INTRAVENOUS at 15:00

## 2025-07-02 NOTE — TELEPHONE ENCOUNTER
UNABLE TO WARM TRANSFER   Caller: Monica ROWELL    Relationship: Emergency Contact    Best call back number: [phone]     What orders are you requesting (i.e. lab or imaging): XRAY     In what timeframe would the patient need to come in: AS SOON AS POSSIBLE     Where will you receive your lab/imaging services: [facility]     Additional notes: PATIENTS DAUGHTER STATES PATIENT WAS WALKING UP THE STEPS AND RIGHT KNEE GAVE OUT AND NOW SHE IS HAVING TROUBLE WALKING ON IT

## 2025-07-03 ENCOUNTER — OFFICE VISIT (OUTPATIENT)
Dept: INTERNAL MEDICINE | Facility: CLINIC | Age: OVER 89
End: 2025-07-03
Payer: MEDICARE

## 2025-07-03 VITALS
HEART RATE: 55 BPM | HEIGHT: 59 IN | WEIGHT: 120 LBS | BODY MASS INDEX: 24.19 KG/M2 | OXYGEN SATURATION: 98 % | TEMPERATURE: 98 F | SYSTOLIC BLOOD PRESSURE: 138 MMHG | DIASTOLIC BLOOD PRESSURE: 85 MMHG

## 2025-07-03 DIAGNOSIS — M25.561 ACUTE PAIN OF RIGHT KNEE: Primary | ICD-10-CM

## 2025-07-03 PROCEDURE — 1160F RVW MEDS BY RX/DR IN RCRD: CPT

## 2025-07-03 PROCEDURE — 1125F AMNT PAIN NOTED PAIN PRSNT: CPT

## 2025-07-03 PROCEDURE — 99213 OFFICE O/P EST LOW 20 MIN: CPT

## 2025-07-03 PROCEDURE — 1159F MED LIST DOCD IN RCRD: CPT

## 2025-07-03 NOTE — PROGRESS NOTES
"Chief Complaint  Knee Pain (Right knee pain )    Subjective        Neha Guillory presents to [facility]  History of Present Illness  89-year-old female presenting with right posterior knee pain.  Patient of ANKIT Kimbrough.  States that on Friday she was going up the stairs and felt acute pain behind her right knee.  Family member noticed a bulge at that time.  Swelling has gone down and almost resolved.  States that pain has improved.  Going downstairs has discomfort.  Going upstairs is a struggle but not due to pain.  Due to instability with the knee, patient has been using her walker more frequently.  Denies any numbness or tingling down the leg  Knee Pain         Objective   Vital Signs:  /85 (BP Location: Right arm, Patient Position: Sitting, Cuff Size: Adult)   Pulse 55   Temp 98 °F (36.7 °C) (Temporal)   Ht 149.9 cm (59.02\")   Wt 54.4 kg (120 lb)   SpO2 98%   BMI 24.22 kg/m²   Estimated body mass index is 24.22 kg/m² as calculated from the following:    Height as of this encounter: 149.9 cm (59.02\").    Weight as of this encounter: 54.4 kg (120 lb).       BMI is within normal parameters. No other follow-up for BMI required.      Physical Exam  Vitals reviewed.   Constitutional:       Appearance: Normal appearance.   HENT:      Head: Normocephalic.   Musculoskeletal:         General: Swelling present. No tenderness. Normal range of motion.      Cervical back: Normal range of motion.   Skin:     General: Skin is warm and dry.      Capillary Refill: Capillary refill takes less than 2 seconds.      Findings: No bruising or erythema.   Neurological:      General: No focal deficit present.      Mental Status: She is alert and oriented to person, place, and time.   Psychiatric:         Mood and Affect: Mood normal.         Behavior: Behavior normal.         Thought Content: Thought content normal.         Judgment: Judgment normal.        Result Review :      Common labs "          4/3/2025    05:29 4/4/2025    04:54 7/2/2025    13:46   Common Labs   Glucose 87  82  95    BUN 19  21  26.3    Creatinine 1.03  1.14  1.02    Sodium 143  136  141    Potassium 3.9  3.9  4.7    Chloride 107  106  106    Calcium 9.0  8.7  10.0    Albumin   4.6    Total Bilirubin   0.4    Alkaline Phosphatase   50    AST (SGOT)   19    ALT (SGPT)   14    WBC 10.67  10.08  8.95    Hemoglobin 11.7  10.7  12.7    Hematocrit 35.4  33.4  39.9    Platelets 192  183  219          Current Outpatient Medications on File Prior to Visit   Medication Sig Dispense Refill    amLODIPine (NORVASC) 5 MG tablet Take 1 tablet by mouth Daily. 90 tablet 3    B Complex Vitamins (vitamin b complex) capsule capsule Take 1 capsule by mouth Daily.      carvedilol (COREG) 12.5 MG tablet TAKE ONE TABLET BY MOUTH EVERY 12 HOURS 180 tablet 3    cholecalciferol (VITAMIN D3) 1000 units tablet Take 1 tablet by mouth Daily.      mirtazapine (REMERON) 15 MG tablet Take 1 tablet by mouth Every Night. 90 tablet 1    multivitamin (THERAGRAN) tablet tablet Take  by mouth Daily. Gummies      olmesartan (BENICAR) 20 MG tablet TAKE 1 TABLET BY MOUTH DAILY 90 tablet 3    tamoxifen (NOLVADEX) 20 MG chemo tablet Take 1 tablet by mouth Daily.      vitamin C (ASCORBIC ACID) 250 MG tablet Take 4 tablets by mouth Every Night.      Zinc 50 MG tablet Take 1 tablet by mouth Every Night.      Lactobacillus (PROBIOTIC ACIDOPHILUS PO) Take 1 tablet by mouth Every Night. (Patient not taking: Reported on 7/3/2025)       No current facility-administered medications on file prior to visit.                Assessment and Plan     Diagnoses and all orders for this visit:    1. Acute pain of right knee (Primary)        Patient Instructions   Rest. Apply heat as needed. Apply IcyHot or Aspercreme as needed. Perform PT exercises as instructed. Follow-up as needed.            Follow Up     Return if symptoms worsen or fail to improve.  Patient was given instructions and  counseling regarding her condition or for health maintenance advice. Please see specific information pulled into the AVS if appropriate.

## 2025-07-03 NOTE — PATIENT INSTRUCTIONS
Rest. Apply heat as needed. Apply IcyHot or Aspercreme as needed. Perform PT exercises as instructed. Follow-up as needed.

## 2025-07-30 LAB
MC_CV_MDC_IDC_RATE_1: 150
MC_CV_MDC_IDC_RATE_1: 171
MC_CV_MDC_IDC_THERAPIES: NORMAL
MC_CV_MDC_IDC_ZONE_ID: 2
MC_CV_MDC_IDC_ZONE_ID: 6
MDC_IDC_MSMT_BATTERY_REMAINING_LONGEVITY: 174 MO
MDC_IDC_MSMT_BATTERY_RRT_TRIGGER: 2.62
MDC_IDC_MSMT_BATTERY_STATUS: NORMAL
MDC_IDC_MSMT_BATTERY_VOLTAGE: 3.19
MDC_IDC_MSMT_LEADCHNL_RA_DTM: NORMAL
MDC_IDC_MSMT_LEADCHNL_RA_IMPEDANCE_VALUE: 361
MDC_IDC_MSMT_LEADCHNL_RA_PACING_THRESHOLD_AMPLITUDE: 0.5
MDC_IDC_MSMT_LEADCHNL_RA_PACING_THRESHOLD_POLARITY: NORMAL
MDC_IDC_MSMT_LEADCHNL_RA_PACING_THRESHOLD_PULSEWIDTH: 0.4
MDC_IDC_MSMT_LEADCHNL_RA_SENSING_INTR_AMPL: 3.62
MDC_IDC_MSMT_LEADCHNL_RV_DTM: NORMAL
MDC_IDC_MSMT_LEADCHNL_RV_IMPEDANCE_VALUE: 551
MDC_IDC_MSMT_LEADCHNL_RV_PACING_THRESHOLD_AMPLITUDE: 0.62
MDC_IDC_MSMT_LEADCHNL_RV_PACING_THRESHOLD_POLARITY: NORMAL
MDC_IDC_MSMT_LEADCHNL_RV_PACING_THRESHOLD_PULSEWIDTH: 0.4
MDC_IDC_MSMT_LEADCHNL_RV_SENSING_INTR_AMPL: 23.88
MDC_IDC_PG_IMPLANT_DTM: NORMAL
MDC_IDC_PG_MFG: NORMAL
MDC_IDC_PG_MODEL: NORMAL
MDC_IDC_PG_SERIAL: NORMAL
MDC_IDC_PG_TYPE: NORMAL
MDC_IDC_SESS_DTM: NORMAL
MDC_IDC_SESS_TYPE: NORMAL
MDC_IDC_SET_BRADY_AT_MODE_SWITCH_RATE: 171
MDC_IDC_SET_BRADY_LOWRATE: 60
MDC_IDC_SET_BRADY_MAX_SENSOR_RATE: 130
MDC_IDC_SET_BRADY_MAX_TRACKING_RATE: 130
MDC_IDC_SET_BRADY_MODE: NORMAL
MDC_IDC_SET_BRADY_PAV_DELAY: 180
MDC_IDC_SET_BRADY_SAV_DELAY: 150
MDC_IDC_SET_LEADCHNL_RA_PACING_AMPLITUDE: 1.5
MDC_IDC_SET_LEADCHNL_RA_PACING_POLARITY: NORMAL
MDC_IDC_SET_LEADCHNL_RA_PACING_PULSEWIDTH: 0.4
MDC_IDC_SET_LEADCHNL_RA_SENSING_POLARITY: NORMAL
MDC_IDC_SET_LEADCHNL_RA_SENSING_SENSITIVITY: 0.3
MDC_IDC_SET_LEADCHNL_RV_PACING_AMPLITUDE: 2
MDC_IDC_SET_LEADCHNL_RV_PACING_POLARITY: NORMAL
MDC_IDC_SET_LEADCHNL_RV_PACING_PULSEWIDTH: 0.4
MDC_IDC_SET_LEADCHNL_RV_SENSING_POLARITY: NORMAL
MDC_IDC_SET_LEADCHNL_RV_SENSING_SENSITIVITY: 0.9
MDC_IDC_SET_ZONE_STATUS: NORMAL
MDC_IDC_SET_ZONE_STATUS: NORMAL
MDC_IDC_SET_ZONE_TYPE: NORMAL
MDC_IDC_SET_ZONE_TYPE: NORMAL
MDC_IDC_STAT_AT_BURDEN_PERCENT: 0
MDC_IDC_STAT_BRADY_RA_PERCENT_PACED: 14.46
MDC_IDC_STAT_BRADY_RV_PERCENT_PACED: 26.78

## 2025-08-01 ENCOUNTER — OFFICE VISIT (OUTPATIENT)
Dept: CARDIOLOGY | Age: OVER 89
End: 2025-08-01
Payer: MEDICARE

## 2025-08-01 VITALS
HEART RATE: 70 BPM | SYSTOLIC BLOOD PRESSURE: 118 MMHG | DIASTOLIC BLOOD PRESSURE: 74 MMHG | OXYGEN SATURATION: 96 % | BODY MASS INDEX: 23.51 KG/M2 | HEIGHT: 59 IN | WEIGHT: 116.6 LBS

## 2025-08-01 DIAGNOSIS — G47.33 OSA (OBSTRUCTIVE SLEEP APNEA): ICD-10-CM

## 2025-08-01 DIAGNOSIS — I44.2 AV BLOCK, COMPLETE: Primary | ICD-10-CM

## 2025-08-01 DIAGNOSIS — Z66 DNR (DO NOT RESUSCITATE): ICD-10-CM

## 2025-08-01 DIAGNOSIS — Z95.0 PRESENCE OF CARDIAC PACEMAKER: ICD-10-CM

## 2025-08-01 DIAGNOSIS — I35.0 AORTIC STENOSIS, MILD: ICD-10-CM

## 2025-08-01 DIAGNOSIS — I10 ESSENTIAL HYPERTENSION: ICD-10-CM

## 2025-08-01 RX ORDER — OLMESARTAN MEDOXOMIL 20 MG/1
20 TABLET ORAL DAILY
Qty: 90 TABLET | Refills: 3 | Status: SHIPPED | OUTPATIENT
Start: 2025-08-01

## 2025-08-01 RX ORDER — AMLODIPINE BESYLATE 5 MG/1
5 TABLET ORAL DAILY
Qty: 90 TABLET | Refills: 3 | Status: SHIPPED | OUTPATIENT
Start: 2025-08-01

## 2025-08-01 RX ORDER — CARVEDILOL 12.5 MG/1
12.5 TABLET ORAL EVERY 12 HOURS PRN
Qty: 180 TABLET | Refills: 3 | Status: SHIPPED | OUTPATIENT
Start: 2025-08-01

## 2025-08-01 NOTE — PROGRESS NOTES
CARDIOLOGY        Patient Name: Neha Guillory  :1933  Age: 89 y.o.  Primary Cardiologist: Zack Mohr MD  Encounter Provider:  ENZO Sloan    Date of Service: 2025      CHIEF COMPLAINT / REASON FOR OFFICE VISIT     Follow-up (Hypertension/Hypertrophic cardiomyopathy/PPM)        HPI  Neha Guillory is a 89 y.o. female who presents today for annual assessment.     Pt has a  history significant for hypertrophic cardiomyopathy, aortic valve stenosis, hypertension.    Patient reports that she has done well since the last assessment.  She is no longer having episodes of syncope or falls.  She has done remarkably well since her pacemaker was implanted.  She is asymptomatic and denies any episodes of chest pain, shortness of breath, palpitations, lightheadedness, swelling or fatigue.      HISTORY OF PRESENT ILLNESS       Echocardiogram 2022: LVEF 64%.  Moderate LVH.  No evidence of LVOT obstruction.  Moderately dilated left atrium.  Mild tricuspid valve regurgitation.  Mild aortic valve stenosis.  No significant change from prior study 1 year ago.    Echocardiogram 2024.  LVEF 64%.  Moderate calcification of aortic valve with noted thickening.  Aortic valve was poorly visualized but appears trileaflet.  No significant aortic valve regurgitation.  Maximum pressure gradient 14.2 mmHg.  Mean pressure gradient 7.0 mmHg.  Mild mitral valve regurgitation.  No significant mitral valve stenosis.    Echocardiogram 2025.  LVEF 55%.  Moderate LVH.  Left atrial cavity is severely dilated.  Left atrial volume is severely increased.  Mild aortic valve stenosis.  Peak flow distal to the aortic valve 233.8 cm/s.  Maximum pressure gradient 22 mmHg.  Mean pressure gradient 12 mmHg.  Mild aortic valve regurgitation.  Moderate mitral valve regurgitation.  Calculated RVSP 35 mmHg.    Patient was seen during hospitalization in 2025 where she had 3 episodes of syncope in a 6-month timeframe.   "While being monitored patient was found to have a 8-second pause of ventricular standstill with symptoms of fatigue, dizziness and vision changes.  She was ultimately referred for placement of a dual-chamber pacemaker.    Placement of dual-chamber pacemaker 1/7/2025.  [employer] LB PPM    The following portions of the patient's history were reviewed and updated as appropriate: allergies, current medications, past family history, past medical history, past social history, past surgical history and problem list.      VITAL SIGNS     Visit Vitals  /74 (BP Location: Right arm, Patient Position: Sitting, Cuff Size: Adult)   Pulse 70   Ht 149.9 cm (59.02\")   Wt 52.9 kg (116 lb 9.6 oz)   SpO2 96%   BMI 23.54 kg/m²         Wt Readings from Last 3 Encounters:   08/01/25 52.9 kg (116 lb 9.6 oz)   07/03/25 54.4 kg (120 lb)   07/02/25 54.3 kg (119 lb 9.6 oz)     Body mass index is 23.54 kg/m².      REVIEW OF SYSTEMS     Review of Systems   Constitutional: Negative for chills, fever, weight gain and weight loss.   Cardiovascular:  Negative for leg swelling.   Respiratory:  Negative for cough, snoring and wheezing.    Hematologic/Lymphatic: Negative for bleeding problem. Does not bruise/bleed easily.   Skin:  Negative for color change.   Musculoskeletal:  Negative for falls, joint pain and myalgias.   Gastrointestinal:  Negative for melena.   Genitourinary:  Negative for hematuria.   Neurological:  Negative for excessive daytime sleepiness.   Psychiatric/Behavioral:  Negative for depression. The patient is not nervous/anxious.            PHYSICAL EXAMINATION     Vitals and nursing note reviewed.   Constitutional:       Appearance: Normal appearance. Well-developed.   Eyes:      Conjunctiva/sclera: Conjunctivae normal.   Neck:      Vascular: No carotid bruit.   Pulmonary:      Breath sounds: Normal breath sounds.   Cardiovascular:      Normal rate. Regular rhythm. Normal S1 with normal intensity. Normal S2 with normal " intensity.       Murmurs: There is no murmur.      No gallop.  No click. No rub.   Edema:     Peripheral edema absent.   Musculoskeletal: Normal range of motion. Skin:     General: Skin is warm and dry.   Neurological:      Mental Status: Alert and oriented to person, place, and time.      GCS: GCS eye subscore is 4. GCS verbal subscore is 5. GCS motor subscore is 6.   Psychiatric:         Speech: Speech normal.         Behavior: Behavior normal.         Thought Content: Thought content normal.         Judgment: Judgment normal.           REVIEWED DATA     Procedures            Lab Results   Component Value Date     07/02/2025     04/04/2025    K 4.7 07/02/2025    K 3.9 04/04/2025     07/02/2025     04/04/2025    CO2 22.3 07/02/2025    CO2 22.0 04/04/2025    BUN 26.3 (H) 07/02/2025    BUN 21 04/04/2025    CREATININE 1.02 (H) 07/02/2025    CREATININE 1.14 (H) 04/04/2025    EGFRIFNONA 70 01/03/2022    EGFRIFNONA 60 (L) 08/30/2021    EGFRIFAFRI 73 08/30/2021    EGFRIFAFRI 69 02/24/2021    GLUCOSE 95 07/02/2025    GLUCOSE 82 04/04/2025    CALCIUM 10.0 (H) 07/02/2025    CALCIUM 8.7 04/04/2025    ALBUMIN 4.6 07/02/2025    ALBUMIN 3.7 04/02/2025    BILITOT 0.4 07/02/2025    BILITOT <0.2 04/02/2025    AST 19 07/02/2025    AST 19 04/02/2025    ALT 14 07/02/2025    ALT 10 04/02/2025     Lab Results   Component Value Date    WBC 8.95 07/02/2025    WBC 10.08 04/04/2025    HGB 12.7 07/02/2025    HGB 10.7 (L) 04/04/2025    HCT 39.9 07/02/2025    HCT 33.4 (L) 04/04/2025    MCV 90.5 07/02/2025    MCV 90.5 04/04/2025     07/02/2025     04/04/2025     Lab Results   Component Value Date    .0 (H) 05/07/2019     Lab Results   Component Value Date    CKTOTAL 134 12/12/2014    TROPONINT 20 (H) 01/06/2025     Lab Results   Component Value Date    TSH 1.610 01/05/2025    TSH 0.858 07/05/2022             ASSESSMENT & PLAN     Diagnoses and all orders for this visit:    1. AV block, complete  (Primary)  Overview:  1/7/2025: dual chamber pacemaker       2. Presence of cardiac pacemaker  Assessment & Plan:  Secondary to an 8-second pause  Patient no longer experiencing falls or syncope      3. Aortic stenosis, mild  Overview:  Echocardiogram 1/5/2025.  LVEF 55%.  Moderate LVH.  Left atrial cavity is severely dilated.  Left atrial volume is severely increased.  Mild aortic valve stenosis.  Peak flow distal to the aortic valve 233.8 cm/s.  Maximum pressure gradient 22 mmHg.  Mean pressure gradient 12 mmHg.  Mild aortic valve regurgitation.  Moderate mitral valve regurgitation.  Calculated RVSP 35 mmHg.    Assessment & Plan:  Currently asymptomatic      4. Essential hypertension  Overview:  Amlodipine 5 mg/day  Olmesartan 20 mg/day  Carvedilol 12.5 mg twice daily as needed only for systolic blood pressure exceeding 130/80    Assessment & Plan:  Hypertension is stable and controlled  Continue current treatment regimen.  Dietary sodium restriction.  Regular aerobic exercise.  Ambulatory blood pressure monitoring.  Blood pressure will be reassessed in 1 year.    Orders:  -     amLODIPine (NORVASC) 5 MG tablet; Take 1 tablet by mouth Daily.  Dispense: 90 tablet; Refill: 3  -     carvedilol (COREG) 12.5 MG tablet; Take 1 tablet by mouth Every 12 (Twelve) Hours As Needed (SBP > 130/80). Taking as needed and not having to take it as often lately.  Dispense: 180 tablet; Refill: 3  -     olmesartan (BENICAR) 20 MG tablet; Take 1 tablet by mouth Daily.  Dispense: 90 tablet; Refill: 3    5. DNR (do not resuscitate)    6. RICH (obstructive sleep apnea)  Assessment & Plan:  Encouraged compliance with CPAP          Return in about 1 year (around 8/1/2026) for Dr. Mohr- Routine.    Future Appointments         Provider Department Center    8/13/2025 2:30 PM (Arrive by 2:15 PM) Yvan Ruiz III, NP-C [facility]IMARY [facility]    7/6/2026 12:30 PM INFUSION ACCESS CHAIR- OLIVIA ELIZABETH  [facility]    7/6/2026 1:00 PM Briseyda Palumbo APRN [facility] GROUP HEMATOLOGY & ONCOLOGY [facility]    7/6/2026 1:30 PM CHAIR 01 [facility] - [facility]              MEDICATIONS         Discharge Medications            Accurate as of August 1, 2025  1:30 PM. If you have any questions, ask your nurse or doctor.                Changes to Medications        Instructions Start Date   carvedilol 12.5 MG tablet  Commonly known as: COREG  What changed:   when to take this  reasons to take this  additional instructions  Changed by: ENZO Oliveros   12.5 mg, Oral, Every 12 Hours PRN, Taking as needed and not having to take it as often lately.             Continue These Medications        Instructions Start Date   amLODIPine 5 MG tablet  Commonly known as: NORVASC   5 mg, Oral, Daily      cholecalciferol 25 MCG (1000 UT) tablet  Commonly known as: VITAMIN D3   1,000 Units, Daily      mirtazapine 15 MG tablet  Commonly known as: REMERON   15 mg, Oral, Nightly      multivitamin tablet tablet   Daily      olmesartan 20 MG tablet  Commonly known as: BENICAR   20 mg, Oral, Daily      PROBIOTIC ACIDOPHILUS PO   1 tablet, Nightly      tamoxifen 20 MG chemo tablet  Commonly known as: NOLVADEX   20 mg, Daily      vitamin b complex capsule capsule   1 capsule, Daily      vitamin C 250 MG tablet  Commonly known as: ASCORBIC ACID   1,000 mg, Nightly      Zinc 50 MG tablet   50 mg, Nightly                   **Dragon Disclaimer:   Much of this encounter note is an electronic transcription/translation of spoken language to printed text. The electronic translation of spoken language may permit erroneous, or at times, nonsensical words or phrases to be inadvertently transcribed. Although I have reviewed the note for such errors, some may still exist.

## 2025-08-05 LAB
MC_CV_MDC_IDC_RATE_1: 150
MC_CV_MDC_IDC_RATE_1: 171
MC_CV_MDC_IDC_THERAPIES: NORMAL
MC_CV_MDC_IDC_ZONE_ID: 2
MC_CV_MDC_IDC_ZONE_ID: 6
MDC_IDC_MSMT_BATTERY_REMAINING_LONGEVITY: 174 MO
MDC_IDC_MSMT_BATTERY_RRT_TRIGGER: 2.62
MDC_IDC_MSMT_BATTERY_STATUS: NORMAL
MDC_IDC_MSMT_BATTERY_VOLTAGE: 3.19
MDC_IDC_MSMT_LEADCHNL_RA_DTM: NORMAL
MDC_IDC_MSMT_LEADCHNL_RA_IMPEDANCE_VALUE: 361
MDC_IDC_MSMT_LEADCHNL_RA_PACING_THRESHOLD_AMPLITUDE: 0.5
MDC_IDC_MSMT_LEADCHNL_RA_PACING_THRESHOLD_POLARITY: NORMAL
MDC_IDC_MSMT_LEADCHNL_RA_PACING_THRESHOLD_PULSEWIDTH: 0.4
MDC_IDC_MSMT_LEADCHNL_RA_SENSING_INTR_AMPL: 3.62
MDC_IDC_MSMT_LEADCHNL_RV_DTM: NORMAL
MDC_IDC_MSMT_LEADCHNL_RV_IMPEDANCE_VALUE: 551
MDC_IDC_MSMT_LEADCHNL_RV_PACING_THRESHOLD_AMPLITUDE: 0.62
MDC_IDC_MSMT_LEADCHNL_RV_PACING_THRESHOLD_POLARITY: NORMAL
MDC_IDC_MSMT_LEADCHNL_RV_PACING_THRESHOLD_PULSEWIDTH: 0.4
MDC_IDC_MSMT_LEADCHNL_RV_SENSING_INTR_AMPL: 23.88
MDC_IDC_PG_IMPLANT_DTM: NORMAL
MDC_IDC_PG_MFG: NORMAL
MDC_IDC_PG_MODEL: NORMAL
MDC_IDC_PG_SERIAL: NORMAL
MDC_IDC_PG_TYPE: NORMAL
MDC_IDC_SESS_DTM: NORMAL
MDC_IDC_SESS_TYPE: NORMAL
MDC_IDC_SET_BRADY_AT_MODE_SWITCH_RATE: 171
MDC_IDC_SET_BRADY_LOWRATE: 60
MDC_IDC_SET_BRADY_MAX_SENSOR_RATE: 130
MDC_IDC_SET_BRADY_MAX_TRACKING_RATE: 130
MDC_IDC_SET_BRADY_MODE: NORMAL
MDC_IDC_SET_BRADY_PAV_DELAY: 180
MDC_IDC_SET_BRADY_SAV_DELAY: 150
MDC_IDC_SET_LEADCHNL_RA_PACING_AMPLITUDE: 1.5
MDC_IDC_SET_LEADCHNL_RA_PACING_POLARITY: NORMAL
MDC_IDC_SET_LEADCHNL_RA_PACING_PULSEWIDTH: 0.4
MDC_IDC_SET_LEADCHNL_RA_SENSING_POLARITY: NORMAL
MDC_IDC_SET_LEADCHNL_RA_SENSING_SENSITIVITY: 0.3
MDC_IDC_SET_LEADCHNL_RV_PACING_AMPLITUDE: 2
MDC_IDC_SET_LEADCHNL_RV_PACING_POLARITY: NORMAL
MDC_IDC_SET_LEADCHNL_RV_PACING_PULSEWIDTH: 0.4
MDC_IDC_SET_LEADCHNL_RV_SENSING_POLARITY: NORMAL
MDC_IDC_SET_LEADCHNL_RV_SENSING_SENSITIVITY: 0.9
MDC_IDC_SET_ZONE_STATUS: NORMAL
MDC_IDC_SET_ZONE_STATUS: NORMAL
MDC_IDC_SET_ZONE_TYPE: NORMAL
MDC_IDC_SET_ZONE_TYPE: NORMAL
MDC_IDC_STAT_AT_BURDEN_PERCENT: 0
MDC_IDC_STAT_BRADY_RA_PERCENT_PACED: 14.46
MDC_IDC_STAT_BRADY_RV_PERCENT_PACED: 26.78

## (undated) DEVICE — PATIENT RETURN ELECTRODE, SINGLE-USE, CONTACT QUALITY MONITORING, ADULT, WITH 9FT CORD, FOR PATIENTS WEIGING OVER 33LBS. (15KG): Brand: MEGADYNE

## (undated) DEVICE — INTRO SHEATH PRELUDE SNAP .038 9F 13CM W/SDPRT BLK

## (undated) DEVICE — ADHS SKIN SURG TISS VISC PREMIERPRO EXOFIN HI/VISC FAST/DRY

## (undated) DEVICE — SLITTER CATH GUIDE ATTAIN ADJ

## (undated) DEVICE — GLV SURG BIOGEL M LTX PF 6 1/2

## (undated) DEVICE — SUT SILK 2/0 FS BLK 18IN 685G

## (undated) DEVICE — STPLR SKIN VISISTAT WD 35CT

## (undated) DEVICE — ADAPT SEAL SAFESHEATH

## (undated) DEVICE — Device

## (undated) DEVICE — CONTAINER,SPECIMEN,OR STERILE,4OZ: Brand: MEDLINE

## (undated) DEVICE — DRAPE,CHEST,FENES,15X10,STERIL: Brand: MEDLINE

## (undated) DEVICE — CATH DEFLECT SELECTSITE 9F 43CM W/ART HNDL

## (undated) DEVICE — ELECTRD BLD EZ CLN MOD XLNG 2.75IN

## (undated) DEVICE — APPL CHLORAPREP HI/LITE 26ML ORNG

## (undated) DEVICE — PK PROC MINOR TOWER 40

## (undated) DEVICE — INTRO SHEATH PRELUDE SNAP .038 7F 13CM W/SDPRT

## (undated) DEVICE — LOU PACE DEFIB: Brand: MEDLINE INDUSTRIES, INC.

## (undated) DEVICE — SUT VIC 3/0 TIES 18IN J110T

## (undated) DEVICE — SUT VIC 5/0 PS2 18IN J495H

## (undated) DEVICE — SUT VIC 3/0 CT2 27IN J232H

## (undated) DEVICE — PENCL SMOKE/EVAC MEGADYNE TELESCP 10FT